# Patient Record
Sex: FEMALE | Race: WHITE | NOT HISPANIC OR LATINO | Employment: UNEMPLOYED | URBAN - METROPOLITAN AREA
[De-identification: names, ages, dates, MRNs, and addresses within clinical notes are randomized per-mention and may not be internally consistent; named-entity substitution may affect disease eponyms.]

---

## 2017-04-12 ENCOUNTER — COMMUNICATION - HEALTHEAST (OUTPATIENT)
Dept: INTERNAL MEDICINE | Facility: CLINIC | Age: 67
End: 2017-04-12

## 2017-04-12 DIAGNOSIS — J44.9 COPD (CHRONIC OBSTRUCTIVE PULMONARY DISEASE) (H): ICD-10-CM

## 2017-07-28 ENCOUNTER — COMMUNICATION - HEALTHEAST (OUTPATIENT)
Dept: INTERNAL MEDICINE | Facility: CLINIC | Age: 67
End: 2017-07-28

## 2017-07-28 DIAGNOSIS — J44.9 COPD (CHRONIC OBSTRUCTIVE PULMONARY DISEASE) (H): ICD-10-CM

## 2017-09-29 ENCOUNTER — HOSPITAL ENCOUNTER (OUTPATIENT)
Dept: MAMMOGRAPHY | Facility: HOSPITAL | Age: 67
Discharge: HOME | End: 2017-09-29
Attending: INTERNAL MEDICINE

## 2017-09-29 DIAGNOSIS — Z12.31 VISIT FOR SCREENING MAMMOGRAM: ICD-10-CM

## 2017-10-02 ENCOUNTER — COMMUNICATION - HEALTHEAST (OUTPATIENT)
Dept: INTERNAL MEDICINE | Facility: CLINIC | Age: 67
End: 2017-10-02

## 2017-10-02 ENCOUNTER — HOSPITAL ENCOUNTER (OUTPATIENT)
Dept: MAMMOGRAPHY | Facility: HOSPITAL | Age: 67
Discharge: HOME | End: 2017-10-02
Attending: INTERNAL MEDICINE

## 2017-10-02 DIAGNOSIS — N64.89 BREAST ASYMMETRY: ICD-10-CM

## 2017-11-09 ENCOUNTER — AMBULATORY - HEALTHEAST (OUTPATIENT)
Dept: INTERNAL MEDICINE | Facility: CLINIC | Age: 67
End: 2017-11-09

## 2017-11-09 ENCOUNTER — OFFICE VISIT - HEALTHEAST (OUTPATIENT)
Dept: INTERNAL MEDICINE | Facility: CLINIC | Age: 67
End: 2017-11-09

## 2017-11-09 DIAGNOSIS — D64.9 ANEMIA: ICD-10-CM

## 2017-11-09 DIAGNOSIS — Z00.00 GENERAL MEDICAL EXAM: ICD-10-CM

## 2017-11-09 DIAGNOSIS — J44.9 COPD (CHRONIC OBSTRUCTIVE PULMONARY DISEASE) (H): ICD-10-CM

## 2017-11-09 DIAGNOSIS — Z00.00 ROUTINE GENERAL MEDICAL EXAMINATION AT A HEALTH CARE FACILITY: ICD-10-CM

## 2017-11-09 DIAGNOSIS — I10 HTN (HYPERTENSION): ICD-10-CM

## 2017-11-09 RX ORDER — ALBUTEROL SULFATE 90 UG/1
2 AEROSOL, METERED RESPIRATORY (INHALATION) EVERY 6 HOURS PRN
Qty: 1 INHALER | Refills: 3 | Status: SHIPPED | OUTPATIENT
Start: 2017-11-09 | End: 2021-12-06

## 2017-11-09 RX ORDER — MECOBALAMIN 5000 MCG
15 TABLET,DISINTEGRATING ORAL DAILY
Status: ON HOLD | COMMUNITY
Start: 2017-11-09 | End: 2023-01-01

## 2017-11-09 RX ORDER — CETIRIZINE HYDROCHLORIDE 10 MG/1
10 TABLET ORAL DAILY PRN
Status: SHIPPED | COMMUNITY
Start: 2017-11-09

## 2017-11-09 ASSESSMENT — MIFFLIN-ST. JEOR: SCORE: 1192.1

## 2017-11-10 ENCOUNTER — AMBULATORY - HEALTHEAST (OUTPATIENT)
Dept: LAB | Facility: CLINIC | Age: 67
End: 2017-11-10

## 2017-11-10 ENCOUNTER — COMMUNICATION - HEALTHEAST (OUTPATIENT)
Dept: INTERNAL MEDICINE | Facility: CLINIC | Age: 67
End: 2017-11-10

## 2017-11-10 ENCOUNTER — AMBULATORY - HEALTHEAST (OUTPATIENT)
Dept: INTERNAL MEDICINE | Facility: CLINIC | Age: 67
End: 2017-11-10

## 2017-11-10 DIAGNOSIS — E11.9 DIABETES (H): ICD-10-CM

## 2017-11-10 DIAGNOSIS — D64.9 ANEMIA: ICD-10-CM

## 2017-11-10 DIAGNOSIS — Z00.00 GENERAL MEDICAL EXAM: ICD-10-CM

## 2017-11-10 DIAGNOSIS — Z00.00 ROUTINE GENERAL MEDICAL EXAMINATION AT A HEALTH CARE FACILITY: ICD-10-CM

## 2017-11-10 LAB
CHOLEST SERPL-MCNC: 169 MG/DL
FASTING STATUS PATIENT QL REPORTED: YES
HBA1C MFR BLD: 13.7 % (ref 3.5–6)
HDLC SERPL-MCNC: 64 MG/DL
LDLC SERPL CALC-MCNC: 76 MG/DL
TRIGL SERPL-MCNC: 146 MG/DL

## 2017-11-13 ENCOUNTER — COMMUNICATION - HEALTHEAST (OUTPATIENT)
Dept: INTERNAL MEDICINE | Facility: CLINIC | Age: 67
End: 2017-11-13

## 2017-11-15 ENCOUNTER — AMBULATORY - HEALTHEAST (OUTPATIENT)
Dept: EDUCATION SERVICES | Facility: CLINIC | Age: 67
End: 2017-11-15

## 2017-11-15 DIAGNOSIS — E11.9 DIABETES (H): ICD-10-CM

## 2017-12-09 ENCOUNTER — COMMUNICATION - HEALTHEAST (OUTPATIENT)
Dept: INTERNAL MEDICINE | Facility: CLINIC | Age: 67
End: 2017-12-09

## 2017-12-09 DIAGNOSIS — E11.9 DIABETES (H): ICD-10-CM

## 2017-12-10 RX ORDER — GLUCOSAMINE HCL/CHONDROITIN SU 500-400 MG
CAPSULE ORAL
Qty: 30 STRIP | Refills: 6 | Status: ON HOLD | OUTPATIENT
Start: 2017-12-10 | End: 2024-01-01

## 2017-12-20 ENCOUNTER — COMMUNICATION - HEALTHEAST (OUTPATIENT)
Dept: EDUCATION SERVICES | Facility: CLINIC | Age: 67
End: 2017-12-20

## 2017-12-20 ENCOUNTER — OFFICE VISIT - HEALTHEAST (OUTPATIENT)
Dept: EDUCATION SERVICES | Facility: CLINIC | Age: 67
End: 2017-12-20

## 2017-12-26 ENCOUNTER — OFFICE VISIT - HEALTHEAST (OUTPATIENT)
Dept: INTERNAL MEDICINE | Facility: CLINIC | Age: 67
End: 2017-12-26

## 2017-12-26 ENCOUNTER — AMBULATORY - HEALTHEAST (OUTPATIENT)
Dept: INTERNAL MEDICINE | Facility: CLINIC | Age: 67
End: 2017-12-26

## 2017-12-26 ENCOUNTER — COMMUNICATION - HEALTHEAST (OUTPATIENT)
Dept: INTERNAL MEDICINE | Facility: CLINIC | Age: 67
End: 2017-12-26

## 2017-12-26 DIAGNOSIS — E11.9 DIABETES MELLITUS, TYPE 2 (H): ICD-10-CM

## 2017-12-26 ASSESSMENT — MIFFLIN-ST. JEOR: SCORE: 1183.02

## 2018-10-02 ENCOUNTER — COMMUNICATION - HEALTHEAST (OUTPATIENT)
Dept: INTERNAL MEDICINE | Facility: CLINIC | Age: 68
End: 2018-10-02

## 2018-10-02 ENCOUNTER — AMBULATORY - HEALTHEAST (OUTPATIENT)
Dept: INTERNAL MEDICINE | Facility: CLINIC | Age: 68
End: 2018-10-02

## 2018-10-02 DIAGNOSIS — E11.9 DIABETES MELLITUS, TYPE 2 (H): ICD-10-CM

## 2018-10-17 ENCOUNTER — AMBULATORY - HEALTHEAST (OUTPATIENT)
Dept: LAB | Facility: CLINIC | Age: 68
End: 2018-10-17

## 2018-10-17 DIAGNOSIS — E11.9 DIABETES MELLITUS, TYPE 2 (H): ICD-10-CM

## 2018-10-17 LAB
ALBUMIN UR-MCNC: NEGATIVE MG/DL
APPEARANCE UR: CLEAR
BACTERIA #/AREA URNS HPF: ABNORMAL HPF
BILIRUB UR QL STRIP: NEGATIVE
CHOLEST SERPL-MCNC: 162 MG/DL
COLOR UR AUTO: YELLOW
CREAT UR-MCNC: 154.5 MG/DL
FASTING STATUS PATIENT QL REPORTED: YES
FASTING STATUS PATIENT QL REPORTED: YES
GLUCOSE BLD-MCNC: 167 MG/DL (ref 70–125)
GLUCOSE UR STRIP-MCNC: NEGATIVE MG/DL
HBA1C MFR BLD: 9.4 % (ref 3.5–6)
HDLC SERPL-MCNC: 62 MG/DL
HGB UR QL STRIP: NEGATIVE
KETONES UR STRIP-MCNC: NEGATIVE MG/DL
LDLC SERPL CALC-MCNC: 70 MG/DL
LEUKOCYTE ESTERASE UR QL STRIP: ABNORMAL
MICROALBUMIN UR-MCNC: 3.65 MG/DL (ref 0–1.99)
MICROALBUMIN/CREAT UR: 23.6 MG/G
NITRATE UR QL: POSITIVE
PH UR STRIP: 5.5 [PH] (ref 5–8)
RBC #/AREA URNS AUTO: ABNORMAL HPF
SP GR UR STRIP: >=1.03 (ref 1–1.03)
SQUAMOUS #/AREA URNS AUTO: ABNORMAL LPF
TRIGL SERPL-MCNC: 148 MG/DL
UROBILINOGEN UR STRIP-ACNC: ABNORMAL
WBC #/AREA URNS AUTO: ABNORMAL HPF

## 2018-10-20 LAB
BACTERIA SPEC CULT: ABNORMAL
BACTERIA SPEC CULT: ABNORMAL

## 2018-10-22 ENCOUNTER — COMMUNICATION - HEALTHEAST (OUTPATIENT)
Dept: INTERNAL MEDICINE | Facility: CLINIC | Age: 68
End: 2018-10-22

## 2018-10-23 ENCOUNTER — COMMUNICATION - HEALTHEAST (OUTPATIENT)
Dept: INTERNAL MEDICINE | Facility: CLINIC | Age: 68
End: 2018-10-23

## 2018-10-23 ENCOUNTER — OFFICE VISIT - HEALTHEAST (OUTPATIENT)
Dept: INTERNAL MEDICINE | Facility: CLINIC | Age: 68
End: 2018-10-23

## 2018-10-23 DIAGNOSIS — E11.9 DIABETES MELLITUS, TYPE 2 (H): ICD-10-CM

## 2018-10-23 ASSESSMENT — MIFFLIN-ST. JEOR: SCORE: 1223.85

## 2018-12-18 ENCOUNTER — COMMUNICATION - HEALTHEAST (OUTPATIENT)
Dept: INTERNAL MEDICINE | Facility: CLINIC | Age: 68
End: 2018-12-18

## 2018-12-18 DIAGNOSIS — J44.9 COPD (CHRONIC OBSTRUCTIVE PULMONARY DISEASE) (H): ICD-10-CM

## 2018-12-18 DIAGNOSIS — I10 HTN (HYPERTENSION): ICD-10-CM

## 2019-09-16 ENCOUNTER — COMMUNICATION - HEALTHEAST (OUTPATIENT)
Dept: LAB | Facility: CLINIC | Age: 69
End: 2019-09-16

## 2019-09-16 ENCOUNTER — AMBULATORY - HEALTHEAST (OUTPATIENT)
Dept: INTERNAL MEDICINE | Facility: CLINIC | Age: 69
End: 2019-09-16

## 2019-09-16 DIAGNOSIS — E11.8 TYPE 2 DIABETES MELLITUS WITH COMPLICATION, WITHOUT LONG-TERM CURRENT USE OF INSULIN (H): ICD-10-CM

## 2019-10-17 ENCOUNTER — COMMUNICATION - HEALTHEAST (OUTPATIENT)
Dept: INTERNAL MEDICINE | Facility: CLINIC | Age: 69
End: 2019-10-17

## 2019-10-17 DIAGNOSIS — Z00.00 ROUTINE GENERAL MEDICAL EXAMINATION AT A HEALTH CARE FACILITY: ICD-10-CM

## 2019-10-21 ENCOUNTER — AMBULATORY - HEALTHEAST (OUTPATIENT)
Dept: LAB | Facility: CLINIC | Age: 69
End: 2019-10-21

## 2019-10-21 DIAGNOSIS — E11.8 TYPE 2 DIABETES MELLITUS WITH COMPLICATION, WITHOUT LONG-TERM CURRENT USE OF INSULIN (H): ICD-10-CM

## 2019-10-21 LAB
CHOLEST SERPL-MCNC: 158 MG/DL
FASTING STATUS PATIENT QL REPORTED: YES
FASTING STATUS PATIENT QL REPORTED: YES
GLUCOSE BLD-MCNC: 141 MG/DL (ref 70–125)
HBA1C MFR BLD: 9.2 % (ref 3.5–6)
HDLC SERPL-MCNC: 56 MG/DL
LDLC SERPL CALC-MCNC: 69 MG/DL
TRIGL SERPL-MCNC: 166 MG/DL

## 2019-10-22 ENCOUNTER — COMMUNICATION - HEALTHEAST (OUTPATIENT)
Dept: INTERNAL MEDICINE | Facility: CLINIC | Age: 69
End: 2019-10-22

## 2019-10-31 ENCOUNTER — OFFICE VISIT - HEALTHEAST (OUTPATIENT)
Dept: INTERNAL MEDICINE | Facility: CLINIC | Age: 69
End: 2019-10-31

## 2019-10-31 ENCOUNTER — COMMUNICATION - HEALTHEAST (OUTPATIENT)
Dept: INTERNAL MEDICINE | Facility: CLINIC | Age: 69
End: 2019-10-31

## 2019-10-31 DIAGNOSIS — E11.69 TYPE 2 DIABETES MELLITUS WITH OTHER SPECIFIED COMPLICATION, WITHOUT LONG-TERM CURRENT USE OF INSULIN (H): ICD-10-CM

## 2019-10-31 DIAGNOSIS — Z00.00 ROUTINE GENERAL MEDICAL EXAMINATION AT A HEALTH CARE FACILITY: ICD-10-CM

## 2019-10-31 DIAGNOSIS — Z12.31 VISIT FOR SCREENING MAMMOGRAM: ICD-10-CM

## 2019-10-31 DIAGNOSIS — I10 HTN (HYPERTENSION): ICD-10-CM

## 2019-10-31 ASSESSMENT — MIFFLIN-ST. JEOR: SCORE: 1251.06

## 2019-12-26 ENCOUNTER — COMMUNICATION - HEALTHEAST (OUTPATIENT)
Dept: INTERNAL MEDICINE | Facility: CLINIC | Age: 69
End: 2019-12-26

## 2019-12-26 DIAGNOSIS — J44.9 COPD (CHRONIC OBSTRUCTIVE PULMONARY DISEASE) (H): ICD-10-CM

## 2020-03-15 ENCOUNTER — COMMUNICATION - HEALTHEAST (OUTPATIENT)
Dept: INTERNAL MEDICINE | Facility: CLINIC | Age: 70
End: 2020-03-15

## 2020-03-15 DIAGNOSIS — J44.9 COPD (CHRONIC OBSTRUCTIVE PULMONARY DISEASE) (H): ICD-10-CM

## 2020-03-30 ENCOUNTER — COMMUNICATION - HEALTHEAST (OUTPATIENT)
Dept: INTERNAL MEDICINE | Facility: CLINIC | Age: 70
End: 2020-03-30

## 2020-03-30 DIAGNOSIS — J44.9 COPD (CHRONIC OBSTRUCTIVE PULMONARY DISEASE) (H): ICD-10-CM

## 2020-04-08 ENCOUNTER — COMMUNICATION - HEALTHEAST (OUTPATIENT)
Dept: INTERNAL MEDICINE | Facility: CLINIC | Age: 70
End: 2020-04-08

## 2020-04-08 DIAGNOSIS — J44.9 COPD (CHRONIC OBSTRUCTIVE PULMONARY DISEASE) (H): ICD-10-CM

## 2020-05-05 ENCOUNTER — COMMUNICATION - HEALTHEAST (OUTPATIENT)
Dept: INTERNAL MEDICINE | Facility: CLINIC | Age: 70
End: 2020-05-05

## 2020-05-05 DIAGNOSIS — J44.9 COPD (CHRONIC OBSTRUCTIVE PULMONARY DISEASE) (H): ICD-10-CM

## 2020-06-15 ENCOUNTER — COMMUNICATION - HEALTHEAST (OUTPATIENT)
Dept: INTERNAL MEDICINE | Facility: CLINIC | Age: 70
End: 2020-06-15

## 2020-06-15 DIAGNOSIS — J44.9 COPD (CHRONIC OBSTRUCTIVE PULMONARY DISEASE) (H): ICD-10-CM

## 2020-06-16 RX ORDER — PREDNISONE 10 MG/1
TABLET ORAL
Qty: 90 TABLET | Refills: 11 | Status: SHIPPED | OUTPATIENT
Start: 2020-06-16 | End: 2021-12-06

## 2020-12-02 ENCOUNTER — COMMUNICATION - HEALTHEAST (OUTPATIENT)
Dept: INTERNAL MEDICINE | Facility: CLINIC | Age: 70
End: 2020-12-02

## 2020-12-02 DIAGNOSIS — Z00.00 ROUTINE GENERAL MEDICAL EXAMINATION AT A HEALTH CARE FACILITY: ICD-10-CM

## 2020-12-02 DIAGNOSIS — I10 HTN (HYPERTENSION): ICD-10-CM

## 2021-03-31 ENCOUNTER — COMMUNICATION - HEALTHEAST (OUTPATIENT)
Dept: INTERNAL MEDICINE | Facility: CLINIC | Age: 71
End: 2021-03-31

## 2021-03-31 DIAGNOSIS — I10 HTN (HYPERTENSION): ICD-10-CM

## 2021-03-31 DIAGNOSIS — Z00.00 ROUTINE GENERAL MEDICAL EXAMINATION AT A HEALTH CARE FACILITY: ICD-10-CM

## 2021-04-01 RX ORDER — GLIPIZIDE 5 MG/1
TABLET ORAL
Qty: 90 TABLET | Refills: 0 | Status: SHIPPED | OUTPATIENT
Start: 2021-04-01 | End: 2021-12-06

## 2021-04-01 RX ORDER — LISINOPRIL 20 MG/1
TABLET ORAL
Qty: 90 TABLET | Refills: 0 | Status: SHIPPED | OUTPATIENT
Start: 2021-04-01 | End: 2021-12-06

## 2021-05-26 ENCOUNTER — RECORDS - HEALTHEAST (OUTPATIENT)
Dept: ADMINISTRATIVE | Facility: CLINIC | Age: 71
End: 2021-05-26

## 2021-05-27 ENCOUNTER — RECORDS - HEALTHEAST (OUTPATIENT)
Dept: ADMINISTRATIVE | Facility: CLINIC | Age: 71
End: 2021-05-27

## 2021-05-28 ENCOUNTER — RECORDS - HEALTHEAST (OUTPATIENT)
Dept: ADMINISTRATIVE | Facility: CLINIC | Age: 71
End: 2021-05-28

## 2021-05-31 VITALS — BODY MASS INDEX: 29.61 KG/M2 | WEIGHT: 158 LBS

## 2021-05-31 VITALS — HEIGHT: 61 IN | WEIGHT: 158 LBS | BODY MASS INDEX: 29.83 KG/M2

## 2021-05-31 VITALS — BODY MASS INDEX: 30.21 KG/M2 | WEIGHT: 160 LBS | HEIGHT: 61 IN

## 2021-06-01 NOTE — TELEPHONE ENCOUNTER
Please place appropriate lab orders for upcoming lab appointment. Thank you.    Arleen Hernandez, CMA

## 2021-06-02 VITALS — WEIGHT: 167 LBS | BODY MASS INDEX: 31.53 KG/M2 | HEIGHT: 61 IN

## 2021-06-02 NOTE — TELEPHONE ENCOUNTER
RN cannot approve Refill Request    RN can NOT refill this medication Protocol failed and NO refill given.     Regla Desai, [facility] Triage/Med Refill 10/18/2019    Requested Prescriptions   Pending Prescriptions Disp Refills     glipiZIDE (GLUCOTROL) 5 MG tablet [Pharmacy Med Name: GlipiZIDE 5MG       TAB] 90 tablet 3     Sig: TAKE 1 TABLET BY MOUTH ONCE DAILY       Oral Hypoglycemics Refill Protocol Failed - 10/17/2019  4:45 PM        Failed - Visit with PCP or prescribing provider visit in last 6 months       Last office visit with prescriber/PCP: Visit date not found OR same dept: 10/23/2018 Lacho Gunter MD OR same specialty: 10/23/2018 Lacho Gunter MD Last physical: Visit date not found Last MTM visit: Visit date not found         Next appt within 3 mo: Visit date not found  Next physical within 3 mo: Visit date not found  Prescriber OR PCP: Lacho Gunter MD  Last diagnosis associated with med order: There are no diagnoses linked to this encounter.   If protocol passes may refill for 12 months if within 3 months of last provider visit (or a total of 15 months).           Failed - A1C in last 6 months     Hemoglobin A1c   Date Value Ref Range Status   10/17/2018 9.4 (H) 3.5 - 6.0 % Final               Failed - Serum creatinine in last year     Creatinine   Date Value Ref Range Status   11/10/2017 0.80 0.60 - 1.10 mg/dL Final             Passed - Microalbumin in last year      Microalbumin, Random Urine   Date Value Ref Range Status   10/17/2018 3.65 (H) 0.00 - 1.99 mg/dL Final                  Passed - Blood pressure in last year     BP Readings from Last 1 Encounters:   10/23/18 158/84

## 2021-06-02 NOTE — PROGRESS NOTES
Office Visit - Follow up    Stella Greene   69 y.o. female    Date of Visit: 10/31/2019    Chief Complaint   Patient presents with     Diabetes     Hypertension       Subjective: Diabetes mellitus type 2 with hypertension and obesity and insulin resistance.    Initial A1c 13 today's A1c 9.7.  Desire it less than 8.0.    The patient has had a history of hypertension as well but no target organ damage related to same.  Initial A1c as noted above was near 13.  She is trying to work on diet and exercise as her blood sugars come under somewhat better control his weight is up 6 more pounds.    Colonoscopy dated August 8, 2011 showed tubular adenomatous polyps mammogram incomplete but negative ultrasound October 2, 2017.    No blood in stool or urine medication list reviewed well-tolerated normal effects reconciled in chart.    No known drug allergies.  She is trying to exercise for she is a non-smoker she does not abuse alcohol.  Her  has been successful in losing weight also patient of this examiner with coronary artery disease.  Now retired 13+ years.    ROS: A comprehensive review of systems was performed and was otherwise negative    Medications:  Prior to Admission medications    Medication Sig Start Date End Date Taking? Authorizing Provider   ADVAIR DISKUS 250-50 mcg/dose DISKUS INHALE ONE PUFF MOUTH TWICE DAILY 12/19/18  Yes Lacho Gunter MD   blood glucose test (CONTOUR NEXT STRIPS) strips Test four times daily.  Dx code DM2. 12/10/17  Yes Lacho Gunter MD   cetirizine (ZYRTEC) 10 MG tablet Take 10 mg by mouth daily.   Yes PROVIDER, HISTORICAL   fluticasone (FLONASE) 50 mcg/actuation nasal spray 1 spray into each nostril daily.   Yes PROVIDER, HISTORICAL   generic lancets Test each day as directed. 11/15/17  Yes Lacho Gunter MD   glipiZIDE (GLUCOTROL) 5 MG tablet TAKE 1 TABLET BY MOUTH ONCE DAILY 10/18/19  Yes Lacho Gunter MD   lansoprazole (PREVACID) 15 MG capsule Take 15 mg  by mouth daily.   Yes PROVIDER, HISTORICAL   lisinopril (PRINIVIL,ZESTRIL) 20 MG tablet TAKE ONE TABLET BY MOUTH ONCE DAILY 12/19/18  Yes Lacho Gunter MD   metFORMIN (GLUCOPHAGE) 1000 MG tablet TAKE ONE TABLET BY MOUTH TWICE DAILY WITH MEALS 12/19/18  Yes Lacho Gunter MD   multivitamin therapeutic tablet Take 1 tablet by mouth daily.   Yes PROVIDER, HISTORICAL   predniSONE (DELTASONE) 10 mg tablet TAKE ONE TABLET BY MOUTH ONCE DAILY 12/19/18  Yes Lacho Gunter MD   albuterol (PROAIR HFA;PROVENTIL HFA;VENTOLIN HFA) 90 mcg/actuation inhaler Inhale 2 puffs every 6 (six) hours as needed for wheezing (prn). 11/9/17   Lacho Gunter MD       Allergies: No Known Allergies    Immunizations:   Immunization History   Administered Date(s) Administered     Influenza high dose,seasonal,PF, 65+ yrs 11/09/2017, 10/31/2019     Influenza, seasonal,quad inj 6-35 mos 11/18/2013     Tdap 11/18/2013       Exam Chest clear to auscultation and percussion.  Heart tones regular rhythm without murmur rub or gallop.  Abdomen soft nontender no organomegaly.  No peritoneal signs.  Extremities free of edema cyanosis or clubbing.  Neck veins nondistended no thyromegaly or scleral icterus noted, carotids full.  Skin warm and dry easily conversant good spirited.  Normal intelligence.  Neurologically intact no gross localizing findings.    Recheck 138/8297 is a pulse O2 sats 97% respiratory rate 18 and unlabored.    Will be spending the winter in [place] upon her return return to clinic.  For fasting labs to include A1c blood sugar lipid panel plus urine for microalbumin.  Lipid panel was excellent blood sugar was 140 A1c 9.7.  Improved.    Assessment and Plan  Diabetes mellitus type 2 with improving A1c but not optimal.  Prefer less than 8.  Consider Januvia addition patient wished to wait on any medicine addition and will try to work on weight with caloric restriction regular exercise.  Insulin resistance related to  obesity BMI 32+.    Hypertension with adequate control.  138/82 no target organ damage related to same.    Tubular adenomatous colon polyp see colonoscopy report August 8, 2011.  Obesity with insulin resistance and diabetes mellitus type 2.    COPD continue same meds and inhalers.  RTC March or April 2020 for fasting office visit with labs to include A1c lipid panel urine for microalbumin and blood sugar.  Encourage weight loss with regular exercise and the need to lose weight was emphasized to improve insulin resistance.        The following high BMI interventions were performed this visit: encouragement to exercise    Lacho Gunter MD    Patient Active Problem List   Diagnosis     Essential Hypertension     Chronic Obstructive Pulmonary Disease

## 2021-06-03 VITALS
OXYGEN SATURATION: 97 % | DIASTOLIC BLOOD PRESSURE: 82 MMHG | SYSTOLIC BLOOD PRESSURE: 138 MMHG | HEIGHT: 61 IN | WEIGHT: 173 LBS | BODY MASS INDEX: 32.66 KG/M2 | HEART RATE: 97 BPM

## 2021-06-04 NOTE — TELEPHONE ENCOUNTER
RN cannot approve Refill Request    RN can NOT refill this medication med is not covered by policy/route to provider     . Last office visit: 10/31/2019 Lacho Gunter MD Last Physical: 11/9/2017 Last MTM visit: Visit date not found Last visit same specialty: 10/31/2019 Lacho Gunter MD.  Next visit within 3 mo: Visit date not found  Next physical within 3 mo: Visit date not found      Regla Desai, [facility] Triage/Med Refill 12/28/2019    Requested Prescriptions   Pending Prescriptions Disp Refills     ADVAIR DISKUS 250-50 mcg/dose DISKUS [Pharmacy Med Name: Advair Diskus 250-50 MCG/DOSE Inhalation Aerosol Powder Breath Activated] 60 each 0     Sig: INHALE 1 PUFF BY MOUTH TWICE DAILY       There is no refill protocol information for this order        predniSONE (DELTASONE) 10 mg tablet [Pharmacy Med Name: predniSONE 10 MG Oral Tablet] 90 tablet 0     Sig: TAKE 1 TABLET BY MOUTH ONCE DAILY       There is no refill protocol information for this order

## 2021-06-06 NOTE — TELEPHONE ENCOUNTER
RN cannot approve Refill Request    RN can NOT refill this medication med is not covered by policy/route to provider     . Last office visit: 10/31/2019 Lacho Gunter MD Last Physical: 11/9/2017 Last MTM visit: Visit date not found Last visit same specialty: 10/31/2019 Lacho Gunter MD.  Next visit within 3 mo: Visit date not found  Next physical within 3 mo: Visit date not found      Regla Desai, [facility] Triage/Med Refill 3/16/2020    Requested Prescriptions   Pending Prescriptions Disp Refills     ADVAIR DISKUS 250-50 mcg/dose DISKUS [Pharmacy Med Name: Advair Diskus 250-50 MCG/DOSE Inhalation Aerosol Powder Breath Activated] 60 each 0     Sig: INHALE 1 DOSE BY MOUTH TWICE DAILY       There is no refill protocol information for this order        predniSONE (DELTASONE) 10 mg tablet [Pharmacy Med Name: predniSONE 10 MG Oral Tablet] 90 tablet 0     Sig: Take 1 tablet by mouth once daily       There is no refill protocol information for this order

## 2021-06-07 NOTE — TELEPHONE ENCOUNTER
RN cannot approve Refill Request    RN can NOT refill this medication med is not covered by policy/route to provider. Last office visit: 10/31/2019 Lacho Gunter MD Last Physical: 11/9/2017 Last MTM visit: Visit date not found Last visit same specialty: 10/31/2019 Lacho Gunter MD.  Next visit within 3 mo: Visit date not found  Next physical within 3 mo: Visit date not found      Uma Aldrich, [facility] Triage/Med Refill 5/5/2020    Requested Prescriptions   Pending Prescriptions Disp Refills     ADVAIR DISKUS 250-50 mcg/dose DISKUS [Pharmacy Med Name: Advair Diskus 250-50 MCG/DOSE Inhalation Aerosol Powder Breath Activated] 60 each 0     Sig: INHALE 1 DOSE BY MOUTH TWICE DAILY       There is no refill protocol information for this order

## 2021-06-08 NOTE — TELEPHONE ENCOUNTER
RN cannot approve Refill Request    RN can NOT refill this medication med is not covered by policy/route to provider   . Last office visit: 10/31/2019 Lacho Gunter MD Last Physical: 11/9/2017 Last MTM visit: Visit date not found Last visit same specialty: 10/31/2019 Lacho Gunter MD.  Next visit within 3 mo: Visit date not found  Next physical within 3 mo: Visit date not found      Regla Desai, [facility] Triage/Med Refill 6/16/2020    Requested Prescriptions   Pending Prescriptions Disp Refills     ADVAIR DISKUS 250-50 mcg/dose DISKUS [Pharmacy Med Name: Advair Diskus 250-50 MCG/DOSE Inhalation Aerosol Powder Breath Activated] 60 each 11     Sig: INHALE 1 DOSE BY MOUTH TWICE DAILY       There is no refill protocol information for this order        predniSONE (DELTASONE) 10 mg tablet [Pharmacy Med Name: predniSONE 10 MG Oral Tablet] 90 tablet 11     Sig: Take 1 tablet by mouth once daily       There is no refill protocol information for this order

## 2021-06-13 NOTE — TELEPHONE ENCOUNTER
RN cannot approve Refill Request    RN can NOT refill this medication Protocol failed and NO refill given. Last office visit: 10/31/2019 Lacho Gunter MD Last Physical: 11/9/2017 Last MTM visit: Visit date not found Last visit same specialty: 10/31/2019 Lacho Gunter MD.  Next visit within 3 mo: Visit date not found  Next physical within 3 mo: Visit date not found      Regla Desai, [facility] Triage/Med Refill 12/4/2020    Requested Prescriptions   Pending Prescriptions Disp Refills     glipiZIDE (GLUCOTROL) 5 MG tablet [Pharmacy Med Name: glipiZIDE 5 MG Oral Tablet] 90 tablet 0     Sig: Take 1 tablet by mouth once daily       Oral Hypoglycemics Refill Protocol Failed - 12/2/2020  6:06 PM        Failed - Visit with PCP or prescribing provider visit in last 6 months       Last office visit with prescriber/PCP: Visit date not found OR same dept: Visit date not found OR same specialty: 10/31/2019 Lacho Gunter MD Last physical: Visit date not found Last MTM visit: Visit date not found         Next appt within 3 mo: Visit date not found  Next physical within 3 mo: Visit date not found  Prescriber OR PCP: Lacho Gunter MD  Last diagnosis associated with med order: 1. Routine general medical examination at a health care facility  - glipiZIDE (GLUCOTROL) 5 MG tablet [Pharmacy Med Name: glipiZIDE 5 MG Oral Tablet]; Take 1 tablet by mouth once daily  Dispense: 90 tablet; Refill: 0  - metFORMIN (GLUCOPHAGE) 1000 MG tablet [Pharmacy Med Name: metFORMIN HCl 1000 MG Oral Tablet]; TAKE 1 TABLET BY MOUTH TWICE DAILY WITH MEALS  Dispense: 180 tablet; Refill: 0    2. HTN (hypertension)  - lisinopriL (PRINIVIL,ZESTRIL) 20 MG tablet [Pharmacy Med Name: Lisinopril 20 MG Oral Tablet]; Take 1 tablet by mouth once daily  Dispense: 90 tablet; Refill: 0  - metFORMIN (GLUCOPHAGE) 1000 MG tablet [Pharmacy Med Name: metFORMIN HCl 1000 MG Oral Tablet]; TAKE 1 TABLET BY MOUTH TWICE DAILY WITH MEALS  Dispense:  180 tablet; Refill: 0     If protocol passes may refill for 12 months if within 3 months of last provider visit (or a total of 15 months).           Failed - A1C in last 6 months     Hemoglobin A1c   Date Value Ref Range Status   10/21/2019 9.2 (H) 3.5 - 6.0 % Final               Failed - Microalbumin in last year      Microalbumin, Random Urine   Date Value Ref Range Status   10/17/2018 3.65 (H) 0.00 - 1.99 mg/dL Final                  Failed - Blood pressure in last year     BP Readings from Last 1 Encounters:   10/31/19 138/82             Failed - Serum creatinine in last year     Creatinine   Date Value Ref Range Status   11/10/2017 0.80 0.60 - 1.10 mg/dL Final                lisinopriL (PRINIVIL,ZESTRIL) 20 MG tablet [Pharmacy Med Name: Lisinopril 20 MG Oral Tablet] 90 tablet 0     Sig: Take 1 tablet by mouth once daily       Ace Inhibitors Refill Protocol Failed - 12/2/2020  6:06 PM        Failed - PCP or prescribing provider visit in past 12 months       Last office visit with prescriber/PCP: 10/31/2019 Lacho Gunter MD OR same dept: Visit date not found OR same specialty: 10/31/2019 Lacho Gunter MD  Last physical: 11/9/2017 Last MTM visit: Visit date not found   Next visit within 3 mo: Visit date not found  Next physical within 3 mo: Visit date not found  Prescriber OR PCP: Lacho Gunter MD  Last diagnosis associated with med order: 1. Routine general medical examination at a health care facility  - glipiZIDE (GLUCOTROL) 5 MG tablet [Pharmacy Med Name: glipiZIDE 5 MG Oral Tablet]; Take 1 tablet by mouth once daily  Dispense: 90 tablet; Refill: 0  - metFORMIN (GLUCOPHAGE) 1000 MG tablet [Pharmacy Med Name: metFORMIN HCl 1000 MG Oral Tablet]; TAKE 1 TABLET BY MOUTH TWICE DAILY WITH MEALS  Dispense: 180 tablet; Refill: 0    2. HTN (hypertension)  - lisinopriL (PRINIVIL,ZESTRIL) 20 MG tablet [Pharmacy Med Name: Lisinopril 20 MG Oral Tablet]; Take 1 tablet by mouth once daily  Dispense: 90  tablet; Refill: 0  - metFORMIN (GLUCOPHAGE) 1000 MG tablet [Pharmacy Med Name: metFORMIN HCl 1000 MG Oral Tablet]; TAKE 1 TABLET BY MOUTH TWICE DAILY WITH MEALS  Dispense: 180 tablet; Refill: 0    If protocol passes may refill for 12 months if within 3 months of last provider visit (or a total of 15 months).             Failed - Serum Potassium in past 12 months     No results found for: LN-POTASSIUM          Failed - Blood pressure filed in past 12 months     BP Readings from Last 1 Encounters:   10/31/19 138/82             Failed - Serum Creatinine in past 12 months     Creatinine   Date Value Ref Range Status   11/10/2017 0.80 0.60 - 1.10 mg/dL Final                metFORMIN (GLUCOPHAGE) 1000 MG tablet [Pharmacy Med Name: metFORMIN HCl 1000 MG Oral Tablet] 180 tablet 0     Sig: TAKE 1 TABLET BY MOUTH TWICE DAILY WITH MEALS       Metformin Refill Protocol Failed - 12/2/2020  6:06 PM        Failed - Blood pressure in last 12 months     BP Readings from Last 1 Encounters:   10/31/19 138/82             Failed - LFT or AST or ALT in last 12 months     Albumin   Date Value Ref Range Status   11/10/2017 3.6 3.5 - 5.0 g/dL Final     Bilirubin, Total   Date Value Ref Range Status   11/10/2017 0.8 0.0 - 1.0 mg/dL Final     Alkaline Phosphatase   Date Value Ref Range Status   11/10/2017 90 45 - 120 U/L Final     AST   Date Value Ref Range Status   11/10/2017 15 0 - 40 U/L Final     ALT   Date Value Ref Range Status   11/10/2017 38 0 - 45 U/L Final     Protein, Total   Date Value Ref Range Status   11/10/2017 6.9 6.0 - 8.0 g/dL Final                Failed - GFR or Serum Creatinine in last 6 months     GFR MDRD Non Af Amer   Date Value Ref Range Status   11/10/2017 >60 >60 mL/min/1.73m2 Final     GFR MDRD Af Amer   Date Value Ref Range Status   11/10/2017 >60 >60 mL/min/1.73m2 Final             Failed - Visit with PCP or prescribing provider visit in last 6 months or next 3 months     Last office visit with prescriber/PCP:  Visit date not found OR same dept: Visit date not found OR same specialty: 10/31/2019 Lacho Gunter MD Last physical: Visit date not found Last MTM visit: Visit date not found         Next appt within 3 mo: Visit date not found  Next physical within 3 mo: Visit date not found  Prescriber OR PCP: Lacho Gunter MD  Last diagnosis associated with med order: 1. Routine general medical examination at a health care facility  - glipiZIDE (GLUCOTROL) 5 MG tablet [Pharmacy Med Name: glipiZIDE 5 MG Oral Tablet]; Take 1 tablet by mouth once daily  Dispense: 90 tablet; Refill: 0  - metFORMIN (GLUCOPHAGE) 1000 MG tablet [Pharmacy Med Name: metFORMIN HCl 1000 MG Oral Tablet]; TAKE 1 TABLET BY MOUTH TWICE DAILY WITH MEALS  Dispense: 180 tablet; Refill: 0    2. HTN (hypertension)  - lisinopriL (PRINIVIL,ZESTRIL) 20 MG tablet [Pharmacy Med Name: Lisinopril 20 MG Oral Tablet]; Take 1 tablet by mouth once daily  Dispense: 90 tablet; Refill: 0  - metFORMIN (GLUCOPHAGE) 1000 MG tablet [Pharmacy Med Name: metFORMIN HCl 1000 MG Oral Tablet]; TAKE 1 TABLET BY MOUTH TWICE DAILY WITH MEALS  Dispense: 180 tablet; Refill: 0     If protocol passes may refill for 12 months if within 3 months of last provider visit (or a total of 15 months).           Failed - A1C in last 6 months     Hemoglobin A1c   Date Value Ref Range Status   10/21/2019 9.2 (H) 3.5 - 6.0 % Final               Failed - Microalbumin in last year      Microalbumin, Random Urine   Date Value Ref Range Status   10/17/2018 3.65 (H) 0.00 - 1.99 mg/dL Final

## 2021-06-14 NOTE — PROGRESS NOTES
Assessment:   Stella arrived today with her BG log.  She is testing FBG most days with 100 % of results above target range as indicated below.  BG have been trending toward goal, however.    She is taking Metformin as prescribed and has occasional loose stools.  Pt may benefit from increased dose of Metformin.  Will consult with PCP.  Pt has been making positive lifestyle changes and has reduced portion sizes and limited CHO intake.  She has lost approximately 2 lbs in the past month.  Congratulated her for her efforts.  She asked about fiber and sources of CHO.  Discussed the different CHO and the benefits of fiber.  Demonstrated use of [employer] website.  She is anticipating her trip to [place] and the cruise.  Discussed ways to enjoy the cruise and make better choices.  She plans to be active on the beach and walking the promenade.        Plan: see goals    Subjective and Objective:      Stella Greene is referred by Dr Gunter for Diabetes Education.     Lab Results   Component Value Date    HGBA1C 13.7 (H) 11/10/2017     Meals;  B-donut-diet pop or water or egg or oatmeal or PB with toast  L-meat or unsweet tea or [employer] chili  D-pizza or chicken sand or pot pie from [employer] or [facility]   HS-popcorn or sunchips    Current diabetes medications:    Metformin 500 mg bid    FBG: most recent  138, 156, 138, 154, 161, 168, 163, 167, 183, 161, 186    Goals       activity            Add activity most days        medications            Take as directed.  DOING        monitoring            Test BG each day.  DOING        nutrition            New goals:  Add foods with fiber      Watch portion size of CHO foods.  DOING  Add protein to each meal.  DOING            Follow up:   MNT (medical nutrition therapy)      Education:     Monitoring   Meter (per above goals): Assessed and Discussed  Monitoring: Assessed and Discussed  BG goals: Assessed and Discussed    Nutrition Management  Nutrition Management: Assessed  "and Discussed  Weight: Assessed and Discussed  Portions/Balance: Assessed and Discussed  Carb ID/Count: Assessed and Discussed  Label Reading: Assessed and Discussed  Heart Healthy Fats: Needs instruction/review at follow-up  Menu Planning: Needs instruction/review at follow-up  Dining Out: Assessed, Discussed and Literature provided  Physical Activity: Needs instruction/review at follow-up  Medications: Assessed and Discussed  Orals: Assessed and Discussed  Injected Medications: Not addressed   Storage/Exp:Not addressed   Site Rotation: Not addressed   Sites Assessed: no    Diabetes Disease Process: Assessed    Acute Complications: Prevent, Detect, Treat:  Hypoglycemia: Assessed and Discussed  Hyperglycemia: Assessed and Discussed  Sick Days: Needs instruction/review at follow-up  Driving: Not addressed    Chronic Complications  Foot Care:Needs instruction/review at follow-up  Skin Care: Needs instruction/review at follow-up  Eye: Needs instruction/review at follow-up  ABC: Needs instruction/review at follow-up  Teeth:Needs instruction/review at follow-up  Goal Setting and Problem Solving: Needs instruction/review at follow-up  Barriers: Needs instruction/review at follow-up \"love of sugar and carbs\"  Psychosocial Adjustments: Needs instruction/review at follow-up      Time spent with the patient: 60 minutes for diabetes education and counseling.   Previous Education: no  Visit Type:MNT  Hours Remaining: DSMT 9 and MNT 2      Melissa Steward RD, LD, CDE  12/20/2017               "

## 2021-06-14 NOTE — PROGRESS NOTES
Assessment:   Stella arrived today newly dx with T2DM.  Educated her on A1c and BG goals.  Demonstrated Contour Next BG meter and pt returned demonstration.    She has started taking Metformin bid as prescribed without concerns.  She has many questions about nutrition and the amount of sugar in foods.  Provided Living well with diabetes and place mat and discussed.  She is including three meals per day.  Educated pt on portion size of typical foods.  She eats out most dinners.  Provided eating out tips and discussed.    She has not been active.  Will discuss at next appt.   Pt has a strong family hx of diabetes.  She is planning on a vacation in [place] for two months with two cruises.  Will discuss traveling at next appt.      Plan: see goals    Subjective and Objective:      Stella Greene is referred by Dr Gunter for Diabetes Education.     Lab Results   Component Value Date    HGBA1C 13.7 (H) 11/10/2017     Meals;  B-donut-diet pop or water   L-meat or unsweet tea  D-pizza or chicken sand or pot pie from [employer] or [employer]   HS-popcorn or sunchips    Current diabetes medications:    Metformin 500 mg bid    Goals     None          Follow up:   MNT (medical nutrition therapy)      Education:     Monitoring   Meter (per above goals): Assessed, Discussed, Literature provided and Patient returned demonstration  Monitoring: Assessed, Discussed, Literature provided and Patient returned demonstration  BG goals: Assessed, Discussed and Literature provided    Nutrition Management  Nutrition Management: Assessed, Discussed and Literature provided  Weight: Assessed and Discussed  Portions/Balance: Assessed and Discussed  Carb ID/Count: Assessed, Discussed and Literature provided  Label Reading: Assessed, Discussed and Literature provided  Heart Healthy Fats: Needs instruction/review at follow-up  Menu Planning: Needs instruction/review at follow-up  Dining Out: Assessed, Discussed and Literature provided  Physical  "Activity: Needs instruction/review at follow-up  Medications: Assessed and Discussed  Orals: Assessed and Discussed  Injected Medications: Not addressed   Storage/Exp:Not addressed   Site Rotation: Not addressed   Sites Assessed: no    Diabetes Disease Process: Assessed    Acute Complications: Prevent, Detect, Treat:  Hypoglycemia: Assessed, Discussed and Literature provided  Hyperglycemia: Assessed, Discussed and Literature provided  Sick Days: Needs instruction/review at follow-up  Driving: Not addressed    Chronic Complications  Foot Care:Needs instruction/review at follow-up  Skin Care: Needs instruction/review at follow-up  Eye: Needs instruction/review at follow-up  ABC: Needs instruction/review at follow-up  Teeth:Needs instruction/review at follow-up  Goal Setting and Problem Solving: Needs instruction/review at follow-up  Barriers: Needs instruction/review at follow-up \"love of sugar and carbs\"  Psychosocial Adjustments: Needs instruction/review at follow-up      Time spent with the patient: 60 minutes for diabetes education and counseling.   Previous Education: no  Visit Type:DSMT  Hours Remaining: DSMT 9 and MNT 3      Melissa Steward RD, LD, CDE  11/15/2017               "

## 2021-06-14 NOTE — PROGRESS NOTES
Assessment and Plan:   Annual wellness visit physical exam.  Labs to be done tomorrow fasting.    67-year-old female.    1. Routine general medical examination at a health care facility  Annual wellness visit and physical exam with labs to be done tomorrow.  - Microalbumin, Random Urine; Future  - Glycosylated Hemoglobin A1c; Future      The patient's current medical problems were reviewed.    I have had an Advance Directives discussion with the patient.  The following health maintenance schedule was reviewed with the patient and provided in printed form in the after visit summary:   Health Maintenance   Topic Date Due     ZOSTER VACCINE  2011     DXA SCAN  2016     PNEUMOCOCCAL POLYSACCHARIDE VACCINE AGE 65 AND OVER  2016     PNEUMOCOCCAL CONJUGATE VACCINE FOR ADULTS (PCV13 OR PREVNAR)  2016     FALL RISK ASSESSMENT  2018     MAMMOGRAM  2019     COLONOSCOPY  2021     ADVANCE DIRECTIVES DISCUSSED WITH PATIENT  2022     TD 18+ HE  2023     INFLUENZA VACCINE RULE BASED  Completed        Subjective:   Chief Complaint: Stella Greene is an 67 y.o. female here for an Annual Wellness visit.   HPI: Annual wellness visit and physical exam with fasting labs to be done tomorrow.  Patient feels she could be diabetic she is 67 years of age she has excessive thirst and urination.  No symptoms of peripheral neuropathy.    Non-smoker.    No alcohol.    No known drug allergies.    Medication list reviewed including Prevacid multivitamin Zyrtec and albuterol.    History of laparoscopic examination of her abdomen plus D&C.  Prior nasal polyp surgery.  GYN checks including Pap pelvic rectal and breast checks with her gynecologist Dr. Fong.    COPD history of asthma.  Controlled with Advair disc.  History of hypertension also on prednisone Deltasone 10 mg daily.    Mother  blood dyscrasias 79.    Father  75 stroke.    3 children well  well also patient of this  examiner.    Last mammogram incomplete finally complete with ultrasound right breast all negative October 2, 2017.    Tubular adenomatous colon polyp seen in colonoscopy dated August 8, 2011.  Dr. Huizar.  Medication list reviewed generally well-tolerated.    Review of Systems:    Please see above.  The rest of the review of systems are negative for all systems.    Patient Care Team:  Lacho Gunter MD as PCP - General  Govind Huizar MD (Gastroenterology)     Patient Active Problem List   Diagnosis     Essential Hypertension     Chronic Obstructive Pulmonary Disease     No past medical history on file.   No past surgical history on file.   No family history on file.   Social History     Social History     Marital status:      Spouse name: N/A     Number of children: N/A     Years of education: N/A     Occupational History     Not on file.     Social History Main Topics     Smoking status: Never Smoker     Smokeless tobacco: Never Used     Alcohol use No     Drug use: Not on file     Sexual activity: Not on file     Other Topics Concern     Not on file     Social History Narrative      Current Outpatient Prescriptions   Medication Sig Dispense Refill     ADVAIR DISKUS 250-50 mcg/dose DISKUS INHALE ONE PUFF BY MOUTH TWICE DAILY 60 each 0     cetirizine (ZYRTEC) 10 MG tablet Take 10 mg by mouth daily.       fluticasone (FLONASE) 50 mcg/actuation nasal spray 1 spray into each nostril daily.       lansoprazole (PREVACID) 15 MG capsule Take 15 mg by mouth daily.       lisinopril (PRINIVIL,ZESTRIL) 20 MG tablet TAKE 1 TABLET DAILY 90 tablet 3     multivitamin therapeutic tablet Take 1 tablet by mouth daily.       predniSONE (DELTASONE) 10 MG tablet Take 1 tablet (10 mg total) by mouth daily. 90 tablet 3     albuterol (PROAIR HFA;PROVENTIL HFA;VENTOLIN HFA) 90 mcg/actuation inhaler Inhale 2 puffs every 6 (six) hours as needed for wheezing (prn).       No current facility-administered medications for this visit.  "      Objective:   Vital Signs:   Visit Vitals     /86     Pulse 78     Ht 5' 1.25\" (1.556 m)     Wt 160 lb (72.6 kg)     BMI 29.99 kg/m2        VisionScreening:  No exam data present     PHYSICAL EXAM  Chest clear to auscultation and percussion.  Heart tones regular rhythm without murmur rub or gallop.  Abdomen soft nontender no organomegaly.  No peritoneal signs.  Extremities free of edema cyanosis or clubbing.  Neck veins nondistended no thyromegaly or scleral icterus noted, carotids full.  Skin warm and dry easily conversant good spirited.  Normal intelligence.  Neurologically intact no gross localizing findings.  Impaired breath sounds throughout decreased.  No rales rhonchi or wheezes skin negative mild dorsal thoracic kyphosis noted mild flushing of the feces good pulses noted in all 4 extremities.  Onychomycosis noted no carotid bruits no thyromegaly skin negative lymph negative neuro negative HEENT negative breast pelvic and rectal examination done per her gynecologist Dr. Fong not repeated.  BMI elevated.  Weight down 25 pounds.  Tubular colon polyps tubular adenomatous in type.  See colonoscopy report August 8, 2011.    Assessment Results 11/9/2017   Activities of Daily Living No help needed   Instrumental Activities of Daily Living No help needed   Get Up and Go Score Less than 12 seconds   Mini Cog Total Score 5   Some recent data might be hidden     A Mini-Cog score of 0-2 suggests the possibility of dementia, score of 3-5 suggests no dementia    Identified Health Risks:     She is at risk for lack of exercise and has been provided with information to increase physical activity for the benefit of her well-being.  The patient was counseled and encouraged to consider modifying their diet and eating habits. She was provided with information on recommended healthy diet options.  Information regarding advance directives (living graff), including where she can download the appropriate form, was " provided to the patient via the AVS.

## 2021-06-15 NOTE — PROGRESS NOTES
Office Visit - Follow up    Stella Greene   67 y.o. female    Date of Visit: 12/26/2017    Chief Complaint   Patient presents with     Hypertension     Diabetes       Subjective: Diabetes mellitus type 2 with hypertension.  Outside blood pressure readings were reviewed outside blood sugar readings were reviewed.  Has met with diabetic educator Kiara twice.  Blood sugar this morning 155 at the initial time of diagnosis in excess of 320 on November 9, 2017.  Patient denies any new complaints no polyuria or polydipsia.  She denies blood in stool or urine and no chest pain or shortness of breath.    Colonoscopy with Dr. Hernández dated May 8, 2011 showed one tubular adenomatous colon polyp.  Mammograms were done in late September early October 2017 with ultimately being allCLEAR including ultrasound right breast.    Medication list reviewed generally well-tolerated    ROS: A comprehensive review of systems was performed and was otherwise negative    Medications:  Prior to Admission medications    Medication Sig Start Date End Date Taking? Authorizing Provider   blood glucose test (CONTOUR NEXT STRIPS) strips Test four times daily.  Dx code DM2. 12/10/17  Yes Lacho Gunter MD   cetirizine (ZYRTEC) 10 MG tablet Take 10 mg by mouth daily.   Yes PROVIDER, HISTORICAL   fluticasone-salmeterol (ADVAIR DISKUS) 250-50 mcg/dose DISKUS Inhale 1 puff 2 (two) times a day. 11/9/17  Yes Lacho Gunter MD   generic lancets Test each day as directed. 11/15/17  Yes Lacho Gunter MD   lansoprazole (PREVACID) 15 MG capsule Take 15 mg by mouth daily.   Yes PROVIDER, HISTORICAL   lisinopril (PRINIVIL,ZESTRIL) 20 MG tablet TAKE 1 TABLET DAILY 11/9/17  Yes Lacho Gunter MD   metFORMIN (GLUCOPHAGE) 500 MG tablet Take 1 tablet (500 mg total) by mouth 2 (two) times a day with meals.  Patient taking differently: Take 1,000 mg by mouth 2 (two) times a day with meals.  11/10/17  Yes Lacho Gunter MD   multivitamin  therapeutic tablet Take 1 tablet by mouth daily.   Yes PROVIDER, HISTORICAL   predniSONE (DELTASONE) 10 mg tablet Take 1 tablet (10 mg total) by mouth daily. 11/9/17  Yes Lacho Gunter MD   albuterol (PROAIR HFA;PROVENTIL HFA;VENTOLIN HFA) 90 mcg/actuation inhaler Inhale 2 puffs every 6 (six) hours as needed for wheezing (prn). 11/9/17   Lacho Gunter MD   fluticasone (FLONASE) 50 mcg/actuation nasal spray 1 spray into each nostril daily.    PROVIDER, HISTORICAL       Allergies: No Known Allergies    Immunizations:   Immunization History   Administered Date(s) Administered     Influenza high dose, seasonal 11/09/2017     Influenza, seasonal,quad inj 6-35 mos 11/18/2013     Tdap 11/18/2013       Exam Chest clear to auscultation and percussion.  Heart tones regular rhythm without murmur rub or gallop.  Abdomen soft nontender no organomegaly.  No peritoneal signs.  Extremities free of edema cyanosis or clubbing.  Neck veins nondistended no thyromegaly or scleral icterus noted, carotids full.  Skin warm and dry easily conversant good spirited.  Normal intelligence.  Neurologically intact no gross localizing findings.      Assessment and Plan  Diabetes mellitus type 2 offered laboratory testing today to include A1c blood sugar urine for microalbumin and lipid panel patient declined.  Needs fasting office visit with labs in March when she returns home from [place].    Obesity BMI 29.61.  With insulin resistance.    Hypertension controlled.    Addendum O2 sats 98% pulse 97 blood pressure initially elevated recheck improved 138/84.    Tubular adenomatous colon polyp see colonoscopy report May 8 or August 8, 2011 with Dr. Hernández.        Time: total time spent with the patient was 25 minutes of which >50% was spent in counseling and coordination of care    The following high BMI interventions were performed this visit: encouragement to exercise    Lacho Gunter MD    Patient Active Problem List   Diagnosis      Essential Hypertension     Chronic Obstructive Pulmonary Disease

## 2021-06-16 NOTE — TELEPHONE ENCOUNTER
RN cannot approve Refill Request    RN can NOT refill this medication PCP messaged that patient is overdue for Office Visit. Last office visit: 10/31/2019 Lacho Gunter MD Last Physical: 11/9/2017 Last MTM visit: Visit date not found Last visit same specialty: 10/31/2019 Lacho Gunter MD.  Next visit within 3 mo: Visit date not found  Next physical within 3 mo: Visit date not found      Viridiana Dunbar, [facility] Triage/Med Refill 3/31/2021    Requested Prescriptions   Pending Prescriptions Disp Refills     glipiZIDE (GLUCOTROL) 5 MG tablet [Pharmacy Med Name: glipiZIDE 5 MG Oral Tablet] 90 tablet 0     Sig: Take 1 tablet by mouth once daily       Oral Hypoglycemics Refill Protocol Failed - 3/31/2021  1:56 PM        Failed - Visit with PCP or prescribing provider visit in last 6 months       Last office visit with prescriber/PCP: Visit date not found OR same dept: Visit date not found OR same specialty: 10/31/2019 Lacho Gunter MD Last physical: Visit date not found Last MTM visit: Visit date not found         Next appt within 3 mo: Visit date not found  Next physical within 3 mo: Visit date not found  Prescriber OR PCP: Lacho Gunter MD  Last diagnosis associated with med order: 1. Routine general medical examination at a health care facility  - glipiZIDE (GLUCOTROL) 5 MG tablet [Pharmacy Med Name: glipiZIDE 5 MG Oral Tablet]; Take 1 tablet by mouth once daily  Dispense: 90 tablet; Refill: 0    2. HTN (hypertension)  - lisinopriL (PRINIVIL,ZESTRIL) 20 MG tablet [Pharmacy Med Name: Lisinopril 20 MG Oral Tablet]; Take 1 tablet by mouth once daily  Dispense: 90 tablet; Refill: 0     If protocol passes may refill for 12 months if within 3 months of last provider visit (or a total of 15 months).           Failed - A1C in last 6 months     Hemoglobin A1c   Date Value Ref Range Status   10/21/2019 9.2 (H) 3.5 - 6.0 % Final               Failed - Microalbumin in last year       Microalbumin, Random Urine   Date Value Ref Range Status   10/17/2018 3.65 (H) 0.00 - 1.99 mg/dL Final                  Failed - Blood pressure in last year     BP Readings from Last 1 Encounters:   10/31/19 138/82             Failed - Serum creatinine in last year     Creatinine   Date Value Ref Range Status   11/10/2017 0.80 0.60 - 1.10 mg/dL Final                lisinopriL (PRINIVIL,ZESTRIL) 20 MG tablet [Pharmacy Med Name: Lisinopril 20 MG Oral Tablet] 90 tablet 0     Sig: Take 1 tablet by mouth once daily       Ace Inhibitors Refill Protocol Failed - 3/31/2021  1:56 PM        Failed - PCP or prescribing provider visit in past 12 months       Last office visit with prescriber/PCP: 10/31/2019 Lacho Gunter MD OR same dept: Visit date not found OR same specialty: 10/31/2019 Lacho Gunter MD  Last physical: 11/9/2017 Last MTM visit: Visit date not found   Next visit within 3 mo: Visit date not found  Next physical within 3 mo: Visit date not found  Prescriber OR PCP: Lacho Gunter MD  Last diagnosis associated with med order: 1. Routine general medical examination at a health care facility  - glipiZIDE (GLUCOTROL) 5 MG tablet [Pharmacy Med Name: glipiZIDE 5 MG Oral Tablet]; Take 1 tablet by mouth once daily  Dispense: 90 tablet; Refill: 0    2. HTN (hypertension)  - lisinopriL (PRINIVIL,ZESTRIL) 20 MG tablet [Pharmacy Med Name: Lisinopril 20 MG Oral Tablet]; Take 1 tablet by mouth once daily  Dispense: 90 tablet; Refill: 0    If protocol passes may refill for 12 months if within 3 months of last provider visit (or a total of 15 months).             Failed - Serum Potassium in past 12 months     No results found for: LN-POTASSIUM          Failed - Blood pressure filed in past 12 months     BP Readings from Last 1 Encounters:   10/31/19 138/82             Failed - Serum Creatinine in past 12 months     Creatinine   Date Value Ref Range Status   11/10/2017 0.80 0.60 - 1.10 mg/dL Final

## 2021-06-19 NOTE — LETTER
Letter by Lacho Gunter MD at      Author: Lacho Gunter MD Service: -- Author Type: --    Filed:  Encounter Date: 10/22/2019 Status: Signed         Stella [place]  [id number] [facility] [place] [address]             October 22, 2019         Dear Ms. Greene,    Below are the results from your recent visit:    Resulted Orders   Glycosylated Hemoglobin A1c   Result Value Ref Range    Hemoglobin A1c 9.2 (H) 3.5 - 6.0 %   Glucose   Result Value Ref Range    Glucose 141 (H) 70 - 125 mg/dL    Patient Fasting > 8hrs? Yes     Narrative    Fasting Glucose reference range is 70-99 mg/dL per  American Diabetes Association (ADA) guidelines.   Lipid Cascade   Result Value Ref Range    Cholesterol 158 <=199 mg/dL    Triglycerides 166 (H) <=149 mg/dL    HDL Cholesterol 56 >=50 mg/dL    LDL Calculated 69 <=129 mg/dL    Patient Fasting > 8hrs? Yes        All very good results    Remains too high and weight before the A1c less than 9.    It is better than 1 year ago when A1c was 13.7 so we are making headway but not optimal yet for the low value as we prefer at less than 8    Please call with questions or contact us using [employer].    Sincerely,        Electronically signed by Lacho Gunter MD

## 2021-06-21 NOTE — PROGRESS NOTES
Office Visit - Follow up    Stella Greene   68 y.o. female    Date of Visit: 10/23/2018    Chief Complaint   Patient presents with     Diabetes     Hypertension       Subjective: Diabetes mellitus type 2 with latest A1c 9.4.  Also hypertension.  Diarrhea from metformin.    Glipizide trial.  5 mg daily.  In the a.m.    Colonoscopy dated August 8, 2011 was unremarkable for cancer.  Mammogram dated September 29, 2017 showed negative after an ultrasound done right breast.    No blood in stool or urine no chest pain or shortness of breath.    Morning blood sugars range between 160 and 180 never over 200 last laboratory tests reviewed from October 17, 2018.  Started yesterday ciprofloxacin 250 mg twice daily for UTI.    ROS: A comprehensive review of systems was performed and was otherwise negative    Medications:  Prior to Admission medications    Medication Sig Start Date End Date Taking? Authorizing Provider   blood glucose test (CONTOUR NEXT STRIPS) strips Test four times daily.  Dx code DM2. 12/10/17  Yes Lacho Gunter MD   cetirizine (ZYRTEC) 10 MG tablet Take 10 mg by mouth daily.   Yes PROVIDER, HISTORICAL   ciprofloxacin HCl (CIPRO) 250 MG tablet Take 1 tablet (250 mg total) by mouth 2 (two) times a day for 7 days. 10/22/18 10/29/18 Yes Lacho Gunter MD   fluticasone (FLONASE) 50 mcg/actuation nasal spray 1 spray into each nostril daily.   Yes PROVIDER, HISTORICAL   fluticasone-salmeterol (ADVAIR DISKUS) 250-50 mcg/dose DISKUS Inhale 1 puff 2 (two) times a day. 11/9/17  Yes Lacho Gunter MD   generic lancets Test each day as directed. 11/15/17  Yes Lacho Gunter MD   lansoprazole (PREVACID) 15 MG capsule Take 15 mg by mouth daily.   Yes PROVIDER, HISTORICAL   lisinopril (PRINIVIL,ZESTRIL) 20 MG tablet TAKE 1 TABLET DAILY 11/9/17  Yes Lacho Gunter MD   metFORMIN (GLUCOPHAGE) 1000 MG tablet Take 1 tablet (1,000 mg total) by mouth 2 (two) times a day with meals. 12/26/17  Yes Lacho  JOSE JUAN Gunter MD   multivitamin therapeutic tablet Take 1 tablet by mouth daily.   Yes PROVIDER, HISTORICAL   predniSONE (DELTASONE) 10 mg tablet Take 1 tablet (10 mg total) by mouth daily. 11/9/17  Yes Lacho Gunter MD   albuterol (PROAIR HFA;PROVENTIL HFA;VENTOLIN HFA) 90 mcg/actuation inhaler Inhale 2 puffs every 6 (six) hours as needed for wheezing (prn). 11/9/17   Lacho Gunter MD   glipiZIDE (GLUCOTROL) 5 MG tablet Take 1 tablet (5 mg total) by mouth daily. 10/23/18   Lacho Gunter MD       Allergies: No Known Allergies    Immunizations:   Immunization History   Administered Date(s) Administered     Influenza high dose, seasonal 11/09/2017     Influenza, seasonal,quad inj 6-35 mos 11/18/2013     Tdap 11/18/2013       Exam Chest clear to auscultation and percussion.  Heart tones regular rhythm without murmur rub or gallop.  Abdomen soft nontender no organomegaly.  No peritoneal signs.  Extremities free of edema cyanosis or clubbing.  Neck veins nondistended no thyromegaly or scleral icterus noted, carotids full.  Skin warm and dry easily conversant good spirited.  Normal intelligence.  Neurologically intact no gross localizing findings.    Assessment and Plan  Diabetes mellitus type 2.  With associated diarrhea from metformin.  Decrease metformin to 1000 mg daily and add glipizide 5 mg in the morning.    Hypertension with mild systolic elevation 158/84 recheck 2 months time.  Reemphasized salt restriction regular exercise and weight loss.    Obesity with BMI 31+ and insulin resistance.        Time: total time spent with the patient was 25 minutes of which >50% was spent in counseling and coordination of care    The following high BMI interventions were performed this visit: encouragement to exercise    Lacho Gunter MD    Patient Active Problem List   Diagnosis     Essential Hypertension     Chronic Obstructive Pulmonary Disease

## 2021-07-03 NOTE — ADDENDUM NOTE
Addendum Note by Per Dunbar RN at 12/10/2017  6:40 AM     Author: Per Dunbar RN Service: -- Author Type: Registered Nurse    Filed: 12/10/2017  6:40 AM Encounter Date: 12/9/2017 Status: Signed    : Per Dunbar RN (Registered Nurse)    Addended by: PER DUNBAR on: 12/10/2017 06:40 AM        Modules accepted: Orders

## 2021-07-03 NOTE — ADDENDUM NOTE
Addendum Note by Kim Stern CMA at 11/9/2017  3:23 PM     Author: Kim Stern CMA Service: -- Author Type: Certified Medical Assistant    Filed: 11/9/2017  3:23 PM Encounter Date: 11/9/2017 Status: Signed    : Kim Stern CMA (Certified Medical Assistant)    Addended by: KIM SETRN on: 11/9/2017 03:23 PM        Modules accepted: Orders

## 2021-07-03 NOTE — ADDENDUM NOTE
Addendum Note by Tico Pena MD at 11/9/2017  3:15 PM     Author: Tico Pena MD Service: -- Author Type: Physician    Filed: 11/9/2017  3:15 PM Encounter Date: 11/9/2017 Status: Signed    : Tico Pena MD (Physician)    Addended by: TICO PENA on: 11/9/2017 03:15 PM        Modules accepted: Orders

## 2021-07-13 ENCOUNTER — RECORDS - HEALTHEAST (OUTPATIENT)
Dept: ADMINISTRATIVE | Facility: CLINIC | Age: 71
End: 2021-07-13

## 2021-07-21 ENCOUNTER — RECORDS - HEALTHEAST (OUTPATIENT)
Dept: ADMINISTRATIVE | Facility: CLINIC | Age: 71
End: 2021-07-21

## 2021-12-06 ENCOUNTER — OFFICE VISIT (OUTPATIENT)
Dept: INTERNAL MEDICINE | Facility: CLINIC | Age: 71
End: 2021-12-06
Payer: MEDICARE

## 2021-12-06 VITALS
BODY MASS INDEX: 28.28 KG/M2 | DIASTOLIC BLOOD PRESSURE: 88 MMHG | HEART RATE: 92 BPM | SYSTOLIC BLOOD PRESSURE: 144 MMHG | HEIGHT: 61 IN | OXYGEN SATURATION: 96 % | WEIGHT: 149.8 LBS

## 2021-12-06 DIAGNOSIS — Z00.00 ROUTINE GENERAL MEDICAL EXAMINATION AT A HEALTH CARE FACILITY: ICD-10-CM

## 2021-12-06 DIAGNOSIS — I73.9 PAD (PERIPHERAL ARTERY DISEASE) (H): ICD-10-CM

## 2021-12-06 DIAGNOSIS — Z11.59 NEED FOR HEPATITIS C SCREENING TEST: ICD-10-CM

## 2021-12-06 DIAGNOSIS — E11.8 TYPE 2 DIABETES MELLITUS WITH COMPLICATION, WITHOUT LONG-TERM CURRENT USE OF INSULIN (H): Primary | ICD-10-CM

## 2021-12-06 DIAGNOSIS — I10 ESSENTIAL HYPERTENSION: ICD-10-CM

## 2021-12-06 DIAGNOSIS — Z12.31 VISIT FOR SCREENING MAMMOGRAM: ICD-10-CM

## 2021-12-06 DIAGNOSIS — Z13.220 SCREENING FOR HYPERLIPIDEMIA: ICD-10-CM

## 2021-12-06 DIAGNOSIS — Z12.11 SCREEN FOR COLON CANCER: ICD-10-CM

## 2021-12-06 DIAGNOSIS — E11.9 DIABETES MELLITUS, TYPE 2 (H): Primary | ICD-10-CM

## 2021-12-06 LAB
ANION GAP SERPL CALCULATED.3IONS-SCNC: 13 MMOL/L (ref 5–18)
BUN SERPL-MCNC: 12 MG/DL (ref 8–28)
CALCIUM SERPL-MCNC: 10.2 MG/DL (ref 8.5–10.5)
CHLORIDE BLD-SCNC: 103 MMOL/L (ref 98–107)
CHOLEST SERPL-MCNC: 174 MG/DL
CO2 SERPL-SCNC: 24 MMOL/L (ref 22–31)
CREAT SERPL-MCNC: 0.81 MG/DL (ref 0.6–1.1)
CREAT UR-MCNC: 161 MG/DL
FASTING STATUS PATIENT QL REPORTED: YES
GFR SERPL CREATININE-BSD FRML MDRD: 74 ML/MIN/1.73M2
GLUCOSE BLD-MCNC: 247 MG/DL (ref 70–125)
HBA1C MFR BLD: 12.7 % (ref 0–5.6)
HDLC SERPL-MCNC: 58 MG/DL
LDLC SERPL CALC-MCNC: 92 MG/DL
MICROALBUMIN UR-MCNC: 3.43 MG/DL (ref 0–1.99)
MICROALBUMIN/CREAT UR: 21.3 MG/G CR
POTASSIUM BLD-SCNC: 4.8 MMOL/L (ref 3.5–5)
SODIUM SERPL-SCNC: 140 MMOL/L (ref 136–145)
TRIGL SERPL-MCNC: 122 MG/DL

## 2021-12-06 PROCEDURE — 80048 BASIC METABOLIC PNL TOTAL CA: CPT | Performed by: INTERNAL MEDICINE

## 2021-12-06 PROCEDURE — 99214 OFFICE O/P EST MOD 30 MIN: CPT | Performed by: INTERNAL MEDICINE

## 2021-12-06 PROCEDURE — 80061 LIPID PANEL: CPT | Performed by: INTERNAL MEDICINE

## 2021-12-06 PROCEDURE — 82043 UR ALBUMIN QUANTITATIVE: CPT | Performed by: INTERNAL MEDICINE

## 2021-12-06 PROCEDURE — 83036 HEMOGLOBIN GLYCOSYLATED A1C: CPT | Performed by: INTERNAL MEDICINE

## 2021-12-06 PROCEDURE — 36415 COLL VENOUS BLD VENIPUNCTURE: CPT | Performed by: INTERNAL MEDICINE

## 2021-12-06 PROCEDURE — 86803 HEPATITIS C AB TEST: CPT | Performed by: INTERNAL MEDICINE

## 2021-12-06 RX ORDER — CEPHALEXIN 500 MG/1
500 CAPSULE ORAL 2 TIMES DAILY
Qty: 30 CAPSULE | Refills: 1 | Status: SHIPPED | OUTPATIENT
Start: 2021-12-06 | End: 2022-04-05

## 2021-12-06 RX ORDER — LISINOPRIL 20 MG/1
TABLET ORAL
Qty: 90 TABLET | Refills: 11 | Status: ON HOLD | OUTPATIENT
Start: 2021-12-06 | End: 2023-01-05

## 2021-12-06 RX ORDER — GLIPIZIDE 5 MG/1
TABLET ORAL
Qty: 90 TABLET | Refills: 11 | Status: SHIPPED | OUTPATIENT
Start: 2021-12-06 | End: 2022-04-26 | Stop reason: ALTCHOICE

## 2021-12-06 RX ORDER — PREDNISONE 10 MG/1
TABLET ORAL
Qty: 90 TABLET | Refills: 11 | Status: SHIPPED | OUTPATIENT
Start: 2021-12-06 | End: 2022-07-15

## 2021-12-06 RX ORDER — ALBUTEROL SULFATE 90 UG/1
2 AEROSOL, METERED RESPIRATORY (INHALATION) EVERY 6 HOURS PRN
Qty: 18 G | Refills: 11 | Status: SHIPPED | OUTPATIENT
Start: 2021-12-06 | End: 2023-01-01

## 2021-12-06 ASSESSMENT — MIFFLIN-ST. JEOR: SCORE: 1140.68

## 2021-12-06 NOTE — LETTER
December 6, 2021      Stella Greene  [address] [facility] [place] [address]        Dear ,    We are writing to inform you of your test results.    Your Hemoglobin A1C is too high and needs to be less than 8. I will send an order for diabetic education as well as an endocrinology consultation. You should be receiving a call to set up these appointments in the next few days.      Resulted Orders   HEMOGLOBIN A1C   Result Value Ref Range    Hemoglobin A1C 12.7 (H) 0.0 - 5.6 %      Comment:      Normal <5.7%   Prediabetes 5.7-6.4%    Diabetes 6.5% or higher     Note: Adopted from ADA consensus guidelines.       If you have any questions or concerns, please call the clinic at the number listed above.       Sincerely,      Lacho Gunter MD

## 2021-12-06 NOTE — LETTER
December 7, 2021      Stella Greene  [address] [facility] [address]        Dear ,    We are writing to inform you of your test results.    Fasting blood sugar bit too high as we prefer less than 140.  Rest of the labs are good but for the A1c discussed previously.       Resulted Orders   BASIC METABOLIC PANEL   Result Value Ref Range    Sodium 140 136 - 145 mmol/L    Potassium 4.8 3.5 - 5.0 mmol/L    Chloride 103 98 - 107 mmol/L    Carbon Dioxide (CO2) 24 22 - 31 mmol/L    Anion Gap 13 5 - 18 mmol/L    Urea Nitrogen 12 8 - 28 mg/dL    Creatinine 0.81 0.60 - 1.10 mg/dL    Calcium 10.2 8.5 - 10.5 mg/dL    Glucose 247 (H) 70 - 125 mg/dL    GFR Estimate 74 >60 mL/min/1.73m2      Comment:      As of July 11, 2021, eGFR is calculated by the CKD-EPI creatinine equation, without race adjustment. eGFR can be influenced by muscle mass, exercise, and diet. The reported eGFR is an estimation only and is only applicable if the renal function is stable.   HEMOGLOBIN A1C   Result Value Ref Range    Hemoglobin A1C 12.7 (H) 0.0 - 5.6 %      Comment:      Normal <5.7%   Prediabetes 5.7-6.4%    Diabetes 6.5% or higher     Note: Adopted from ADA consensus guidelines.   Lipid panel reflex to direct LDL Fasting   Result Value Ref Range    Cholesterol 174 <=199 mg/dL    Triglycerides 122 <=149 mg/dL    Direct Measure HDL 58 >=50 mg/dL      Comment:      HDL Cholesterol Reference Range:     0-2 years:   No reference ranges established for patients under 2 years old  at [facility] for lipid analytes.    2-8 years:  Greater than 45 mg/dL     18 years and older:   Female: Greater than or equal to 50 mg/dL   Male:   Greater than or equal to 40 mg/dL    LDL Cholesterol Calculated 92 <=129 mg/dL    Patient Fasting > 8hrs? Yes    Albumin Random Urine Quantitative with Creat Ratio   Result Value Ref Range    Microalbumin Urine mg/dL 3.43 (H) 0.00 - 1.99 mg/dL    Creatinine Urine mg/dL 161 mg/dL    Microalbumin  Urine mg/g Cr 21.3 (H) <=19.9 mg/g Cr    Narrative    Microalbumin, Random Urine   <2.0 mg/dL . . . . . . . . Normal   3.0-30.0 mg/dL . . . . . . Microalbuminuria   >30.0 mg/dL . . . . . .  . Clinical Proteinuria     Microalbumin/Creatinine Ratio, Random Urine   <20 mg/g . . . . .. . . . Normal    mg/g . . . . . . . Microalbuminuria   >300 mg/g . . . . . . . . Clinical Proteinuria       If you have any questions or concerns, please call the clinic at the number listed above.       Sincerely,      Lacho Gunter MD

## 2021-12-07 LAB — HCV AB SERPL QL IA: NONREACTIVE

## 2021-12-07 ASSESSMENT — ASTHMA QUESTIONNAIRES: ACT_TOTALSCORE: 22

## 2021-12-12 ENCOUNTER — HEALTH MAINTENANCE LETTER (OUTPATIENT)
Age: 71
End: 2021-12-12

## 2021-12-14 ENCOUNTER — ANCILLARY PROCEDURE (OUTPATIENT)
Dept: VASCULAR ULTRASOUND | Facility: CLINIC | Age: 71
End: 2021-12-14
Attending: NURSE PRACTITIONER
Payer: MEDICARE

## 2021-12-14 DIAGNOSIS — I73.9 PAD (PERIPHERAL ARTERY DISEASE) (H): ICD-10-CM

## 2021-12-14 DIAGNOSIS — E11.9 DIABETES MELLITUS, TYPE 2 (H): ICD-10-CM

## 2021-12-14 DIAGNOSIS — I10 ESSENTIAL HYPERTENSION: ICD-10-CM

## 2021-12-14 PROCEDURE — 93922 UPR/L XTREMITY ART 2 LEVELS: CPT

## 2021-12-14 PROCEDURE — 93925 LOWER EXTREMITY STUDY: CPT | Mod: 26 | Performed by: SURGERY

## 2021-12-14 PROCEDURE — 93925 LOWER EXTREMITY STUDY: CPT

## 2021-12-14 PROCEDURE — 93922 UPR/L XTREMITY ART 2 LEVELS: CPT | Mod: 26 | Performed by: SURGERY

## 2021-12-15 ENCOUNTER — TELEPHONE (OUTPATIENT)
Dept: VASCULAR SURGERY | Facility: CLINIC | Age: 71
End: 2021-12-15
Payer: MEDICARE

## 2021-12-15 NOTE — TELEPHONE ENCOUNTER
----- Message from Hannah Blandon NP sent at 12/15/2021  8:31 AM CST -----  Please let the patient know that her arterial ultrasound   Was normal

## 2021-12-17 ENCOUNTER — OFFICE VISIT (OUTPATIENT)
Dept: VASCULAR SURGERY | Facility: CLINIC | Age: 71
End: 2021-12-17
Attending: INTERNAL MEDICINE
Payer: MEDICARE

## 2021-12-17 ENCOUNTER — TELEPHONE (OUTPATIENT)
Dept: VASCULAR SURGERY | Facility: CLINIC | Age: 71
End: 2021-12-17

## 2021-12-17 VITALS
RESPIRATION RATE: 16 BRPM | DIASTOLIC BLOOD PRESSURE: 82 MMHG | TEMPERATURE: 98.3 F | BODY MASS INDEX: 28.91 KG/M2 | SYSTOLIC BLOOD PRESSURE: 154 MMHG | HEART RATE: 76 BPM | WEIGHT: 154.2 LBS

## 2021-12-17 DIAGNOSIS — L97.909 ULCER OF LOWER EXTREMITY, UNSPECIFIED LATERALITY, UNSPECIFIED ULCER STAGE (H): ICD-10-CM

## 2021-12-17 DIAGNOSIS — L97.412 DIABETIC ULCER OF RIGHT MIDFOOT ASSOCIATED WITH TYPE 2 DIABETES MELLITUS, WITH FAT LAYER EXPOSED (H): Primary | ICD-10-CM

## 2021-12-17 DIAGNOSIS — E11.621 DIABETIC ULCER OF RIGHT MIDFOOT ASSOCIATED WITH TYPE 2 DIABETES MELLITUS, WITH FAT LAYER EXPOSED (H): Primary | ICD-10-CM

## 2021-12-17 DIAGNOSIS — E11.8 TYPE 2 DIABETES MELLITUS WITH COMPLICATION, WITHOUT LONG-TERM CURRENT USE OF INSULIN (H): ICD-10-CM

## 2021-12-17 DIAGNOSIS — R23.4 FISSURE IN SKIN OF FOOT: ICD-10-CM

## 2021-12-17 DIAGNOSIS — E11.8 TYPE 2 DIABETES MELLITUS WITH COMPLICATION, WITHOUT LONG-TERM CURRENT USE OF INSULIN (H): Primary | ICD-10-CM

## 2021-12-17 PROCEDURE — G0463 HOSPITAL OUTPT CLINIC VISIT: HCPCS | Performed by: NURSE PRACTITIONER

## 2021-12-17 PROCEDURE — 99204 OFFICE O/P NEW MOD 45 MIN: CPT | Mod: 25 | Performed by: NURSE PRACTITIONER

## 2021-12-17 PROCEDURE — 250N000009 HC RX 250: Performed by: NURSE PRACTITIONER

## 2021-12-17 PROCEDURE — 11042 DBRDMT SUBQ TIS 1ST 20SQCM/<: CPT | Performed by: NURSE PRACTITIONER

## 2021-12-17 RX ORDER — MUPIROCIN 20 MG/G
OINTMENT TOPICAL DAILY
Qty: 30 G | Refills: 3 | Status: SHIPPED | OUTPATIENT
Start: 2021-12-17 | End: 2022-07-15

## 2021-12-17 RX ORDER — MULTIVIT-MIN/IRON/FOLIC ACID/K 18-600-40
1 CAPSULE ORAL DAILY
COMMUNITY

## 2021-12-17 RX ORDER — MICONAZOLE NITRATE 20 MG/G
CREAM TOPICAL DAILY
Qty: 118 G | Refills: 3 | Status: SHIPPED | OUTPATIENT
Start: 2021-12-17 | End: 2022-07-15

## 2021-12-17 RX ORDER — LIDOCAINE HYDROCHLORIDE 20 MG/ML
JELLY TOPICAL DAILY PRN
Status: DISCONTINUED | OUTPATIENT
Start: 2021-12-17 | End: 2024-01-01

## 2021-12-17 RX ADMIN — LIDOCAINE HYDROCHLORIDE: 20 JELLY TOPICAL at 07:54

## 2021-12-17 ASSESSMENT — PAIN SCALES - GENERAL: PAINLEVEL: NO PAIN (0)

## 2021-12-17 NOTE — LETTER
2021    [facility]  Fax: [phone] Wound Dressing Rx and Order Form  Customer Service: [phone] Order Status: New   Verbal: Amber  Patient Info:  Name: Stella Greene  : 1950  Address: [address] [place] [id number]        Insurance Info:  INSURER: Payor: MEDICARE / Plan: MEDICARE / Product Type: Medicare /   Policy ID#:  [id number]  : 1950    Physician Info:   Name: Hannah Blandon   Dept Address/Phones:   [facility], [address]  [facility] [address] [phone]  Fax: [phone]    Impression:   Encounter Diagnoses   Name Primary?     Diabetic ulcer of right midfoot associated with type 2 diabetes mellitus, with fat layer exposed (H) Yes     Type 2 diabetes mellitus with complication, without long-term current use of insulin (H)      Fissure in skin of foot      Wound info:    VASC Wound Rt plantar below 3rd toe (Active)   Pre Size Length 3 21 0700   Pre Size Width 0.2 21 0700   Pre Size Depth 0.1 21 0700   Pre Total Sq cm 0.6 21 0700       VASC Wound Rt plantar (Active)   Pre Size Length 0.8 21 0700   Pre Size Width 0.5 21 0700   Pre Size Depth 0.1 21 0700   Pre Total Sq cm 0.4 21 0700       VASC Wound Rt hallux (Active)   Pre Size Length 0.2 21 0700   Pre Size Width 3.2 21 0700   Pre Size Depth 0.2 21 0700   Pre Total Sq cm 0.64 21 0700       VASC Wound Rt shin (Active)   Pre Size Length 2 21 0700   Pre Size Width 2 21 0700   Pre Size Depth 0.1 21 0700   Pre Total Sq cm 4 21 0700       VASC Wound Rt anterior ankle (Active)   Pre Size Length 1 21 0700   Pre Size Width 0.8 21 0700   Pre Size Depth 0.1 21 0700   Pre Total Sq cm 0.8 21 0700       VASC Wound Lt shin (Active)   Pre Size Length 0.6 21 0700   Pre Size Width 0.6 21 0700   Pre Size Depth 0.1 21 0700   Pre Total Sq cm 0.36  12/17/21 0700         Drainage: Moderate  Thickness:  Full  Duration of Need: 90 days  Days Supply: 90 days  Start Date: 12/17/2021  Starter Kit:ancillary  Qualifying wound/Debridement: yes/yes     Dressing Type Brand Size Number of pieces Frequency of change   Primary Manuka Honey HD supra lite   4''x5'' 12 sheets  3 times a week     Square gauze   4''x4'' 3 loafs 3 times a week    Medipore+pad  2''x2 3/4'' 48 bandages  3 times per week     Latex free tubular compression  Size F  1 box 3 times per week        Note: If total out of pocket is more than $50.00 please contact the patient before processing order.     OK to forward to covered supplier.    Electronically Signed Physician: CHAVA CORRALES                         Date: 12/17/2021

## 2021-12-17 NOTE — PATIENT INSTRUCTIONS
"  Apply to dry, thickened; scaling areas 1-2 times per day    Stop getting wounds wet in the shower; tub; pool; ocean    Avoid getting sand in the wounds; wear ankle cotton sock and tennis shoe when going to the beach    Wound Care Instructions    Daily for the foot and every 3 days for the shins , Cleanse your wound(s) with Dilute hibiclens 30cc in 500cc NS.    Pat Dry with non-sterile gauze    Apply Lotion to the intact skin surrounding your wound and other dry skin locations. Some good lotions include: Remedy Skin Repair Cream, Sarna, Vanicream or Cetaphil    Apply am lactin to the heels and thickened skin areas on the feet (intact skin ONLY) apply twice per day    Primary Dressing: Apply mupirocin and  into/onto the wounds    Secondary dressing: Cover with medihoney alginate; gauze or bordered foam    Secure with non-sterile roll gauze (4\" x 75\" roll) and tape (1\" roll tape) as needed; avoid adhesive directly on the skin    Compression: tubular compression bilaterally    It is not ok to get your wound wet in the bath or shower    SEEK MEDICAL CARE IF:    You have an increase in swelling, pain, or redness around the wound.    You have an increase in the amount of pus coming from the wound.    There is a bad smell coming from the wound.    The wound appears to be worsening/enlarging    You have a fever greater than 101.5 F      It is ok to continue current wound care treatment/products for the next 2-3 days until new wound care supplies are ordered and arrive. If longer than this please contact our office at [phone].      It is recommended that you do not get your ulcer wet when showering.  Listed below are several ways of keeping it dry when you shower.     1. Wrap it with Press and Seal plastic wrap.  It can be found in the stores where the plastic wraps or tin foil is kept.               2.  Some people take a bath and hang their leg/foot out of the tub.                        3  Put your leg in a plastic " bag and tape it on.           4. You can purchase a shower cover for casts at some pharmacies and through the Internet.            5. Take a Bed Bath or wash up at the sink

## 2021-12-17 NOTE — TELEPHONE ENCOUNTER
"Called patient to give her contact information for endocrinology, however patient is not planning on scheduling with them right now.  She is leaving for 2 months to go to [place] and if she is \"still having issues\" she will consider scheduling at that time.  She also states her blood sugar last time she checked was \"only 119\".  Educated patient that blood sugars can fluctuate and one reading does not mean her sugars are overall controlled. PCP and Hannah Blandon NP updated.  "

## 2021-12-17 NOTE — TELEPHONE ENCOUNTER
----- Message from Hannah Blandon NP sent at 12/17/2021  9:58 AM CST -----  Regarding: FW: diabetes management  Can you call the patient and let her know that I spoke with Dr. Gunter and he would like her seen by endocrinology to help manager her diabetes; please give her the contact info to get scheduled with them.    LK  ----- Message -----  From: Lacho Gunter MD  Sent: 12/17/2021   8:43 AM CST  To: Hannah Blandon NP  Subject: RE: diabetes management                          I think she should see a endocrinologist and the wound clinic and thanks for your help! MJB  ----- Message -----  From: Hannah Blandon NP  Sent: 12/17/2021   8:28 AM CST  To: Lacho Gunter MD  Subject: diabetes management                              Hey Dr. Gunter    I had the pleasure of seeing Stella Greene today for her right foot ulcers and bilateral leg wounds. I had a serious discussion with her about her diabetes; delayed wound healing and risk of future amputation. She needs much tighter control to get these wounds healed. Her sugars are running over 200. Can you help?      Hannah Blandon DNP, RN, CNP, CWOCN, CFCN, CLT  [facility]  [phone]

## 2021-12-17 NOTE — PROGRESS NOTES
[facility] Consult Note    Date of Service:   12/17/2021     Requesting Provider: Dr. Lacho Gunter    Chief Complaint: BLE leg ulcers and right foot fissures    History of Present Illness: Stella Greene is being seen at [facility] today regarding BLE leg ulcers and right foot fissures. They arrive to the clinic today alone. The patient reports that she struck her left shin on the  door a few weeks ago; the right shin she is unsure what caused the wound. She was started on keflex 12/6/21; is tolerating this well.  Was currently/previously using bandadis. Reports pain of 0/10; currently using nothing for pain. Has used nothing as compression in the past, is currently using nothing for compression. Denies any fevers, chills, or generalized ill feeling. Denies history of cancer. Sleeps in a bed with legs elevated. Pt still has their uterus and ovaries, they deny any abnormal vaginal bleeding. Denies history of DVT, Joint Replacement and Vein Procedures. Positive history of Cellulitis. I personally reviewed outside imaging, lab work, and progress noted through Care Everywhere and outside records. She has diabetes; checks fs rarely; these were running 150s now above 200s,  last a1c was 12.7% done 12/6/21. She is on prednisone for copd; this is chronic. She had ESTELLA completed 12/14 this demonstrated adequate arterial perfusion for wound healing; results were d/w her today and answered all questions. Lives in a house with spouse; he is good health; he would be willing to help with wound cares.         Review of Systems:   Constitutional:  Negative   EENTM: negative for glasses;  negative [place]  GI:  negative for nausea/vomiting;   negative for constipation   negative diarrhea;   negative for fecal incontinence  negative weight loss  :   negative dysuria,  negative incontinence  MS: patient is ambulatory;  does not use assistive devices  Cardiac: negative   Respiratory:   positive SOB; asthma; copd  Endocrine:  positive  diabetes  Psych: negative  depression/anxiety    Past Medical History:    Past Medical History:   Diagnosis Date     Chronic Obstructive Pulmonary Disease     Created by Conversion      Diabetes mellitus, type 2 (H) 12/6/2021     Essential hypertension     Created by Conversion Replacement Utility updated for latest IMO load        Surgical History:   Past Surgical History:   Procedure Laterality Date     LAPAROSCOPIC TUBAL LIGATION Bilateral      nasal polpys Bilateral         Medications:   Current Outpatient Medications   Medication Sig     ADVAIR DISKUS 250-50 MCG/DOSE inhaler [ADVAIR DISKUS 250-50 MCG/DOSE DISKUS] INHALE 1 DOSE BY MOUTH TWICE DAILY     albuterol (PROAIR HFA/PROVENTIL HFA/VENTOLIN HFA) 108 (90 Base) MCG/ACT inhaler Inhale 2 puffs into the lungs every 6 hours as needed     blood glucose test (CONTOUR NEXT STRIPS) strips [BLOOD GLUCOSE TEST (CONTOUR NEXT STRIPS) STRIPS] Test four times daily.  Dx code DM2.     cephALEXin (KEFLEX) 500 MG capsule Take 1 capsule (500 mg) by mouth 2 times daily     cetirizine (ZYRTEC) 10 MG tablet [CETIRIZINE (ZYRTEC) 10 MG TABLET] Take 10 mg by mouth daily.     fluticasone propionate (FLONASE) 50 mcg/actuation nasal spray [FLUTICASONE PROPIONATE (FLONASE) 50 MCG/ACTUATION NASAL SPRAY] 1 spray into each nostril daily.     generic lancets [GENERIC LANCETS] Test each day as directed.     glipiZIDE (GLUCOTROL) 5 MG tablet [GLIPIZIDE (GLUCOTROL) 5 MG TABLET] Take 1 tablet by mouth once daily     lansoprazole (PREVACID) 15 MG capsule [LANSOPRAZOLE (PREVACID) 15 MG CAPSULE] Take 15 mg by mouth daily.     lisinopril (ZESTRIL) 20 MG tablet [LISINOPRIL (PRINIVIL,ZESTRIL) 20 MG TABLET] Take 1 tablet by mouth once daily     metFORMIN (GLUCOPHAGE) 1000 MG tablet Take 1 tablet (1,000 mg) by mouth daily (with dinner)     miconazole (MICATIN) 2 % external cream Apply topically daily Apply 3 grams to right plantar foot daily      multivitamin therapeutic tablet [MULTIVITAMIN THERAPEUTIC TABLET] Take 1 tablet by mouth daily.     mupirocin (BACTROBAN) 2 % external ointment Apply topically daily     predniSONE (DELTASONE) 10 MG tablet [PREDNISONE (DELTASONE) 10 MG TABLET] Take 1 tablet by mouth once daily     Vitamin D, Cholecalciferol, 25 MCG (1000 UT) TABS      Current Facility-Administered Medications   Medication     lidocaine (XYLOCAINE) 2 % external gel       Allergies: No Known Allergies    Family history:   Family History   Problem Relation Age of Onset     No Known Problems Mother      Heart Disease Father         Social History:   Social History     Socioeconomic History     Marital status:      Spouse name: Not on file     Number of children: Not on file     Years of education: Not on file     Highest education level: Not on file   Occupational History     Not on file   Tobacco Use     Smoking status: Never Smoker     Smokeless tobacco: Never Used   Substance and Sexual Activity     Alcohol use: No     Drug use: No     Sexual activity: Not on file   Other Topics Concern     Parent/sibling w/ CABG, MI or angioplasty before 65F 55M? Not Asked   Social History Narrative     Not on file     Social Determinants of Health     Financial Resource Strain: Not on file   Food Insecurity: Not on file   Transportation Needs: Not on file   Physical Activity: Not on file   Stress: Not on file   Social Connections: Not on file   Intimate Partner Violence: Not on file   Housing Stability: Not on file        Physical Exam  Vitals: Resp 16   Wt 154 lb 3.2 oz (69.9 kg)   BMI 28.91 kg/m    Weight is 154 lbs 3.2 oz          Body mass index is 28.91 kg/m .  General: This is a 71 year old female who appears their reported age, not in acute distress  Head: normocephalic, Atraumatic; not wearing glasses; non-icteric sclera; no exudate; no hearing loss   Respiratory: Clear throughout all lung fields; unlabored breathing; no cough  Cardiac: Regular,  Rate and Rhythm, no murmurs appreciated   Skin: Uniformly warm and dry  Psych: Alert and oriented x4; calm and cooperative throughout exam  Extremities: BLE with trace edema; scattered areas of hyperpigmentation; strength testing revealed 4/4 to BLEs. Significantly thickened skin on the bilateral heels with fissures    Wound #1 Location: right shin  Size: 2L x 2W x 0.1depth.  No sinus tract present, Wound base: red;  No underminingpresent. Wound is full thickness. There is moderate drainage. Periwound: no denudement, erythema, induration, maceration or warmth.      Wound #2 Location: right anterior ankle  Size: 1L x 0.8W x 0.1depth.  No sinus tract present, Wound base: red  No underminingpresent. Wound is full thickness. There is moderate drainage. Periwound: no denudement, erythema, induration, maceration or warmth.      Wound #3 Location: left shin  Size: 0.6L x 0.6W x 0.1depth.  No sinus tract present, Wound base: slough  No underminingpresent. Wound is full thickness. There is moderate drainage. Periwound: no denudement, erythema, induration, maceration or warmth.     Wound #4 Location: right plantar  Size: 0.8L x 0.5W x 0.1depth.  No sinus tract present, Wound base: fissure  No underminingpresent. Wound is full thickness. There is moderate drainage. Periwound: no denudement, erythema, induration, maceration or warmth.         Wound #5 Location: right plantar below 3rd toe Size: 3x0.2 x 0.1depth.  No sinus tract present, Wound base: fissure  No underminingpresent. Wound is full thickness. There is moderate drainage. Periwound: no denudement, erythema, induration, maceration or warmth.     Wound #6 Location: right plantar hallux Size: 0.2x3.2x 0.1depth.  No sinus tract present, Wound base: fissure  No underminingpresent. Wound is full thickness. There is moderate drainage. Periwound: no denudement, erythema, induration, maceration or warmth.    Sensation: Decreased to pinprick and light touch in a stocking  distribution bilaterally Using a monofiliment the patient was able to identify 5/7 sites on the right plantar foot and 5/7 sites on the left plantar foot.    Peripheral Vascular: normal dorsalis pedis, posterior tibial pulses to bilateral feet , using a handheld doppler these were strong; easily found and biphasic in nature.  Good capillary refill. No unusual venous distention. Positive for spider veins, telangiectasias, hemosiderin deposition or hyperpigmentation and fibrosis or scarring         Circumferential volume measures:      No flowsheet data found.    Ulceration(s)/Wound(s):     VASC Wound Rt plantar below 3rd toe (Active)   Pre Size Length 3 12/17/21 0700   Pre Size Width 0.2 12/17/21 0700   Pre Size Depth 0.1 12/17/21 0700   Pre Total Sq cm 0.6 12/17/21 0700   Number of days: 0       VASC Wound Rt plantar (Active)   Pre Size Length 0.8 12/17/21 0700   Pre Size Width 0.5 12/17/21 0700   Pre Size Depth 0.1 12/17/21 0700   Pre Total Sq cm 0.4 12/17/21 0700   Number of days: 0       VASC Wound Rt hallux (Active)   Pre Size Length 0.2 12/17/21 0700   Pre Size Width 3.2 12/17/21 0700   Pre Size Depth 0.2 12/17/21 0700   Pre Total Sq cm 0.64 12/17/21 0700   Number of days: 0       VASC Wound Rt shin (Active)   Pre Size Length 2 12/17/21 0700   Pre Size Width 2 12/17/21 0700   Pre Size Depth 0.1 12/17/21 0700   Pre Total Sq cm 4 12/17/21 0700   Number of days: 0       VASC Wound Rt anterior ankle (Active)   Pre Size Length 1 12/17/21 0700   Pre Size Width 0.8 12/17/21 0700   Pre Size Depth 0.1 12/17/21 0700   Pre Total Sq cm 0.8 12/17/21 0700   Number of days: 0       VASC Wound Lt shin (Active)   Pre Size Length 0.6 12/17/21 0700   Pre Size Width 0.6 12/17/21 0700   Pre Size Depth 0.1 12/17/21 0700   Pre Total Sq cm 0.36 12/17/21 0700   Number of days: 0        Laboratory studies:     I personally reviewed the following lab results today and those on care everywhere    No results found for: CRP   No results  found for: SED   Last Renal Panel:  Sodium   Date Value Ref Range Status   12/06/2021 140 136 - 145 mmol/L Final     Potassium   Date Value Ref Range Status   12/06/2021 4.8 3.5 - 5.0 mmol/L Final     Chloride   Date Value Ref Range Status   12/06/2021 103 98 - 107 mmol/L Final     Carbon Dioxide (CO2)   Date Value Ref Range Status   12/06/2021 24 22 - 31 mmol/L Final     Anion Gap   Date Value Ref Range Status   12/06/2021 13 5 - 18 mmol/L Final     Glucose   Date Value Ref Range Status   12/06/2021 247 (H) 70 - 125 mg/dL Final     Urea Nitrogen   Date Value Ref Range Status   12/06/2021 12 8 - 28 mg/dL Final     Creatinine   Date Value Ref Range Status   12/06/2021 0.81 0.60 - 1.10 mg/dL Final     GFR Estimate   Date Value Ref Range Status   12/06/2021 74 >60 mL/min/1.73m2 Final     Comment:     As of July 11, 2021, eGFR is calculated by the CKD-EPI creatinine equation, without race adjustment. eGFR can be influenced by muscle mass, exercise, and diet. The reported eGFR is an estimation only and is only applicable if the renal function is stable.     Calcium   Date Value Ref Range Status   12/06/2021 10.2 8.5 - 10.5 mg/dL Final      No results found for: WBC  No results found for: RBC  No results found for: HGB  No results found for: HCT  No components found for: MCT  No results found for: MCV  No results found for: MCH  No results found for: MCHC  No results found for: RDW  No results found for: PLT   Lab Results   Component Value Date    A1C 12.7 12/06/2021    A1C 9.2 10/21/2019    A1C 9.4 10/17/2018    A1C 13.7 11/10/2017      No results found for: TSH   No results found for: VITDT             Impression:   Encounter Diagnoses   Name Primary?     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 Debridement: After discussion of risk factors and verbal consent was obtained 2% Lidocaine HCL jelly was applied, under clean conditions, the bilateral legs and foot ulceration(s) were debrided using currette or #15 blade scalpel. Devitalized and nonviable tissue, along with any fibrin and slough, was removed to improve granulation tissue formation, stimulate wound healing, decrease overall bacteria load, disrupt biofilm formation and decrease edge senescence.  Total excisional debridement was 6.8 sq cm from the epidermis/dermis area or into the subcutaneous tissue with a depth of 0.1 cm.   Ulcers were improved afterwards and .  Measures were unchanged after debridement.    2. Treatment: wound treatment will include irrigation and dressings to promote autolytic debridement which will include: the foot ulcers appear to be from xerosis and fissuring with a fungal component; I had a very serious conversation with her about foot health and her diabetes; and risk for limb loss in the future; I recommend am lactin lotion to the heels BID; I recommend miconazole cream and mupiocin ointment; and medihoney and gauze; due to the location will need to be changed daily; for the shins she can use medihoney and bordered foam change every 3 days. She is traveling to [place] for 2 months; I recommend she stop getting the wounds wet in the shower/tub/pool or ocean. She needs to find a way to keep the sand out of the wounds. Her ABIs were normal this was d/w her today and answered all questions    3. Edema. Will apply light tubular compression today; encourage elevation    4. Offloading: will see how she does without offloading; if the wounds fail to improve on the right foot will need to make her NWB at her next visit    5. Nutrition: her diabetes is not controlled; she is not checking fs; she has all supplies needed to do this; she is just not checking; when she is checking running  over 200; this could be due to chronic prednisone use and her ulcers; educated her on the importance diabetes control and wound healing; sent message to PCP to also address. She should continue on MVI daily and vitamin D.    Patient to return to clinic in 8 week(s) for re-evaluation. They were instructed to call the clinic sooner with any further questions or concerns. Answered all questions.              Hannah Blandon DNP, RN, CNP, CWOCN  [facility]  [phone]      This note was electronically signed by Hannah Blandon, NOEL

## 2022-04-04 ASSESSMENT — ENCOUNTER SYMPTOMS
SINUS CONGESTION: 0
FLANK PAIN: 0
HEMATURIA: 0
SMELL DISTURBANCE: 0
SORE THROAT: 0
TROUBLE SWALLOWING: 0
HOARSE VOICE: 0
SINUS PAIN: 0
DYSURIA: 0
DIFFICULTY URINATING: 0
TASTE DISTURBANCE: 0
NECK MASS: 0

## 2022-04-05 ENCOUNTER — OFFICE VISIT (OUTPATIENT)
Dept: ENDOCRINOLOGY | Facility: CLINIC | Age: 72
End: 2022-04-05
Attending: INTERNAL MEDICINE
Payer: MEDICARE

## 2022-04-05 ENCOUNTER — TELEPHONE (OUTPATIENT)
Dept: ENDOCRINOLOGY | Facility: CLINIC | Age: 72
End: 2022-04-05

## 2022-04-05 ENCOUNTER — LAB (OUTPATIENT)
Dept: LAB | Facility: CLINIC | Age: 72
End: 2022-04-05

## 2022-04-05 VITALS
BODY MASS INDEX: 29.04 KG/M2 | HEART RATE: 88 BPM | SYSTOLIC BLOOD PRESSURE: 144 MMHG | WEIGHT: 154.9 LBS | DIASTOLIC BLOOD PRESSURE: 84 MMHG

## 2022-04-05 DIAGNOSIS — E24.2 CUSHINGOID SIDE EFFECT OF STEROIDS (H): ICD-10-CM

## 2022-04-05 DIAGNOSIS — E66.3 OVERWEIGHT (BMI 25.0-29.9): ICD-10-CM

## 2022-04-05 DIAGNOSIS — E11.65 TYPE 2 DIABETES MELLITUS WITH HYPERGLYCEMIA, WITH LONG-TERM CURRENT USE OF INSULIN (H): ICD-10-CM

## 2022-04-05 DIAGNOSIS — E78.5 DYSLIPIDEMIA: ICD-10-CM

## 2022-04-05 DIAGNOSIS — L97.909 ULCER OF LOWER EXTREMITY, UNSPECIFIED LATERALITY, UNSPECIFIED ULCER STAGE (H): ICD-10-CM

## 2022-04-05 DIAGNOSIS — E27.40 ADRENAL INSUFFICIENCY (H): ICD-10-CM

## 2022-04-05 DIAGNOSIS — J45.909 ASTHMA, UNSPECIFIED ASTHMA SEVERITY, UNSPECIFIED WHETHER COMPLICATED, UNSPECIFIED WHETHER PERSISTENT: ICD-10-CM

## 2022-04-05 DIAGNOSIS — Z79.4 TYPE 2 DIABETES MELLITUS WITH HYPERGLYCEMIA, WITH LONG-TERM CURRENT USE OF INSULIN (H): Primary | ICD-10-CM

## 2022-04-05 DIAGNOSIS — E11.65 TYPE 2 DIABETES MELLITUS WITH HYPERGLYCEMIA, WITH LONG-TERM CURRENT USE OF INSULIN (H): Primary | ICD-10-CM

## 2022-04-05 DIAGNOSIS — E11.42 TYPE 2 DIABETES MELLITUS WITH DIABETIC POLYNEUROPATHY, WITH LONG-TERM CURRENT USE OF INSULIN (H): ICD-10-CM

## 2022-04-05 DIAGNOSIS — Z79.4 TYPE 2 DIABETES MELLITUS WITH HYPERGLYCEMIA, WITH LONG-TERM CURRENT USE OF INSULIN (H): ICD-10-CM

## 2022-04-05 DIAGNOSIS — Z79.4 TYPE 2 DIABETES MELLITUS WITH DIABETIC POLYNEUROPATHY, WITH LONG-TERM CURRENT USE OF INSULIN (H): ICD-10-CM

## 2022-04-05 DIAGNOSIS — I10 HYPERTENSION, UNSPECIFIED TYPE: ICD-10-CM

## 2022-04-05 LAB
ANION GAP SERPL CALCULATED.3IONS-SCNC: 13 MMOL/L (ref 5–18)
BUN SERPL-MCNC: 13 MG/DL (ref 8–28)
CALCIUM SERPL-MCNC: 10 MG/DL (ref 8.5–10.5)
CHLORIDE BLD-SCNC: 101 MMOL/L (ref 98–107)
CO2 SERPL-SCNC: 24 MMOL/L (ref 22–31)
CREAT SERPL-MCNC: 0.8 MG/DL (ref 0.6–1.1)
FASTING STATUS PATIENT QL REPORTED: NO
GFR SERPL CREATININE-BSD FRML MDRD: 79 ML/MIN/1.73M2
GLUCOSE BLD-MCNC: 290 MG/DL (ref 70–125)
GLUCOSE BLD-MCNC: 290 MG/DL (ref 70–125)
HBA1C MFR BLD: 10.7 % (ref 0–5.6)
POTASSIUM BLD-SCNC: 4.1 MMOL/L (ref 3.5–5)
SODIUM SERPL-SCNC: 138 MMOL/L (ref 136–145)

## 2022-04-05 PROCEDURE — 99205 OFFICE O/P NEW HI 60 MIN: CPT | Performed by: INTERNAL MEDICINE

## 2022-04-05 PROCEDURE — 80048 BASIC METABOLIC PNL TOTAL CA: CPT

## 2022-04-05 PROCEDURE — 36415 COLL VENOUS BLD VENIPUNCTURE: CPT

## 2022-04-05 PROCEDURE — 83036 HEMOGLOBIN GLYCOSYLATED A1C: CPT

## 2022-04-05 RX ORDER — INSULIN ASPART 100 [IU]/ML
INJECTION, SUSPENSION SUBCUTANEOUS
Qty: 15 ML | Refills: 3 | Status: SHIPPED | OUTPATIENT
Start: 2022-04-05 | End: 2022-04-26

## 2022-04-05 RX ORDER — GLUCAGON INJECTION, SOLUTION 1 MG/.2ML
1 INJECTION, SOLUTION SUBCUTANEOUS PRN
Qty: 0.2 ML | Refills: 1 | Status: ON HOLD | OUTPATIENT
Start: 2022-04-05 | End: 2024-01-01

## 2022-04-05 NOTE — TELEPHONE ENCOUNTER
Date: 4/7/2022 Status: Scheduled   Time: 3:00 PM Length: 60   Visit Type: DIABETIC ED [690] Copay: $0.00   Provider: Patricia Pittman RN

## 2022-04-05 NOTE — TELEPHONE ENCOUNTER
Dr. Romo would like this patient seen ASAP to get her started on insulin.    Please let him know if you cannot see her in the next 1-3 days (if not he will contact the [facility] to schedule).    Patient would like a call on her home phone number:  [phone]    Thank you!

## 2022-04-05 NOTE — PROGRESS NOTES
Subjective:    New patient, no prior Endocrine notes including Care Everywhere    Stella Greene is a 71 year old female who presents to review DM.    Diagnosed with DM: she recalls diagnosis of DM in 2017 (BMI was ~30 kg/m2 at the time of diagnosis) when she had polydipsia, polyuria and her HbA1c was 13.7% (prior to this she hadn't had glycemic testing in a long time). She has never been on insulin therapy.     History of DKA/HHS: no    FH of DM: multiple maternal aunts/uncles with DM-2 (diagnosed around age 70), son with DM-2 (diagnosed with DM around age 45)    Glycemic control over the years:          Current DM therapy:  -Glipizide 5 mg daily (not IR)   -Metformin 1000 mg daily, not ER (taken intermittently due to GI upset)   -has never been on insulin therapy     Prior DM therapy:  -No GLP-1 R agonist or SGLT2 inhibitor     Current glycemic control:  -doesn't use her glucometer     Diet: 3 meals/day    Physical activity: she gardens, no formal exercise program    Tobacco/alcohol use: no    Complications noted in the A/P section.     Objective:    /84, BMI 29 kg/m2     She does appears mildly Cushingoid. Thoracic kyphosis, spine non tender to palpation.     DM foot exam: pedal pulses palpable, markedly reduced sensation to 10 gram monofilament testing bilaterally, multiple healing/healed lower extremity/foot ulcerations, none are actively infected     Assessment/Plan:    # DM-2  # No recent DM eye exam, unknown if DM retinopathy    # Hypertension, on lisinopril    -12/2021: GFR 74, urine microalbumin normal   # Peripheral neuropathy, multiple lower extremity ulcerations  -most recently seen in the [facility] wound care clinic 12/2021  # No prior ASCVD event  # Dyslipidemia, has never been on a cholesterol lowering medication    -12/2021: , HDL 58, LDL 92,     We reviewed management of diabetes mellitus in detail.  We reviewed that at this time I strongly recommend starting therapy with insulin.   To make this easier for Stella we will start NovoLog 70/30 10 units before breakfast and 10 units before evening meal.  She will do her best to check a capillary glucose twice daily before breakfast and before her evening meal and occasionally before lunch and before her bedtime.  In the future we will start a CGM but she will like to hold off on this for now.    Labs ordered hemoglobin A1c, glucose, BMP.  She will see a diabetes nurse educator as soon as possible to start insulin and bring her insulin with her to the appointment.  Given her significant GI upset she will stop Metformin.  When she starts insulin she will stop glipizide.    In the future we will likely start a GLP-1 receptor agonist and reintroduce Metformin as extended release.  Will need to be cautious with an SGLT2 inhibitor given her lower extremity ulcerations.    She has severe peripheral neuropathy and lower extremity ulcerations and I recommended a podiatry visit but she prefers to hold off on this for now.    She will set up a local diabetes eye exam.    Will address a statin at a future visit.     # Secondary adrenal insufficiency  -she has been on prednisone 10 mg daily for about 25 years for asthma.    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  I did refer her to a pulmonologist because the indication for prednisone for her has been asthma.  I am hoping that she can be on either a lower dose of prednisone or ideally no systemic oral prednisone.    # Bone health  -She takes a MVI daily and vitamin D 1,000 international unit(s) daily   -no recent DEXA, no prior fracture     We will review bone health in detail at a subsequent visit.    67 minutes spent on the date of the encounter doing chart review, history and exam, documentation and further activities as noted above.

## 2022-04-05 NOTE — LETTER
4/5/2022         RE: Stella Greene  [address]  [facility] [address]        Dear Colleague,    Thank you for referring your patient, Stella Greene, to the [facility]. Please see a copy of my visit note below.    Subjective:    New patient, no prior Endocrine notes including Care Everywhere    Stella Greene is a 71 year old female who presents to review DM.    Diagnosed with DM: she recalls diagnosis of DM in 2017 (BMI was ~30 kg/m2 at the time of diagnosis) when she had polydipsia, polyuria and her HbA1c was 13.7% (prior to this she hadn't had glycemic testing in a long time). She has never been on insulin therapy.     History of DKA/HHS: no    FH of DM: multiple maternal aunts/uncles with DM-2 (diagnosed around age 70), son with DM-2 (diagnosed with DM around age 45)    Glycemic control over the years:          Current DM therapy:  -Glipizide 5 mg daily (not IR)   -Metformin 1000 mg daily, not ER (taken intermittently due to GI upset)   -has never been on insulin therapy     Prior DM therapy:  -No GLP-1 R agonist or SGLT2 inhibitor     Current glycemic control:  -doesn't use her glucometer     Diet: 3 meals/day    Physical activity: she gardens, no formal exercise program    Tobacco/alcohol use: no    Complications noted in the A/P section.     Objective:    /84, BMI 29 kg/m2     She does appears mildly Cushingoid. Thoracic kyphosis, spine non tender to palpation.     DM foot exam: pedal pulses palpable, markedly reduced sensation to 10 gram monofilament testing bilaterally, multiple healing/healed lower extremity/foot ulcerations, none are actively infected     Assessment/Plan:    # DM-2  # No recent DM eye exam, unknown if DM retinopathy    # Hypertension, on lisinopril    -12/2021: GFR 74, urine microalbumin normal   # Peripheral neuropathy, multiple lower extremity ulcerations  -most recently seen in the [facility] wound care clinic 12/2021  # No prior ASCVD event  #  Dyslipidemia, has never been on a cholesterol lowering medication    -12/2021: , HDL 58, LDL 92,     We reviewed management of diabetes mellitus in detail.  We reviewed that at this time I strongly recommend starting therapy with insulin.  To make this easier for Stella we will start NovoLog 70/30 10 units before breakfast and 10 units before evening meal.  She will do her best to check a capillary glucose twice daily before breakfast and before her evening meal and occasionally before lunch and before her bedtime.  In the future we will start a CGM but she will like to hold off on this for now.    Labs ordered hemoglobin A1c, glucose, BMP.  She will see a diabetes nurse educator as soon as possible to start insulin and bring her insulin with her to the appointment.  Given her significant GI upset she will stop Metformin.  When she starts insulin she will stop glipizide.    In the future we will likely start a GLP-1 receptor agonist and reintroduce Metformin as extended release.  Will need to be cautious with an SGLT2 inhibitor given her lower extremity ulcerations.    She has severe peripheral neuropathy and lower extremity ulcerations and I recommended a podiatry visit but she prefers to hold off on this for now.    She will set up a local diabetes eye exam.    Will address a statin at a future visit.     # Secondary adrenal insufficiency  -she has been on prednisone 10 mg daily for about 25 years for asthma.    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  I did refer her to a pulmonologist because the indication for prednisone for her has been asthma.  I am hoping that she can be on either a lower dose of prednisone or ideally no systemic oral prednisone.    # Bone health  -She takes a MVI daily and vitamin D 1,000 international unit(s) daily   -no recent DEXA, no prior fracture     We will review bone health in detail at a subsequent visit.    67 minutes spent on the date of the  encounter doing chart review, history and exam, documentation and further activities as noted above.       Again, thank you for allowing me to participate in the care of your patient.        Sincerely,        Chris Romo MD

## 2022-04-07 NOTE — TELEPHONE ENCOUNTER
Patient had to cancel appt because her  was admitted to the hospital. Wondering when else we can get patient in?    Please call her back @ [phone]

## 2022-04-07 NOTE — TELEPHONE ENCOUNTER
"4/7 - lvm x 1 - per karsten, \"Ok to use my 12:30 on Monday or any urgent hold on any CDE schedule (can use holds for urgent GDM). I know Mary Ann and Nadira have urgent GDM holds next week as well. \"  "

## 2022-04-08 NOTE — TELEPHONE ENCOUNTER
4/8 - called x 2 - pt states she wants to wait until her  is out of the hospital to schedule an appt. she will call to schedule

## 2022-04-26 ENCOUNTER — OFFICE VISIT (OUTPATIENT)
Dept: ENDOCRINOLOGY | Facility: CLINIC | Age: 72
End: 2022-04-26
Payer: MEDICARE

## 2022-04-26 VITALS
HEART RATE: 88 BPM | SYSTOLIC BLOOD PRESSURE: 136 MMHG | WEIGHT: 153.9 LBS | BODY MASS INDEX: 28.85 KG/M2 | DIASTOLIC BLOOD PRESSURE: 88 MMHG

## 2022-04-26 DIAGNOSIS — E11.65 TYPE 2 DIABETES MELLITUS WITH HYPERGLYCEMIA, WITH LONG-TERM CURRENT USE OF INSULIN (H): Primary | ICD-10-CM

## 2022-04-26 DIAGNOSIS — J45.909 ASTHMA, UNSPECIFIED ASTHMA SEVERITY, UNSPECIFIED WHETHER COMPLICATED, UNSPECIFIED WHETHER PERSISTENT: ICD-10-CM

## 2022-04-26 DIAGNOSIS — E27.40 ADRENAL INSUFFICIENCY (H): ICD-10-CM

## 2022-04-26 DIAGNOSIS — E11.42 TYPE 2 DIABETES MELLITUS WITH DIABETIC POLYNEUROPATHY, WITH LONG-TERM CURRENT USE OF INSULIN (H): ICD-10-CM

## 2022-04-26 DIAGNOSIS — E78.5 DYSLIPIDEMIA: ICD-10-CM

## 2022-04-26 DIAGNOSIS — L97.909 ULCER OF LOWER EXTREMITY, UNSPECIFIED LATERALITY, UNSPECIFIED ULCER STAGE (H): ICD-10-CM

## 2022-04-26 DIAGNOSIS — I10 HYPERTENSION, UNSPECIFIED TYPE: ICD-10-CM

## 2022-04-26 DIAGNOSIS — E66.3 OVERWEIGHT (BMI 25.0-29.9): ICD-10-CM

## 2022-04-26 DIAGNOSIS — Z79.4 TYPE 2 DIABETES MELLITUS WITH DIABETIC POLYNEUROPATHY, WITH LONG-TERM CURRENT USE OF INSULIN (H): ICD-10-CM

## 2022-04-26 DIAGNOSIS — Z79.4 TYPE 2 DIABETES MELLITUS WITH HYPERGLYCEMIA, WITH LONG-TERM CURRENT USE OF INSULIN (H): Primary | ICD-10-CM

## 2022-04-26 DIAGNOSIS — E24.2 CUSHINGOID SIDE EFFECT OF STEROIDS (H): ICD-10-CM

## 2022-04-26 PROCEDURE — 99214 OFFICE O/P EST MOD 30 MIN: CPT | Performed by: INTERNAL MEDICINE

## 2022-04-26 RX ORDER — INSULIN ASPART 100 [IU]/ML
INJECTION, SUSPENSION SUBCUTANEOUS
Qty: 15 ML | Refills: 3 | COMMUNITY
Start: 2022-04-26 | End: 2022-05-27

## 2022-04-26 RX ORDER — ROSUVASTATIN CALCIUM 10 MG/1
10 TABLET, COATED ORAL DAILY
Qty: 90 TABLET | Refills: 4 | Status: SHIPPED | OUTPATIENT
Start: 2022-04-26 | End: 2022-06-24

## 2022-04-26 NOTE — PROGRESS NOTES
Subjective:    Established patient    Stella Greene is a 71 year old female who presents for DM.     Current DM therapy:  -NovoLog 70/30 10 units before breakfast and 10 units before evening meal  -She stopped glipizide and stopped metformin (and GI distress has resolved)     She has checked a capillary glucose twice daily, before breakfast and before her evening meal.  Typical values are around 160-250 mg/deciliter.  No hypoglycemia.    Objective:    /88, BMI 28.9 kg/meters squared, appears well.    4/2022: pedal pulses palpable, markedly reduced sensation to 10 gram monofilament testing bilaterally, multiple healing/healed lower extremity/foot ulcerations, none are actively infected     Assessment/Plan:    # DM-2, HbA1c 10.7% 4/2022 with glucose 290 mg/dL   # No recent DM eye exam, unknown if DM retinopathy  -She will set this up locally     # Hypertension, on lisinopril    -12/2021: urine microalbumin normal   -4/2022: GFR 79  # Peripheral neuropathy, multiple lower extremity ulcerations  -most recently seen in the [facility] wound care clinic 12/2021  # No prior ASCVD event, not on ASA   # Dyslipidemia, has never been on a cholesterol lowering medication    -12/2021: , HDL 58, LDL 92,      Increase NovoLog 70/30 to 20 units before breakfast and 10 units before evening meal.  Continue to check a capillary glucose before breakfast and before evening meal.  She will check in with me weekly via [employer] for insulin dose adjustment.    She will also start rosuvastatin 10 mg daily.    In the future we will likely start a GLP-1 receptor agonist and reintroduce Metformin as extended release.  Will need to be cautious with an SGLT2 inhibitor given her lower extremity ulcerations.  She would like to start these medicines slowly over time and we will likely start at the next visit.    In the future we will start a CGM but she will like to hold off on this for now.     Return in 1-2 months.      # Secondary  adrenal insufficiency  -she has been on prednisone 10 mg daily for about 25 years for asthma.     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  I did refer her to a pulmonologist because the indication for prednisone for her has been asthma.  I am hoping that she can be on either a lower dose of prednisone or ideally no systemic oral prednisone.    She will schedule this appointment.     # Bone health  -She takes a MVI daily and vitamin D 1,000 international unit(s) daily   -no recent DEXA, no prior fracture   -In 4/2022 she had teeth extracted      We will review bone health in detail at a subsequent visit.    32 minutes spent on the date of the encounter doing chart review, history and exam, documentation and further activities as noted above.

## 2022-04-26 NOTE — NURSING NOTE
Started insulin 4/20/22   Novolog 10 units BID    Blood sugar log:  (checking before breakfast while fasting and before her evening meal)    4/20/22:  237  4/21/22:  199, 260  4/22/22:  175, 211  4/23/22:  198, 278 (only had eggs)  4/24/22:  184, 289 (no lunch)  4/25/22:  157, 250  4/26/22:  177

## 2022-04-26 NOTE — LETTER
4/26/2022         RE: Stella Greene  [address]  [facility] [address]        Dear Colleague,    Thank you for referring your patient, Stella Greene, to the [facility]. Please see a copy of my visit note below.    Subjective:    Established patient    Stella Greene is a 71 year old female who presents for DM.     Current DM therapy:  -NovoLog 70/30 10 units before breakfast and 10 units before evening meal  -She stopped glipizide and stopped metformin (and GI distress has resolved)     She has checked a capillary glucose twice daily, before breakfast and before her evening meal.  Typical values are around 160-250 mg/deciliter.  No hypoglycemia.    Objective:    /88, BMI 28.9 kg/meters squared, appears well.    4/2022: pedal pulses palpable, markedly reduced sensation to 10 gram monofilament testing bilaterally, multiple healing/healed lower extremity/foot ulcerations, none are actively infected     Assessment/Plan:    # DM-2, HbA1c 10.7% 4/2022 with glucose 290 mg/dL   # No recent DM eye exam, unknown if DM retinopathy  -She will set this up locally     # Hypertension, on lisinopril    -12/2021: urine microalbumin normal   -4/2022: GFR 79  # Peripheral neuropathy, multiple lower extremity ulcerations  -most recently seen in the [facility] wound care clinic 12/2021  # No prior ASCVD event, not on ASA   # Dyslipidemia, has never been on a cholesterol lowering medication    -12/2021: , HDL 58, LDL 92,      Increase NovoLog 70/30 to 20 units before breakfast and 10 units before evening meal.  Continue to check a capillary glucose before breakfast and before evening meal.  She will check in with me weekly via [employer] for insulin dose adjustment.    She will also start rosuvastatin 10 mg daily.    In the future we will likely start a GLP-1 receptor agonist and reintroduce Metformin as extended release.  Will need to be cautious with an SGLT2 inhibitor given her lower  extremity ulcerations.  She would like to start these medicines slowly over time and we will likely start at the next visit.    In the future we will start a CGM but she will like to hold off on this for now.     Return in 1-2 months.      # Secondary adrenal insufficiency  -she has been on prednisone 10 mg daily for about 25 years for asthma.     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  I did refer her to a pulmonologist because the indication for prednisone for her has been asthma.  I am hoping that she can be on either a lower dose of prednisone or ideally no systemic oral prednisone.    She will schedule this appointment.     # Bone health  -She takes a MVI daily and vitamin D 1,000 international unit(s) daily   -no recent DEXA, no prior fracture   -In 4/2022 she had teeth extracted      We will review bone health in detail at a subsequent visit.    32 minutes spent on the date of the encounter doing chart review, history and exam, documentation and further activities as noted above.       Again, thank you for allowing me to participate in the care of your patient.        Sincerely,        Chris Romo MD

## 2022-05-02 DIAGNOSIS — J45.909 ASTHMA: Primary | ICD-10-CM

## 2022-05-26 DIAGNOSIS — Z79.4 TYPE 2 DIABETES MELLITUS WITH HYPERGLYCEMIA, WITH LONG-TERM CURRENT USE OF INSULIN (H): ICD-10-CM

## 2022-05-26 DIAGNOSIS — E11.65 TYPE 2 DIABETES MELLITUS WITH HYPERGLYCEMIA, WITH LONG-TERM CURRENT USE OF INSULIN (H): ICD-10-CM

## 2022-05-26 RX ORDER — INSULIN ASPART 100 [IU]/ML
INJECTION, SUSPENSION SUBCUTANEOUS
Qty: 15 ML | Refills: 3 | OUTPATIENT
Start: 2022-05-26

## 2022-05-26 NOTE — TELEPHONE ENCOUNTER
Pending Prescriptions:                       Disp   Refills    insulin aspart prot & aspart (NOVOLOG MIX*15 mL  3            Si units before breakfast, 10 units before           evening meal    Last filled: 22  Last seen: 22

## 2022-05-27 ENCOUNTER — TELEPHONE (OUTPATIENT)
Dept: ENDOCRINOLOGY | Facility: CLINIC | Age: 72
End: 2022-05-27
Payer: MEDICARE

## 2022-05-27 DIAGNOSIS — E11.65 TYPE 2 DIABETES MELLITUS WITH HYPERGLYCEMIA, WITH LONG-TERM CURRENT USE OF INSULIN (H): ICD-10-CM

## 2022-05-27 DIAGNOSIS — Z79.4 TYPE 2 DIABETES MELLITUS WITH HYPERGLYCEMIA, WITH LONG-TERM CURRENT USE OF INSULIN (H): ICD-10-CM

## 2022-05-27 RX ORDER — INSULIN ASPART 100 [IU]/ML
INJECTION, SUSPENSION SUBCUTANEOUS
Qty: 15 ML | Refills: 3 | Status: SHIPPED | OUTPATIENT
Start: 2022-05-27 | End: 2022-06-24

## 2022-05-27 NOTE — TELEPHONE ENCOUNTER
Rx was sent on 4/26/22 with 3 refills.    PT informed. Pt states was receiving old rx.     Informed rx was sent on 4/26/22 with 3 refills

## 2022-05-27 NOTE — TELEPHONE ENCOUNTER
[facility] Call Center    Phone Message    May a detailed message be left on voicemail: yes     Reason for Call: Medication Refill Request    Has the patient contacted the pharmacy for the refill? Yes   Name of medication being requested: Novolog Pens  Provider who prescribed the medication: Dr. Romo  Pharmacy: [facility]  Date medication is needed: TODAY  Medication was increased at last visit and the new script needs to reflect.        Action Taken: Other: Endo    Travel Screening: Not Applicable

## 2022-06-24 ENCOUNTER — OFFICE VISIT (OUTPATIENT)
Dept: ENDOCRINOLOGY | Facility: CLINIC | Age: 72
End: 2022-06-24
Payer: MEDICARE

## 2022-06-24 VITALS
DIASTOLIC BLOOD PRESSURE: 88 MMHG | HEART RATE: 76 BPM | BODY MASS INDEX: 28.68 KG/M2 | WEIGHT: 153 LBS | SYSTOLIC BLOOD PRESSURE: 142 MMHG

## 2022-06-24 DIAGNOSIS — E27.40 ADRENAL INSUFFICIENCY (H): ICD-10-CM

## 2022-06-24 DIAGNOSIS — E24.2 CUSHINGOID SIDE EFFECT OF STEROIDS (H): ICD-10-CM

## 2022-06-24 DIAGNOSIS — Z13.820 SCREENING FOR OSTEOPOROSIS: ICD-10-CM

## 2022-06-24 DIAGNOSIS — E11.42 TYPE 2 DIABETES MELLITUS WITH DIABETIC POLYNEUROPATHY, WITH LONG-TERM CURRENT USE OF INSULIN (H): ICD-10-CM

## 2022-06-24 DIAGNOSIS — Z79.4 TYPE 2 DIABETES MELLITUS WITH HYPERGLYCEMIA, WITH LONG-TERM CURRENT USE OF INSULIN (H): Primary | ICD-10-CM

## 2022-06-24 DIAGNOSIS — I10 HYPERTENSION, UNSPECIFIED TYPE: ICD-10-CM

## 2022-06-24 DIAGNOSIS — E78.5 DYSLIPIDEMIA: ICD-10-CM

## 2022-06-24 DIAGNOSIS — E11.65 TYPE 2 DIABETES MELLITUS WITH HYPERGLYCEMIA, WITH LONG-TERM CURRENT USE OF INSULIN (H): Primary | ICD-10-CM

## 2022-06-24 DIAGNOSIS — L97.909 ULCER OF LOWER EXTREMITY, UNSPECIFIED LATERALITY, UNSPECIFIED ULCER STAGE (H): ICD-10-CM

## 2022-06-24 DIAGNOSIS — Z79.4 TYPE 2 DIABETES MELLITUS WITH DIABETIC POLYNEUROPATHY, WITH LONG-TERM CURRENT USE OF INSULIN (H): ICD-10-CM

## 2022-06-24 DIAGNOSIS — J45.909 ASTHMA, UNSPECIFIED ASTHMA SEVERITY, UNSPECIFIED WHETHER COMPLICATED, UNSPECIFIED WHETHER PERSISTENT: ICD-10-CM

## 2022-06-24 DIAGNOSIS — E66.3 OVERWEIGHT (BMI 25.0-29.9): ICD-10-CM

## 2022-06-24 PROCEDURE — 99214 OFFICE O/P EST MOD 30 MIN: CPT | Performed by: INTERNAL MEDICINE

## 2022-06-24 RX ORDER — INSULIN ASPART 100 [IU]/ML
INJECTION, SUSPENSION SUBCUTANEOUS
Qty: 40 ML | Refills: 3 | Status: SHIPPED | OUTPATIENT
Start: 2022-06-24 | End: 2023-01-01

## 2022-06-24 RX ORDER — SEMAGLUTIDE 1.34 MG/ML
1 INJECTION, SOLUTION SUBCUTANEOUS WEEKLY
Qty: 9 ML | Refills: 4 | Status: SHIPPED | OUTPATIENT
Start: 2022-06-24 | End: 2022-10-03

## 2022-06-24 RX ORDER — ROSUVASTATIN CALCIUM 20 MG/1
20 TABLET, COATED ORAL DAILY
Qty: 90 TABLET | Refills: 4 | Status: SHIPPED | OUTPATIENT
Start: 2022-06-24 | End: 2022-12-12

## 2022-06-24 NOTE — LETTER
6/24/2022         RE: Stella Greene  [address]  [place] [facility] [address]        Dear Colleague,    Thank you for referring your patient, Stella Greene, to the [facility]. Please see a copy of my visit note below.    Subjective:    Established patient    Stella Greene is a 71 year old female who presents for the following endocrinopathies. The following is a comprehensive summary of her Endocrine care to date.     Current DM therapy:  -NovoLog 70/30 20 units before breakfast and 10 units before evening meal  -She stopped glipizide and stopped metformin (and GI distress has resolved) 4/2022     She has checked a capillary glucose twice daily, before breakfast (~150 - 180 mg/dL) and before her evening meal (~200 mg/dL). No hypoglycemia.     No personal or FH: pancreatitis, pancreatic cancer, thyroid cancer, MEN     Objective:    /88, BMI 26.68 kg/m2    4/2022: pedal pulses palpable, markedly reduced sensation to 10 gram monofilament testing bilaterally, multiple healing/healed lower extremity/foot ulcerations, none are actively infected     Assessment/Plan:    # DM-2, HbA1c 10.7% 4/2022 with glucose 290 mg/dL   # No recent DM eye exam, unknown if DM retinopathy  -She has an upcoming eye exam next week   # Hypertension, on lisinopril    -12/2021: urine microalbumin normal   -4/2022: GFR 79  # Peripheral neuropathy, multiple lower extremity ulcerations  -most recently seen in the [facility] wound care clinic 12/2021  # No prior ASCVD event, not on ASA   # Dyslipidemia, rosuvastatin 10 mg daily started 4/2022    -12/2021: , HDL 58, LDL 92,   -She has a glucagon kit at home     Glucose is still elevated but improving. She will increase to NovoLog 70/30 25 units before breakfast and 15 units before evening meal. Start Ozempic and increase to 1.0 mg weekly.     In the future we will consider reintroducing Metformin as extended release.  Will need to be cautious with  an SGLT2 inhibitor given her lower extremity ulcerations.       In the future we will start a CGM but she will like to hold off on this for now.    Increase rosuvastatin to 20 mg daily.    Labs ordered: HbA1c, glucose, lipid panel    Return in a few months.       # Secondary adrenal insufficiency  -she has been on prednisone 10 mg daily for about 25 years for asthma.     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  I did refer her to a pulmonologist because the indication for prednisone for her has been asthma.  I am hoping that she can be on either a lower dose of prednisone or ideally no systemic oral prednisone.     She is scheduled to see pulmonology 7/2022.     We reviewed basics of adrenal insufficiency with prednisone taper.      # Bone health  -She takes a MVI daily and vitamin D (dose unclear)   -no recent DEXA, no prior fracture   -In 4/2022 she had teeth extracted   -Need to complete a chart review for her bone health      We will review bone health in detail at a subsequent visit. I ordered a DEXA and vitamin D level.    34 minutes spent on the date of the encounter doing chart review, history and exam, documentation and further activities as noted above.       Again, thank you for allowing me to participate in the care of your patient.        Sincerely,        Chris Romo MD

## 2022-06-24 NOTE — NURSING NOTE
BG Log    First number is fasting and 2nd number is before dinner    6/24 143  6/21 161, 184  6/20 180  6/19 180, 129  6/18 129, 109  6/17 151  6/16 155, 172  6/15 170, 210  6/14 148  6/13 160  6/12 124, 109

## 2022-06-24 NOTE — PROGRESS NOTES
Subjective:    Established patient    Stella Greene is a 71 year old female who presents for the following endocrinopathies. The following is a comprehensive summary of her Endocrine care to date.     Current DM therapy:  -NovoLog 70/30 20 units before breakfast and 10 units before evening meal  -She stopped glipizide and stopped metformin (and GI distress has resolved) 4/2022     She has checked a capillary glucose twice daily, before breakfast (~150 - 180 mg/dL) and before her evening meal (~200 mg/dL). No hypoglycemia.     No personal or FH: pancreatitis, pancreatic cancer, thyroid cancer, MEN     Objective:    /88, BMI 26.68 kg/m2    4/2022: pedal pulses palpable, markedly reduced sensation to 10 gram monofilament testing bilaterally, multiple healing/healed lower extremity/foot ulcerations, none are actively infected     Assessment/Plan:    # DM-2, HbA1c 10.7% 4/2022 with glucose 290 mg/dL   # No recent DM eye exam, unknown if DM retinopathy  -She has an upcoming eye exam next week   # Hypertension, on lisinopril    -12/2021: urine microalbumin normal   -4/2022: GFR 79  # Peripheral neuropathy, multiple lower extremity ulcerations  -most recently seen in the Vascular wound care clinic 12/2021  # No prior ASCVD event, not on ASA   # Dyslipidemia, rosuvastatin 10 mg daily started 4/2022    -12/2021: , HDL 58, LDL 92,   -She has a glucagon kit at home     Glucose is still elevated but improving. She will increase to NovoLog 70/30 25 units before breakfast and 15 units before evening meal. Start Ozempic and increase to 1.0 mg weekly.     In the future we will consider reintroducing Metformin as extended release.  Will need to be cautious with an SGLT2 inhibitor given her lower extremity ulcerations.       In the future we will start a CGM but she will like to hold off on this for now.    Increase rosuvastatin to 20 mg daily.    Labs ordered: HbA1c, glucose, lipid panel    Return in a few months.        # Secondary adrenal insufficiency  -she has been on prednisone 10 mg daily for about 25 years for asthma.     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  I did refer her to a pulmonologist because the indication for prednisone for her has been asthma.  I am hoping that she can be on either a lower dose of prednisone or ideally no systemic oral prednisone.     She is scheduled to see pulmonology 7/2022.     We reviewed basics of adrenal insufficiency with prednisone taper.      # Bone health  -She takes a MVI daily and vitamin D (dose unclear)   -no recent DEXA, no prior fracture   -In 4/2022 she had teeth extracted   -Need to complete a chart review for her bone health      We will review bone health in detail at a subsequent visit. I ordered a DEXA and vitamin D level.    34 minutes spent on the date of the encounter doing chart review, history and exam, documentation and further activities as noted above.

## 2022-06-27 ENCOUNTER — TRANSFERRED RECORDS (OUTPATIENT)
Dept: HEALTH INFORMATION MANAGEMENT | Facility: CLINIC | Age: 72
End: 2022-06-27

## 2022-06-27 LAB
RETINOPATHY: NEGATIVE
RETINOPATHY: NEGATIVE

## 2022-07-15 ENCOUNTER — ALLIED HEALTH/NURSE VISIT (OUTPATIENT)
Dept: PULMONOLOGY | Facility: OTHER | Age: 72
End: 2022-07-15
Payer: MEDICARE

## 2022-07-15 ENCOUNTER — OFFICE VISIT (OUTPATIENT)
Dept: PULMONOLOGY | Facility: OTHER | Age: 72
End: 2022-07-15

## 2022-07-15 VITALS
OXYGEN SATURATION: 97 % | DIASTOLIC BLOOD PRESSURE: 87 MMHG | HEART RATE: 93 BPM | BODY MASS INDEX: 28.93 KG/M2 | WEIGHT: 154.3 LBS | SYSTOLIC BLOOD PRESSURE: 129 MMHG

## 2022-07-15 DIAGNOSIS — J45.909 ASTHMA: ICD-10-CM

## 2022-07-15 DIAGNOSIS — J45.40 MODERATE PERSISTENT ASTHMA WITHOUT COMPLICATION: ICD-10-CM

## 2022-07-15 LAB — HGB BLD-MCNC: 13.4 G/DL

## 2022-07-15 PROCEDURE — 85018 HEMOGLOBIN: CPT

## 2022-07-15 PROCEDURE — 94726 PLETHYSMOGRAPHY LUNG VOLUMES: CPT

## 2022-07-15 PROCEDURE — 94729 DIFFUSING CAPACITY: CPT

## 2022-07-15 PROCEDURE — 99204 OFFICE O/P NEW MOD 45 MIN: CPT | Performed by: INTERNAL MEDICINE

## 2022-07-15 PROCEDURE — 94060 EVALUATION OF WHEEZING: CPT

## 2022-07-15 RX ORDER — PREDNISONE 5 MG/1
5 TABLET ORAL DAILY
Qty: 30 TABLET | Refills: 3 | Status: SHIPPED | OUTPATIENT
Start: 2022-07-15 | End: 2022-09-12

## 2022-07-15 RX ORDER — PREDNISONE 1 MG/1
1 TABLET ORAL DAILY
Qty: 120 TABLET | Refills: 1 | Status: SHIPPED | OUTPATIENT
Start: 2022-07-15 | End: 2022-08-09

## 2022-07-15 RX ORDER — MONTELUKAST SODIUM 10 MG/1
10 TABLET ORAL AT BEDTIME
Qty: 30 TABLET | Refills: 11 | Status: ON HOLD | OUTPATIENT
Start: 2022-07-15 | End: 2024-01-01

## 2022-07-15 ASSESSMENT — ASTHMA QUESTIONNAIRES
QUESTION_3 LAST FOUR WEEKS HOW OFTEN DID YOUR ASTHMA SYMPTOMS (WHEEZING, COUGHING, SHORTNESS OF BREATH, CHEST TIGHTNESS OR PAIN) WAKE YOU UP AT NIGHT OR EARLIER THAN USUAL IN THE MORNING: NOT AT ALL
QUESTION_4 LAST FOUR WEEKS HOW OFTEN HAVE YOU USED YOUR RESCUE INHALER OR NEBULIZER MEDICATION (SUCH AS ALBUTEROL): NOT AT ALL
QUESTION_1 LAST FOUR WEEKS HOW MUCH OF THE TIME DID YOUR ASTHMA KEEP YOU FROM GETTING AS MUCH DONE AT WORK, SCHOOL OR AT HOME: NONE OF THE TIME
QUESTION_5 LAST FOUR WEEKS HOW WOULD YOU RATE YOUR ASTHMA CONTROL: COMPLETELY CONTROLLED
QUESTION_2 LAST FOUR WEEKS HOW OFTEN HAVE YOU HAD SHORTNESS OF BREATH: MORE THAN ONCE A DAY
ACT_TOTALSCORE: 21
ACT_TOTALSCORE: 21

## 2022-07-15 NOTE — PROGRESS NOTES
Pulmonary Clinic Consult     Cc: Asthma     HPI:   The patient is a 71 year old female who presents for an evaluation of her asthma with chronic prednisone use. She has used prednisone with as needed albuterol since late 80's/early 90's for asthma control, at some point Advair was added in. She remembers having her first asthma exacerbation when a fresh [place] tree was in her house in the late 80's.  She reports good control currently, very rare cough, minimal dyspnea with exertion. No dyspnea with exertion when walking. Can do one flight of stairs easily. Occasional wheeze when humid. Occasional snoring. No headaches or daily sleepiness.   Occasional environmental allergy sx, uses zyrtec and flonase. She did spirometry to diagnose her asthma at that time but had never had formal PFTs or imaging.     Hx of GERD, takes prevacid. No reflux. Tries not to eat for a few hours before bed.        Lives in house built 2006, no mold  Children- 3   No pets      ROS: 12-point review performed and notable for weight gain, shortness breath. The remainder reviewed and negative.     Current Outpatient Medications   Medication     ADVAIR DISKUS 250-50 MCG/DOSE inhaler     albuterol (PROAIR HFA/PROVENTIL HFA/VENTOLIN HFA) 108 (90 Base) MCG/ACT inhaler     blood glucose test (CONTOUR NEXT STRIPS) strips     cetirizine (ZYRTEC) 10 MG tablet     fluticasone propionate (FLONASE) 50 mcg/actuation nasal spray     generic lancets     Glucagon (GVOKE HYPOPEN 1-PACK) 1 MG/0.2ML SOAJ     Glucagon 0.5 MG/0.1ML SOAJ     insulin aspart prot & aspart (NOVOLOG MIX 70/30 FLEXPEN) (70-30) 100 UNIT/ML pen     insulin pen needle (31G X 6 MM) 31G X 6 MM miscellaneous     lansoprazole (PREVACID) 15 MG capsule     lisinopril (ZESTRIL) 20 MG tablet     montelukast (SINGULAIR) 10 MG tablet     multivitamin therapeutic tablet     predniSONE (DELTASONE) 1 MG tablet     predniSONE (DELTASONE) 5 MG tablet     rosuvastatin (CRESTOR) 20 MG tablet      semaglutide (OZEMPIC) 2 MG/1.5ML SOPN pen     Semaglutide, 1 MG/DOSE, (OZEMPIC, 1 MG/DOSE,) 4 MG/3ML SOPN     Vitamin D, Cholecalciferol, 25 MCG (1000 UT) TABS     Current Facility-Administered Medications   Medication     lidocaine (XYLOCAINE) 2 % external gel     Social History     Tobacco Use     Smoking status: Never Smoker     Smokeless tobacco: Never Used   Substance Use Topics     Alcohol use: No     Drug use: No     Past Medical History:   Diagnosis Date     Diabetes mellitus, type 2 (H) 12/06/2021     Essential hypertension     Created by Conversion Replacement Utility updated for latest IMO load      Mild persistent asthma without complication      Past Surgical History:   Procedure Laterality Date     LAPAROSCOPIC TUBAL LIGATION Bilateral      nasal polpys Bilateral        ASSESSMENT/PLAN:  /87 (BP Location: Left arm, Patient Position: Chair)   Pulse 93   Wt 70 kg (154 lb 4.8 oz)   SpO2 97%   BMI 28.93 kg/m       Asthma with chronic prednisone use:    She will continue her Advair for now as she has two inhalers left and the cost is high to make changes for her now.     Monitor spirometry closely     Start a very slow taper of prednisone, decrease by 1mg per month with close follow up.    Start Singulair     Continue prn albuterol     Consider adding azithromycin 3 times weekly in future if her symptoms worsen    CBC and IgE panel    Follow up in 4 weeks    Physical Exam  Constitutional:       General: She is not in acute distress.     Appearance: She is not ill-appearing or diaphoretic.   HENT:      Nose: Nose normal.   Cardiovascular:      Rate and Rhythm: Normal rate and regular rhythm.      Heart sounds: Normal heart sounds.   Pulmonary:      Effort: Pulmonary effort is normal. No respiratory distress.      Breath sounds: Normal breath sounds. No wheezing or rhonchi.   Musculoskeletal:      Right lower leg: No edema.      Left lower leg: No edema.   Skin:     General: Skin is warm and dry.    Neurological:      Mental Status: She is alert.   Psychiatric:         Behavior: Behavior normal.       PFTs  Asthma without reversibility and obstruction. Airtrapping. No hyperinflation. Normal diffusion capacity.          Margarita Barbosa MD

## 2022-07-16 LAB
DLCOCOR-%PRED-PRE: 117 %
DLCOCOR-PRE: 20.67 ML/MIN/MMHG
DLCOUNC-%PRED-PRE: 117 %
DLCOUNC-PRE: 20.67 ML/MIN/MMHG
DLCOUNC-PRED: 17.66 ML/MIN/MMHG
ERV-%PRED-PRE: 93 %
ERV-PRE: 0.44 L
ERV-PRED: 0.47 L
EXPTIME-PRE: 7.34 SEC
FEF2575-%PRED-POST: 41 %
FEF2575-%PRED-PRE: 47 %
FEF2575-POST: 0.73 L/SEC
FEF2575-PRE: 0.84 L/SEC
FEF2575-PRED: 1.76 L/SEC
FEFMAX-%PRED-PRE: 66 %
FEFMAX-PRE: 3.49 L/SEC
FEFMAX-PRED: 5.23 L/SEC
FEV1-%PRED-PRE: 75 %
FEV1-PRE: 1.51 L
FEV1FEV6-PRE: 61 %
FEV1FEV6-PRED: 79 %
FEV1FVC-PRE: 60 %
FEV1FVC-PRED: 79 %
FEV1SVC-PRE: 53 %
FEV1SVC-PRED: 74 %
FIFMAX-PRE: 4.44 L/SEC
FRCPLETH-%PRED-PRE: 132 %
FRCPLETH-PRE: 3.38 L
FRCPLETH-PRED: 2.55 L
FVC-%PRED-PRE: 96 %
FVC-PRE: 2.49 L
FVC-PRED: 2.58 L
IC-%PRED-PRE: 99 %
IC-PRE: 2.22 L
IC-PRED: 2.23 L
RVPLETH-%PRED-PRE: 143 %
RVPLETH-PRE: 2.77 L
RVPLETH-PRED: 1.93 L
TLCPLETH-%PRED-PRE: 125 %
TLCPLETH-PRE: 5.61 L
TLCPLETH-PRED: 4.47 L
VA-%PRED-PRE: 107 %
VA-PRE: 4.5 L
VC-%PRED-PRE: 105 %
VC-PRE: 2.84 L
VC-PRED: 2.71 L

## 2022-08-09 DIAGNOSIS — J45.40 MODERATE PERSISTENT ASTHMA WITHOUT COMPLICATION: ICD-10-CM

## 2022-08-09 RX ORDER — PREDNISONE 1 MG/1
1 TABLET ORAL DAILY
Qty: 120 TABLET | Refills: 1 | Status: SHIPPED | OUTPATIENT
Start: 2022-08-09 | End: 2022-08-15

## 2022-08-15 ENCOUNTER — OFFICE VISIT (OUTPATIENT)
Dept: PULMONOLOGY | Facility: OTHER | Age: 72
End: 2022-08-15
Payer: MEDICARE

## 2022-08-15 VITALS
OXYGEN SATURATION: 97 % | SYSTOLIC BLOOD PRESSURE: 135 MMHG | WEIGHT: 154.8 LBS | BODY MASS INDEX: 29.02 KG/M2 | DIASTOLIC BLOOD PRESSURE: 86 MMHG | HEART RATE: 90 BPM

## 2022-08-15 DIAGNOSIS — J45.40 MODERATE PERSISTENT ASTHMA WITHOUT COMPLICATION: ICD-10-CM

## 2022-08-15 PROCEDURE — 99213 OFFICE O/P EST LOW 20 MIN: CPT | Performed by: INTERNAL MEDICINE

## 2022-08-15 RX ORDER — PREDNISONE 1 MG/1
TABLET ORAL
Qty: 120 TABLET | Refills: 1 | COMMUNITY
Start: 2022-08-15 | End: 2022-08-15

## 2022-08-15 RX ORDER — PREDNISONE 1 MG/1
3 TABLET ORAL DAILY
Qty: 90 TABLET | Refills: 0 | Status: SHIPPED | OUTPATIENT
Start: 2022-08-15 | End: 2022-09-12

## 2022-08-15 ASSESSMENT — ASTHMA QUESTIONNAIRES
ACT_TOTALSCORE: 22
QUESTION_3 LAST FOUR WEEKS HOW OFTEN DID YOUR ASTHMA SYMPTOMS (WHEEZING, COUGHING, SHORTNESS OF BREATH, CHEST TIGHTNESS OR PAIN) WAKE YOU UP AT NIGHT OR EARLIER THAN USUAL IN THE MORNING: NOT AT ALL
QUESTION_1 LAST FOUR WEEKS HOW MUCH OF THE TIME DID YOUR ASTHMA KEEP YOU FROM GETTING AS MUCH DONE AT WORK, SCHOOL OR AT HOME: A LITTLE OF THE TIME
ACT_TOTALSCORE: 22
QUESTION_2 LAST FOUR WEEKS HOW OFTEN HAVE YOU HAD SHORTNESS OF BREATH: ONCE OR TWICE A WEEK
QUESTION_5 LAST FOUR WEEKS HOW WOULD YOU RATE YOUR ASTHMA CONTROL: WELL CONTROLLED
QUESTION_4 LAST FOUR WEEKS HOW OFTEN HAVE YOU USED YOUR RESCUE INHALER OR NEBULIZER MEDICATION (SUCH AS ALBUTEROL): NOT AT ALL

## 2022-08-15 NOTE — PATIENT INSTRUCTIONS
If you have worsening symptoms, questions, or need to speak with the nurse please call [phone].    Brandy Bazzi, CNP  Pulmonary Medicine  [facility]   [phone]

## 2022-08-15 NOTE — PROGRESS NOTES
Pulmonary Clinic Consult:     Cc: Asthma     HPI:   The patient is a 72 year old female who presents for an evaluation of her asthma with chronic prednisone use. She has used prednisone with as needed albuterol since late 80's/early 90's for asthma control, at some point Advair was added in. She remembers having her first asthma exacerbation when a fresh [place] tree was in her house in the late 80's.  She reports good control currently, very rare cough, minimal dyspnea with exertion. No dyspnea with exertion when walking. Can do one flight of stairs easily. No cough, wheeze, chest tightness, chest pain. Occasional snoring. No headaches or daily sleepiness.   We started a very slow prednisone taper at last visit. She has been feeling fine and is comfortable continuing this taper. She took 9mg every day for the last month.     Occasional environmental allergy sx, uses zyrtec and flonase. Was started on Singulair at her last visit and has not found it helpful, no change in symptoms. Has not needed her albuterol inhaler.      Hx of GERD, takes prevacid. No reflux. Tries not to eat for a few hours before bed.     Blood work ordered at last visit was not completed. Reminder to have this done before next visit.        Lives in house built 2006, no mold  Children- 3   No pets      ROS: 12-point review performed and notable for weight gain, shortness breath. The remainder reviewed and negative.     Current Outpatient Medications   Medication     ADVAIR DISKUS 250-50 MCG/DOSE inhaler     albuterol (PROAIR HFA/PROVENTIL HFA/VENTOLIN HFA) 108 (90 Base) MCG/ACT inhaler     blood glucose test (CONTOUR NEXT STRIPS) strips     cetirizine (ZYRTEC) 10 MG tablet     fluticasone propionate (FLONASE) 50 mcg/actuation nasal spray     Glucagon (GVOKE HYPOPEN 1-PACK) 1 MG/0.2ML SOAJ     Glucagon 0.5 MG/0.1ML SOAJ     insulin aspart prot & aspart (NOVOLOG MIX 70/30 FLEXPEN) (70-30) 100 UNIT/ML pen     insulin pen needle (31G X 6  MM) 31G X 6 MM miscellaneous     lansoprazole (PREVACID) 15 MG capsule     lisinopril (ZESTRIL) 20 MG tablet     montelukast (SINGULAIR) 10 MG tablet     multivitamin therapeutic tablet     predniSONE (DELTASONE) 1 MG tablet     predniSONE (DELTASONE) 5 MG tablet     rosuvastatin (CRESTOR) 20 MG tablet     semaglutide (OZEMPIC) 2 MG/1.5ML SOPN pen     Semaglutide, 1 MG/DOSE, (OZEMPIC, 1 MG/DOSE,) 4 MG/3ML SOPN     Vitamin D, Cholecalciferol, 25 MCG (1000 UT) TABS     generic lancets     Current Facility-Administered Medications   Medication     lidocaine (XYLOCAINE) 2 % external gel     Social History     Tobacco Use     Smoking status: Never Smoker     Smokeless tobacco: Never Used   Substance Use Topics     Alcohol use: No     Drug use: No     Past Medical History:   Diagnosis Date     Diabetes mellitus, type 2 (H) 12/06/2021     Essential hypertension     Created by Conversion Replacement Utility updated for latest IMO load      Mild persistent asthma without complication      Past Surgical History:   Procedure Laterality Date     LAPAROSCOPIC TUBAL LIGATION Bilateral      nasal polpys Bilateral        ASSESSMENT/PLAN:  /86 (BP Location: Left arm)   Pulse 90   Wt 70.2 kg (154 lb 12.8 oz)   SpO2 97%   BMI 29.02 kg/m       Asthma with chronic prednisone use:    She will continue her Advair for now as she has two inhalers left and the cost is high to make changes for her now.     Monitor spirometry closely     Start a very slow taper of prednisone, decrease by 1mg per month with close follow up. 8mg every day for 4 weeks.     Discontinue Singulair for now. The patient did not notice a difference and would prefer to stop this and monitor her symptoms.     Continue prn albuterol     Consider adding azithromycin 3 times weekly in future if her symptoms worsen    CBC and IgE panel    Follow up in 4 weeks    Physical Exam  Constitutional:       General: She is not in acute distress.     Appearance: She is not  ill-appearing or diaphoretic.   HENT:      Nose: Nose normal.   Cardiovascular:      Rate and Rhythm: Normal rate and regular rhythm.      Heart sounds: Normal heart sounds.   Pulmonary:      Effort: Pulmonary effort is normal. No respiratory distress.      Breath sounds: Normal breath sounds. No wheezing or rhonchi.   Musculoskeletal:      Right lower leg: No edema.      Left lower leg: No edema.   Skin:     General: Skin is warm and dry.   Neurological:      Mental Status: She is alert.   Psychiatric:         Behavior: Behavior normal.       PFTs (7/15/22):  Asthma without reversibility and obstruction. Airtrapping. No hyperinflation. Normal diffusion capacity.          Brandy Bazzi, CNP  Pulmonary Medicine  [facility]   [phone]

## 2022-09-09 ENCOUNTER — LAB (OUTPATIENT)
Dept: LAB | Facility: CLINIC | Age: 72
End: 2022-09-09
Payer: MEDICARE

## 2022-09-09 DIAGNOSIS — E11.65 TYPE 2 DIABETES MELLITUS WITH HYPERGLYCEMIA, WITH LONG-TERM CURRENT USE OF INSULIN (H): ICD-10-CM

## 2022-09-09 DIAGNOSIS — J45.40 MODERATE PERSISTENT ASTHMA WITHOUT COMPLICATION: ICD-10-CM

## 2022-09-09 DIAGNOSIS — Z79.4 TYPE 2 DIABETES MELLITUS WITH HYPERGLYCEMIA, WITH LONG-TERM CURRENT USE OF INSULIN (H): ICD-10-CM

## 2022-09-09 LAB
CHOLEST SERPL-MCNC: 158 MG/DL
FASTING STATUS PATIENT QL REPORTED: YES
GLUCOSE SERPL-MCNC: 113 MG/DL (ref 70–99)
HBA1C MFR BLD: 7.1 % (ref 0–5.6)
HDLC SERPL-MCNC: 60 MG/DL
LDLC SERPL CALC-MCNC: 78 MG/DL
NONHDLC SERPL-MCNC: 98 MG/DL
TRIGL SERPL-MCNC: 99 MG/DL

## 2022-09-09 PROCEDURE — 80061 LIPID PANEL: CPT

## 2022-09-09 PROCEDURE — 82306 VITAMIN D 25 HYDROXY: CPT

## 2022-09-09 PROCEDURE — 83036 HEMOGLOBIN GLYCOSYLATED A1C: CPT

## 2022-09-09 PROCEDURE — 82785 ASSAY OF IGE: CPT

## 2022-09-09 PROCEDURE — 82947 ASSAY GLUCOSE BLOOD QUANT: CPT

## 2022-09-09 PROCEDURE — 36415 COLL VENOUS BLD VENIPUNCTURE: CPT

## 2022-09-12 ENCOUNTER — OFFICE VISIT (OUTPATIENT)
Dept: PULMONOLOGY | Facility: OTHER | Age: 72
End: 2022-09-12
Payer: MEDICARE

## 2022-09-12 VITALS
HEART RATE: 88 BPM | SYSTOLIC BLOOD PRESSURE: 139 MMHG | WEIGHT: 154.2 LBS | BODY MASS INDEX: 28.91 KG/M2 | OXYGEN SATURATION: 98 % | DIASTOLIC BLOOD PRESSURE: 81 MMHG

## 2022-09-12 DIAGNOSIS — J45.40 MODERATE PERSISTENT ASTHMA WITHOUT COMPLICATION: Primary | ICD-10-CM

## 2022-09-12 PROCEDURE — 99214 OFFICE O/P EST MOD 30 MIN: CPT | Performed by: NURSE PRACTITIONER

## 2022-09-12 RX ORDER — PREDNISONE 5 MG/1
5 TABLET ORAL DAILY
Qty: 30 TABLET | Refills: 3 | Status: ON HOLD | OUTPATIENT
Start: 2022-09-12 | End: 2023-01-05

## 2022-09-12 RX ORDER — PREDNISONE 1 MG/1
2 TABLET ORAL DAILY
Qty: 60 TABLET | Refills: 0 | Status: SHIPPED | OUTPATIENT
Start: 2022-09-12 | End: 2022-10-31

## 2022-09-12 ASSESSMENT — ASTHMA QUESTIONNAIRES
QUESTION_1 LAST FOUR WEEKS HOW MUCH OF THE TIME DID YOUR ASTHMA KEEP YOU FROM GETTING AS MUCH DONE AT WORK, SCHOOL OR AT HOME: NONE OF THE TIME
QUESTION_3 LAST FOUR WEEKS HOW OFTEN DID YOUR ASTHMA SYMPTOMS (WHEEZING, COUGHING, SHORTNESS OF BREATH, CHEST TIGHTNESS OR PAIN) WAKE YOU UP AT NIGHT OR EARLIER THAN USUAL IN THE MORNING: NOT AT ALL
QUESTION_2 LAST FOUR WEEKS HOW OFTEN HAVE YOU HAD SHORTNESS OF BREATH: NOT AT ALL
QUESTION_5 LAST FOUR WEEKS HOW WOULD YOU RATE YOUR ASTHMA CONTROL: COMPLETELY CONTROLLED
ACT_TOTALSCORE: 25
ACT_TOTALSCORE: 25
QUESTION_4 LAST FOUR WEEKS HOW OFTEN HAVE YOU USED YOUR RESCUE INHALER OR NEBULIZER MEDICATION (SUCH AS ALBUTEROL): NOT AT ALL

## 2022-09-12 NOTE — PATIENT INSTRUCTIONS
- we will decrease your prednisone to 7mg every day since you are feeling well.   - I will check with Dr. Barbosa and see if she is comfortable continuing this taper via telephone visit for the next few months to eliminate the need for another appointment in month.     If you have worsening symptoms, questions, or need to speak with the nurse please call [phone].

## 2022-09-12 NOTE — Clinical Note
Are you ok with having her follow up in person next in Nov or Dec? She was hoping to not come in as often. She will call in 1 month with update so we can continue prednisone taper.  I did make a not to repeat spirometry next time she was in.

## 2022-09-12 NOTE — PROGRESS NOTES
Pulmonary Clinic Follow up:  September 12, 2022      Cc: Asthma follow up     HPI:   The patient is a 72 year old female who presents for an evaluation of her asthma with chronic prednisone use. She has used prednisone with as needed albuterol since late 80's/early 90's for asthma control, at some point Advair was added in. She remembers having her first asthma exacerbation when a fresh [place] tree was in her house in the late 80's.  She reports good control currently, very rare cough, minimal dyspnea with exertion. No dyspnea with exertion when walking. Can do one flight of stairs easily. No cough, wheeze, chest tightness, chest pain. Occasional snoring. No headaches or daily sleepiness.   We started a very slow prednisone taper a few months ago. She has been feeling fine and is comfortable continuing this taper. She took 8mg every day for the last month.     Occasional environmental allergy sx, uses zyrtec and flonase. Was started on Singulair at a previous visit and did not find it helpful, no change in symptoms. Has not needed her albuterol inhaler.      Hx of GERD, takes prevacid. No reflux. Tries not to eat for a few hours before bed.     Blood work ordered at last visit was not completed. Reminder to have this done before next visit, labs re-ordered for current order as she did just have other labs drawn and they were not done.        Lives in house built 2006, no mold  Children- 3   No pets      ROS: 12-point review performed and notable for weight gain, shortness breath. The remainder reviewed and negative.     Current Outpatient Medications   Medication     ADVAIR DISKUS 250-50 MCG/DOSE inhaler     albuterol (PROAIR HFA/PROVENTIL HFA/VENTOLIN HFA) 108 (90 Base) MCG/ACT inhaler     blood glucose test (CONTOUR NEXT STRIPS) strips     cetirizine (ZYRTEC) 10 MG tablet     fluticasone propionate (FLONASE) 50 mcg/actuation nasal spray     Glucagon (GVOKE HYPOPEN 1-PACK) 1 MG/0.2ML SOAJ     Glucagon 0.5  MG/0.1ML SOAJ     insulin aspart prot & aspart (NOVOLOG MIX 70/30 FLEXPEN) (70-30) 100 UNIT/ML pen     insulin pen needle (31G X 6 MM) 31G X 6 MM miscellaneous     lansoprazole (PREVACID) 15 MG capsule     lisinopril (ZESTRIL) 20 MG tablet     multivitamin therapeutic tablet     predniSONE (DELTASONE) 1 MG tablet     predniSONE (DELTASONE) 5 MG tablet     rosuvastatin (CRESTOR) 20 MG tablet     Semaglutide, 1 MG/DOSE, (OZEMPIC, 1 MG/DOSE,) 4 MG/3ML SOPN     Vitamin D, Cholecalciferol, 25 MCG (1000 UT) TABS     montelukast (SINGULAIR) 10 MG tablet     semaglutide (OZEMPIC) 2 MG/1.5ML SOPN pen     Current Facility-Administered Medications   Medication     lidocaine (XYLOCAINE) 2 % external gel     Social History     Tobacco Use     Smoking status: Never Smoker     Smokeless tobacco: Never Used   Substance Use Topics     Alcohol use: No     Drug use: No     Past Medical History:   Diagnosis Date     Diabetes mellitus, type 2 (H) 12/06/2021     Essential hypertension     Created by Conversion Replacement Utility updated for latest IMO load      Mild persistent asthma without complication      Past Surgical History:   Procedure Laterality Date     LAPAROSCOPIC TUBAL LIGATION Bilateral      nasal polpys Bilateral        ASSESSMENT/PLAN:  /81 (BP Location: Right arm)   Pulse 88   Wt 69.9 kg (154 lb 3.2 oz)   SpO2 98%   BMI 28.91 kg/m       Asthma with chronic prednisone use:    She will continue her Advair for now as she has two inhalers left and the cost is high to make changes for her now. She would like to remain on this medication until she is back from [place] next spring.      Monitor spirometry closely. Recheck at next visit.      Start a very slow taper of prednisone, decrease by 1mg per month with close follow up. 7mg every day for 4 weeks.     Discontinue Singulair for now. The patient did not notice a difference and would prefer to stop this and monitor her symptoms.      Continue prn albuterol      Consider adding azithromycin 3 times weekly in future if her symptoms worsen    CBC and IgE panel reordered     The patient is hoping to extend the time period in between her visits to eliminate the need for so many appointments. She is agreeable to keep up posted and call when she is done with the next month of prednisone.     Follow up in 4-8 weeks    Brandy Bazzi CNP  Pulmonary Medicine  [facility]   [phone]      Physical Exam  Constitutional:       General: She is not in acute distress.     Appearance: She is not ill-appearing or diaphoretic.   HENT:      Nose: Nose normal.   Cardiovascular:      Rate and Rhythm: Normal rate and regular rhythm.      Heart sounds: Normal heart sounds.   Pulmonary:      Effort: Pulmonary effort is normal. No respiratory distress.      Breath sounds: Normal breath sounds. No wheezing or rhonchi.   Musculoskeletal:      Right lower leg: No edema.      Left lower leg: No edema.   Skin:     General: Skin is warm and dry.   Neurological:      Mental Status: She is alert.   Psychiatric:         Behavior: Behavior normal.       PFTs (7/15/22):  Asthma without reversibility and obstruction. Airtrapping. No hyperinflation. Normal diffusion capacity.

## 2022-09-13 LAB — IGE SERPL-ACNC: 273 KU/L (ref 0–114)

## 2022-09-16 ENCOUNTER — OFFICE VISIT (OUTPATIENT)
Dept: ENDOCRINOLOGY | Facility: CLINIC | Age: 72
End: 2022-09-16
Payer: MEDICARE

## 2022-09-16 VITALS
SYSTOLIC BLOOD PRESSURE: 120 MMHG | WEIGHT: 153 LBS | DIASTOLIC BLOOD PRESSURE: 70 MMHG | HEART RATE: 87 BPM | BODY MASS INDEX: 28.68 KG/M2

## 2022-09-16 DIAGNOSIS — Z79.4 TYPE 2 DIABETES MELLITUS WITH DIABETIC POLYNEUROPATHY, WITH LONG-TERM CURRENT USE OF INSULIN (H): ICD-10-CM

## 2022-09-16 DIAGNOSIS — E66.3 OVERWEIGHT (BMI 25.0-29.9): ICD-10-CM

## 2022-09-16 DIAGNOSIS — I10 HYPERTENSION, UNSPECIFIED TYPE: ICD-10-CM

## 2022-09-16 DIAGNOSIS — L97.909 ULCER OF LOWER EXTREMITY, UNSPECIFIED LATERALITY, UNSPECIFIED ULCER STAGE (H): ICD-10-CM

## 2022-09-16 DIAGNOSIS — E11.42 TYPE 2 DIABETES MELLITUS WITH DIABETIC POLYNEUROPATHY, WITH LONG-TERM CURRENT USE OF INSULIN (H): ICD-10-CM

## 2022-09-16 DIAGNOSIS — E78.5 DYSLIPIDEMIA: ICD-10-CM

## 2022-09-16 DIAGNOSIS — E24.2 CUSHINGOID SIDE EFFECT OF STEROIDS (H): ICD-10-CM

## 2022-09-16 DIAGNOSIS — E27.40 ADRENAL INSUFFICIENCY (H): ICD-10-CM

## 2022-09-16 DIAGNOSIS — J45.909 ASTHMA, UNSPECIFIED ASTHMA SEVERITY, UNSPECIFIED WHETHER COMPLICATED, UNSPECIFIED WHETHER PERSISTENT: ICD-10-CM

## 2022-09-16 LAB
DEPRECATED CALCIDIOL+CALCIFEROL SERPL-MC: <43 UG/L (ref 20–75)
VITAMIN D2 SERPL-MCNC: <5 UG/L
VITAMIN D3 SERPL-MCNC: 38 UG/L

## 2022-09-16 PROCEDURE — 99215 OFFICE O/P EST HI 40 MIN: CPT | Performed by: INTERNAL MEDICINE

## 2022-09-16 RX ORDER — METFORMIN HCL 500 MG
2000 TABLET, EXTENDED RELEASE 24 HR ORAL
Qty: 90 TABLET | Refills: 4 | Status: ON HOLD | OUTPATIENT
Start: 2022-09-16 | End: 2023-01-05

## 2022-09-16 RX ORDER — DEXAMETHASONE SODIUM PHOSPHATE 4 MG/ML
INJECTION, SOLUTION INTRA-ARTICULAR; INTRALESIONAL; INTRAMUSCULAR; INTRAVENOUS; SOFT TISSUE
Qty: 2 ML | Refills: 3 | Status: SHIPPED | OUTPATIENT
Start: 2022-09-16 | End: 2023-03-23

## 2022-09-16 RX ORDER — ROSUVASTATIN CALCIUM 10 MG/1
10 TABLET, COATED ORAL DAILY
Qty: 90 TABLET | Refills: 4 | Status: SHIPPED | OUTPATIENT
Start: 2022-09-16 | End: 2023-03-23

## 2022-09-16 NOTE — LETTER
9/16/2022         RE: Stella Greene  [address]  [facility] [address]        Dear Colleague,    Thank you for referring your patient, Stella Greene, to the [facility]. Please see a copy of my visit note below.    Subjective:    Established patient    Stella Greene is a 71 year old female who presents for the following endocrinopathies. The following is a comprehensive summary of her Endocrine care to date.      Current DM therapy:  -NovoLog 70/30 25 units before breakfast and 15 units before evening meal  -Ozempic 1.0 mg weekly   -She stopped glipizide and stopped metformin (and GI distress has resolved) 4/2022     She has been checking a glucose about 3 times per week, and she checks fasting in the AM and it's 100 - 115 mg/dL. No symptoms to suggest hypoglycemia.      No personal or FH: pancreatitis, pancreatic cancer, thyroid cancer, MEN     Objective:    Appears well today, BMI 28.7 kg/m2, /70    4/2022: pedal pulses palpable, markedly reduced sensation to 10 gram monofilament testing bilaterally, multiple healing/healed lower extremity/foot ulcerations, none are actively infected     Assessment/Plan:    # DM-2, control significantly improved  -HbA1c 10.7% 4/2022 with glucose 290 mg/dL   -9/2022: HbA1c 7.1%  # No DM retinopathy on eye exam 6/2022  # Hypertension, on lisinopril    -12/2021: urine microalbumin normal   -4/2022: GFR 79  # Peripheral neuropathy, multiple lower extremity ulcerations  -most recently seen in the Vascular wound care clinic 12/2021  # No prior ASCVD event, not on ASA   # Dyslipidemia, rosuvastatin 20 mg daily (stopped a few weeks ago due to arthralgias that subsequently improved)   -9/2022: , HDL 60, LDL 78, TG 99  -She has a glucagon kit at home     Stella has markedly improved her glycemic control. We would like to increase the dose of Ozempic but it's already $250 per month. I reached out to our pharmacy liaison team to look into  "options.    We will start metformin ER with goal dose 2000 mg daily. No change in insulin dosing for now.     She will be more diligent with checking a capillary glucose twice daily and check in with me in a few weeks to let me know her glucose data and if she's tolerating metformin.     Will need to be cautious with an SGLT2 inhibitor given her lower extremity ulcerations, although I'm hopeful we can start this down the road.       She would like to defer on a CGM.     She will restart rosuvastatin 10 mg daily.     Return in 6 months, when we check in in a few weeks I'll place orders to be done before the visit in 6 months.      # Secondary adrenal insufficiency  -she had been on prednisone 10 mg daily for about 25 years for asthma.     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  I did refer her to a pulmonologist because the indication for prednisone for her has been asthma.  I am hoping that she can be on either a lower dose of prednisone or ideally no systemic oral prednisone.     She is working with pulmonology currently to taper prednisone and the current dose is 8 mg.      Adrenal insufficiency sick day rules:  -When ill, take prednisone 10 mg daily for 3 days or until feeling back to baseline  -If Stella is too ill to take prednisone by mouth (ex. Significant nausea, vomiting), she should inject dexamethasone 4 mg intramuscular and call 911 or proceed to the ED, I prescribed dexamethasone  -In the event of hospitalization, surgery/procedure, car accident, etc. she will need IV steroids, a typical initial dose is hydrocortisone 100 mg IV, I can be contacted for dosing specifics as needed   -She will obtain a medical alert [employer] that states \"secondary adrenal insufficiency\"     # Bone health  -She takes a MVI daily and vitamin D 1000 international unit(s) daily   -no recent DEXA, no prior fracture   -In 4/2022 she had teeth extracted   -Need to complete a chart review for her bone " health      I ordered a DEXA (has not been scheduled yet) and vitamin D level (pending).    She plans to do the DEXA in the spring when she returns from [place] and we'll review her bone health at that time.     41 minutes spent on the date of the encounter doing chart review, history and exam, documentation and further activities as noted above.        Again, thank you for allowing me to participate in the care of your patient.        Sincerely,        Chris Romo MD

## 2022-09-16 NOTE — PROGRESS NOTES
Subjective:    Established patient    Stella Greene is a 71 year old female who presents for the following endocrinopathies. The following is a comprehensive summary of her Endocrine care to date.      Current DM therapy:  -NovoLog 70/30 25 units before breakfast and 15 units before evening meal  -Ozempic 1.0 mg weekly   -She stopped glipizide and stopped metformin (and GI distress has resolved) 4/2022     She has been checking a glucose about 3 times per week, and she checks fasting in the AM and it's 100 - 115 mg/dL. No symptoms to suggest hypoglycemia.      No personal or FH: pancreatitis, pancreatic cancer, thyroid cancer, MEN     Objective:    Appears well today, BMI 28.7 kg/m2, /70    4/2022: pedal pulses palpable, markedly reduced sensation to 10 gram monofilament testing bilaterally, multiple healing/healed lower extremity/foot ulcerations, none are actively infected     Assessment/Plan:    # DM-2, control significantly improved  -HbA1c 10.7% 4/2022 with glucose 290 mg/dL   -9/2022: HbA1c 7.1%  # No DM retinopathy on eye exam 6/2022  # Hypertension, on lisinopril    -12/2021: urine microalbumin normal   -4/2022: GFR 79  # Peripheral neuropathy, multiple lower extremity ulcerations  -most recently seen in the Vascular wound care clinic 12/2021  # No prior ASCVD event, not on ASA   # Dyslipidemia, rosuvastatin 20 mg daily (stopped a few weeks ago due to arthralgias that subsequently improved)   -9/2022: , HDL 60, LDL 78, TG 99  -She has a glucagon kit at home     Stella has markedly improved her glycemic control. We would like to increase the dose of Ozempic but it's already $250 per month. I reached out to our pharmacy liaison team to look into options.    We will start metformin ER with goal dose 2000 mg daily. No change in insulin dosing for now.     She will be more diligent with checking a capillary glucose twice daily and check in with me in a few weeks to let me know her glucose data and if  "she's tolerating metformin.     Will need to be cautious with an SGLT2 inhibitor given her lower extremity ulcerations, although I'm hopeful we can start this down the road.       She would like to defer on a CGM.     She will restart rosuvastatin 10 mg daily.     Return in 6 months, when we check in in a few weeks I'll place orders to be done before the visit in 6 months.      # Secondary adrenal insufficiency  -she had been on prednisone 10 mg daily for about 25 years for asthma.     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  I did refer her to a pulmonologist because the indication for prednisone for her has been asthma.  I am hoping that she can be on either a lower dose of prednisone or ideally no systemic oral prednisone.     She is working with pulmonology currently to taper prednisone and the current dose is 8 mg.      Adrenal insufficiency sick day rules:  -When ill, take prednisone 10 mg daily for 3 days or until feeling back to baseline  -If Stella is too ill to take prednisone by mouth (ex. Significant nausea, vomiting), she should inject dexamethasone 4 mg intramuscular and call 911 or proceed to the ED, I prescribed dexamethasone  -In the event of hospitalization, surgery/procedure, car accident, etc. she will need IV steroids, a typical initial dose is hydrocortisone 100 mg IV, I can be contacted for dosing specifics as needed   -She will obtain a medical alert bracelet that states \"secondary adrenal insufficiency\"     # Bone health  -She takes a MVI daily and vitamin D 1000 international unit(s) daily   -no recent DEXA, no prior fracture   -In 4/2022 she had teeth extracted   -Need to complete a chart review for her bone health      I ordered a DEXA (has not been scheduled yet) and vitamin D level (pending).    She plans to do the DEXA in the spring when she returns from [place] and we'll review her bone health at that time.     41 minutes spent on the date of the encounter doing " chart review, history and exam, documentation and further activities as noted above.

## 2022-09-20 ENCOUNTER — LAB (OUTPATIENT)
Dept: LAB | Facility: CLINIC | Age: 72
End: 2022-09-20
Payer: MEDICARE

## 2022-09-20 DIAGNOSIS — J45.40 MODERATE PERSISTENT ASTHMA WITHOUT COMPLICATION: ICD-10-CM

## 2022-09-20 LAB
BASOPHILS # BLD AUTO: 0.1 10E3/UL (ref 0–0.2)
BASOPHILS NFR BLD AUTO: 0 %
EOSINOPHIL # BLD AUTO: 0.4 10E3/UL (ref 0–0.7)
EOSINOPHIL NFR BLD AUTO: 3 %
ERYTHROCYTE [DISTWIDTH] IN BLOOD BY AUTOMATED COUNT: 13.1 % (ref 10–15)
HCT VFR BLD AUTO: 41.6 % (ref 35–47)
HGB BLD-MCNC: 13.6 G/DL (ref 11.7–15.7)
IMM GRANULOCYTES # BLD: 0 10E3/UL
IMM GRANULOCYTES NFR BLD: 0 %
LYMPHOCYTES # BLD AUTO: 3.9 10E3/UL (ref 0.8–5.3)
LYMPHOCYTES NFR BLD AUTO: 28 %
MCH RBC QN AUTO: 28 PG (ref 26.5–33)
MCHC RBC AUTO-ENTMCNC: 32.7 G/DL (ref 31.5–36.5)
MCV RBC AUTO: 86 FL (ref 78–100)
MONOCYTES # BLD AUTO: 1 10E3/UL (ref 0–1.3)
MONOCYTES NFR BLD AUTO: 7 %
NEUTROPHILS # BLD AUTO: 8.8 10E3/UL (ref 1.6–8.3)
NEUTROPHILS NFR BLD AUTO: 62 %
PLATELET # BLD AUTO: 365 10E3/UL (ref 150–450)
RBC # BLD AUTO: 4.86 10E6/UL (ref 3.8–5.2)
WBC # BLD AUTO: 14.1 10E3/UL (ref 4–11)

## 2022-09-20 PROCEDURE — 85025 COMPLETE CBC W/AUTO DIFF WBC: CPT

## 2022-09-20 PROCEDURE — 36415 COLL VENOUS BLD VENIPUNCTURE: CPT

## 2022-10-01 ENCOUNTER — HEALTH MAINTENANCE LETTER (OUTPATIENT)
Age: 72
End: 2022-10-01

## 2022-10-03 DIAGNOSIS — Z79.4 TYPE 2 DIABETES MELLITUS WITH HYPERGLYCEMIA, WITH LONG-TERM CURRENT USE OF INSULIN (H): ICD-10-CM

## 2022-10-03 DIAGNOSIS — E11.65 TYPE 2 DIABETES MELLITUS WITH HYPERGLYCEMIA, WITH LONG-TERM CURRENT USE OF INSULIN (H): ICD-10-CM

## 2022-10-03 RX ORDER — SEMAGLUTIDE 1.34 MG/ML
2 INJECTION, SOLUTION SUBCUTANEOUS WEEKLY
Qty: 18 ML | Refills: 4 | Status: SHIPPED | OUTPATIENT
Start: 2022-10-03 | End: 2022-10-05

## 2022-10-04 ENCOUNTER — TELEPHONE (OUTPATIENT)
Dept: ENDOCRINOLOGY | Facility: CLINIC | Age: 72
End: 2022-10-04

## 2022-10-04 DIAGNOSIS — Z79.4 TYPE 2 DIABETES MELLITUS WITH HYPERGLYCEMIA, WITH LONG-TERM CURRENT USE OF INSULIN (H): ICD-10-CM

## 2022-10-04 DIAGNOSIS — E11.65 TYPE 2 DIABETES MELLITUS WITH HYPERGLYCEMIA, WITH LONG-TERM CURRENT USE OF INSULIN (H): ICD-10-CM

## 2022-10-05 RX ORDER — SEMAGLUTIDE 1.34 MG/ML
2 INJECTION, SOLUTION SUBCUTANEOUS WEEKLY
Qty: 18 ML | Refills: 4 | Status: ON HOLD | OUTPATIENT
Start: 2022-10-05 | End: 2023-01-05

## 2022-10-24 DIAGNOSIS — J45.40 MODERATE PERSISTENT ASTHMA WITHOUT COMPLICATION: ICD-10-CM

## 2022-10-24 DIAGNOSIS — J45.909 ASTHMA: ICD-10-CM

## 2022-10-24 DIAGNOSIS — J45.40 MODERATE PERSISTENT ASTHMA WITHOUT COMPLICATION: Primary | ICD-10-CM

## 2022-10-24 RX ORDER — PREDNISONE 1 MG/1
1 TABLET ORAL DAILY
Qty: 30 TABLET | Refills: 0 | Status: SHIPPED | OUTPATIENT
Start: 2022-10-24 | End: 2022-12-12

## 2022-10-31 RX ORDER — PREDNISONE 1 MG/1
TABLET ORAL
Qty: 60 TABLET | Refills: 0 | Status: SHIPPED | OUTPATIENT
Start: 2022-10-31 | End: 2022-12-12

## 2022-11-26 DIAGNOSIS — Z79.4 TYPE 2 DIABETES MELLITUS WITH HYPERGLYCEMIA, WITH LONG-TERM CURRENT USE OF INSULIN (H): ICD-10-CM

## 2022-11-26 DIAGNOSIS — E11.65 TYPE 2 DIABETES MELLITUS WITH HYPERGLYCEMIA, WITH LONG-TERM CURRENT USE OF INSULIN (H): ICD-10-CM

## 2022-11-28 RX ORDER — INSULIN ASPART 100 [IU]/ML
INJECTION, SUSPENSION SUBCUTANEOUS
Qty: 15 ML | Refills: 0 | OUTPATIENT
Start: 2022-11-28

## 2022-12-12 ENCOUNTER — OFFICE VISIT (OUTPATIENT)
Dept: PULMONOLOGY | Facility: CLINIC | Age: 72
End: 2022-12-12
Payer: MEDICARE

## 2022-12-12 VITALS
OXYGEN SATURATION: 99 % | DIASTOLIC BLOOD PRESSURE: 72 MMHG | BODY MASS INDEX: 28.31 KG/M2 | SYSTOLIC BLOOD PRESSURE: 126 MMHG | WEIGHT: 151 LBS | HEART RATE: 68 BPM

## 2022-12-12 DIAGNOSIS — J45.40 MODERATE PERSISTENT ASTHMA WITHOUT COMPLICATION: ICD-10-CM

## 2022-12-12 PROCEDURE — 99213 OFFICE O/P EST LOW 20 MIN: CPT | Performed by: NURSE PRACTITIONER

## 2022-12-12 RX ORDER — PREDNISONE 1 MG/1
2 TABLET ORAL DAILY
Qty: 90 TABLET | Refills: 1 | Status: ON HOLD | OUTPATIENT
Start: 2022-12-12 | End: 2023-01-05

## 2022-12-12 ASSESSMENT — ASTHMA QUESTIONNAIRES
QUESTION_3 LAST FOUR WEEKS HOW OFTEN DID YOUR ASTHMA SYMPTOMS (WHEEZING, COUGHING, SHORTNESS OF BREATH, CHEST TIGHTNESS OR PAIN) WAKE YOU UP AT NIGHT OR EARLIER THAN USUAL IN THE MORNING: NOT AT ALL
QUESTION_5 LAST FOUR WEEKS HOW WOULD YOU RATE YOUR ASTHMA CONTROL: WELL CONTROLLED
ACT_TOTALSCORE: 23
ACT_TOTALSCORE: 23
QUESTION_4 LAST FOUR WEEKS HOW OFTEN HAVE YOU USED YOUR RESCUE INHALER OR NEBULIZER MEDICATION (SUCH AS ALBUTEROL): NOT AT ALL
QUESTION_2 LAST FOUR WEEKS HOW OFTEN HAVE YOU HAD SHORTNESS OF BREATH: ONCE OR TWICE A WEEK
QUESTION_1 LAST FOUR WEEKS HOW MUCH OF THE TIME DID YOUR ASTHMA KEEP YOU FROM GETTING AS MUCH DONE AT WORK, SCHOOL OR AT HOME: NONE OF THE TIME

## 2022-12-12 NOTE — PROGRESS NOTES
Pulmonary Clinic Follow up:  September 12, 2022    ASSESSMENT/PLAN:  /72 (BP Location: Right arm, Patient Position: Chair, Cuff Size: Adult Regular)   Pulse 68   Wt 68.5 kg (151 lb)   SpO2 99%   BMI 28.31 kg/m       Asthma with chronic prednisone use:    She will continue her Advair for now as she has two inhalers left and the cost is high to make changes for her now. She would like to remain on this medication until she is back from [place] next spring.      Monitor spirometry closely. Recheck at next visit.      Start a very slow taper of prednisone, decrease by 1mg per month with close follow up. Currently on 5mg every day for 4 weeks.     Continue Singulair for now. The patient did not notice a difference previously but her recent IgE was elevated so we agreed she would remain on this until off prednisone to see how she does.       Continue prn albuterol     Consider adding azithromycin 3 times weekly in future if her symptoms worsen    The patient is hoping to extend the time period in between her visits to eliminate the need for so many appointments. She is agreeable to keep up posted and call when she is done with the next month of prednisone.     Follow up after she returns for [place]    Brandy Bazzi CNP  Pulmonary Medicine  [facility]   [phone]    Cc:   Chief Complaint   Patient presents with     Follow Up     Asthma         HPI:   The patient is a 72 year old female who presents for an evaluation of her asthma with chronic prednisone use. She has used prednisone with as needed albuterol since late 80's/early 90's for asthma control, at some point Advair was added in. She remembers having her first asthma exacerbation when a fresh [place] tree was in her house in the late 80's.  She reports good control currently, very rare cough, minimal dyspnea with exertion. No dyspnea with exertion when walking. Can do one flight of stairs easily. No cough, wheeze, chest tightness, chest  pain. Occasional snoring. No headaches or daily sleepiness.   We started a very slow prednisone taper a few months ago. She has been feeling fine and is comfortable continuing this taper. She took 5mg every day for the last few weeks and will decrease this to 4mg while out of state for a few months.     Occasional environmental allergy sx, uses zyrtec and flonase. Was started on Singulair at a previous visit and did not find it helpful, no change in symptoms. Has not needed her albuterol inhaler.      Hx of GERD, takes prevacid. No reflux. Tries not to eat for a few hours before bed.     ACT Total Scores 8/15/2022 9/12/2022 12/12/2022   ACT TOTAL SCORE (Goal Greater than or Equal to 20) 22 25 23   In the past 12 months, how many times did you visit the emergency room for your asthma without being admitted to the hospital? 0 0 0   In the past 12 months, how many times were you hospitalized overnight because of your asthma? 0 0 0          Lives in house built 2006, no mold  Children- 3   No pets      ROS: 12-point review performed and notable for weight gain, shortness breath. The remainder reviewed and negative.     Current Outpatient Medications   Medication     ADVAIR DISKUS 250-50 MCG/DOSE inhaler     albuterol (PROAIR HFA/PROVENTIL HFA/VENTOLIN HFA) 108 (90 Base) MCG/ACT inhaler     blood glucose test (CONTOUR NEXT STRIPS) strips     cetirizine (ZYRTEC) 10 MG tablet     fluticasone propionate (FLONASE) 50 mcg/actuation nasal spray     insulin aspart prot & aspart (NOVOLOG MIX 70/30 FLEXPEN) (70-30) 100 UNIT/ML pen     insulin pen needle (31G X 6 MM) 31G X 6 MM miscellaneous     lansoprazole (PREVACID) 15 MG capsule     lisinopril (ZESTRIL) 20 MG tablet     metFORMIN (GLUCOPHAGE XR) 500 MG 24 hr tablet     multivitamin therapeutic tablet     predniSONE (DELTASONE) 5 MG tablet     rosuvastatin (CRESTOR) 10 MG tablet     Semaglutide, 1 MG/DOSE, (OZEMPIC, 1 MG/DOSE,) 4 MG/3ML SOPN     Vitamin D,  Cholecalciferol, 25 MCG (1000 UT) TABS     dexamethasone (DECADRON) 4 MG/ML injection     Glucagon (GVOKE HYPOPEN 1-PACK) 1 MG/0.2ML SOAJ     Glucagon 0.5 MG/0.1ML SOAJ     montelukast (SINGULAIR) 10 MG tablet     predniSONE (DELTASONE) 1 MG tablet     predniSONE (DELTASONE) 1 MG tablet     rosuvastatin (CRESTOR) 20 MG tablet     Current Facility-Administered Medications   Medication     lidocaine (XYLOCAINE) 2 % external gel     Social History     Tobacco Use     Smoking status: Never     Smokeless tobacco: Never   Vaping Use     Vaping Use: Never used   Substance Use Topics     Alcohol use: No     Drug use: No     Past Medical History:   Diagnosis Date     Diabetes mellitus, type 2 (H) 12/06/2021     Essential hypertension     Created by Conversion Replacement Utility updated for latest IMO load      Mild persistent asthma without complication      Past Surgical History:   Procedure Laterality Date     LAPAROSCOPIC TUBAL LIGATION Bilateral      nasal polpys Bilateral      /72 (BP Location: Right arm, Patient Position: Chair, Cuff Size: Adult Regular)   Pulse 68   Wt 68.5 kg (151 lb)   SpO2 99%   BMI 28.31 kg/m      Physical Exam  Constitutional:       General: She is not in acute distress.     Appearance: She is not ill-appearing or diaphoretic.   HENT:      Nose: Nose normal.   Cardiovascular:      Rate and Rhythm: Normal rate and regular rhythm.      Heart sounds: Normal heart sounds.   Pulmonary:      Effort: Pulmonary effort is normal. No respiratory distress.      Breath sounds: Normal breath sounds. No wheezing or rhonchi.   Musculoskeletal:      Right lower leg: No edema.      Left lower leg: No edema.   Skin:     General: Skin is warm and dry.   Neurological:      Mental Status: She is alert.   Psychiatric:         Behavior: Behavior normal.       Labs:     Latest Reference Range & Units 09/09/22 10:00   IGE 0 - 114 kU/L 273 (H)      Latest Reference Range & Units 09/20/22 11:32   Absolute  Eosinophils 0.0 - 0.7 10e3/uL 0.4       PFTs (7/15/22):  Asthma without reversibility and obstruction. Airtrapping. No hyperinflation. Normal diffusion capacity.

## 2022-12-12 NOTE — PATIENT INSTRUCTIONS
- let's have you follow up in clinic when you get back from [place]. We will repeat the spirometry at that time.   - continue the taper and update us if there are changes in your symptoms.     If you have worsening symptoms, questions, or need to speak with the nurse please call [phone].

## 2022-12-28 ENCOUNTER — APPOINTMENT (OUTPATIENT)
Dept: CT IMAGING | Facility: HOSPITAL | Age: 72
End: 2022-12-28
Attending: EMERGENCY MEDICINE
Payer: MEDICARE

## 2022-12-28 ENCOUNTER — HOSPITAL ENCOUNTER (EMERGENCY)
Facility: HOSPITAL | Age: 72
Discharge: HOME | End: 2022-12-29
Attending: EMERGENCY MEDICINE | Admitting: EMERGENCY MEDICINE
Payer: MEDICARE

## 2022-12-28 DIAGNOSIS — E16.2 HYPOGLYCEMIA: ICD-10-CM

## 2022-12-28 DIAGNOSIS — S01.81XA LACERATION OF FOREHEAD, INITIAL ENCOUNTER: ICD-10-CM

## 2022-12-28 LAB
ERYTHROCYTE [DISTWIDTH] IN BLOOD BY AUTOMATED COUNT: 13.1 % (ref 10–15)
GLUCOSE BLDC GLUCOMTR-MCNC: 100 MG/DL (ref 70–99)
HCT VFR BLD AUTO: 37 % (ref 35–47)
HGB BLD-MCNC: 11.4 G/DL (ref 11.7–15.7)
MCH RBC QN AUTO: 26 PG (ref 26.5–33)
MCHC RBC AUTO-ENTMCNC: 30.8 G/DL (ref 31.5–36.5)
MCV RBC AUTO: 85 FL (ref 78–100)
PLATELET # BLD AUTO: 384 10E3/UL (ref 150–450)
RBC # BLD AUTO: 4.38 10E6/UL (ref 3.8–5.2)
WBC # BLD AUTO: 20.4 10E3/UL (ref 4–11)

## 2022-12-28 PROCEDURE — 96361 HYDRATE IV INFUSION ADD-ON: CPT | Mod: 59

## 2022-12-28 PROCEDURE — G1010 CDSM STANSON: HCPCS

## 2022-12-28 PROCEDURE — 12011 RPR F/E/E/N/L/M 2.5 CM/<: CPT

## 2022-12-28 PROCEDURE — 96360 HYDRATION IV INFUSION INIT: CPT

## 2022-12-28 PROCEDURE — 80048 BASIC METABOLIC PNL TOTAL CA: CPT | Performed by: EMERGENCY MEDICINE

## 2022-12-28 PROCEDURE — 36415 COLL VENOUS BLD VENIPUNCTURE: CPT | Performed by: EMERGENCY MEDICINE

## 2022-12-28 PROCEDURE — 93005 ELECTROCARDIOGRAM TRACING: CPT | Mod: 59 | Performed by: EMERGENCY MEDICINE

## 2022-12-28 PROCEDURE — 258N000003 HC RX IP 258 OP 636: Performed by: EMERGENCY MEDICINE

## 2022-12-28 PROCEDURE — 85027 COMPLETE CBC AUTOMATED: CPT | Performed by: EMERGENCY MEDICINE

## 2022-12-28 PROCEDURE — 99285 EMERGENCY DEPT VISIT HI MDM: CPT | Mod: 25

## 2022-12-28 RX ADMIN — SODIUM CHLORIDE 1000 ML: 9 INJECTION, SOLUTION INTRAVENOUS at 23:28

## 2022-12-28 ASSESSMENT — ACTIVITIES OF DAILY LIVING (ADL): ADLS_ACUITY_SCORE: 33

## 2022-12-29 VITALS
HEART RATE: 92 BPM | SYSTOLIC BLOOD PRESSURE: 92 MMHG | OXYGEN SATURATION: 96 % | TEMPERATURE: 98.7 F | DIASTOLIC BLOOD PRESSURE: 55 MMHG | WEIGHT: 141 LBS | RESPIRATION RATE: 16 BRPM | BODY MASS INDEX: 26.62 KG/M2 | HEIGHT: 61 IN

## 2022-12-29 LAB
ANION GAP SERPL CALCULATED.3IONS-SCNC: 15 MMOL/L (ref 7–15)
BUN SERPL-MCNC: 35.8 MG/DL (ref 8–23)
CALCIUM SERPL-MCNC: 10.2 MG/DL (ref 8.8–10.2)
CHLORIDE SERPL-SCNC: 99 MMOL/L (ref 98–107)
CREAT SERPL-MCNC: 1.63 MG/DL (ref 0.51–0.95)
DEPRECATED HCO3 PLAS-SCNC: 22 MMOL/L (ref 22–29)
GFR SERPL CREATININE-BSD FRML MDRD: 33 ML/MIN/1.73M2
GLUCOSE BLDC GLUCOMTR-MCNC: 95 MG/DL (ref 70–99)
GLUCOSE SERPL-MCNC: 101 MG/DL (ref 70–99)
POTASSIUM SERPL-SCNC: 3.7 MMOL/L (ref 3.4–5.3)
SODIUM SERPL-SCNC: 136 MMOL/L (ref 136–145)

## 2022-12-29 ASSESSMENT — ACTIVITIES OF DAILY LIVING (ADL): ADLS_ACUITY_SCORE: 35

## 2022-12-29 NOTE — ED TRIAGE NOTES
Syncope episode tonight. 2.5 cm laceration to forehead. Spouse states that she hit the door jam when she fell. Patient feels like her blood glucose was low this evening. After fall patient had sugar water. She had a gastroenteritis type event over rosamaria and states that she has not been eating enough. Patient has been continuing to take her regular insulin.      Triage Assessment     Row Name 12/28/22 5522       Triage Assessment (Adult)    Airway WDL WDL       Respiratory WDL    Respiratory WDL WDL       Skin Circulation/Temperature WDL    Skin Circulation/Temperature WDL X  Laceration       Cardiac WDL    Cardiac WDL WDL       Peripheral/Neurovascular WDL    Peripheral Neurovascular WDL WDL       Cognitive/Neuro/Behavioral WDL    Cognitive/Neuro/Behavioral WDL WDL

## 2022-12-29 NOTE — DISCHARGE INSTRUCTIONS
Your symptoms today are most likely related to low blood sugar.  You were also found to have a forehead laceration.  This was repaired with sutures.  These will need to be removed in approximately 5 days.  Follow-up with your primary care doctor or urgent care for suture removal.  Return to the ER for any worsening symptoms or other concerns.

## 2022-12-29 NOTE — ED PROVIDER NOTES
EMERGENCY DEPARTMENT ENCOUnter      NAME: Stella Greene  AGE: 72 year old female  YOB: 1950  MRN: [id number]  EVALUATION DATE & TIME: 2022 10:59 PM    PCP: Lacho Gunter    ED PROVIDER: Adam Cabrera DO      Chief Complaint   Patient presents with     Hypoglycemia       FINAL IMPRESSION:  1. Hypoglycemia    2. Laceration of forehead, initial encounter        ED COURSE & MEDICAL DECISION MAKIN:03 PM I met with the patient, obtained history, performed an initial exam, and discussed options and plan for diagnostics and treatment here in the ED. Room 31.  12:15 AM Rechecked and updated the patient. Performed laceration repair as charted below.  1:02 AM Rechecked and updated the patient. Discussed plan for discharge - patient agreeable.      The patient presented to the emergency department today after having a brief syncopal episode at home.  She states that she took her usual insulin but then did not eat dinner and became hypoglycemic.  She suffered a small forehead laceration.  Here, her blood sugar has remained stable and she is feeling well.  Her laceration was repaired with sutures.  Given her well appearance here I feel that she can be safely discharged home.  She is comfortable with this plan.        Medical Decision Making    History:    Supplemental history from: Documented in HPI, if applicable    External Record(s) reviewed: Documented in HPI, if applicable.    Work Up:    Chart documentation includes differential considered and any EKGs or imaging independently interpreted by provider.    In additional to work up documented, I considered the following work up: See chart documentation, if applicable.    External consultation:    Discussion of management with another provider: See chart documentation, if applicable    Complicating factors:    Care impacted by chronic illness: Diabetes    Care affected by social determinants of health: N/A    Disposition considerations:  Discharge. No recommendations on prescription strength medication(s). I considered admission, but discharged the patient after share decision making conversation.      At the conclusion of the encounter I discussed the results of all of the tests and the disposition. The questions were answered. The patient or family acknowledged understanding and was agreeable with the care plan.     MEDICATIONS GIVEN IN THE EMERGENCY:  Medications   0.9% sodium chloride BOLUS (0 mLs Intravenous Stopped 12/29/22 0133)     =================================================================    HPI    Stella Greene is a 72 year old female with a pertinent history of type II DM, HTN, who presents to this ED by private car for evaluation after a syncopal episode. Patient with history of type II DM on insulin, reports that she took her insulin around dinner time this evening, but she did not actually get around to eating. Patient got up to use the bathroom this evening and had a syncopal episode and fell.  states that she hit her head on the door jam when she fell. 2 cm vertical laceration noted to the left forehead. She denies any associated pain or headaches. She is not anticoagulated. States she is feeling better now and denies any other injuries. Patient mentions that she has not been feeling great overall and not eating much since Christmas Eve, but has continued to take her regular insulin. She has been drinking Gatorade to try staying hydrated. She otherwise denies any complaints or concerns.      REVIEW OF SYSTEMS   Constitutional:  Denies fever or chills  HENT:  Denies sore throat. Positive for forehead laceration.  Respiratory:  Denies cough or shortness of breath   Cardiovascular:  Denies chest pain or palpitations  GI:  Denies abdominal pain, nausea, or vomiting  Musculoskeletal:  Denies any new extremity pain   Skin:  Denies rash   Neurologic:  Denies headache, focal weakness or sensory changes. Positive for syncope.    All other systems reviewed and are negative      PAST MEDICAL HISTORY:  Past Medical History:   Diagnosis Date     Diabetes mellitus, type 2 (H) 12/06/2021     Essential hypertension     Created by Conversion Replacement Utility updated for latest IMO load      Mild persistent asthma without complication        PAST SURGICAL HISTORY:  Past Surgical History:   Procedure Laterality Date     LAPAROSCOPIC TUBAL LIGATION Bilateral      nasal polpys Bilateral        CURRENT MEDICATIONS:    ADVAIR DISKUS 250-50 MCG/DOSE inhaler  albuterol (PROAIR HFA/PROVENTIL HFA/VENTOLIN HFA) 108 (90 Base) MCG/ACT inhaler  blood glucose test (CONTOUR NEXT STRIPS) strips  cetirizine (ZYRTEC) 10 MG tablet  dexamethasone (DECADRON) 4 MG/ML injection  fluticasone propionate (FLONASE) 50 mcg/actuation nasal spray  Glucagon (GVOKE HYPOPEN 1-PACK) 1 MG/0.2ML SOAJ  Glucagon 0.5 MG/0.1ML SOAJ  insulin aspart prot & aspart (NOVOLOG MIX 70/30 FLEXPEN) (70-30) 100 UNIT/ML pen  insulin pen needle (31G X 6 MM) 31G X 6 MM miscellaneous  lansoprazole (PREVACID) 15 MG capsule  lisinopril (ZESTRIL) 20 MG tablet  metFORMIN (GLUCOPHAGE XR) 500 MG 24 hr tablet  montelukast (SINGULAIR) 10 MG tablet  multivitamin therapeutic tablet  predniSONE (DELTASONE) 1 MG tablet  predniSONE (DELTASONE) 5 MG tablet  rosuvastatin (CRESTOR) 10 MG tablet  Semaglutide, 1 MG/DOSE, (OZEMPIC, 1 MG/DOSE,) 4 MG/3ML SOPN  Vitamin D, Cholecalciferol, 25 MCG (1000 UT) TABS        ALLERGIES:  No Known Allergies    FAMILY HISTORY:  Family History   Problem Relation Age of Onset     No Known Problems Mother      Heart Disease Father        SOCIAL HISTORY:   Social History     Socioeconomic History     Marital status:      Spouse name: None     Number of children: None     Years of education: None     Highest education level: None   Tobacco Use     Smoking status: Never     Smokeless tobacco: Never   Vaping Use     Vaping Use: Never used   Substance and Sexual Activity      "Alcohol use: No     Drug use: No       VITALS:  Patient Vitals for the past 24 hrs:   BP Temp Temp src Pulse Resp SpO2 Height Weight   12/29/22 0130 92/55 -- -- 92 -- 96 % -- --   12/29/22 0015 100/58 -- -- 102 -- 96 % -- --   12/28/22 2330 97/58 -- -- 107 -- 95 % -- --   12/28/22 2252 98/62 98.7  F (37.1  C) Temporal 114 16 97 % 1.549 m (5' 1\") 64 kg (141 lb)       PHYSICAL EXAM   Constitutional:  Well developed, Well nourished,  HENT:  Normocephalic, Bilateral external ears normal, Oropharynx moist, Nose normal. 2 cm vertical laceration to the left forehead. No active bleeding. Superficial horizontal laceration just above the eyelid.  Neck:  Normal range of motion, No meningismus, No stridor.   Eyes:  EOMI, Conjunctiva normal, No discharge.   Respiratory:  Normal breath sounds, No respiratory distress, No wheezing, No chest tenderness.   Cardiovascular:  Normal heart rate, Normal rhythm, No murmurs  GI:  Soft, No tenderness, No guarding, No CVA tenderness.   Musculoskeletal:  No tenderness to palpation or major deformities noted.   Integument:  Warm, Dry, No erythema, No rash.   Neurologic:  Alert & oriented x 3, Normal motor function, Normal sensory function, No focal deficits noted.   Psychiatric:  Affect normal, Judgment normal, Mood normal.      LAB:  All pertinent labs reviewed and interpreted.  Results for orders placed or performed during the hospital encounter of 12/28/22              Glucose by meter   Result Value Ref Range    GLUCOSE BY METER POCT 100 (H) 70 - 99 mg/dL   CBC with platelets   Result Value Ref Range    WBC Count 20.4 (H) 4.0 - 11.0 10e3/uL    RBC Count 4.38 3.80 - 5.20 10e6/uL    Hemoglobin 11.4 (L) 11.7 - 15.7 g/dL    Hematocrit 37.0 35.0 - 47.0 %    MCV 85 78 - 100 fL    MCH 26.0 (L) 26.5 - 33.0 pg    MCHC 30.8 (L) 31.5 - 36.5 g/dL    RDW 13.1 10.0 - 15.0 %    Platelet Count 384 150 - 450 10e3/uL   Basic metabolic panel   Result Value Ref Range    Sodium 136 136 - 145 mmol/L    " Potassium 3.7 3.4 - 5.3 mmol/L    Chloride 99 98 - 107 mmol/L    Carbon Dioxide (CO2) 22 22 - 29 mmol/L    Anion Gap 15 7 - 15 mmol/L    Urea Nitrogen 35.8 (H) 8.0 - 23.0 mg/dL    Creatinine 1.63 (H) 0.51 - 0.95 mg/dL    Calcium 10.2 8.8 - 10.2 mg/dL    Glucose 101 (H) 70 - 99 mg/dL    GFR Estimate 33 (L) >60 mL/min/1.73m2   Glucose by meter   Result Value Ref Range    GLUCOSE BY METER POCT 95 70 - 99 mg/dL       RADIOLOGY:  I have independently reviewed and interpreted the above imaging, pending the final radiology read.  CT Head w/o Contrast   Final Result   IMPRESSION:   1.  Left frontal scalp laceration without acute intracranial abnormality.   2.  Incidental note of a subcentimeter calcified left frontal convexity meningioma.          EKG:  Sinus tachycardia at 108 bpm.  Right bundle branch block.  No signs of acute ischemia.   ms,  ms.    I have independently reviewed and interpreted this EKG      PROCEDURES:  PROCEDURE: Laceration Repair   INDICATIONS: Laceration   PROCEDURE PROVIDER: Dr Adam Cabrera   SITE: Forehead.   TYPE/SIZE: simple, clean and no foreign body visualized  2 cm (total length)   FUNCTIONAL ASSESSMENT: Distal sensation, circulation and motor intact   MEDICATION: 3 mLs of 1% Lidocaine with epinephrine   PREPARATION: scrubbing with Betadine   DEBRIDEMENT: no debridement   CLOSURE:  Superficial layer closed with 3 stitches of 6-0 Prolene simple interrupted    Total number of sutures/staples placed: 3       I, Surendra Gamez, am serving as a scribe to document services personally performed by Dr. Cabrera based on my observation and the provider's statements to me. I, Adam Cabrera, DO attest that Surendra Gamez is acting in a scribe capacity, has observed my performance of the services and has documented them in accordance with my direction.    Adam Cabrera, DO  Emergency Medicine  [facility] EMERGENCY DEPARTMENT  [address]  [facility] [address]  [phone]  Dept: [phone]     Adam Cabrera MD  12/29/22 0222

## 2022-12-31 ENCOUNTER — HOSPITAL ENCOUNTER (INPATIENT)
Facility: HOSPITAL | Age: 72
LOS: 5 days | Discharge: HOME | DRG: 871 | End: 2023-01-05
Attending: EMERGENCY MEDICINE | Admitting: INTERNAL MEDICINE
Payer: MEDICARE

## 2022-12-31 ENCOUNTER — APPOINTMENT (OUTPATIENT)
Dept: INTERVENTIONAL RADIOLOGY/VASCULAR | Facility: HOSPITAL | Age: 72
DRG: 871 | End: 2022-12-31
Attending: RADIOLOGY
Payer: MEDICARE

## 2022-12-31 ENCOUNTER — APPOINTMENT (OUTPATIENT)
Dept: CT IMAGING | Facility: HOSPITAL | Age: 72
DRG: 871 | End: 2022-12-31
Attending: EMERGENCY MEDICINE
Payer: MEDICARE

## 2022-12-31 DIAGNOSIS — D72.829 LEUKOCYTOSIS, UNSPECIFIED TYPE: ICD-10-CM

## 2022-12-31 DIAGNOSIS — E11.8 TYPE 2 DIABETES MELLITUS WITH COMPLICATION, WITHOUT LONG-TERM CURRENT USE OF INSULIN (H): ICD-10-CM

## 2022-12-31 DIAGNOSIS — R19.00 PELVIC MASS: ICD-10-CM

## 2022-12-31 DIAGNOSIS — R65.21 SEPTIC SHOCK (H): ICD-10-CM

## 2022-12-31 DIAGNOSIS — J45.40 MODERATE PERSISTENT ASTHMA WITHOUT COMPLICATION: ICD-10-CM

## 2022-12-31 DIAGNOSIS — A41.9 SEPTIC SHOCK (H): ICD-10-CM

## 2022-12-31 DIAGNOSIS — R79.89 ELEVATED TROPONIN: ICD-10-CM

## 2022-12-31 DIAGNOSIS — R74.8 ELEVATED LIVER ENZYMES: ICD-10-CM

## 2022-12-31 DIAGNOSIS — N28.9 ACUTE RENAL INSUFFICIENCY: ICD-10-CM

## 2022-12-31 DIAGNOSIS — N39.0 URINARY TRACT INFECTION WITHOUT HEMATURIA, SITE UNSPECIFIED: ICD-10-CM

## 2022-12-31 DIAGNOSIS — N17.0 ACUTE KIDNEY FAILURE WITH TUBULAR NECROSIS (H): Primary | ICD-10-CM

## 2022-12-31 LAB
ALBUMIN SERPL BCG-MCNC: 3 G/DL (ref 3.5–5.2)
ALBUMIN UR-MCNC: 100 MG/DL
ALBUMIN UR-MCNC: 100 MG/DL
ALP SERPL-CCNC: 487 U/L (ref 35–104)
ALT SERPL W P-5'-P-CCNC: 92 U/L (ref 10–35)
ANION GAP SERPL CALCULATED.3IONS-SCNC: 14 MMOL/L (ref 7–15)
ANION GAP SERPL CALCULATED.3IONS-SCNC: 19 MMOL/L (ref 7–15)
ANION GAP SERPL CALCULATED.3IONS-SCNC: 21 MMOL/L (ref 7–15)
APPEARANCE UR: ABNORMAL
APPEARANCE UR: ABNORMAL
APTT PPP: 31 SECONDS (ref 22–38)
AST SERPL W P-5'-P-CCNC: 98 U/L (ref 10–35)
B-OH-BUTYR SERPL-MCNC: 2.2 MMOL/L
BACTERIA #/AREA URNS HPF: ABNORMAL /HPF
BASOPHILS # BLD MANUAL: 0 10E3/UL (ref 0–0.2)
BASOPHILS NFR BLD MANUAL: 0 %
BILIRUB DIRECT SERPL-MCNC: 0.58 MG/DL (ref 0–0.3)
BILIRUB SERPL-MCNC: 0.9 MG/DL
BILIRUB UR QL STRIP: NEGATIVE
BILIRUB UR QL STRIP: NEGATIVE
BUN SERPL-MCNC: 33.5 MG/DL (ref 8–23)
BUN SERPL-MCNC: 34.1 MG/DL (ref 8–23)
BUN SERPL-MCNC: 38 MG/DL (ref 8–23)
CALCIUM SERPL-MCNC: 7.5 MG/DL (ref 8.8–10.2)
CALCIUM SERPL-MCNC: 7.6 MG/DL (ref 8.8–10.2)
CALCIUM SERPL-MCNC: 9 MG/DL (ref 8.8–10.2)
CHLORIDE SERPL-SCNC: 102 MMOL/L (ref 98–107)
CHLORIDE SERPL-SCNC: 105 MMOL/L (ref 98–107)
CHLORIDE SERPL-SCNC: 106 MMOL/L (ref 98–107)
COLOR UR AUTO: ABNORMAL
COLOR UR AUTO: ABNORMAL
CREAT SERPL-MCNC: 2.57 MG/DL (ref 0.51–0.95)
CREAT SERPL-MCNC: 2.79 MG/DL (ref 0.51–0.95)
CREAT SERPL-MCNC: 2.99 MG/DL (ref 0.51–0.95)
DEPRECATED HCO3 PLAS-SCNC: 16 MMOL/L (ref 22–29)
DEPRECATED HCO3 PLAS-SCNC: 18 MMOL/L (ref 22–29)
DEPRECATED HCO3 PLAS-SCNC: 21 MMOL/L (ref 22–29)
EOSINOPHIL # BLD MANUAL: 0 10E3/UL (ref 0–0.7)
EOSINOPHIL NFR BLD MANUAL: 0 %
ERYTHROCYTE [DISTWIDTH] IN BLOOD BY AUTOMATED COUNT: 13.5 % (ref 10–15)
ETHANOL SERPL-MCNC: <0.01 G/DL
FLUAV RNA SPEC QL NAA+PROBE: NEGATIVE
FLUBV RNA RESP QL NAA+PROBE: NEGATIVE
GFR SERPL CREATININE-BSD FRML MDRD: 16 ML/MIN/1.73M2
GFR SERPL CREATININE-BSD FRML MDRD: 17 ML/MIN/1.73M2
GFR SERPL CREATININE-BSD FRML MDRD: 19 ML/MIN/1.73M2
GLUCOSE BLDC GLUCOMTR-MCNC: 139 MG/DL (ref 70–99)
GLUCOSE BLDC GLUCOMTR-MCNC: 153 MG/DL (ref 70–99)
GLUCOSE BLDC GLUCOMTR-MCNC: 242 MG/DL (ref 70–99)
GLUCOSE BLDC GLUCOMTR-MCNC: 260 MG/DL (ref 70–99)
GLUCOSE SERPL-MCNC: 155 MG/DL (ref 70–99)
GLUCOSE SERPL-MCNC: 215 MG/DL (ref 70–99)
GLUCOSE SERPL-MCNC: 260 MG/DL (ref 70–99)
GLUCOSE UR STRIP-MCNC: 70 MG/DL
GLUCOSE UR STRIP-MCNC: NEGATIVE MG/DL
HBA1C MFR BLD: 5.8 %
HCT VFR BLD AUTO: 31.5 % (ref 35–47)
HGB BLD-MCNC: 10.1 G/DL (ref 11.7–15.7)
HGB UR QL STRIP: ABNORMAL
HGB UR QL STRIP: ABNORMAL
INR PPP: 1.3 (ref 0.85–1.15)
KETONES UR STRIP-MCNC: NEGATIVE MG/DL
KETONES UR STRIP-MCNC: NEGATIVE MG/DL
LACTATE SERPL-SCNC: 1.9 MMOL/L (ref 0.7–2)
LACTATE SERPL-SCNC: 3.5 MMOL/L (ref 0.7–2)
LEUKOCYTE ESTERASE UR QL STRIP: ABNORMAL
LEUKOCYTE ESTERASE UR QL STRIP: ABNORMAL
LIPASE SERPL-CCNC: 15 U/L (ref 13–60)
LYMPHOCYTES # BLD MANUAL: 2.3 10E3/UL (ref 0.8–5.3)
LYMPHOCYTES NFR BLD MANUAL: 4 %
MCH RBC QN AUTO: 26 PG (ref 26.5–33)
MCHC RBC AUTO-ENTMCNC: 32.1 G/DL (ref 31.5–36.5)
MCV RBC AUTO: 81 FL (ref 78–100)
METAMYELOCYTES # BLD MANUAL: 0.6 10E3/UL
METAMYELOCYTES NFR BLD MANUAL: 1 %
MONOCYTES # BLD MANUAL: 4.1 10E3/UL (ref 0–1.3)
MONOCYTES NFR BLD MANUAL: 7 %
MUCOUS THREADS #/AREA URNS LPF: PRESENT /LPF
NEUTROPHILS # BLD MANUAL: 51.4 10E3/UL (ref 1.6–8.3)
NEUTROPHILS NFR BLD MANUAL: 88 %
NITRATE UR QL: NEGATIVE
NITRATE UR QL: NEGATIVE
NT-PROBNP SERPL-MCNC: ABNORMAL PG/ML (ref 0–900)
PATH REV: ABNORMAL
PH UR STRIP: 5.5 [PH] (ref 5–7)
PH UR STRIP: 5.5 [PH] (ref 5–7)
PLAT MORPH BLD: ABNORMAL
PLATELET # BLD AUTO: 396 10E3/UL (ref 150–450)
POTASSIUM SERPL-SCNC: 3.5 MMOL/L (ref 3.4–5.3)
POTASSIUM SERPL-SCNC: 3.6 MMOL/L (ref 3.4–5.3)
POTASSIUM SERPL-SCNC: 3.8 MMOL/L (ref 3.4–5.3)
PROCALCITONIN SERPL IA-MCNC: >100 NG/ML
PROT SERPL-MCNC: 6.8 G/DL (ref 6.4–8.3)
RBC # BLD AUTO: 3.88 10E6/UL (ref 3.8–5.2)
RBC MORPH BLD: ABNORMAL
RBC URINE: 0 /HPF
RBC URINE: >182 /HPF
RSV RNA SPEC NAA+PROBE: NEGATIVE
SARS-COV-2 RNA RESP QL NAA+PROBE: NEGATIVE
SODIUM SERPL-SCNC: 140 MMOL/L (ref 136–145)
SODIUM SERPL-SCNC: 141 MMOL/L (ref 136–145)
SODIUM SERPL-SCNC: 141 MMOL/L (ref 136–145)
SP GR UR STRIP: 1.02 (ref 1–1.03)
SP GR UR STRIP: 1.04 (ref 1–1.03)
TROPONIN T SERPL HS-MCNC: 311 NG/L
TROPONIN T SERPL HS-MCNC: 453 NG/L
UROBILINOGEN UR STRIP-MCNC: <2 MG/DL
UROBILINOGEN UR STRIP-MCNC: <2 MG/DL
WBC # BLD AUTO: 58.4 10E3/UL (ref 4–11)
WBC # BLD AUTO: 58.4 10E3/UL (ref 4–11)
WBC CLUMPS #/AREA URNS HPF: PRESENT /HPF
WBC CLUMPS #/AREA URNS HPF: PRESENT /HPF
WBC URINE: >182 /HPF
WBC URINE: >182 /HPF

## 2022-12-31 PROCEDURE — 255N000002 HC RX 255 OP 636: Performed by: RADIOLOGY

## 2022-12-31 PROCEDURE — 84484 ASSAY OF TROPONIN QUANT: CPT | Performed by: EMERGENCY MEDICINE

## 2022-12-31 PROCEDURE — C1769 GUIDE WIRE: HCPCS

## 2022-12-31 PROCEDURE — 80048 BASIC METABOLIC PNL TOTAL CA: CPT | Performed by: INTERNAL MEDICINE

## 2022-12-31 PROCEDURE — 96366 THER/PROPH/DIAG IV INF ADDON: CPT

## 2022-12-31 PROCEDURE — C1729 CATH, DRAINAGE: HCPCS

## 2022-12-31 PROCEDURE — 83690 ASSAY OF LIPASE: CPT | Performed by: EMERGENCY MEDICINE

## 2022-12-31 PROCEDURE — G1010 CDSM STANSON: HCPCS

## 2022-12-31 PROCEDURE — 83036 HEMOGLOBIN GLYCOSYLATED A1C: CPT | Performed by: INTERNAL MEDICINE

## 2022-12-31 PROCEDURE — 36569 INSJ PICC 5 YR+ W/O IMAGING: CPT

## 2022-12-31 PROCEDURE — 82010 KETONE BODYS QUAN: CPT | Performed by: INTERNAL MEDICINE

## 2022-12-31 PROCEDURE — 82077 ASSAY SPEC XCP UR&BREATH IA: CPT | Performed by: EMERGENCY MEDICINE

## 2022-12-31 PROCEDURE — 3E043XZ INTRODUCTION OF VASOPRESSOR INTO CENTRAL VEIN, PERCUTANEOUS APPROACH: ICD-10-PCS | Performed by: EMERGENCY MEDICINE

## 2022-12-31 PROCEDURE — 83605 ASSAY OF LACTIC ACID: CPT | Performed by: EMERGENCY MEDICINE

## 2022-12-31 PROCEDURE — 96375 TX/PRO/DX INJ NEW DRUG ADDON: CPT

## 2022-12-31 PROCEDURE — 82533 TOTAL CORTISOL: CPT | Performed by: EMERGENCY MEDICINE

## 2022-12-31 PROCEDURE — 250N000011 HC RX IP 250 OP 636: Performed by: RADIOLOGY

## 2022-12-31 PROCEDURE — 85014 HEMATOCRIT: CPT | Performed by: EMERGENCY MEDICINE

## 2022-12-31 PROCEDURE — 36415 COLL VENOUS BLD VENIPUNCTURE: CPT | Performed by: EMERGENCY MEDICINE

## 2022-12-31 PROCEDURE — 85007 BL SMEAR W/DIFF WBC COUNT: CPT | Performed by: EMERGENCY MEDICINE

## 2022-12-31 PROCEDURE — 258N000003 HC RX IP 258 OP 636: Performed by: EMERGENCY MEDICINE

## 2022-12-31 PROCEDURE — 80053 COMPREHEN METABOLIC PANEL: CPT | Performed by: EMERGENCY MEDICINE

## 2022-12-31 PROCEDURE — 85730 THROMBOPLASTIN TIME PARTIAL: CPT | Performed by: EMERGENCY MEDICINE

## 2022-12-31 PROCEDURE — 96374 THER/PROPH/DIAG INJ IV PUSH: CPT | Mod: 59

## 2022-12-31 PROCEDURE — 250N000009 HC RX 250: Performed by: EMERGENCY MEDICINE

## 2022-12-31 PROCEDURE — 99291 CRITICAL CARE FIRST HOUR: CPT | Performed by: INTERNAL MEDICINE

## 2022-12-31 PROCEDURE — 87637 SARSCOV2&INF A&B&RSV AMP PRB: CPT | Performed by: EMERGENCY MEDICINE

## 2022-12-31 PROCEDURE — 250N000011 HC RX IP 250 OP 636: Performed by: EMERGENCY MEDICINE

## 2022-12-31 PROCEDURE — 81001 URINALYSIS AUTO W/SCOPE: CPT | Performed by: RADIOLOGY

## 2022-12-31 PROCEDURE — 200N000001 HC R&B ICU

## 2022-12-31 PROCEDURE — 87040 BLOOD CULTURE FOR BACTERIA: CPT | Performed by: EMERGENCY MEDICINE

## 2022-12-31 PROCEDURE — 87186 SC STD MICRODIL/AGAR DIL: CPT | Performed by: EMERGENCY MEDICINE

## 2022-12-31 PROCEDURE — 51702 INSERT TEMP BLADDER CATH: CPT

## 2022-12-31 PROCEDURE — 250N000009 HC RX 250: Performed by: INTERNAL MEDICINE

## 2022-12-31 PROCEDURE — 99285 EMERGENCY DEPT VISIT HI MDM: CPT | Mod: 25

## 2022-12-31 PROCEDURE — C9113 INJ PANTOPRAZOLE SODIUM, VIA: HCPCS | Performed by: INTERNAL MEDICINE

## 2022-12-31 PROCEDURE — 87086 URINE CULTURE/COLONY COUNT: CPT | Performed by: RADIOLOGY

## 2022-12-31 PROCEDURE — 250N000012 HC RX MED GY IP 250 OP 636 PS 637: Performed by: INTERNAL MEDICINE

## 2022-12-31 PROCEDURE — 85610 PROTHROMBIN TIME: CPT | Performed by: EMERGENCY MEDICINE

## 2022-12-31 PROCEDURE — 99152 MOD SED SAME PHYS/QHP 5/>YRS: CPT

## 2022-12-31 PROCEDURE — 82248 BILIRUBIN DIRECT: CPT | Performed by: EMERGENCY MEDICINE

## 2022-12-31 PROCEDURE — 83880 ASSAY OF NATRIURETIC PEPTIDE: CPT | Performed by: EMERGENCY MEDICINE

## 2022-12-31 PROCEDURE — 84145 PROCALCITONIN (PCT): CPT | Performed by: EMERGENCY MEDICINE

## 2022-12-31 PROCEDURE — 272N000452 HC KIT SHRLOCK 5FR POWER PICC TRIPLE LUMEN

## 2022-12-31 PROCEDURE — 93005 ELECTROCARDIOGRAM TRACING: CPT | Performed by: EMERGENCY MEDICINE

## 2022-12-31 PROCEDURE — 71250 CT THORAX DX C-: CPT | Mod: MG

## 2022-12-31 PROCEDURE — 96361 HYDRATE IV INFUSION ADD-ON: CPT

## 2022-12-31 PROCEDURE — 0T25X0Z CHANGE DRAINAGE DEVICE IN KIDNEY, EXTERNAL APPROACH: ICD-10-PCS | Performed by: RADIOLOGY

## 2022-12-31 PROCEDURE — 250N000011 HC RX IP 250 OP 636: Performed by: INTERNAL MEDICINE

## 2022-12-31 PROCEDURE — 272N000492 HC NEEDLE CR1

## 2022-12-31 PROCEDURE — 81001 URINALYSIS AUTO W/SCOPE: CPT | Performed by: EMERGENCY MEDICINE

## 2022-12-31 PROCEDURE — 96365 THER/PROPH/DIAG IV INF INIT: CPT | Mod: 59

## 2022-12-31 RX ORDER — LIDOCAINE 40 MG/G
CREAM TOPICAL
Status: ACTIVE | OUTPATIENT
Start: 2022-12-31 | End: 2023-01-03

## 2022-12-31 RX ORDER — FENTANYL CITRATE 50 UG/ML
25-50 INJECTION, SOLUTION INTRAMUSCULAR; INTRAVENOUS EVERY 5 MIN PRN
Status: DISCONTINUED | OUTPATIENT
Start: 2022-12-31 | End: 2023-01-02

## 2022-12-31 RX ORDER — NALOXONE HYDROCHLORIDE 0.4 MG/ML
0.4 INJECTION, SOLUTION INTRAMUSCULAR; INTRAVENOUS; SUBCUTANEOUS
Status: DISCONTINUED | OUTPATIENT
Start: 2022-12-31 | End: 2023-01-02

## 2022-12-31 RX ORDER — CEFAZOLIN SODIUM 1 G/50ML
1250 SOLUTION INTRAVENOUS ONCE
Status: COMPLETED | OUTPATIENT
Start: 2022-12-31 | End: 2022-12-31

## 2022-12-31 RX ORDER — NOREPINEPHRINE BITARTRATE 0.02 MG/ML
.01-.6 INJECTION, SOLUTION INTRAVENOUS CONTINUOUS
Status: DISCONTINUED | OUTPATIENT
Start: 2022-12-31 | End: 2022-12-31

## 2022-12-31 RX ORDER — NOREPINEPHRINE BITARTRATE 0.02 MG/ML
.01-.6 INJECTION, SOLUTION INTRAVENOUS CONTINUOUS
Status: DISCONTINUED | OUTPATIENT
Start: 2022-12-31 | End: 2023-01-02

## 2022-12-31 RX ORDER — DEXTROSE MONOHYDRATE 25 G/50ML
25-50 INJECTION, SOLUTION INTRAVENOUS
Status: DISCONTINUED | OUTPATIENT
Start: 2022-12-31 | End: 2023-01-05

## 2022-12-31 RX ORDER — NICOTINE POLACRILEX 4 MG
15-30 LOZENGE BUCCAL
Status: DISCONTINUED | OUTPATIENT
Start: 2022-12-31 | End: 2023-01-05

## 2022-12-31 RX ORDER — ONDANSETRON 4 MG/1
4 TABLET, ORALLY DISINTEGRATING ORAL ONCE
Status: COMPLETED | OUTPATIENT
Start: 2022-12-31 | End: 2022-12-31

## 2022-12-31 RX ORDER — PIPERACILLIN SODIUM, TAZOBACTAM SODIUM 3; .375 G/15ML; G/15ML
3.38 INJECTION, POWDER, LYOPHILIZED, FOR SOLUTION INTRAVENOUS EVERY 12 HOURS
Status: DISCONTINUED | OUTPATIENT
Start: 2022-12-31 | End: 2023-01-02

## 2022-12-31 RX ORDER — NALOXONE HYDROCHLORIDE 0.4 MG/ML
0.2 INJECTION, SOLUTION INTRAMUSCULAR; INTRAVENOUS; SUBCUTANEOUS
Status: DISCONTINUED | OUTPATIENT
Start: 2022-12-31 | End: 2023-01-02

## 2022-12-31 RX ORDER — PIPERACILLIN SODIUM, TAZOBACTAM SODIUM 3; .375 G/15ML; G/15ML
3.38 INJECTION, POWDER, LYOPHILIZED, FOR SOLUTION INTRAVENOUS ONCE
Status: COMPLETED | OUTPATIENT
Start: 2022-12-31 | End: 2022-12-31

## 2022-12-31 RX ORDER — FLUMAZENIL 0.1 MG/ML
0.2 INJECTION, SOLUTION INTRAVENOUS
Status: DISCONTINUED | OUTPATIENT
Start: 2022-12-31 | End: 2023-01-02

## 2022-12-31 RX ORDER — IPRATROPIUM BROMIDE AND ALBUTEROL SULFATE 2.5; .5 MG/3ML; MG/3ML
3 SOLUTION RESPIRATORY (INHALATION) EVERY 6 HOURS PRN
Status: DISCONTINUED | OUTPATIENT
Start: 2022-12-31 | End: 2023-01-05 | Stop reason: HOSPADM

## 2022-12-31 RX ADMIN — VANCOMYCIN HYDROCHLORIDE 1250 MG: 5 INJECTION, POWDER, LYOPHILIZED, FOR SOLUTION INTRAVENOUS at 15:05

## 2022-12-31 RX ADMIN — MIDAZOLAM HYDROCHLORIDE 1 MG: 1 INJECTION, SOLUTION INTRAMUSCULAR; INTRAVENOUS at 19:41

## 2022-12-31 RX ADMIN — HYDROCORTISONE SODIUM SUCCINATE 100 MG: 100 INJECTION, POWDER, FOR SOLUTION INTRAMUSCULAR; INTRAVENOUS at 21:15

## 2022-12-31 RX ADMIN — FENTANYL CITRATE 50 MCG: 50 INJECTION, SOLUTION INTRAMUSCULAR; INTRAVENOUS at 19:44

## 2022-12-31 RX ADMIN — SODIUM CHLORIDE 500 ML: 9 INJECTION, SOLUTION INTRAVENOUS at 14:45

## 2022-12-31 RX ADMIN — INSULIN ASPART 3 UNITS: 100 INJECTION, SOLUTION INTRAVENOUS; SUBCUTANEOUS at 21:09

## 2022-12-31 RX ADMIN — PIPERACILLIN AND TAZOBACTAM 3.38 G: 3; .375 INJECTION, POWDER, LYOPHILIZED, FOR SOLUTION INTRAVENOUS at 21:15

## 2022-12-31 RX ADMIN — LIDOCAINE HYDROCHLORIDE 2 ML: 10 INJECTION, SOLUTION EPIDURAL; INFILTRATION; INTRACAUDAL; PERINEURAL at 14:54

## 2022-12-31 RX ADMIN — IOHEXOL 10 ML: 350 INJECTION, SOLUTION INTRAVENOUS at 20:00

## 2022-12-31 RX ADMIN — MIDAZOLAM HYDROCHLORIDE 0.5 MG: 1 INJECTION, SOLUTION INTRAMUSCULAR; INTRAVENOUS at 19:49

## 2022-12-31 RX ADMIN — PIPERACILLIN AND TAZOBACTAM 3.38 G: 3; .375 INJECTION, POWDER, LYOPHILIZED, FOR SOLUTION INTRAVENOUS at 14:02

## 2022-12-31 RX ADMIN — SODIUM CHLORIDE 500 ML: 9 INJECTION, SOLUTION INTRAVENOUS at 13:15

## 2022-12-31 RX ADMIN — HYDROCORTISONE SODIUM SUCCINATE 100 MG: 100 INJECTION, POWDER, FOR SOLUTION INTRAMUSCULAR; INTRAVENOUS at 14:06

## 2022-12-31 RX ADMIN — PANTOPRAZOLE SODIUM 40 MG: 40 INJECTION, POWDER, FOR SOLUTION INTRAVENOUS at 20:32

## 2022-12-31 RX ADMIN — SODIUM CHLORIDE 500 ML: 9 INJECTION, SOLUTION INTRAVENOUS at 14:00

## 2022-12-31 RX ADMIN — Medication 0.03 MCG/KG/MIN: at 15:26

## 2022-12-31 RX ADMIN — SODIUM CHLORIDE 1000 ML: 9 INJECTION, SOLUTION INTRAVENOUS at 13:31

## 2022-12-31 RX ADMIN — SODIUM BICARBONATE: 84 INJECTION, SOLUTION INTRAVENOUS at 18:55

## 2022-12-31 RX ADMIN — FENTANYL CITRATE 50 MCG: 50 INJECTION, SOLUTION INTRAMUSCULAR; INTRAVENOUS at 19:51

## 2022-12-31 ASSESSMENT — ACTIVITIES OF DAILY LIVING (ADL)
ADLS_ACUITY_SCORE: 33
ADLS_ACUITY_SCORE: 44
ADLS_ACUITY_SCORE: 44
ADLS_ACUITY_SCORE: 37
ADLS_ACUITY_SCORE: 37
ADLS_ACUITY_SCORE: 35

## 2022-12-31 NOTE — ED NOTES
Patient to room 6 via wheelchair with . Assisted to bed, very weak.  states she was seen here 3 days ago after fall, CT head done, negative, laceration to forehead with sutures present. Patient is diabetic,  states she did not eat supper last night. This am, she was weak, did not get out of bed. He checked her sugar at 115.    Blood pressure at 70/. Diaphoretic. Accu check at 137. IV placed, lab, IV fluids infusing. Pateint has now had 2000cc NS with BP at 68-70/. Lungs clear. Abdomin soft, bowels sounds minimal. Denies any pain or nausea.MD in room. Pure wick placed, no void, order for hall.    PICC nurse here placing line. Will need CT after line placed. Repeat accu check, hall and urine sent.

## 2022-12-31 NOTE — PROCEDURES
"PICC Line Insertion Procedure Note  Pt. Name: Stella Greene  MRN:        [id number]    Procedure: Insertion of a  triple Lumen  5 fr  Bard SOLO (valved) Power PICC, Lot number [id number]    Indications: Vasopressor    Contraindications : none    Procedure Details     Patient identified with 2 identifiers and \"Time Out\" conducted.  .     Central line insertion bundle followed: hand hygiene performed prior to procedure, site cleansed with cholraprep, hat, mask, sterile gloves, sterile gown worn, patient draped with maximum barrier head to toe drape, sterile field maintained.    The vein was assessed and found to be compressible and of adequate size.     Lidocaine 1% 2 ml administered sq to the insertion site. A 5 Fr PICC was inserted into the basilic vein of the right arm with ultrasound guidance. 1 attempt(s) required to access vein.   Catheter threaded without difficulty. Good blood return noted.    Modified Seldinger Technique used for insertion.    The 8 sharps that are included in the PICC insertion kit were accounted for and disposed of in the sharps container prior to breakdown of the sterile field.    Catheter secured with Statlock, biopatch and Tegaderm dressing applied.    Findings:    Total catheter length  37 cm, with 0 cm exposed. Mid upper arm circumference is 29 cm. Catheter was flushed with 30 cc NS. Patient  tolerated procedure well.    Tip placement verified by 3CG technology. Tip placement in the SVC.    CLABSI prevention brochure left at bedside.    Patient's primary RN notified PICC is ready for use.      Comments:        Stephen GUEVARAN,RN,VA-BC  Vascular Access - [facility]        "

## 2022-12-31 NOTE — PHARMACY-VANCOMYCIN DOSING SERVICE
"Pharmacy Vancomycin Initial Note  Date of Service 2022  Patient's  1950  72 year old, female    Indication: Sepsis    Current estimated CrCl = Estimated Creatinine Clearance: 15.1 mL/min (A) (based on SCr of 2.99 mg/dL (H)).    Creatinine for last 3 days  2022: 11:28 PM Creatinine 1.63 mg/dL  2022:  1:14 PM Creatinine 2.99 mg/dL    Recent Vancomycin Level(s) for last 3 days  No results found for requested labs within last 72 hours.      Vancomycin IV Administrations (past 72 hours)      No vancomycin orders with administrations in past 72 hours.                Nephrotoxins and other renal medications (From now, onward)    Start     Dose/Rate Route Frequency Ordered Stop    22 1400  piperacillin-tazobactam (ZOSYN) 3.375 g vial to attach to  mL bag        Note to Pharmacy: For [facility], [facility] and [facility]: For Zosyn-naive patients, use the \"Zosyn initial dose + extended infusion\" order panel.    3.375 g  over 30 Minutes Intravenous ONCE 22 1338            Contrast Orders - past 72 hours (72h ago, onward)    None            Plan:  1. Start vancomycin  1250 mg IV once. Please re-consult pharmacy for future doses  CARLITOS SALCEDO [facility]  "

## 2022-12-31 NOTE — ED PROVIDER NOTES
"  Emergency Department Encounter     Evaluation Date & Time:   2022 12:56 PM    CHIEF COMPLAINT:  Dizziness and Generalized Weakness      Triage Note:       Impression and Plan       FINAL IMPRESSION:    ICD-10-CM    1. Septic shock (H)  A41.9     R65.21       2. Leukocytosis, unspecified type  D72.829       3. Urinary tract infection without hematuria, site unspecified  N39.0       4. Elevated troponin  R77.8       5. Acute renal insufficiency  N28.9       6. Elevated liver enzymes  R74.8       7. Pelvic mass  R19.00             ED COURSE & MEDICAL DECISION MAKIN:58 PM Introduced myself to the patient, obtained history of present illness, and performed initial physical exam at this time. PPE: Provider wore gloves, gown, and paper mask.   1:30 PM Checked on the patient and updated her and her  on the current treatment plan. BP after 500cc bolus low (70s systolic), however is mentating well. Will finish bolus of additional liter NS and reassess.   1:41 PM Spoke with patient and her  regarding disposition.   2:00 PM Discussed the patient with Dr. Soto, Cardiology  3:15 PM Updated the patient and her  on the current treatment plan.  4:44 PM Spoke with Radiology regarding imaging results.   4:51 PM Discussed the case with Dr. Uriostegui, ICU.   5:05 PM Spoke with Nori Vivar PA-C, Urology, regarding the patient.   5:10 PM Updated the patient and her  on the information from ICU and Urology.   5:20 PM Spoke with Nori Vivar PA-C, Urology, again with information about the patient's wishes on how to move forward with treatment.  5:34 PM Spoke with Urology again.   5:35 PM Updated the patient.     73 yo F, history of DM2, HTN and COPD / asthma, who presents for evaluation of generalized weakness and confusion.  reports that she usually gets out of bed around 9am, but he had to wake her this morning at 11am. At that time, she was \"spacey\" and had some difficulty walking " without assistance. She vomited once during the night and twice since she awoke today.     She denies headache, chest pain, abdominal pain. She reports mild shortness of breath due to her asthma.    Patient just seen here 3 days ago after a syncopal episode with head injury attributed to hypoglycemia.    On exam, she has tachycardic rate with regular rhythm with clear lungs. Abdomen benign. Patient A&O x3 with normal neuro exam.    Patient placed on cardiac monitor, IV access established and blood sent for labs.    Blood sugar checked and unremarkable (139).     Initial BP low (70s systolic) for which IV fluid bolus was initiated.     EKG performed and demonstrated sinus tachycardia with PACs, RBBB and no ST-T wave changes consistent with ACS or pericarditis.    Blood pressure not responsive to IV fluids.  Her spouse did report that they had been weaning her off chronic steroids; a cortisol level was checked and patient was given 100 Mg IV Solu-Cortef, also without improvement in her blood pressures.    Patient continued to be hypotensive despite 2+L IV fluids and steroids for which a PICC line was placed and Levophed was initiated with MAP > 65.     Her initial lactate returned elevated at 3.5 and her WBC also returned elevated at 58.4 with left shift (ANC 51.4).  Blood cultures were obtained and patient was given broad-spectrum antibiotics (IV Zosyn and IV vancomycin).  Procalcitonin checked and returned elevated at >100.    Troponin returned elevated (453). I spoke with Dr. Soto with Cardiology who agreed with plan to hold on heparin at this time given concerns for sepsis. Consider demand ischemia. He advises to check a repeat troponin.    COVID, influenza and RSV were negative.    Despite 30+ml/kg IV fluids, the patient has had very little urine output for which a Waddell catheter was placed.  UA suggestive of infection with 500 LE, many bacteria, >182 WBCs and WBC clumps.  Urine culture is pending and  antibiotics already administered should adequately cover urinary source of infection.    Patient with acute renal insufficiency (creatinine 2.99 with GFR 16); creatinine 3 days ago was 1.63 (GFR 33) and 3 months ago was 0.8 with GFR of 79). She also has abnormal liver panel with elevated alk phos (487) and elevated enzymes (AST 98, ALT 92).     Repeat troponin improved (311).     Given septic shock with acute renal insufficiency and decreased urine output, CT chest / abdomen / pelvis (without IV contrast given renal function) was performed and demonstrated:  1.  Right hydronephrosis and hydroureter extending to the distal ureter, where the ureter crosses vessels and a soft tissue mass.  2.  Non-specific lower pelvic stranding could be from cystitis.  3.  Retroperitoneal masses are concerning for malignancy. The uterus is heterogeneous, lobulated, and enlarged, and a uterine or right ovarian mass is possible. Recommend further evaluation of the retroperitoneal soft tissue. PET/CT may be helpful.  4.  Hepatic steatosis.  5.  Fluid throughout the esophagus with a moderate to large hiatal hernia. This raises the risk of aspiration.      Urology was consulted and recommended percutaneous nephrostomy tube be placed by interventional radiology; they will discuss the case with the interventional radiologist.  Patient kept NPO.     Head CT also performed given syncope with head injury 3 days ago and was negative for acute intracranial abnormality.    A repeat lactate was checked and improved to 1.9.    Patient admitted to the ICU.  Patient in improved condition at time of disposition.      At the conclusion of the encounter I discussed the results of all the tests and the disposition. The questions were answered. The patient and family acknowledged understanding and were agreeable with the care plan.    60 minutes of critical care time    MEDICATIONS GIVEN IN THE EMERGENCY DEPARTMENT:  Medications   lidocaine 1 % 0.1-5 mL (2  mLs Other Given 12/31/22 1454)   lidocaine (LMX4) cream (has no administration in time range)   sodium chloride (PF) 0.9% PF flush 10-40 mL (has no administration in time range)   norepinephrine (LEVOPHED) 4 mg in  mL infusion PREMIX (0.05 mcg/kg/min × 63.5 kg Intravenous Rate/Dose Change 12/31/22 2030)   sodium chloride (PF) 0.9% PF flush 10-20 mL (has no administration in time range)   sodium chloride (PF) 0.9% PF flush 10-40 mL (10 mLs Intracatheter Not Given 12/31/22 1529)   sodium chloride (PF) 0.9% PF flush 10-40 mL (has no administration in time range)   glucose gel 15-30 g (has no administration in time range)     Or   dextrose 50 % injection 25-50 mL (has no administration in time range)     Or   glucagon injection 1 mg (has no administration in time range)   glucose gel 15-30 g (has no administration in time range)     Or   dextrose 50 % injection 25-50 mL (has no administration in time range)     Or   glucagon injection 1 mg (has no administration in time range)   insulin aspart (NovoLOG) injection (RAPID ACTING) (has no administration in time range)   piperacillin-tazobactam (ZOSYN) 3.375 g vial to attach to  mL bag (has no administration in time range)   sodium bicarbonate 150 mEq in sterile water (preservative free) 1,000 mL infusion ( Intravenous Rate/Dose Verify 12/31/22 1915)   ipratropium - albuterol 0.5 mg/2.5 mg/3 mL (DUONEB) neb solution 3 mL (has no administration in time range)   hydrocortisone sodium succinate PF (solu-CORTEF) injection 100 mg (has no administration in time range)   pantoprazole (PROTONIX) IV push injection 40 mg (40 mg Intravenous Given 12/31/22 2032)   vancomycin place castro - receiving intermittent dosing (has no administration in time range)   lidocaine 1 % 1-30 mL (has no administration in time range)   midazolam (VERSED) injection 0.5-2 mg (0.5 mg Intravenous Given 12/31/22 1949)   flumazenil (ROMAZICON) injection 0.2 mg (has no administration in time  range)   fentaNYL (PF) (SUBLIMAZE) injection 25-50 mcg (50 mcg Intravenous Given 12/1951)   naloxone (NARCAN) injection 0.2 mg (has no administration in time range)     Or   naloxone (NARCAN) injection 0.4 mg (has no administration in time range)     Or   naloxone (NARCAN) injection 0.2 mg (has no administration in time range)     Or   naloxone (NARCAN) injection 0.4 mg (has no administration in time range)   0.9% sodium chloride BOLUS (0 mLs Intravenous Stopped 12/31/22 1329)   0.9% sodium chloride BOLUS (0 mLs Intravenous Stopped 12/31/22 1410)   piperacillin-tazobactam (ZOSYN) 3.375 g vial to attach to  mL bag (3.375 g Intravenous Given 12/31/22 1402)   ondansetron (ZOFRAN ODT) ODT tab 4 mg (4 mg Oral Not Given 12/31/22 1403)   hydrocortisone sodium succinate PF (solu-CORTEF) injection 100 mg (100 mg Intravenous Given 12/31/22 1406)   vancomycin (VANCOCIN) 1,250 mg in sodium chloride 0.9 % 250 mL intermittent infusion (0 mg Intravenous Stopped 12/31/22 1645)   0.9% sodium chloride BOLUS (0 mLs Intravenous Stopped 12/31/22 1416)   0.9% sodium chloride BOLUS (0 mLs Intravenous Stopped 12/31/22 1627)   iohexol (OMNIPAQUE) 350 MG/ML injectable solution 100 mL (10 mLs Intravenous Given 12/31/22 2000)       NEW PRESCRIPTIONS STARTED AT TODAY'S ED VISIT:  Current Discharge Medication List          HPI     HPI     Stella Greene is a 72 year old female, history of DM2, HTN and asthma / COPD, who presents to this ED for evaluation of dizziness and generalized weakness.    Per chart review, the patient was seen on 12/28/22 for evaluation of hypoglycemia and a forehead laceration at [facility] ED. During this visit, the patient had a CT head wo contrast with results as follows: 1.  Left frontal scalp laceration without acute intracranial abnormality.  2.  Incidental note of a subcentimeter calcified left frontal convexity meningioma. Patient's blood sugar remained stable while in the ED, and was discharged home  "following laceration repair.     Per the patient's , she normally gets out of bed around 9am, but he had to wake her at 11am this morning as she was still sleeping. The patient's  noticed that she seemed \"spacy\" and generally weak, so he checked her blood sugar, which was 115. He states that it was very difficult to get her downstairs and into the car as she was weak and unsteady. The patient endorses dizziness, which she describes as feeling lightheaded and off balance, but denies room-spinning sensation. She also reports generalized weakness without focal weakness.  reports that she vomited during the night and was not aware of this and has vomited twice more since waking this morning.     She denies headache, chest pain, abdominal pain. She reports mild shortness of breath due to her asthma.    No blood thinners. Blood glucose in the room was 139.     Of note, the patient was seen on Wednesday as documented in chart review above. Her  states that her blood pressure was 95/60 at that time. It is 78/47 on initial exam today.     She has otherwise been in her usual state of health and denies diarrhea, cough, fever or other concerns.    REVIEW OF SYSTEMS:  All other systems reviewed and are negative.      Medical History     Past Medical History:   Diagnosis Date     Diabetes mellitus, type 2 (H) 12/06/2021     Essential hypertension      Mild persistent asthma without complication        Past Surgical History:   Procedure Laterality Date     IR NEPHROSTOMY TUBE PLACEMENT RIGHT  12/31/2022     LAPAROSCOPIC TUBAL LIGATION Bilateral      nasal polpys Bilateral      PICC TRIPLE LUMEN PLACEMENT  12/31/2022            Family History   Problem Relation Age of Onset     No Known Problems Mother      Heart Disease Father        Social History     Tobacco Use     Smoking status: Never     Smokeless tobacco: Never   Vaping Use     Vaping Use: Never used   Substance Use Topics     Alcohol use: No     " "Drug use: No       No current outpatient medications on file.      Physical Exam     First Vitals:  Patient Vitals for the past 24 hrs:   BP Temp Temp src Pulse Resp SpO2 Height Weight   12/31/22 2030 122/68 -- -- 105 25 97 % -- --   12/31/22 2015 119/70 -- -- 92 30 95 % -- --   12/31/22 2000 113/64 -- -- 95 17 95 % -- --   12/31/22 1955 110/63 -- -- 93 16 98 % -- --   12/31/22 1950 115/66 -- -- 95 18 (!) 89 % -- --   12/31/22 1945 112/68 -- -- 94 22 95 % -- --   12/31/22 1940 109/61 -- -- 98 20 -- -- --   12/31/22 1920 110/66 98.1  F (36.7  C) Oral 93 25 98 % -- --   12/31/22 1915 106/64 98.1  F (36.7  C) Oral 93 21 99 % -- --   12/31/22 1900 105/62 -- -- 93 25 98 % -- --   12/31/22 1845 104/60 -- -- 93 17 99 % -- --   12/31/22 1830 97/58 -- -- 93 25 100 % -- --   12/31/22 1815 99/57 -- -- 96 28 100 % -- --   12/31/22 1800 101/58 98  F (36.7  C) Oral 95 24 99 % 1.549 m (5' 1\") 67.8 kg (149 lb 7.6 oz)   12/31/22 1730 92/54 -- -- 95 26 100 % -- --   12/31/22 1700 100/57 -- -- 96 23 100 % -- --   12/31/22 1656 99/57 -- -- 96 18 99 % -- --   12/31/22 1649 94/54 -- -- 95 18 100 % -- --   12/31/22 1645 90/51 -- -- 95 20 100 % -- --   12/31/22 1638 (!) 88/51 -- -- 95 18 100 % -- --   12/31/22 1636 92/52 -- -- 94 18 99 % -- --   12/31/22 1634 (!) 88/51 -- -- 94 21 99 % -- --   12/31/22 1630 (!) 88/53 -- -- 94 23 99 % -- --   12/31/22 1627 90/52 -- -- 94 22 100 % -- --   12/31/22 1615 (!) 86/50 -- -- 93 20 99 % -- --   12/31/22 1535 (!) 80/51 -- -- 93 22 100 % -- --   12/31/22 1530 (!) 78/52 -- -- 92 24 100 % -- --   12/31/22 1515 (!) 80/52 -- -- 95 20 99 % -- --   12/31/22 1435 (!) 74/48 -- -- 94 21 100 % -- --   12/31/22 1430 (!) 75/50 -- -- 95 20 100 % -- --   12/31/22 1425 (!) 72/48 -- -- 93 27 99 % -- --   12/31/22 1422 (!) 69/46 -- -- 93 26 99 % -- --   12/31/22 1412 (!) 73/48 -- -- 90 23 95 % -- --   12/31/22 1402 (!) 72/46 -- -- 90 22 94 % -- --   12/31/22 1352 (!) 70/46 -- -- 92 23 97 % -- --   12/31/22 1342 (!) " "67/43 -- -- 91 20 92 % -- --   12/31/22 1332 (!) 70/49 -- -- 96 23 94 % -- --   12/31/22 1330 (!) 69/43 -- -- 99 23 96 % -- --   12/31/22 1320 (!) 69/46 -- -- 102 22 96 % -- --   12/31/22 1310 (!) 89/44 -- -- 104 24 90 % -- --   12/31/22 1303 (!) 78/47 98.8  F (37.1  C) -- 106 16 91 % 1.575 m (5' 2\") 63.5 kg (140 lb)   12/31/22 1300 (!) 78/47 -- -- -- -- -- -- --       PHYSICAL EXAM:   Physical Exam    GENERAL: Awake, alert.  In no acute distress.   HEENT: Normocephalic. Ecchymosis around left eye. Pupils equal, round and reactive. Conjunctiva normal. EOMI without nystagmus. TMs normal without erythema or hemotympanum.   NECK: No stridor.  PULMONARY: Symmetrical breath sounds without distress.  Lungs clear to auscultation bilaterally without wheezes, rhonchi or rales.  CARDIO: Tachycardic rate with regular rhythm.  No significant murmur, rub or gallop.    ABDOMINAL: Abdomen soft, non-distended and non-tender to palpation.    EXTREMITIES: No lower extremity swelling or edema.      NEURO: Alert and oriented to person, place and time.  Cranial nerves III-XII intact.  Strength 5/5 BL upper and lower extremities with sensation to light touch grossly intact.  PSYCH: Normal mood and affect.  SKIN: No rashes.     Results     LAB:  All pertinent labs reviewed and interpreted  Labs Ordered and Resulted from Time of ED Arrival to Time of ED Departure   CBC WITH PLATELETS - Abnormal       Result Value    WBC Count 58.4 (*)     RBC Count 3.88      Hemoglobin 10.1 (*)     Hematocrit 31.5 (*)     MCV 81      MCH 26.0 (*)     MCHC 32.1      RDW 13.5      Platelet Count 396     BASIC METABOLIC PANEL - Abnormal    Sodium 141      Potassium 3.6      Chloride 102      Carbon Dioxide (CO2) 18 (*)     Anion Gap 21 (*)     Urea Nitrogen 33.5 (*)     Creatinine 2.99 (*)     Calcium 9.0      Glucose 155 (*)     GFR Estimate 16 (*)    TROPONIN T, HIGH SENSITIVITY - Abnormal    Troponin T, High Sensitivity 453 (*)    INR - Abnormal    INR " 1.30 (*)    ROUTINE UA WITH MICROSCOPIC REFLEX TO CULTURE - Abnormal    Color Urine Luzmaria (*)     Appearance Urine Cloudy (*)     Glucose Urine Negative      Bilirubin Urine Negative      Ketones Urine Negative      Specific Gravity Urine 1.017      Blood Urine 0.2 mg/dL (*)     pH Urine 5.5      Protein Albumin Urine 100 (*)     Urobilinogen Urine <2.0      Nitrite Urine Negative      Leukocyte Esterase Urine 500 Danial/uL (*)     Bacteria Urine Many (*)     WBC Clumps Urine Present (*)     Mucus Urine Present (*)     RBC Urine 0      WBC Urine >182 (*)    LACTIC ACID WHOLE BLOOD - Abnormal    Lactic Acid 3.5 (*)    HEPATIC FUNCTION PANEL - Abnormal    Protein Total 6.8      Albumin 3.0 (*)     Bilirubin Total 0.9      Alkaline Phosphatase 487 (*)     AST 98 (*)     ALT 92 (*)     Bilirubin Direct 0.58 (*)    GLUCOSE BY METER - Abnormal    GLUCOSE BY METER POCT 139 (*)    WBC AND DIFFERENTIAL - Abnormal    WBC Count 58.4 (*)    NT PROBNP INPATIENT - Abnormal    N terminal Pro BNP Inpatient >35,000 (*)    BASIC METABOLIC PANEL - Abnormal    Sodium 141      Potassium 3.8      Chloride 106      Carbon Dioxide (CO2) 16 (*)     Anion Gap 19 (*)     Urea Nitrogen 34.1 (*)     Creatinine 2.79 (*)     Calcium 7.6 (*)     Glucose 215 (*)     GFR Estimate 17 (*)    GLUCOSE BY METER - Abnormal    GLUCOSE BY METER POCT 153 (*)    TROPONIN T, HIGH SENSITIVITY - Abnormal    Troponin T, High Sensitivity 311 (*)    MANUAL DIFFERENTIAL - Abnormal    % Neutrophils 88      % Lymphocytes 4      % Monocytes 7      % Eosinophils 0      % Basophils 0      % Metamyelocytes 1      Absolute Neutrophils 51.4 (*)     Absolute Lymphocytes 2.3      Absolute Monocytes 4.1 (*)     Absolute Eosinophils 0.0      Absolute Basophils 0.0      Absolute Metamyelocytes 0.6 (*)     RBC Morphology Confirmed RBC Indices      Platelet Assessment        Value: Automated Count Confirmed. Platelet morphology is normal.    Pathologist Review Comments       PARTIAL  THROMBOPLASTIN TIME - Normal    aPTT 31     INFLUENZA A/B & SARS-COV2 PCR MULTIPLEX - Normal    Influenza A PCR Negative      Influenza B PCR Negative      RSV PCR Negative      SARS CoV2 PCR Negative     ETHYL ALCOHOL LEVEL - Normal    Alcohol ethyl <0.01     LIPASE - Normal    Lipase 15     LACTIC ACID WHOLE BLOOD - Normal    Lactic Acid 1.9     GLUCOSE MONITOR NURSING POCT   CORTISOL   BLOOD CULTURE   BLOOD CULTURE   URINE CULTURE   WBC AND DIFFERENTIAL       RADIOLOGY:  IR Nephrostomy Tube Placement Right   Final Result   IMPRESSION:     Successful right percutaneous nephrostomy placement, as detailed above.      Head CT w/o contrast   Final Result   IMPRESSION:       1. Senescent changes and sequelae of chronic microangiopathy without acute intracranial abnormality.      CT Chest Abdomen Pelvis w/o Contrast   Final Result   IMPRESSION:   1.  Right hydronephrosis and hydroureter extending to the distal ureter, where the ureter crosses vessels and a soft tissue mass.   2.  Nonspecific lower pelvic stranding could be from cystitis.   3.  Retroperitoneal masses are concerning for malignancy. The uterus is heterogeneous, lobulated, and enlarged, and a uterine or right ovarian mass is possible. Recommend further evaluation of the retroperitoneal soft tissue. PET/CT may be helpful.   4.  Hepatic steatosis.   5.  Fluid throughout the esophagus with a moderate to large hiatal hernia. This raises the risk of aspiration.        Discussed with Dr. Fernandez by me over telephone at 4:45 PM.          EC2022, 13:13; sinus tachycardia with rate of 107 bpm; PACs; RBBB; no ST-T wave changes consistent with ACS or pericarditis; compared to previous EKG dated 2022, complete RBBB has replaced incomplete RBBB    EKG independently reviewed and interpreted by Irene Fernandez MD      PROCEDURES:  Procedures:    Critical Care     Performed by: Dr Irene Fernandez  Authorized by: Dr Irene Fernandez     Total critical care time:  60 minutes    Critical care was necessary to treat or prevent imminent or life-threatening deterioration of the following conditions: septic shock    Critical care was time spent personally by me on the following activities: development of treatment plan with patient or surrogate, discussions with consultants, examination of patient, evaluation of patient's response to treatment, obtaining history from patient or surrogate, ordering and performing treatments and interventions, ordering and review of laboratory studies, ordering and review of radiographic studies, re-evaluation of patient's condition and monitoring for potential decompensation.  Critical care time was exclusive of separately billable procedures and treating other patients.      [facility] System Documentation     CMS Diagnoses:   The patient has signs of Septic Shock  The patient has signs of septic shock as evidenced by:  1. Presence of Sepsis, AND  2. Persistent hypotension defined by the last 2 BP readings within the ONE HOUR following completion of the 30mL/kg bolus being low (SBP <90 or MAP <65)    Time septic shock diagnosis confirmed = 1500  12/31/22   as this was the time when Persistent Hypotension present (2 consecutive SBP <90 or MAP <65 within ONE hour after 30cc/kg IVF bolus completed)    3 Hour Septic Shock Bundle Completion:  1. Initial Lactic Acid Result:   Recent Labs   Lab Test 12/31/22  1628 12/31/22  1314   LACT 1.9 3.5*     2. Blood Cultures before Antibiotics: Yes  3. Broad Spectrum Antibiotics Administered:  yes       Anti-infectives (From admission through now)    Start     Dose/Rate Route Frequency Ordered Stop    12/31/22 1430  vancomycin (VANCOCIN) 1,250 mg in sodium chloride 0.9 % 250 mL intermittent infusion         1,250 mg  over 90 Minutes Intravenous ONCE 12/31/22 1356 12/31/22 1645    12/31/22 1400  piperacillin-tazobactam (ZOSYN) 3.375 g vial to attach to  mL bag        Note to Pharmacy: For [facility], [facility] and [facility]:  "For Zosyn-naive patients, use the \"Zosyn initial dose + extended infusion\" order panel.    3.375 g  over 30 Minutes Intravenous ONCE 12/31/22 1338 12/31/22 1432          4. IF 30 mL/kg bolus criteria met based on:  -Lactate > 4  OR  -Initial Hypotension:  Definition:  2 low BP readings (SBP <90, MAP <65, or decrease > 40 from baseline due to infection) within 3 hrs of each other during the time period of 6 hrs before and 3 hrs  after time zero  THEN: Fluid volume administered in ED:  Full 30 mL/kg bolus given (see amount below).    BMI Readings from Last 1 Encounters:   12/31/22 28.24 kg/m      30 mL/kg fluids based on weight: 2,030 mL  30 mL/kg fluids based on IBW (must be >= 60 inches tall): 1,430 mL    Septic Shock reassessment:  1. Repeat Lactic Acid Level: 1.9  2. Vasopressors started for Persistent Hypotension defined by the last 2 BP readings within the ONE HOUR following completion of the 30mL/kg bolus being low (SBP <90 or MAP <65).    I attest to having performed a repeat sepsis exam and assessment of perfusion at 1600 and the results demonstrate improved perfusion.      I, Suyapa Paz, am serving as a scribe to document services personally performed by Irene Fernandez MD based on my observation and the provider's statements to me. I, Irene Fernandez MD attest that Suyapa Paz is acting in a scribe capacity, has observed my performance of the services and has documented them in accordance with my direction.    Irene Fernandez MD  Emergency Medicine  [facility] EMERGENCY DEPARTMENT           Irene Fernandez MD  12/31/22 2052    "

## 2022-12-31 NOTE — PHARMACY-ADMISSION MEDICATION HISTORY
Pharmacy Note - Admission Medication History    Pertinent Provider Information: Patient not taking statin due to myalgia, stopped taking Ozempic due to GI disturbances, current prednisone dose is 5 mg per patient. Flagged metformin for removal as well.      ______________________________________________________________________    Prior To Admission (PTA) med list completed and updated in EMR.       Current Facility-Administered Medications for the 12/31/22 encounter (Hospital Encounter)   Medication     lidocaine (XYLOCAINE) 2 % external gel     PTA Med List   Medication Sig Last Dose     ADVAIR DISKUS 250-50 MCG/DOSE inhaler [ADVAIR DISKUS 250-50 MCG/DOSE DISKUS] INHALE 1 DOSE BY MOUTH TWICE DAILY 12/31/2022 at am     albuterol (PROAIR HFA/PROVENTIL HFA/VENTOLIN HFA) 108 (90 Base) MCG/ACT inhaler Inhale 2 puffs into the lungs every 6 hours as needed Unknown     blood glucose test (CONTOUR NEXT STRIPS) strips [BLOOD GLUCOSE TEST (CONTOUR NEXT STRIPS) STRIPS] Test four times daily.  Dx code DM2. 12/30/2022 at am     cetirizine (ZYRTEC) 10 MG tablet [CETIRIZINE (ZYRTEC) 10 MG TABLET] Take 10 mg by mouth daily. 12/30/2022 at am     dexamethasone (DECADRON) 4 MG/ML injection Inject 4 mg for adrenal crisis      fluticasone propionate (FLONASE) 50 mcg/actuation nasal spray [FLUTICASONE PROPIONATE (FLONASE) 50 MCG/ACTUATION NASAL SPRAY] 1 spray into each nostril daily. 12/31/2022     Glucagon (GVOKE HYPOPEN 1-PACK) 1 MG/0.2ML SOAJ Inject 1 mg Subcutaneous as needed (low BG levels)      Glucagon 0.5 MG/0.1ML SOAJ Inject 1 mg Subcutaneous daily as needed (severe hypoglycemia)      insulin aspart prot & aspart (NOVOLOG MIX 70/30 FLEXPEN) (70-30) 100 UNIT/ML pen 25 units before breakfast, 15 units before evening meal Past Week     insulin pen needle (31G X 6 MM) 31G X 6 MM miscellaneous Use 2 pen needles daily or as directed.      lansoprazole (PREVACID) 15 MG capsule [LANSOPRAZOLE (PREVACID) 15 MG CAPSULE] Take 15 mg by mouth  daily. Past Week at am     lisinopril (ZESTRIL) 20 MG tablet [LISINOPRIL (PRINIVIL,ZESTRIL) 20 MG TABLET] Take 1 tablet by mouth once daily 12/31/2022 at am     montelukast (SINGULAIR) 10 MG tablet Take 1 tablet (10 mg) by mouth At Bedtime 12/30/2022 at pm     multivitamin therapeutic tablet [MULTIVITAMIN THERAPEUTIC TABLET] Take 1 tablet by mouth daily. 12/30/2022 at am     predniSONE (DELTASONE) 5 MG tablet Take 1 tablet (5 mg) by mouth daily 12/30/2022 at am     Vitamin D, Cholecalciferol, 25 MCG (1000 UT) TABS  12/30/2022 at am       Information source(s): Patient and CareEverywhere/SureScripts  Method of interview communication: in-person    Summary of Changes to PTA Med List  New: none  Discontinued: none  Changed: none    Patient was asked about OTC/herbal products specifically.  PTA med list reflects this.    In the past week, patient estimated taking medication this percent of the time:  greater than 90%.    Allergies were reviewed, assessed, and updated with the patient.      Patient did not bring any medications to the hospital and can't retrieve from home. No multi-dose medications are available for use during hospital stay.     The information provided in this note is only as accurate as the sources available at the time of the update(s).    Thank you for the opportunity to participate in the care of this patient.    CARLITOS SALCEDO [facility]  12/31/2022 2:18 PM

## 2023-01-01 ENCOUNTER — ANESTHESIA EVENT (OUTPATIENT)
Dept: SURGERY | Facility: HOSPITAL | Age: 73
End: 2023-01-01
Payer: MEDICARE

## 2023-01-01 ENCOUNTER — OFFICE VISIT (OUTPATIENT)
Dept: ENDOCRINOLOGY | Facility: CLINIC | Age: 73
End: 2023-01-01
Payer: MEDICARE

## 2023-01-01 ENCOUNTER — HOSPITAL ENCOUNTER (INPATIENT)
Facility: HOSPITAL | Age: 73
LOS: 3 days | Discharge: HOME | DRG: 392 | End: 2023-05-11
Attending: EMERGENCY MEDICINE | Admitting: INTERNAL MEDICINE
Payer: MEDICARE

## 2023-01-01 ENCOUNTER — ANESTHESIA (OUTPATIENT)
Dept: SURGERY | Facility: HOSPITAL | Age: 73
End: 2023-01-01
Payer: MEDICARE

## 2023-01-01 ENCOUNTER — HEALTH MAINTENANCE LETTER (OUTPATIENT)
Age: 73
End: 2023-01-01

## 2023-01-01 ENCOUNTER — LAB (OUTPATIENT)
Dept: LAB | Facility: CLINIC | Age: 73
End: 2023-01-01
Payer: MEDICARE

## 2023-01-01 ENCOUNTER — APPOINTMENT (OUTPATIENT)
Dept: RADIOLOGY | Facility: HOSPITAL | Age: 73
End: 2023-01-01
Attending: STUDENT IN AN ORGANIZED HEALTH CARE EDUCATION/TRAINING PROGRAM
Payer: MEDICARE

## 2023-01-01 ENCOUNTER — MYC MEDICAL ADVICE (OUTPATIENT)
Dept: INTERNAL MEDICINE | Facility: CLINIC | Age: 73
End: 2023-01-01
Payer: MEDICARE

## 2023-01-01 ENCOUNTER — HOSPITAL ENCOUNTER (OUTPATIENT)
Dept: MRI IMAGING | Facility: HOSPITAL | Age: 73
Discharge: HOME | End: 2023-10-20
Attending: OBSTETRICS & GYNECOLOGY | Admitting: OBSTETRICS & GYNECOLOGY
Payer: MEDICARE

## 2023-01-01 ENCOUNTER — HOSPITAL ENCOUNTER (OUTPATIENT)
Facility: HOSPITAL | Age: 73
Discharge: HOME | End: 2023-11-10
Attending: STUDENT IN AN ORGANIZED HEALTH CARE EDUCATION/TRAINING PROGRAM | Admitting: STUDENT IN AN ORGANIZED HEALTH CARE EDUCATION/TRAINING PROGRAM
Payer: MEDICARE

## 2023-01-01 ENCOUNTER — TRANSFERRED RECORDS (OUTPATIENT)
Dept: HEALTH INFORMATION MANAGEMENT | Facility: CLINIC | Age: 73
End: 2023-01-01
Payer: MEDICARE

## 2023-01-01 ENCOUNTER — OFFICE VISIT (OUTPATIENT)
Dept: INTERNAL MEDICINE | Facility: CLINIC | Age: 73
End: 2023-01-01
Payer: MEDICARE

## 2023-01-01 VITALS
WEIGHT: 127.5 LBS | RESPIRATION RATE: 17 BRPM | TEMPERATURE: 98.2 F | HEIGHT: 61 IN | SYSTOLIC BLOOD PRESSURE: 100 MMHG | BODY MASS INDEX: 24.07 KG/M2 | HEART RATE: 86 BPM | DIASTOLIC BLOOD PRESSURE: 58 MMHG

## 2023-01-01 VITALS
WEIGHT: 137 LBS | HEIGHT: 61 IN | TEMPERATURE: 97.6 F | BODY MASS INDEX: 25.86 KG/M2 | HEART RATE: 94 BPM | RESPIRATION RATE: 18 BRPM | DIASTOLIC BLOOD PRESSURE: 70 MMHG | SYSTOLIC BLOOD PRESSURE: 135 MMHG | OXYGEN SATURATION: 98 %

## 2023-01-01 VITALS
SYSTOLIC BLOOD PRESSURE: 124 MMHG | HEART RATE: 80 BPM | BODY MASS INDEX: 23.41 KG/M2 | WEIGHT: 128 LBS | DIASTOLIC BLOOD PRESSURE: 68 MMHG

## 2023-01-01 VITALS
BODY MASS INDEX: 23.63 KG/M2 | DIASTOLIC BLOOD PRESSURE: 76 MMHG | TEMPERATURE: 98.2 F | WEIGHT: 128.4 LBS | SYSTOLIC BLOOD PRESSURE: 130 MMHG | RESPIRATION RATE: 16 BRPM | HEIGHT: 62 IN | HEART RATE: 78 BPM | OXYGEN SATURATION: 100 %

## 2023-01-01 DIAGNOSIS — I10 ESSENTIAL HYPERTENSION: Primary | ICD-10-CM

## 2023-01-01 DIAGNOSIS — E78.5 DYSLIPIDEMIA: ICD-10-CM

## 2023-01-01 DIAGNOSIS — E11.9 TYPE 2 DIABETES MELLITUS WITHOUT COMPLICATION, WITHOUT LONG-TERM CURRENT USE OF INSULIN (H): ICD-10-CM

## 2023-01-01 DIAGNOSIS — N13.39 OTHER HYDRONEPHROSIS: ICD-10-CM

## 2023-01-01 DIAGNOSIS — J45.909 ASTHMA, UNSPECIFIED ASTHMA SEVERITY, UNSPECIFIED WHETHER COMPLICATED, UNSPECIFIED WHETHER PERSISTENT: ICD-10-CM

## 2023-01-01 DIAGNOSIS — E11.42 TYPE 2 DIABETES MELLITUS WITH DIABETIC POLYNEUROPATHY, WITH LONG-TERM CURRENT USE OF INSULIN (H): ICD-10-CM

## 2023-01-01 DIAGNOSIS — R11.2 NAUSEA AND VOMITING, UNSPECIFIED VOMITING TYPE: ICD-10-CM

## 2023-01-01 DIAGNOSIS — E27.40 ADRENAL INSUFFICIENCY (H): ICD-10-CM

## 2023-01-01 DIAGNOSIS — C54.1 ENDOMETRIAL CANCER (H): ICD-10-CM

## 2023-01-01 DIAGNOSIS — I10 HYPERTENSION, UNSPECIFIED TYPE: ICD-10-CM

## 2023-01-01 DIAGNOSIS — Z79.4 TYPE 2 DIABETES MELLITUS WITH DIABETIC POLYNEUROPATHY, WITH LONG-TERM CURRENT USE OF INSULIN (H): Primary | ICD-10-CM

## 2023-01-01 DIAGNOSIS — Z92.21 STATUS POST CHEMOTHERAPY: ICD-10-CM

## 2023-01-01 DIAGNOSIS — C55 MALIGNANT NEOPLASM OF UTERUS, UNSPECIFIED SITE (H): ICD-10-CM

## 2023-01-01 DIAGNOSIS — E11.8 TYPE 2 DIABETES MELLITUS WITH COMPLICATION, WITHOUT LONG-TERM CURRENT USE OF INSULIN (H): ICD-10-CM

## 2023-01-01 DIAGNOSIS — Z79.4 TYPE 2 DIABETES MELLITUS WITH DIABETIC POLYNEUROPATHY, WITH LONG-TERM CURRENT USE OF INSULIN (H): ICD-10-CM

## 2023-01-01 DIAGNOSIS — I10 ESSENTIAL HYPERTENSION: ICD-10-CM

## 2023-01-01 DIAGNOSIS — R74.8 ELEVATED LIVER ENZYMES: ICD-10-CM

## 2023-01-01 DIAGNOSIS — E11.42 TYPE 2 DIABETES MELLITUS WITH DIABETIC POLYNEUROPATHY, WITH LONG-TERM CURRENT USE OF INSULIN (H): Primary | ICD-10-CM

## 2023-01-01 DIAGNOSIS — J44.89 OTHER SPECIFIED CHRONIC OBSTRUCTIVE PULMONARY DISEASE (H): ICD-10-CM

## 2023-01-01 DIAGNOSIS — C54.1 PRIMARY MALIGNANT NEOPLASM OF ENDOMETRIUM (H): ICD-10-CM

## 2023-01-01 DIAGNOSIS — Z01.818 PREOPERATIVE EXAMINATION: Primary | ICD-10-CM

## 2023-01-01 LAB
ACTH PLAS-MCNC: 21 PG/ML
ALBUMIN SERPL BCG-MCNC: 3.5 G/DL (ref 3.5–5.2)
ALBUMIN SERPL BCG-MCNC: 3.9 G/DL (ref 3.5–5.2)
ALP SERPL-CCNC: 100 U/L (ref 35–104)
ALP SERPL-CCNC: 87 U/L (ref 35–104)
ALT SERPL W P-5'-P-CCNC: 16 U/L (ref 10–35)
ALT SERPL W P-5'-P-CCNC: 6 U/L (ref 0–50)
ANION GAP SERPL CALCULATED.3IONS-SCNC: 11 MMOL/L (ref 7–15)
ANION GAP SERPL CALCULATED.3IONS-SCNC: 13 MMOL/L (ref 7–15)
ANION GAP SERPL CALCULATED.3IONS-SCNC: 16 MMOL/L (ref 7–15)
AST SERPL W P-5'-P-CCNC: 10 U/L (ref 10–35)
AST SERPL W P-5'-P-CCNC: 16 U/L (ref 0–45)
ATRIAL RATE - MUSE: 76 BPM
BACTERIA UR CULT: ABNORMAL
BASOPHILS # BLD AUTO: 0 10E3/UL (ref 0–0.2)
BASOPHILS NFR BLD AUTO: 0 %
BILIRUB DIRECT SERPL-MCNC: <0.2 MG/DL (ref 0–0.3)
BILIRUB SERPL-MCNC: 0.3 MG/DL
BILIRUB SERPL-MCNC: 0.5 MG/DL
BUN SERPL-MCNC: 10.7 MG/DL (ref 8–23)
BUN SERPL-MCNC: 23.3 MG/DL (ref 8–23)
BUN SERPL-MCNC: 32.7 MG/DL (ref 8–23)
BUN SERPL-MCNC: 38.6 MG/DL (ref 8–23)
BUN SERPL-MCNC: 42.5 MG/DL (ref 8–23)
CALCIUM SERPL-MCNC: 7.5 MG/DL (ref 8.8–10.2)
CALCIUM SERPL-MCNC: 7.5 MG/DL (ref 8.8–10.2)
CALCIUM SERPL-MCNC: 9.1 MG/DL (ref 8.8–10.2)
CALCIUM SERPL-MCNC: 9.1 MG/DL (ref 8.8–10.2)
CALCIUM SERPL-MCNC: 9.5 MG/DL (ref 8.8–10.2)
CANCER AG125 SERPL-ACNC: 85 U/ML
CHLORIDE SERPL-SCNC: 101 MMOL/L (ref 98–107)
CHLORIDE SERPL-SCNC: 101 MMOL/L (ref 98–107)
CHLORIDE SERPL-SCNC: 104 MMOL/L (ref 98–107)
CHLORIDE SERPL-SCNC: 104 MMOL/L (ref 98–107)
CHLORIDE SERPL-SCNC: 105 MMOL/L (ref 98–107)
CHOLEST SERPL-MCNC: 158 MG/DL
CORTIS SERPL-MCNC: 13.8 UG/DL
CORTIS SERPL-MCNC: 57.7 UG/DL
CREAT SERPL-MCNC: 0.71 MG/DL (ref 0.51–0.95)
CREAT SERPL-MCNC: 0.78 MG/DL (ref 0.51–0.95)
CREAT SERPL-MCNC: 1.09 MG/DL (ref 0.51–0.95)
CREAT SERPL-MCNC: 1.86 MG/DL (ref 0.51–0.95)
CREAT SERPL-MCNC: 2.3 MG/DL (ref 0.51–0.95)
DEPRECATED HCO3 PLAS-SCNC: 19 MMOL/L (ref 22–29)
DEPRECATED HCO3 PLAS-SCNC: 20 MMOL/L (ref 22–29)
DEPRECATED HCO3 PLAS-SCNC: 22 MMOL/L (ref 22–29)
DEPRECATED HCO3 PLAS-SCNC: 23 MMOL/L (ref 22–29)
DEPRECATED HCO3 PLAS-SCNC: 26 MMOL/L (ref 22–29)
DIASTOLIC BLOOD PRESSURE - MUSE: NORMAL MMHG
EGFRCR SERPLBLD CKD-EPI 2021: 90 ML/MIN/1.73M2
EOSINOPHIL # BLD AUTO: 0 10E3/UL (ref 0–0.7)
EOSINOPHIL NFR BLD AUTO: 1 %
ERYTHROCYTE [DISTWIDTH] IN BLOOD BY AUTOMATED COUNT: 13.6 % (ref 10–15)
ERYTHROCYTE [DISTWIDTH] IN BLOOD BY AUTOMATED COUNT: 14.6 % (ref 10–15)
ERYTHROCYTE [DISTWIDTH] IN BLOOD BY AUTOMATED COUNT: 15.1 % (ref 10–15)
ERYTHROCYTE [DISTWIDTH] IN BLOOD BY AUTOMATED COUNT: 15.1 % (ref 10–15)
FASTING STATUS PATIENT QL REPORTED: ABNORMAL
GFR SERPL CREATININE-BSD FRML MDRD: 22 ML/MIN/1.73M2
GFR SERPL CREATININE-BSD FRML MDRD: 28 ML/MIN/1.73M2
GFR SERPL CREATININE-BSD FRML MDRD: 54 ML/MIN/1.73M2
GFR SERPL CREATININE-BSD FRML MDRD: 81 ML/MIN/1.73M2
GLUCOSE BLDC GLUCOMTR-MCNC: 115 MG/DL (ref 70–99)
GLUCOSE BLDC GLUCOMTR-MCNC: 132 MG/DL (ref 70–99)
GLUCOSE BLDC GLUCOMTR-MCNC: 144 MG/DL (ref 70–99)
GLUCOSE BLDC GLUCOMTR-MCNC: 144 MG/DL (ref 70–99)
GLUCOSE BLDC GLUCOMTR-MCNC: 154 MG/DL (ref 70–99)
GLUCOSE BLDC GLUCOMTR-MCNC: 166 MG/DL (ref 70–99)
GLUCOSE BLDC GLUCOMTR-MCNC: 169 MG/DL (ref 70–99)
GLUCOSE BLDC GLUCOMTR-MCNC: 169 MG/DL (ref 70–99)
GLUCOSE BLDC GLUCOMTR-MCNC: 170 MG/DL (ref 70–99)
GLUCOSE BLDC GLUCOMTR-MCNC: 173 MG/DL (ref 70–99)
GLUCOSE BLDC GLUCOMTR-MCNC: 173 MG/DL (ref 70–99)
GLUCOSE BLDC GLUCOMTR-MCNC: 177 MG/DL (ref 70–99)
GLUCOSE BLDC GLUCOMTR-MCNC: 180 MG/DL (ref 70–99)
GLUCOSE BLDC GLUCOMTR-MCNC: 183 MG/DL (ref 70–99)
GLUCOSE BLDC GLUCOMTR-MCNC: 184 MG/DL (ref 70–99)
GLUCOSE BLDC GLUCOMTR-MCNC: 185 MG/DL (ref 70–99)
GLUCOSE BLDC GLUCOMTR-MCNC: 188 MG/DL (ref 70–99)
GLUCOSE BLDC GLUCOMTR-MCNC: 189 MG/DL (ref 70–99)
GLUCOSE BLDC GLUCOMTR-MCNC: 190 MG/DL (ref 70–99)
GLUCOSE BLDC GLUCOMTR-MCNC: 190 MG/DL (ref 70–99)
GLUCOSE BLDC GLUCOMTR-MCNC: 191 MG/DL (ref 70–99)
GLUCOSE BLDC GLUCOMTR-MCNC: 191 MG/DL (ref 70–99)
GLUCOSE BLDC GLUCOMTR-MCNC: 192 MG/DL (ref 70–99)
GLUCOSE BLDC GLUCOMTR-MCNC: 196 MG/DL (ref 70–99)
GLUCOSE BLDC GLUCOMTR-MCNC: 201 MG/DL (ref 70–99)
GLUCOSE BLDC GLUCOMTR-MCNC: 209 MG/DL (ref 70–99)
GLUCOSE BLDC GLUCOMTR-MCNC: 214 MG/DL (ref 70–99)
GLUCOSE BLDC GLUCOMTR-MCNC: 220 MG/DL (ref 70–99)
GLUCOSE BLDC GLUCOMTR-MCNC: 225 MG/DL (ref 70–99)
GLUCOSE BLDC GLUCOMTR-MCNC: 228 MG/DL (ref 70–99)
GLUCOSE BLDC GLUCOMTR-MCNC: 236 MG/DL (ref 70–99)
GLUCOSE BLDC GLUCOMTR-MCNC: 242 MG/DL (ref 70–99)
GLUCOSE BLDC GLUCOMTR-MCNC: 94 MG/DL (ref 70–99)
GLUCOSE SERPL-MCNC: 109 MG/DL (ref 70–99)
GLUCOSE SERPL-MCNC: 239 MG/DL (ref 70–99)
GLUCOSE SERPL-MCNC: 243 MG/DL (ref 70–99)
GLUCOSE SERPL-MCNC: 246 MG/DL (ref 70–99)
GLUCOSE SERPL-MCNC: 249 MG/DL (ref 70–99)
GLUCOSE SERPL-MCNC: 97 MG/DL (ref 70–99)
HBA1C MFR BLD: 5 % (ref 0–5.6)
HCT VFR BLD AUTO: 25.5 % (ref 35–47)
HCT VFR BLD AUTO: 27.2 % (ref 35–47)
HCT VFR BLD AUTO: 27.3 % (ref 35–47)
HCT VFR BLD AUTO: 28.8 % (ref 35–47)
HDLC SERPL-MCNC: 50 MG/DL
HGB BLD-MCNC: 8.2 G/DL (ref 11.7–15.7)
HGB BLD-MCNC: 8.6 G/DL (ref 11.7–15.7)
HGB BLD-MCNC: 8.8 G/DL (ref 11.7–15.7)
HGB BLD-MCNC: 9.2 G/DL (ref 11.7–15.7)
IMM GRANULOCYTES # BLD: 0.1 10E3/UL
IMM GRANULOCYTES NFR BLD: 1 %
INTERPRETATION ECG - MUSE: NORMAL
LDLC SERPL CALC-MCNC: 81 MG/DL
LYMPHOCYTES # BLD AUTO: 0.9 10E3/UL (ref 0.8–5.3)
LYMPHOCYTES NFR BLD AUTO: 13 %
MCH RBC QN AUTO: 25.5 PG (ref 26.5–33)
MCH RBC QN AUTO: 28.9 PG (ref 26.5–33)
MCH RBC QN AUTO: 29.3 PG (ref 26.5–33)
MCH RBC QN AUTO: 29.7 PG (ref 26.5–33)
MCHC RBC AUTO-ENTMCNC: 31.6 G/DL (ref 31.5–36.5)
MCHC RBC AUTO-ENTMCNC: 31.9 G/DL (ref 31.5–36.5)
MCHC RBC AUTO-ENTMCNC: 32.2 G/DL (ref 31.5–36.5)
MCHC RBC AUTO-ENTMCNC: 32.2 G/DL (ref 31.5–36.5)
MCV RBC AUTO: 81 FL (ref 78–100)
MCV RBC AUTO: 91 FL (ref 78–100)
MCV RBC AUTO: 91 FL (ref 78–100)
MCV RBC AUTO: 92 FL (ref 78–100)
MONOCYTES # BLD AUTO: 0.2 10E3/UL (ref 0–1.3)
MONOCYTES NFR BLD AUTO: 3 %
NEUTROPHILS # BLD AUTO: 5.3 10E3/UL (ref 1.6–8.3)
NEUTROPHILS NFR BLD AUTO: 82 %
NONHDLC SERPL-MCNC: 108 MG/DL
NRBC # BLD AUTO: 0 10E3/UL
NRBC BLD AUTO-RTO: 0 /100
P AXIS - MUSE: 62 DEGREES
PLATELET # BLD AUTO: 275 10E3/UL (ref 150–450)
PLATELET # BLD AUTO: 302 10E3/UL (ref 150–450)
PLATELET # BLD AUTO: 320 10E3/UL (ref 150–450)
PLATELET # BLD AUTO: 330 10E3/UL (ref 150–450)
POTASSIUM SERPL-SCNC: 2.9 MMOL/L (ref 3.4–5.3)
POTASSIUM SERPL-SCNC: 3.5 MMOL/L (ref 3.4–5.3)
POTASSIUM SERPL-SCNC: 3.5 MMOL/L (ref 3.4–5.3)
POTASSIUM SERPL-SCNC: 3.9 MMOL/L (ref 3.4–5.3)
POTASSIUM SERPL-SCNC: 4.1 MMOL/L (ref 3.4–5.3)
PR INTERVAL - MUSE: 164 MS
PROT SERPL-MCNC: 7.2 G/DL (ref 6.4–8.3)
PROT SERPL-MCNC: 7.4 G/DL (ref 6.4–8.3)
QRS DURATION - MUSE: 126 MS
QT - MUSE: 428 MS
QTC - MUSE: 481 MS
R AXIS - MUSE: -13 DEGREES
RBC # BLD AUTO: 2.76 10E6/UL (ref 3.8–5.2)
RBC # BLD AUTO: 3 10E6/UL (ref 3.8–5.2)
RBC # BLD AUTO: 3.18 10E6/UL (ref 3.8–5.2)
RBC # BLD AUTO: 3.37 10E6/UL (ref 3.8–5.2)
SODIUM SERPL-SCNC: 136 MMOL/L (ref 136–145)
SODIUM SERPL-SCNC: 137 MMOL/L (ref 136–145)
SODIUM SERPL-SCNC: 138 MMOL/L (ref 135–145)
SODIUM SERPL-SCNC: 143 MMOL/L (ref 136–145)
SODIUM SERPL-SCNC: 143 MMOL/L (ref 136–145)
SYSTOLIC BLOOD PRESSURE - MUSE: NORMAL MMHG
T AXIS - MUSE: 35 DEGREES
TRIGL SERPL-MCNC: 136 MG/DL
VENTRICULAR RATE- MUSE: 76 BPM
WBC # BLD AUTO: 4.6 10E3/UL (ref 4–11)
WBC # BLD AUTO: 5.1 10E3/UL (ref 4–11)
WBC # BLD AUTO: 54 10E3/UL (ref 4–11)
WBC # BLD AUTO: 6.4 10E3/UL (ref 4–11)

## 2023-01-01 PROCEDURE — 250N000011 HC RX IP 250 OP 636: Performed by: INTERNAL MEDICINE

## 2023-01-01 PROCEDURE — 99285 EMERGENCY DEPT VISIT HI MDM: CPT | Mod: 25

## 2023-01-01 PROCEDURE — 99223 1ST HOSP IP/OBS HIGH 75: CPT | Mod: AI | Performed by: INTERNAL MEDICINE

## 2023-01-01 PROCEDURE — 82947 ASSAY GLUCOSE BLOOD QUANT: CPT

## 2023-01-01 PROCEDURE — 85027 COMPLETE CBC AUTOMATED: CPT | Performed by: INTERNAL MEDICINE

## 2023-01-01 PROCEDURE — 258N000003 HC RX IP 258 OP 636: Performed by: NURSE ANESTHETIST, CERTIFIED REGISTERED

## 2023-01-01 PROCEDURE — 96375 TX/PRO/DX INJ NEW DRUG ADDON: CPT

## 2023-01-01 PROCEDURE — 99239 HOSP IP/OBS DSCHRG MGMT >30: CPT | Mod: GC | Performed by: INTERNAL MEDICINE

## 2023-01-01 PROCEDURE — 250N000013 HC RX MED GY IP 250 OP 250 PS 637: Performed by: PHYSICIAN ASSISTANT

## 2023-01-01 PROCEDURE — 370N000017 HC ANESTHESIA TECHNICAL FEE, PER MIN: Performed by: STUDENT IN AN ORGANIZED HEALTH CARE EDUCATION/TRAINING PROGRAM

## 2023-01-01 PROCEDURE — 82962 GLUCOSE BLOOD TEST: CPT

## 2023-01-01 PROCEDURE — 96361 HYDRATE IV INFUSION ADD-ON: CPT

## 2023-01-01 PROCEDURE — 250N000011 HC RX IP 250 OP 636: Performed by: EMERGENCY MEDICINE

## 2023-01-01 PROCEDURE — C1769 GUIDE WIRE: HCPCS | Performed by: STUDENT IN AN ORGANIZED HEALTH CARE EDUCATION/TRAINING PROGRAM

## 2023-01-01 PROCEDURE — 80053 COMPREHEN METABOLIC PANEL: CPT | Performed by: INTERNAL MEDICINE

## 2023-01-01 PROCEDURE — 258N000003 HC RX IP 258 OP 636: Performed by: EMERGENCY MEDICINE

## 2023-01-01 PROCEDURE — 80048 BASIC METABOLIC PNL TOTAL CA: CPT | Performed by: EMERGENCY MEDICINE

## 2023-01-01 PROCEDURE — 36415 COLL VENOUS BLD VENIPUNCTURE: CPT | Performed by: EMERGENCY MEDICINE

## 2023-01-01 PROCEDURE — 200N000001 HC R&B ICU

## 2023-01-01 PROCEDURE — 80053 COMPREHEN METABOLIC PANEL: CPT | Performed by: NURSE PRACTITIONER

## 2023-01-01 PROCEDURE — 710N000009 HC RECOVERY PHASE 1, LEVEL 1, PER MIN: Performed by: STUDENT IN AN ORGANIZED HEALTH CARE EDUCATION/TRAINING PROGRAM

## 2023-01-01 PROCEDURE — 258N000003 HC RX IP 258 OP 636: Performed by: STUDENT IN AN ORGANIZED HEALTH CARE EDUCATION/TRAINING PROGRAM

## 2023-01-01 PROCEDURE — 99207 PR CDG-CUT & PASTE-POTENTIAL IMPACT ON LEVEL: CPT | Performed by: INTERNAL MEDICINE

## 2023-01-01 PROCEDURE — 250N000013 HC RX MED GY IP 250 OP 250 PS 637: Performed by: OBSTETRICS & GYNECOLOGY

## 2023-01-01 PROCEDURE — 120N000001 HC R&B MED SURG/OB

## 2023-01-01 PROCEDURE — 82024 ASSAY OF ACTH: CPT

## 2023-01-01 PROCEDURE — 710N000012 HC RECOVERY PHASE 2, PER MINUTE: Performed by: STUDENT IN AN ORGANIZED HEALTH CARE EDUCATION/TRAINING PROGRAM

## 2023-01-01 PROCEDURE — 80061 LIPID PANEL: CPT | Performed by: NURSE PRACTITIONER

## 2023-01-01 PROCEDURE — 999N000180 XR SURGERY CARM FLUORO LESS THAN 5 MIN

## 2023-01-01 PROCEDURE — 93010 ELECTROCARDIOGRAM REPORT: CPT | Mod: OFF | Performed by: INTERNAL MEDICINE

## 2023-01-01 PROCEDURE — 258N000001 HC RX 258: Performed by: INTERNAL MEDICINE

## 2023-01-01 PROCEDURE — 250N000013 HC RX MED GY IP 250 OP 250 PS 637: Performed by: INTERNAL MEDICINE

## 2023-01-01 PROCEDURE — 250N000009 HC RX 250: Performed by: INTERNAL MEDICINE

## 2023-01-01 PROCEDURE — C2617 STENT, NON-COR, TEM W/O DEL: HCPCS | Performed by: STUDENT IN AN ORGANIZED HEALTH CARE EDUCATION/TRAINING PROGRAM

## 2023-01-01 PROCEDURE — 83036 HEMOGLOBIN GLYCOSYLATED A1C: CPT | Performed by: NURSE PRACTITIONER

## 2023-01-01 PROCEDURE — 255N000002 HC RX 255 OP 636: Mod: JZ | Performed by: OBSTETRICS & GYNECOLOGY

## 2023-01-01 PROCEDURE — 72197 MRI PELVIS W/O & W/DYE: CPT | Mod: MA

## 2023-01-01 PROCEDURE — 999N000141 HC STATISTIC PRE-PROCEDURE NURSING ASSESSMENT: Performed by: STUDENT IN AN ORGANIZED HEALTH CARE EDUCATION/TRAINING PROGRAM

## 2023-01-01 PROCEDURE — 250N000009 HC RX 250: Performed by: NURSE ANESTHETIST, CERTIFIED REGISTERED

## 2023-01-01 PROCEDURE — 99233 SBSQ HOSP IP/OBS HIGH 50: CPT | Performed by: INTERNAL MEDICINE

## 2023-01-01 PROCEDURE — A9585 GADOBUTROL INJECTION: HCPCS | Mod: JZ | Performed by: OBSTETRICS & GYNECOLOGY

## 2023-01-01 PROCEDURE — 80048 BASIC METABOLIC PNL TOTAL CA: CPT | Performed by: INTERNAL MEDICINE

## 2023-01-01 PROCEDURE — 250N000025 HC SEVOFLURANE, PER MIN: Performed by: STUDENT IN AN ORGANIZED HEALTH CARE EDUCATION/TRAINING PROGRAM

## 2023-01-01 PROCEDURE — 999N000287 HC ICU ADULT ROUNDING, EACH 10 MINS

## 2023-01-01 PROCEDURE — 82248 BILIRUBIN DIRECT: CPT | Performed by: NURSE PRACTITIONER

## 2023-01-01 PROCEDURE — 272N000001 HC OR GENERAL SUPPLY STERILE: Performed by: STUDENT IN AN ORGANIZED HEALTH CARE EDUCATION/TRAINING PROGRAM

## 2023-01-01 PROCEDURE — 360N000082 HC SURGERY LEVEL 2 W/ FLUORO, PER MIN: Performed by: STUDENT IN AN ORGANIZED HEALTH CARE EDUCATION/TRAINING PROGRAM

## 2023-01-01 PROCEDURE — 250N000011 HC RX IP 250 OP 636: Performed by: NURSE ANESTHETIST, CERTIFIED REGISTERED

## 2023-01-01 PROCEDURE — 85027 COMPLETE CBC AUTOMATED: CPT | Performed by: NURSE PRACTITIONER

## 2023-01-01 PROCEDURE — 250N000011 HC RX IP 250 OP 636: Performed by: NURSE PRACTITIONER

## 2023-01-01 PROCEDURE — 85025 COMPLETE CBC W/AUTO DIFF WBC: CPT | Performed by: EMERGENCY MEDICINE

## 2023-01-01 PROCEDURE — 86304 IMMUNOASSAY TUMOR CA 125: CPT | Performed by: OBSTETRICS & GYNECOLOGY

## 2023-01-01 PROCEDURE — 250N000012 HC RX MED GY IP 250 OP 636 PS 637: Performed by: INTERNAL MEDICINE

## 2023-01-01 PROCEDURE — 258N000003 HC RX IP 258 OP 636: Performed by: INTERNAL MEDICINE

## 2023-01-01 PROCEDURE — 96374 THER/PROPH/DIAG INJ IV PUSH: CPT

## 2023-01-01 PROCEDURE — 36415 COLL VENOUS BLD VENIPUNCTURE: CPT

## 2023-01-01 PROCEDURE — 96376 TX/PRO/DX INJ SAME DRUG ADON: CPT

## 2023-01-01 PROCEDURE — 36415 COLL VENOUS BLD VENIPUNCTURE: CPT | Performed by: INTERNAL MEDICINE

## 2023-01-01 PROCEDURE — 82533 TOTAL CORTISOL: CPT

## 2023-01-01 PROCEDURE — 255N000002 HC RX 255 OP 636: Performed by: STUDENT IN AN ORGANIZED HEALTH CARE EDUCATION/TRAINING PROGRAM

## 2023-01-01 PROCEDURE — 36415 COLL VENOUS BLD VENIPUNCTURE: CPT | Performed by: NURSE PRACTITIONER

## 2023-01-01 PROCEDURE — 99214 OFFICE O/P EST MOD 30 MIN: CPT | Performed by: INTERNAL MEDICINE

## 2023-01-01 PROCEDURE — 99214 OFFICE O/P EST MOD 30 MIN: CPT | Mod: 25 | Performed by: NURSE PRACTITIONER

## 2023-01-01 PROCEDURE — 93005 ELECTROCARDIOGRAM TRACING: CPT | Performed by: NURSE PRACTITIONER

## 2023-01-01 PROCEDURE — C9113 INJ PANTOPRAZOLE SODIUM, VIA: HCPCS | Performed by: INTERNAL MEDICINE

## 2023-01-01 DEVICE — URETERAL STENT
Type: IMPLANTABLE DEVICE | Site: URETER | Status: FUNCTIONAL
Brand: PERCUFLEX™ PLUS

## 2023-01-01 RX ORDER — LIDOCAINE HYDROCHLORIDE 10 MG/ML
INJECTION, SOLUTION INFILTRATION; PERINEURAL PRN
Status: DISCONTINUED | OUTPATIENT
Start: 2023-01-01 | End: 2023-01-01

## 2023-01-01 RX ORDER — FLUTICASONE PROPIONATE AND SALMETEROL 50; 250 UG/1; UG/1
POWDER RESPIRATORY (INHALATION)
Qty: 60 EACH | Refills: 2 | Status: SHIPPED | OUTPATIENT
Start: 2023-01-01 | End: 2023-01-01

## 2023-01-01 RX ORDER — LORAZEPAM 0.5 MG/1
0.5 TABLET ORAL EVERY 6 HOURS PRN
Qty: 20 TABLET | Refills: 0 | Status: SHIPPED | OUTPATIENT
Start: 2023-01-01 | End: 2024-01-01

## 2023-01-01 RX ORDER — OLANZAPINE 2.5 MG/1
2.5 TABLET, FILM COATED ORAL AT BEDTIME
Qty: 30 TABLET | Refills: 0 | Status: SHIPPED | OUTPATIENT
Start: 2023-01-01 | End: 2023-01-01

## 2023-01-01 RX ORDER — ONDANSETRON 2 MG/ML
4 INJECTION INTRAMUSCULAR; INTRAVENOUS EVERY 30 MIN PRN
Status: DISCONTINUED | OUTPATIENT
Start: 2023-01-01 | End: 2023-01-01 | Stop reason: HOSPADM

## 2023-01-01 RX ORDER — GLYCOPYRROLATE 0.2 MG/ML
INJECTION, SOLUTION INTRAMUSCULAR; INTRAVENOUS PRN
Status: DISCONTINUED | OUTPATIENT
Start: 2023-01-01 | End: 2023-01-01

## 2023-01-01 RX ORDER — ENOXAPARIN SODIUM 100 MG/ML
40 INJECTION SUBCUTANEOUS EVERY 24 HOURS
Status: DISCONTINUED | OUTPATIENT
Start: 2023-01-01 | End: 2023-01-01 | Stop reason: HOSPADM

## 2023-01-01 RX ORDER — LIDOCAINE 40 MG/G
CREAM TOPICAL
Status: DISCONTINUED | OUTPATIENT
Start: 2023-01-01 | End: 2023-01-01 | Stop reason: HOSPADM

## 2023-01-01 RX ORDER — NICOTINE POLACRILEX 4 MG
15-30 LOZENGE BUCCAL
Status: DISCONTINUED | OUTPATIENT
Start: 2023-01-01 | End: 2023-01-01 | Stop reason: HOSPADM

## 2023-01-01 RX ORDER — LISINOPRIL 5 MG/1
5 TABLET ORAL DAILY
Qty: 90 TABLET | Refills: 0 | Status: ON HOLD | OUTPATIENT
Start: 2023-01-01 | End: 2024-01-01

## 2023-01-01 RX ORDER — OXYCODONE HYDROCHLORIDE 5 MG/1
5 TABLET ORAL
Status: DISCONTINUED | OUTPATIENT
Start: 2023-01-01 | End: 2023-01-01 | Stop reason: HOSPADM

## 2023-01-01 RX ORDER — FENTANYL CITRATE 50 UG/ML
25 INJECTION, SOLUTION INTRAMUSCULAR; INTRAVENOUS EVERY 5 MIN PRN
Status: DISCONTINUED | OUTPATIENT
Start: 2023-01-01 | End: 2023-01-01 | Stop reason: HOSPADM

## 2023-01-01 RX ORDER — DEXAMETHASONE SODIUM PHOSPHATE 10 MG/ML
INJECTION, SOLUTION INTRAMUSCULAR; INTRAVENOUS PRN
Status: DISCONTINUED | OUTPATIENT
Start: 2023-01-01 | End: 2023-01-01

## 2023-01-01 RX ORDER — PROCHLORPERAZINE 25 MG
12.5 SUPPOSITORY, RECTAL RECTAL EVERY 12 HOURS PRN
Status: DISCONTINUED | OUTPATIENT
Start: 2023-01-01 | End: 2023-01-01 | Stop reason: HOSPADM

## 2023-01-01 RX ORDER — OLANZAPINE 2.5 MG/1
2.5 TABLET, FILM COATED ORAL AT BEDTIME
Qty: 30 TABLET | Refills: 0 | Status: SHIPPED | OUTPATIENT
Start: 2023-01-01 | End: 2024-01-01

## 2023-01-01 RX ORDER — LORAZEPAM 0.5 MG/1
0.5 TABLET ORAL EVERY 4 HOURS PRN
Status: DISCONTINUED | OUTPATIENT
Start: 2023-01-01 | End: 2023-01-01 | Stop reason: HOSPADM

## 2023-01-01 RX ORDER — FENTANYL CITRATE 50 UG/ML
INJECTION, SOLUTION INTRAMUSCULAR; INTRAVENOUS PRN
Status: DISCONTINUED | OUTPATIENT
Start: 2023-01-01 | End: 2023-01-01

## 2023-01-01 RX ORDER — SODIUM CHLORIDE, SODIUM LACTATE, POTASSIUM CHLORIDE, CALCIUM CHLORIDE 600; 310; 30; 20 MG/100ML; MG/100ML; MG/100ML; MG/100ML
INJECTION, SOLUTION INTRAVENOUS CONTINUOUS
Status: DISCONTINUED | OUTPATIENT
Start: 2023-01-01 | End: 2023-01-01 | Stop reason: HOSPADM

## 2023-01-01 RX ORDER — PANTOPRAZOLE SODIUM 20 MG/1
TABLET, DELAYED RELEASE ORAL
Qty: 60 TABLET | Refills: 1 | Status: SHIPPED | OUTPATIENT
Start: 2023-01-01 | End: 2023-01-01

## 2023-01-01 RX ORDER — HEPARIN SODIUM (PORCINE) LOCK FLUSH IV SOLN 100 UNIT/ML 100 UNIT/ML
5-10 SOLUTION INTRAVENOUS
Status: DISCONTINUED | OUTPATIENT
Start: 2023-01-01 | End: 2023-01-01 | Stop reason: HOSPADM

## 2023-01-01 RX ORDER — PANTOPRAZOLE SODIUM 20 MG/1
20 TABLET, DELAYED RELEASE ORAL
Status: DISCONTINUED | OUTPATIENT
Start: 2023-01-01 | End: 2023-01-01

## 2023-01-01 RX ORDER — PREDNISONE 5 MG/1
TABLET ORAL
Qty: 150 TABLET | Refills: 3 | Status: SHIPPED | OUTPATIENT
Start: 2023-01-01 | End: 2023-01-01

## 2023-01-01 RX ORDER — GABAPENTIN 300 MG/1
300 CAPSULE ORAL AT BEDTIME
Qty: 60 CAPSULE | Refills: 0 | Status: ON HOLD | OUTPATIENT
Start: 2023-01-01 | End: 2024-01-01

## 2023-01-01 RX ORDER — ACETAMINOPHEN 650 MG/1
650 SUPPOSITORY RECTAL EVERY 6 HOURS PRN
Status: DISCONTINUED | OUTPATIENT
Start: 2023-01-01 | End: 2023-01-01 | Stop reason: HOSPADM

## 2023-01-01 RX ORDER — DEXTROSE MONOHYDRATE, SODIUM CHLORIDE, AND POTASSIUM CHLORIDE 50; 1.49; 9 G/1000ML; G/1000ML; G/1000ML
INJECTION, SOLUTION INTRAVENOUS CONTINUOUS
Status: DISCONTINUED | OUTPATIENT
Start: 2023-01-01 | End: 2023-01-01

## 2023-01-01 RX ORDER — OLANZAPINE 2.5 MG/1
2.5 TABLET, FILM COATED ORAL AT BEDTIME
Status: DISCONTINUED | OUTPATIENT
Start: 2023-01-01 | End: 2023-01-01 | Stop reason: HOSPADM

## 2023-01-01 RX ORDER — ONDANSETRON 4 MG/1
4 TABLET, ORALLY DISINTEGRATING ORAL EVERY 30 MIN PRN
Status: DISCONTINUED | OUTPATIENT
Start: 2023-01-01 | End: 2023-01-01 | Stop reason: HOSPADM

## 2023-01-01 RX ORDER — POTASSIUM CHLORIDE 1500 MG/1
40 TABLET, EXTENDED RELEASE ORAL ONCE
Status: COMPLETED | OUTPATIENT
Start: 2023-01-01 | End: 2023-01-01

## 2023-01-01 RX ORDER — ONDANSETRON 2 MG/ML
INJECTION INTRAMUSCULAR; INTRAVENOUS PRN
Status: DISCONTINUED | OUTPATIENT
Start: 2023-01-01 | End: 2023-01-01

## 2023-01-01 RX ORDER — NICOTINE POLACRILEX 4 MG
15-30 LOZENGE BUCCAL
Status: DISCONTINUED | OUTPATIENT
Start: 2023-01-01 | End: 2023-01-05 | Stop reason: HOSPADM

## 2023-01-01 RX ORDER — HEPARIN SODIUM,PORCINE 10 UNIT/ML
5-10 VIAL (ML) INTRAVENOUS
Status: DISCONTINUED | OUTPATIENT
Start: 2023-01-01 | End: 2023-01-01 | Stop reason: HOSPADM

## 2023-01-01 RX ORDER — OXYCODONE HYDROCHLORIDE 5 MG/1
10 TABLET ORAL
Status: DISCONTINUED | OUTPATIENT
Start: 2023-01-01 | End: 2023-01-01 | Stop reason: HOSPADM

## 2023-01-01 RX ORDER — SODIUM CHLORIDE, SODIUM LACTATE, POTASSIUM CHLORIDE, CALCIUM CHLORIDE 600; 310; 30; 20 MG/100ML; MG/100ML; MG/100ML; MG/100ML
INJECTION, SOLUTION INTRAVENOUS CONTINUOUS
Status: DISCONTINUED | OUTPATIENT
Start: 2023-01-01 | End: 2023-01-02

## 2023-01-01 RX ORDER — FENTANYL CITRATE 50 UG/ML
50 INJECTION, SOLUTION INTRAMUSCULAR; INTRAVENOUS EVERY 5 MIN PRN
Status: DISCONTINUED | OUTPATIENT
Start: 2023-01-01 | End: 2023-01-01 | Stop reason: HOSPADM

## 2023-01-01 RX ORDER — DEXTROSE MONOHYDRATE 25 G/50ML
25-50 INJECTION, SOLUTION INTRAVENOUS
Status: DISCONTINUED | OUTPATIENT
Start: 2023-01-01 | End: 2023-01-01 | Stop reason: HOSPADM

## 2023-01-01 RX ORDER — GADOBUTROL 604.72 MG/ML
6 INJECTION INTRAVENOUS ONCE
Status: COMPLETED | OUTPATIENT
Start: 2023-01-01 | End: 2023-01-01

## 2023-01-01 RX ORDER — HEPARIN SODIUM,PORCINE 10 UNIT/ML
5-10 VIAL (ML) INTRAVENOUS EVERY 24 HOURS
Status: DISCONTINUED | OUTPATIENT
Start: 2023-01-01 | End: 2023-01-01 | Stop reason: HOSPADM

## 2023-01-01 RX ORDER — PROPOFOL 10 MG/ML
INJECTION, EMULSION INTRAVENOUS PRN
Status: DISCONTINUED | OUTPATIENT
Start: 2023-01-01 | End: 2023-01-01

## 2023-01-01 RX ORDER — CEFAZOLIN SODIUM/WATER 2 G/20 ML
2 SYRINGE (ML) INTRAVENOUS
Status: COMPLETED | OUTPATIENT
Start: 2023-01-01 | End: 2023-01-01

## 2023-01-01 RX ORDER — CEFAZOLIN SODIUM/WATER 2 G/20 ML
2 SYRINGE (ML) INTRAVENOUS SEE ADMIN INSTRUCTIONS
Status: DISCONTINUED | OUTPATIENT
Start: 2023-01-01 | End: 2023-01-01 | Stop reason: HOSPADM

## 2023-01-01 RX ORDER — METOCLOPRAMIDE 5 MG/1
TABLET ORAL
Qty: 30 TABLET | Refills: 1 | Status: SHIPPED | OUTPATIENT
Start: 2023-01-01 | End: 2024-01-01

## 2023-01-01 RX ORDER — FLUTICASONE PROPIONATE AND SALMETEROL 50; 250 UG/1; UG/1
1 POWDER RESPIRATORY (INHALATION) 2 TIMES DAILY
Qty: 14 EACH | Refills: 0 | Status: SHIPPED | OUTPATIENT
Start: 2023-01-01

## 2023-01-01 RX ORDER — PANTOPRAZOLE SODIUM 20 MG/1
20 TABLET, DELAYED RELEASE ORAL
Status: DISCONTINUED | OUTPATIENT
Start: 2023-01-01 | End: 2023-01-01 | Stop reason: HOSPADM

## 2023-01-01 RX ORDER — ALBUTEROL SULFATE 90 UG/1
2 AEROSOL, METERED RESPIRATORY (INHALATION) EVERY 6 HOURS PRN
Qty: 18 G | Refills: 1 | Status: SHIPPED | OUTPATIENT
Start: 2023-01-01

## 2023-01-01 RX ORDER — DEXTROSE MONOHYDRATE 25 G/50ML
25-50 INJECTION, SOLUTION INTRAVENOUS
Status: DISCONTINUED | OUTPATIENT
Start: 2023-01-01 | End: 2023-01-05 | Stop reason: HOSPADM

## 2023-01-01 RX ORDER — ACETAMINOPHEN 325 MG/1
650 TABLET ORAL EVERY 6 HOURS PRN
Status: DISCONTINUED | OUTPATIENT
Start: 2023-01-01 | End: 2023-01-01 | Stop reason: HOSPADM

## 2023-01-01 RX ORDER — METOCLOPRAMIDE 5 MG/1
5 TABLET ORAL
Status: DISCONTINUED | OUTPATIENT
Start: 2023-01-01 | End: 2023-01-01 | Stop reason: HOSPADM

## 2023-01-01 RX ORDER — HYDRALAZINE HYDROCHLORIDE 20 MG/ML
10 INJECTION INTRAMUSCULAR; INTRAVENOUS EVERY 6 HOURS PRN
Status: DISCONTINUED | OUTPATIENT
Start: 2023-01-01 | End: 2023-01-01 | Stop reason: HOSPADM

## 2023-01-01 RX ORDER — ONDANSETRON 2 MG/ML
4 INJECTION INTRAMUSCULAR; INTRAVENOUS EVERY 6 HOURS PRN
Status: DISCONTINUED | OUTPATIENT
Start: 2023-01-01 | End: 2023-01-01 | Stop reason: HOSPADM

## 2023-01-01 RX ORDER — LISINOPRIL 20 MG/1
TABLET ORAL
Qty: 90 TABLET | Refills: 3 | Status: SHIPPED | OUTPATIENT
Start: 2023-01-01 | End: 2023-01-01 | Stop reason: DRUGHIGH

## 2023-01-01 RX ORDER — PANTOPRAZOLE SODIUM 20 MG/1
20 TABLET, DELAYED RELEASE ORAL DAILY
Status: ON HOLD | COMMUNITY
Start: 2023-01-01 | End: 2024-01-01

## 2023-01-01 RX ORDER — HEPARIN SODIUM 5000 [USP'U]/.5ML
5000 INJECTION, SOLUTION INTRAVENOUS; SUBCUTANEOUS EVERY 8 HOURS
Status: DISCONTINUED | OUTPATIENT
Start: 2023-01-01 | End: 2023-01-05 | Stop reason: HOSPADM

## 2023-01-01 RX ORDER — FLUTICASONE PROPIONATE AND SALMETEROL 250; 50 UG/1; UG/1
1 POWDER RESPIRATORY (INHALATION) DAILY
COMMUNITY
End: 2023-01-01

## 2023-01-01 RX ORDER — SODIUM CHLORIDE, SODIUM LACTATE, POTASSIUM CHLORIDE, CALCIUM CHLORIDE 600; 310; 30; 20 MG/100ML; MG/100ML; MG/100ML; MG/100ML
INJECTION, SOLUTION INTRAVENOUS CONTINUOUS
Status: DISCONTINUED | OUTPATIENT
Start: 2023-01-01 | End: 2023-01-01

## 2023-01-01 RX ADMIN — ROCURONIUM BROMIDE 30 MG: 50 INJECTION, SOLUTION INTRAVENOUS at 12:31

## 2023-01-01 RX ADMIN — INSULIN ASPART 1 UNITS: 100 INJECTION, SOLUTION INTRAVENOUS; SUBCUTANEOUS at 13:22

## 2023-01-01 RX ADMIN — POTASSIUM CHLORIDE, DEXTROSE MONOHYDRATE AND SODIUM CHLORIDE: 150; 5; 900 INJECTION, SOLUTION INTRAVENOUS at 20:48

## 2023-01-01 RX ADMIN — INSULIN ASPART 2 UNITS: 100 INJECTION, SOLUTION INTRAVENOUS; SUBCUTANEOUS at 20:55

## 2023-01-01 RX ADMIN — SODIUM CHLORIDE, POTASSIUM CHLORIDE, SODIUM LACTATE AND CALCIUM CHLORIDE 1000 ML: 600; 310; 30; 20 INJECTION, SOLUTION INTRAVENOUS at 01:26

## 2023-01-01 RX ADMIN — INSULIN ASPART 1 UNITS: 100 INJECTION, SOLUTION INTRAVENOUS; SUBCUTANEOUS at 17:17

## 2023-01-01 RX ADMIN — ONDANSETRON 4 MG: 2 INJECTION INTRAMUSCULAR; INTRAVENOUS at 12:36

## 2023-01-01 RX ADMIN — HYDROCORTISONE SODIUM SUCCINATE 100 MG: 100 INJECTION, POWDER, FOR SOLUTION INTRAMUSCULAR; INTRAVENOUS at 05:38

## 2023-01-01 RX ADMIN — POTASSIUM CHLORIDE, DEXTROSE MONOHYDRATE AND SODIUM CHLORIDE: 150; 5; 900 INJECTION, SOLUTION INTRAVENOUS at 03:31

## 2023-01-01 RX ADMIN — INSULIN ASPART 2 UNITS: 100 INJECTION, SOLUTION INTRAVENOUS; SUBCUTANEOUS at 04:06

## 2023-01-01 RX ADMIN — WATER 50 MG: 1 INJECTION INTRAMUSCULAR; INTRAVENOUS; SUBCUTANEOUS at 19:16

## 2023-01-01 RX ADMIN — WATER 50 MG: 1 INJECTION INTRAMUSCULAR; INTRAVENOUS; SUBCUTANEOUS at 02:41

## 2023-01-01 RX ADMIN — INSULIN ASPART 2 UNITS: 100 INJECTION, SOLUTION INTRAVENOUS; SUBCUTANEOUS at 03:37

## 2023-01-01 RX ADMIN — PANTOPRAZOLE SODIUM 20 MG: 20 TABLET, DELAYED RELEASE ORAL at 18:55

## 2023-01-01 RX ADMIN — PROCHLORPERAZINE EDISYLATE 5 MG: 5 INJECTION, SOLUTION INTRAMUSCULAR; INTRAVENOUS at 03:30

## 2023-01-01 RX ADMIN — LORAZEPAM 0.5 MG: 0.5 TABLET ORAL at 22:12

## 2023-01-01 RX ADMIN — WATER 50 MG: 1 INJECTION INTRAMUSCULAR; INTRAVENOUS; SUBCUTANEOUS at 17:22

## 2023-01-01 RX ADMIN — WATER 50 MG: 1 INJECTION INTRAMUSCULAR; INTRAVENOUS; SUBCUTANEOUS at 08:33

## 2023-01-01 RX ADMIN — OLANZAPINE 2.5 MG: 2.5 TABLET, FILM COATED ORAL at 21:23

## 2023-01-01 RX ADMIN — HEPARIN SODIUM 5000 UNITS: 10000 INJECTION, SOLUTION INTRAVENOUS; SUBCUTANEOUS at 15:25

## 2023-01-01 RX ADMIN — WATER 50 MG: 1 INJECTION INTRAMUSCULAR; INTRAVENOUS; SUBCUTANEOUS at 10:54

## 2023-01-01 RX ADMIN — FENTANYL CITRATE 50 MCG: 50 INJECTION INTRAMUSCULAR; INTRAVENOUS at 12:31

## 2023-01-01 RX ADMIN — INSULIN ASPART 1 UNITS: 100 INJECTION, SOLUTION INTRAVENOUS; SUBCUTANEOUS at 12:00

## 2023-01-01 RX ADMIN — INSULIN ASPART 2 UNITS: 100 INJECTION, SOLUTION INTRAVENOUS; SUBCUTANEOUS at 07:48

## 2023-01-01 RX ADMIN — INSULIN ASPART 2 UNITS: 100 INJECTION, SOLUTION INTRAVENOUS; SUBCUTANEOUS at 00:49

## 2023-01-01 RX ADMIN — INSULIN ASPART 1 UNITS: 100 INJECTION, SOLUTION INTRAVENOUS; SUBCUTANEOUS at 06:15

## 2023-01-01 RX ADMIN — POTASSIUM CHLORIDE, DEXTROSE MONOHYDRATE AND SODIUM CHLORIDE: 150; 5; 900 INJECTION, SOLUTION INTRAVENOUS at 06:14

## 2023-01-01 RX ADMIN — HYDRALAZINE HYDROCHLORIDE 10 MG: 20 INJECTION INTRAMUSCULAR; INTRAVENOUS at 13:02

## 2023-01-01 RX ADMIN — INSULIN ASPART 2 UNITS: 100 INJECTION, SOLUTION INTRAVENOUS; SUBCUTANEOUS at 17:20

## 2023-01-01 RX ADMIN — INSULIN ASPART 2 UNITS: 100 INJECTION, SOLUTION INTRAVENOUS; SUBCUTANEOUS at 02:25

## 2023-01-01 RX ADMIN — ONDANSETRON 4 MG: 2 INJECTION INTRAMUSCULAR; INTRAVENOUS at 07:44

## 2023-01-01 RX ADMIN — PHENYLEPHRINE HYDROCHLORIDE 100 MCG: 10 INJECTION INTRAVENOUS at 12:39

## 2023-01-01 RX ADMIN — SUGAMMADEX 200 MG: 100 INJECTION, SOLUTION INTRAVENOUS at 12:55

## 2023-01-01 RX ADMIN — INSULIN ASPART 1 UNITS: 100 INJECTION, SOLUTION INTRAVENOUS; SUBCUTANEOUS at 08:41

## 2023-01-01 RX ADMIN — PROCHLORPERAZINE EDISYLATE 5 MG: 5 INJECTION, SOLUTION INTRAMUSCULAR; INTRAVENOUS at 04:18

## 2023-01-01 RX ADMIN — PIPERACILLIN AND TAZOBACTAM 3.38 G: 3; .375 INJECTION, POWDER, LYOPHILIZED, FOR SOLUTION INTRAVENOUS at 23:08

## 2023-01-01 RX ADMIN — INSULIN ASPART 3 UNITS: 100 INJECTION, SOLUTION INTRAVENOUS; SUBCUTANEOUS at 00:07

## 2023-01-01 RX ADMIN — PANTOPRAZOLE SODIUM 40 MG: 40 INJECTION, POWDER, FOR SOLUTION INTRAVENOUS at 07:51

## 2023-01-01 RX ADMIN — HYDROCORTISONE SODIUM SUCCINATE 50 MG: 100 INJECTION, POWDER, FOR SOLUTION INTRAMUSCULAR; INTRAVENOUS at 07:41

## 2023-01-01 RX ADMIN — INSULIN ASPART 1 UNITS: 100 INJECTION, SOLUTION INTRAVENOUS; SUBCUTANEOUS at 11:43

## 2023-01-01 RX ADMIN — ENOXAPARIN SODIUM 40 MG: 40 INJECTION SUBCUTANEOUS at 16:14

## 2023-01-01 RX ADMIN — FAMOTIDINE 20 MG: 10 INJECTION, SOLUTION INTRAVENOUS at 02:45

## 2023-01-01 RX ADMIN — LORAZEPAM 0.5 MG: 0.5 TABLET ORAL at 16:20

## 2023-01-01 RX ADMIN — FAMOTIDINE 20 MG: 10 INJECTION, SOLUTION INTRAVENOUS at 03:34

## 2023-01-01 RX ADMIN — INSULIN GLARGINE 15 UNITS: 100 INJECTION, SOLUTION SUBCUTANEOUS at 10:49

## 2023-01-01 RX ADMIN — PROCHLORPERAZINE EDISYLATE 5 MG: 5 INJECTION, SOLUTION INTRAMUSCULAR; INTRAVENOUS at 14:40

## 2023-01-01 RX ADMIN — LORAZEPAM 0.5 MG: 0.5 TABLET ORAL at 08:31

## 2023-01-01 RX ADMIN — POTASSIUM CHLORIDE, DEXTROSE MONOHYDRATE AND SODIUM CHLORIDE: 150; 5; 900 INJECTION, SOLUTION INTRAVENOUS at 17:55

## 2023-01-01 RX ADMIN — HYDRALAZINE HYDROCHLORIDE 10 MG: 20 INJECTION INTRAMUSCULAR; INTRAVENOUS at 16:09

## 2023-01-01 RX ADMIN — INSULIN ASPART 1 UNITS: 100 INJECTION, SOLUTION INTRAVENOUS; SUBCUTANEOUS at 17:54

## 2023-01-01 RX ADMIN — ONDANSETRON 4 MG: 2 INJECTION INTRAMUSCULAR; INTRAVENOUS at 02:00

## 2023-01-01 RX ADMIN — ONDANSETRON 4 MG: 2 INJECTION INTRAMUSCULAR; INTRAVENOUS at 23:52

## 2023-01-01 RX ADMIN — Medication 2 G: at 12:25

## 2023-01-01 RX ADMIN — METOCLOPRAMIDE 5 MG: 5 TABLET ORAL at 16:18

## 2023-01-01 RX ADMIN — INSULIN ASPART 2 UNITS: 100 INJECTION, SOLUTION INTRAVENOUS; SUBCUTANEOUS at 05:01

## 2023-01-01 RX ADMIN — METOCLOPRAMIDE 5 MG: 5 TABLET ORAL at 11:27

## 2023-01-01 RX ADMIN — METOCLOPRAMIDE 5 MG: 5 TABLET ORAL at 10:52

## 2023-01-01 RX ADMIN — WATER 50 MG: 1 INJECTION INTRAMUSCULAR; INTRAVENOUS; SUBCUTANEOUS at 10:52

## 2023-01-01 RX ADMIN — POTASSIUM CHLORIDE 40 MEQ: 1500 TABLET, EXTENDED RELEASE ORAL at 20:32

## 2023-01-01 RX ADMIN — INSULIN ASPART 2 UNITS: 100 INJECTION, SOLUTION INTRAVENOUS; SUBCUTANEOUS at 12:02

## 2023-01-01 RX ADMIN — HEPARIN SODIUM 5000 UNITS: 10000 INJECTION, SOLUTION INTRAVENOUS; SUBCUTANEOUS at 20:44

## 2023-01-01 RX ADMIN — METOCLOPRAMIDE 5 MG: 5 TABLET ORAL at 06:30

## 2023-01-01 RX ADMIN — INSULIN ASPART 1 UNITS: 100 INJECTION, SOLUTION INTRAVENOUS; SUBCUTANEOUS at 01:08

## 2023-01-01 RX ADMIN — SODIUM CHLORIDE, POTASSIUM CHLORIDE, SODIUM LACTATE AND CALCIUM CHLORIDE: 600; 310; 30; 20 INJECTION, SOLUTION INTRAVENOUS at 10:12

## 2023-01-01 RX ADMIN — ENOXAPARIN SODIUM 40 MG: 40 INJECTION SUBCUTANEOUS at 16:20

## 2023-01-01 RX ADMIN — PANTOPRAZOLE SODIUM 20 MG: 20 TABLET, DELAYED RELEASE ORAL at 06:51

## 2023-01-01 RX ADMIN — DEXAMETHASONE SODIUM PHOSPHATE 4 MG: 10 INJECTION, SOLUTION INTRAMUSCULAR; INTRAVENOUS at 12:36

## 2023-01-01 RX ADMIN — SODIUM CHLORIDE, POTASSIUM CHLORIDE, SODIUM LACTATE AND CALCIUM CHLORIDE: 600; 310; 30; 20 INJECTION, SOLUTION INTRAVENOUS at 09:53

## 2023-01-01 RX ADMIN — PROCHLORPERAZINE EDISYLATE 5 MG: 5 INJECTION, SOLUTION INTRAMUSCULAR; INTRAVENOUS at 13:24

## 2023-01-01 RX ADMIN — WATER 50 MG: 1 INJECTION INTRAMUSCULAR; INTRAVENOUS; SUBCUTANEOUS at 02:45

## 2023-01-01 RX ADMIN — METOCLOPRAMIDE 5 MG: 5 TABLET ORAL at 06:51

## 2023-01-01 RX ADMIN — HYDROCORTISONE SODIUM SUCCINATE 100 MG: 100 INJECTION, POWDER, FOR SOLUTION INTRAMUSCULAR; INTRAVENOUS at 14:16

## 2023-01-01 RX ADMIN — HYDROCORTISONE SODIUM SUCCINATE 100 MG: 100 INJECTION, POWDER, FOR SOLUTION INTRAMUSCULAR; INTRAVENOUS at 23:07

## 2023-01-01 RX ADMIN — PIPERACILLIN AND TAZOBACTAM 3.38 G: 3; .375 INJECTION, POWDER, LYOPHILIZED, FOR SOLUTION INTRAVENOUS at 10:05

## 2023-01-01 RX ADMIN — LORAZEPAM 0.5 MG: 0.5 TABLET ORAL at 16:18

## 2023-01-01 RX ADMIN — INSULIN ASPART 2 UNITS: 100 INJECTION, SOLUTION INTRAVENOUS; SUBCUTANEOUS at 08:00

## 2023-01-01 RX ADMIN — LIDOCAINE HYDROCHLORIDE 50 MG: 10 INJECTION, SOLUTION INFILTRATION; PERINEURAL at 12:31

## 2023-01-01 RX ADMIN — METOCLOPRAMIDE 5 MG: 5 TABLET ORAL at 17:18

## 2023-01-01 RX ADMIN — WATER 50 MG: 1 INJECTION INTRAMUSCULAR; INTRAVENOUS; SUBCUTANEOUS at 01:08

## 2023-01-01 RX ADMIN — Medication 5 ML: at 12:54

## 2023-01-01 RX ADMIN — FAMOTIDINE 20 MG: 10 INJECTION, SOLUTION INTRAVENOUS at 14:37

## 2023-01-01 RX ADMIN — INSULIN ASPART 2 UNITS: 100 INJECTION, SOLUTION INTRAVENOUS; SUBCUTANEOUS at 12:54

## 2023-01-01 RX ADMIN — GLYCOPYRROLATE 0.2 MG: 0.2 INJECTION INTRAMUSCULAR; INTRAVENOUS at 12:39

## 2023-01-01 RX ADMIN — INSULIN ASPART 2 UNITS: 100 INJECTION, SOLUTION INTRAVENOUS; SUBCUTANEOUS at 21:21

## 2023-01-01 RX ADMIN — PANTOPRAZOLE SODIUM 20 MG: 20 TABLET, DELAYED RELEASE ORAL at 06:30

## 2023-01-01 RX ADMIN — POTASSIUM CHLORIDE, DEXTROSE MONOHYDRATE AND SODIUM CHLORIDE: 150; 5; 900 INJECTION, SOLUTION INTRAVENOUS at 19:00

## 2023-01-01 RX ADMIN — HYDROCORTISONE SODIUM SUCCINATE 50 MG: 100 INJECTION, POWDER, FOR SOLUTION INTRAMUSCULAR; INTRAVENOUS at 23:54

## 2023-01-01 RX ADMIN — WATER 50 MG: 1 INJECTION INTRAMUSCULAR; INTRAVENOUS; SUBCUTANEOUS at 18:10

## 2023-01-01 RX ADMIN — HYDRALAZINE HYDROCHLORIDE 10 MG: 20 INJECTION INTRAMUSCULAR; INTRAVENOUS at 23:52

## 2023-01-01 RX ADMIN — OLANZAPINE 2.5 MG: 2.5 TABLET, FILM COATED ORAL at 20:54

## 2023-01-01 RX ADMIN — PROPOFOL 140 MG: 10 INJECTION, EMULSION INTRAVENOUS at 12:31

## 2023-01-01 RX ADMIN — SODIUM CHLORIDE, POTASSIUM CHLORIDE, SODIUM LACTATE AND CALCIUM CHLORIDE 1000 ML: 600; 310; 30; 20 INJECTION, SOLUTION INTRAVENOUS at 23:58

## 2023-01-01 RX ADMIN — INSULIN ASPART 1 UNITS: 100 INJECTION, SOLUTION INTRAVENOUS; SUBCUTANEOUS at 21:09

## 2023-01-01 RX ADMIN — INSULIN ASPART 1 UNITS: 100 INJECTION, SOLUTION INTRAVENOUS; SUBCUTANEOUS at 08:12

## 2023-01-01 RX ADMIN — SODIUM BICARBONATE: 84 INJECTION, SOLUTION INTRAVENOUS at 06:09

## 2023-01-01 RX ADMIN — PROCHLORPERAZINE EDISYLATE 5 MG: 5 INJECTION, SOLUTION INTRAMUSCULAR; INTRAVENOUS at 01:22

## 2023-01-01 RX ADMIN — INSULIN ASPART 1 UNITS: 100 INJECTION, SOLUTION INTRAVENOUS; SUBCUTANEOUS at 08:55

## 2023-01-01 RX ADMIN — GADOBUTROL 6 ML: 604.72 INJECTION INTRAVENOUS at 16:06

## 2023-01-01 ASSESSMENT — ACTIVITIES OF DAILY LIVING (ADL)
ADLS_ACUITY_SCORE: 31
VISION_MANAGEMENT: READING GLASSES
ADLS_ACUITY_SCORE: 31
CHANGE_IN_FUNCTIONAL_STATUS_SINCE_ONSET_OF_CURRENT_ILLNESS/INJURY: YES
ADLS_ACUITY_SCORE: 20
ADLS_ACUITY_SCORE: 35
ADLS_ACUITY_SCORE: 31
TOILETING_ISSUES: NO
ADLS_ACUITY_SCORE: 31
NUMBER_OF_TIMES_PATIENT_HAS_FALLEN_WITHIN_LAST_SIX_MONTHS: 1
ADLS_ACUITY_SCORE: 35
WALKING_OR_CLIMBING_STAIRS_DIFFICULTY: NO
ADLS_ACUITY_SCORE: 35
ADLS_ACUITY_SCORE: 35
ADLS_ACUITY_SCORE: 31
DRESSING/BATHING_DIFFICULTY: NO
ADLS_ACUITY_SCORE: 31
ADLS_ACUITY_SCORE: 27
CONCENTRATING,_REMEMBERING_OR_MAKING_DECISIONS_DIFFICULTY: NO
ADLS_ACUITY_SCORE: 20
ADLS_ACUITY_SCORE: 31
ADLS_ACUITY_SCORE: 27
ADLS_ACUITY_SCORE: 27
ADLS_ACUITY_SCORE: 20
ADLS_ACUITY_SCORE: 27
WALKING_OR_CLIMBING_STAIRS_DIFFICULTY: NO
ADLS_ACUITY_SCORE: 35
ADLS_ACUITY_SCORE: 35
DRESSING/BATHING_DIFFICULTY: NO
ADLS_ACUITY_SCORE: 35
ADLS_ACUITY_SCORE: 31
DOING_ERRANDS_INDEPENDENTLY_DIFFICULTY: NO
DEPENDENT_IADLS:: INDEPENDENT
WEAR_GLASSES_OR_BLIND: YES
ADLS_ACUITY_SCORE: 20
ADLS_ACUITY_SCORE: 35
FALL_HISTORY_WITHIN_LAST_SIX_MONTHS: YES
ADLS_ACUITY_SCORE: 35
ADLS_ACUITY_SCORE: 27
ADLS_ACUITY_SCORE: 20
FALL_HISTORY_WITHIN_LAST_SIX_MONTHS: YES
ADLS_ACUITY_SCORE: 20
ADLS_ACUITY_SCORE: 27
CONCENTRATING,_REMEMBERING_OR_MAKING_DECISIONS_DIFFICULTY: NO
NUMBER_OF_TIMES_PATIENT_HAS_FALLEN_WITHIN_LAST_SIX_MONTHS: 1
ADLS_ACUITY_SCORE: 20
ADLS_ACUITY_SCORE: 20
ADLS_ACUITY_SCORE: 27
ADLS_ACUITY_SCORE: 44
ADLS_ACUITY_SCORE: 27
WEAR_GLASSES_OR_BLIND: NO
DOING_ERRANDS_INDEPENDENTLY_DIFFICULTY: NO
ADLS_ACUITY_SCORE: 35
ADLS_ACUITY_SCORE: 35
ADLS_ACUITY_SCORE: 20
EQUIPMENT_CURRENTLY_USED_AT_HOME: GLUCOMETER
ADLS_ACUITY_SCORE: 35
ADLS_ACUITY_SCORE: 27
ADLS_ACUITY_SCORE: 35
ADLS_ACUITY_SCORE: 35
ADLS_ACUITY_SCORE: 27
DIFFICULTY_EATING/SWALLOWING: NO
ADLS_ACUITY_SCORE: 20
ADLS_ACUITY_SCORE: 31
DEPENDENT_IADLS:: INDEPENDENT;TRANSPORTATION
ADLS_ACUITY_SCORE: 44
ADLS_ACUITY_SCORE: 35
DIFFICULTY_EATING/SWALLOWING: NO
ADLS_ACUITY_SCORE: 35
CHANGE_IN_FUNCTIONAL_STATUS_SINCE_ONSET_OF_CURRENT_ILLNESS/INJURY: NO
ADLS_ACUITY_SCORE: 35
ADLS_ACUITY_SCORE: 31
ADLS_ACUITY_SCORE: 31
ADLS_ACUITY_SCORE: 37
ADLS_ACUITY_SCORE: 35
ADLS_ACUITY_SCORE: 31
ADLS_ACUITY_SCORE: 37
ADLS_ACUITY_SCORE: 35
TOILETING_ISSUES: NO
ADLS_ACUITY_SCORE: 35

## 2023-01-01 ASSESSMENT — COPD QUESTIONNAIRES: COPD: 1

## 2023-01-01 ASSESSMENT — ASTHMA QUESTIONNAIRES
QUESTION_4 LAST FOUR WEEKS HOW OFTEN HAVE YOU USED YOUR RESCUE INHALER OR NEBULIZER MEDICATION (SUCH AS ALBUTEROL): NOT AT ALL
QUESTION_3 LAST FOUR WEEKS HOW OFTEN DID YOUR ASTHMA SYMPTOMS (WHEEZING, COUGHING, SHORTNESS OF BREATH, CHEST TIGHTNESS OR PAIN) WAKE YOU UP AT NIGHT OR EARLIER THAN USUAL IN THE MORNING: NOT AT ALL
QUESTION_5 LAST FOUR WEEKS HOW WOULD YOU RATE YOUR ASTHMA CONTROL: WELL CONTROLLED
QUESTION_2 LAST FOUR WEEKS HOW OFTEN HAVE YOU HAD SHORTNESS OF BREATH: ONCE OR TWICE A WEEK
ACT_TOTALSCORE: 23
QUESTION_1 LAST FOUR WEEKS HOW MUCH OF THE TIME DID YOUR ASTHMA KEEP YOU FROM GETTING AS MUCH DONE AT WORK, SCHOOL OR AT HOME: NONE OF THE TIME
ACT_TOTALSCORE: 23

## 2023-01-01 ASSESSMENT — ENCOUNTER SYMPTOMS
NAUSEA: 1
VOMITING: 1
DIARRHEA: 0
CONSTIPATION: 0
BLOOD IN STOOL: 0
FEVER: 0
ABDOMINAL PAIN: 1

## 2023-01-01 ASSESSMENT — PAIN SCALES - GENERAL: PAINLEVEL: NO PAIN (0)

## 2023-01-01 NOTE — PHARMACY-VANCOMYCIN DOSING SERVICE
"Pharmacy Vancomycin Initial Note  Date of Service 2022  Patient's  1950  72 year old, female    Indication: Pyelonephritis    Current estimated CrCl = Estimated Creatinine Clearance: 16.3 mL/min (A) (based on SCr of 2.79 mg/dL (H)).    Creatinine for last 3 days  2022: 11:28 PM Creatinine 1.63 mg/dL  2022:  1:14 PM Creatinine 2.99 mg/dL;  4:28 PM Creatinine 2.79 mg/dL    Recent Vancomycin Level(s) for last 3 days  No results found for requested labs within last 72 hours.      Vancomycin IV Administrations (past 72 hours)                   vancomycin (VANCOCIN) 1,250 mg in sodium chloride 0.9 % 250 mL intermittent infusion (mg) 1,250 mg New Bag 22 1505                Nephrotoxins and other renal medications (From now, onward)    Start     Dose/Rate Route Frequency Ordered Stop    22 2200  piperacillin-tazobactam (ZOSYN) 3.375 g vial to attach to  mL bag        Note to Pharmacy: For [facility] and [facility]: For Zosyn-naive patients, use the \"Zosyn initial dose + extended infusion\" order panel.    3.375 g  over 240 Minutes Intravenous EVERY 12 HOURS 22 185  vancomycin place castro - receiving intermittent dosing         1 each Intravenous SEE ADMIN INSTRUCTIONS 22 1859      22 1430  norepinephrine (LEVOPHED) 4 mg in  mL infusion PREMIX         0.01-0.6 mcg/kg/min × 63.5 kg  2.4-142.9 mL/hr  Intravenous CONTINUOUS 22 1420            Contrast Orders - past 72 hours (72h ago, onward)    None                Plan:  1. Start vancomycin  Intermittent dosing due to INGRID.  The patient received a dose of 1250 mg vancomycin in the ED today.  Future dosing to be determined as renal function improves.  2. Vancomycin monitoring method: Trough (Method 2 = manual dose calculation)  3. Vancomycin therapeutic monitoring goal: 10-15 mg/L  4. Pharmacy will check vancomycin levels as appropriate in 1-3 Days.    5. Serum creatinine levels will be " ordered daily for the first week of therapy and at least twice weekly for subsequent weeks.      Barb Mcghee, [facility]

## 2023-01-01 NOTE — IR NOTE
Bedisde report given to Tonie, no questions at the end of report. Pt alert and interacting in plan of care.

## 2023-01-01 NOTE — H&P
"Critical Care Admission Note      12/31/2022    Name: Stella Greene MRN#: [id number]   Age: 72 year old YOB: 1950                    Problem List:   Principal Problem:    Elevated liver enzymes  Active Problems:    Acute renal insufficiency    Elevated troponin    Septic shock (H)    Urinary tract infection without hematuria, site unspecified    Leukocytosis, unspecified type    Acute kidney failure with tubular necrosis (H)    Clinically Significant Risk Factors Present on Admission             # Anion Gap Metabolic Acidosis: Highest Anion Gap = 21 mmol/L in last 2 days, will monitor and treat as appropriate  # Hypoalbuminemia: Lowest albumin = 3 g/dL at 12/31/2022  1:14 PM, will monitor as appropriate  # Coagulation Defect: INR = 1.30 (Ref range: 0.85 - 1.15) and/or PTT = 31 Seconds (Ref range: 22 - 38 Seconds), will monitor for bleeding   # Acute Kidney Injury, unspecified: based on a >150% or 0.3 mg/dL increase in last creatinine compared to past 90 day average, will monitor renal function  # Hypertension: home medication list includes antihypertensive(s)   # Circulatory Shock: currently requiring pressors for blood pressure support     # Overweight: Estimated body mass index is 28.24 kg/m  as calculated from the following:    Height as of this encounter: 1.549 m (5' 1\").    Weight as of this encounter: 67.8 kg (149 lb 7.6 oz).        # Anemia: based on hgb <11               HPI:     72-year-old female with history of insulin-dependent diabetes mellitus, asthma, hypertension.  She has been feeling unwell since Wendy eve.  Sustained a fall a few days ago and hit her head.  Presented to the emergency room and had it sutured.  Today she was appearing weak to her .  He checked her sugar which was 115.  She had difficulty ambulating.  Upon arrival to the ED she was found to be hypotensive.  Not responsive to fluid so she was started on vasopressors.  CT of the abdomen is showing right-sided " hydronephrosis secondary to an obstructing mass probably coming from the uterus.      Assessment and plan :       I have personally reviewed the labs, imaging studies, cultures and discussed the case with referring physician and consulting physicians.     My assessment and plan by system for this patient is as follows:    Neurology/Psychiatry:   Awake and answering questions      Cardiovascular:   Septic shock secondary to UTI  Received IV fluids.  Now started on vasopressors.  Lactic acid has improved.  Continue stress dose steroids since she is chronically on prednisone  Start bicarbonate drip    Elevated troponins upon presentation but coming down.  Discussed between ED and cardiology.  No plan for heparin drip, most likely demand ischemia    Pulmonary/Ventilator Management:   Mild persistent asthma  Follows at the pulmonary clinic.  Steroids, Singulair, bronchodilators.  Currently n.p.o. some going to put her on as needed DuoNebs.  Once she can take orals postprocedure she can go back on her Singulair.      GI and Nutrition :   NPO for now      Renal/Fluids/Electrolytes:   Acute renal failure secondary to probably ATN due to urosepsis  Right-sided hydronephrosis secondary to obstructing mass compressing the ureter  Plan for a nephrostomy tube    Bicarbonate drip    - monitor function and electrolytes as needed with replacement per ICU protocols. - generally avoid nephrotoxic agents such as NSAID, IV contrast unless specifically required  - adjust medications as needed for renal clearance  - follow I/O's as appropriate.    Infectious Disease:   Urosepsis.  Continue Zosyn      Endocrine:   Insulin-dependent diabetes mellitus  Start sliding scale insulin  Check ketones in the blood but urine ketones were negative    Plan  - ICU insulin protocol, goal sugar <180      Hematology/Oncology:   Leukocytosis secondary to sepsis  Plan               Medical History:     Past Medical History:   Diagnosis Date     Diabetes  mellitus, type 2 (H) 12/06/2021     Essential hypertension     Created by Conversion Replacement Utility updated for latest IMO load      Mild persistent asthma without complication      Past Surgical History:   Procedure Laterality Date     LAPAROSCOPIC TUBAL LIGATION Bilateral      nasal polpys Bilateral      PICC TRIPLE LUMEN PLACEMENT  12/31/2022          Social History     Socioeconomic History     Marital status:      Spouse name: Not on file     Number of children: Not on file     Years of education: Not on file     Highest education level: Not on file   Occupational History     Not on file   Tobacco Use     Smoking status: Never     Smokeless tobacco: Never   Vaping Use     Vaping Use: Never used   Substance and Sexual Activity     Alcohol use: No     Drug use: No     Sexual activity: Not on file   Other Topics Concern     Parent/sibling w/ CABG, MI or angioplasty before 65F 55M? Not Asked   Social History Narrative     Not on file     Social Determinants of Health     Financial Resource Strain: Not on file   Food Insecurity: Not on file   Transportation Needs: Not on file   Physical Activity: Not on file   Stress: Not on file   Social Connections: Not on file   Intimate Partner Violence: Not on file   Housing Stability: Not on file      No Known Allergies           Key Medications:       hydrocortisone sodium succinate PF  100 mg Intravenous Q8H     insulin aspart  1-6 Units Subcutaneous Q4H     [START ON 1/1/2023] pantoprazole  40 mg Intravenous Daily with breakfast     piperacillin-tazobactam  3.375 g Intravenous Q12H     sodium chloride (PF)  10-40 mL Intracatheter Q7 Days       norepinephrine 0.06 mcg/kg/min (12/31/22 1810)     sodium bicabonate in water for infusion          Home Meds  No current facility-administered medications on file prior to encounter.  ADVAIR DISKUS 250-50 MCG/DOSE inhaler, [ADVAIR DISKUS 250-50 MCG/DOSE DISKUS] INHALE 1 DOSE BY MOUTH TWICE DAILY  albuterol (PROAIR  HFA/PROVENTIL HFA/VENTOLIN HFA) 108 (90 Base) MCG/ACT inhaler, Inhale 2 puffs into the lungs every 6 hours as needed  blood glucose test (CONTOUR NEXT STRIPS) strips, [BLOOD GLUCOSE TEST (CONTOUR NEXT STRIPS) STRIPS] Test four times daily.  Dx code DM2.  cetirizine (ZYRTEC) 10 MG tablet, [CETIRIZINE (ZYRTEC) 10 MG TABLET] Take 10 mg by mouth daily.  dexamethasone (DECADRON) 4 MG/ML injection, Inject 4 mg for adrenal crisis  fluticasone propionate (FLONASE) 50 mcg/actuation nasal spray, [FLUTICASONE PROPIONATE (FLONASE) 50 MCG/ACTUATION NASAL SPRAY] 1 spray into each nostril daily.  Glucagon (GVOKE HYPOPEN 1-PACK) 1 MG/0.2ML SOAJ, Inject 1 mg Subcutaneous as needed (low BG levels)  Glucagon 0.5 MG/0.1ML SOAJ, Inject 1 mg Subcutaneous daily as needed (severe hypoglycemia)  insulin aspart prot & aspart (NOVOLOG MIX 70/30 FLEXPEN) (70-30) 100 UNIT/ML pen, 25 units before breakfast, 15 units before evening meal  insulin pen needle (31G X 6 MM) 31G X 6 MM miscellaneous, Use 2 pen needles daily or as directed.  lansoprazole (PREVACID) 15 MG capsule, [LANSOPRAZOLE (PREVACID) 15 MG CAPSULE] Take 15 mg by mouth daily.  lisinopril (ZESTRIL) 20 MG tablet, [LISINOPRIL (PRINIVIL,ZESTRIL) 20 MG TABLET] Take 1 tablet by mouth once daily  montelukast (SINGULAIR) 10 MG tablet, Take 1 tablet (10 mg) by mouth At Bedtime  multivitamin therapeutic tablet, [MULTIVITAMIN THERAPEUTIC TABLET] Take 1 tablet by mouth daily.  predniSONE (DELTASONE) 5 MG tablet, Take 1 tablet (5 mg) by mouth daily  Vitamin D, Cholecalciferol, 25 MCG (1000 UT) TABS,   metFORMIN (GLUCOPHAGE XR) 500 MG 24 hr tablet, Take 4 tablets (2,000 mg) by mouth daily (with dinner)  predniSONE (DELTASONE) 1 MG tablet, Take 2 tablets (2 mg) by mouth daily (Patient not taking: Reported on 12/31/2022)  rosuvastatin (CRESTOR) 10 MG tablet, Take 1 tablet (10 mg) by mouth daily (Patient not taking: Reported on 12/31/2022)  Semaglutide, 1 MG/DOSE, (OZEMPIC, 1 MG/DOSE,) 4 MG/3ML SOPN,  Inject 2 mg Subcutaneous once a week (Patient not taking: Reported on 12/31/2022)               Physical Examination:   Temp:  [98  F (36.7  C)-98.8  F (37.1  C)] 98  F (36.7  C)  Pulse:  [] 96  Resp:  [16-28] 28  BP: ()/(43-58) 99/57  SpO2:  [90 %-100 %] 100 %    Intake/Output Summary (Last 24 hours) at 12/31/2022 1830  Last data filed at 12/31/2022 1800  Gross per 24 hour   Intake 3250 ml   Output 125 ml   Net 3125 ml     Wt Readings from Last 4 Encounters:   12/31/22 67.8 kg (149 lb 7.6 oz)   12/28/22 64 kg (141 lb)   12/12/22 68.5 kg (151 lb)   09/16/22 69.4 kg (153 lb)     BP - Mean:  [49-74] 72  Resp: 28    No lab results found in last 7 days.    GEN: no acute distress   HEENT: head ncat, sclera anicteric, OP patent, trachea midline   PULM:  clear anteriorly    CV/COR: RRR S1S2 no gallop,  No rub, no murmur  ABD: soft nontender, hypoactive bowel sounds, no mass  EXT: No edema  NEURO: grossly intact  SKIN: no obvious rash  LINES: clean, dry intact         Data:   All data and imaging reviewed     ROUTINE ICU LABS (Last four results)  CMP  Recent Labs   Lab 12/31/22  1628 12/31/22  1456 12/31/22  1314 12/31/22  1304 12/29/22  0042 12/28/22  2328     --  141  --   --  136   POTASSIUM 3.8  --  3.6  --   --  3.7   CHLORIDE 106  --  102  --   --  99   CO2 16*  --  18*  --   --  22   ANIONGAP 19*  --  21*  --   --  15   * 153* 155* 139*   < > 101*   BUN 34.1*  --  33.5*  --   --  35.8*   CR 2.79*  --  2.99*  --   --  1.63*   GFRESTIMATED 17*  --  16*  --   --  33*   DAVID 7.6*  --  9.0  --   --  10.2   PROTTOTAL  --   --  6.8  --   --   --    ALBUMIN  --   --  3.0*  --   --   --    BILITOTAL  --   --  0.9  --   --   --    ALKPHOS  --   --  487*  --   --   --    AST  --   --  98*  --   --   --    ALT  --   --  92*  --   --   --     < > = values in this interval not displayed.     CBC  Recent Labs   Lab 12/31/22  1314 12/28/22  5360   WBC 58.4*  58.4* 20.4*   RBC 3.88 4.38   HGB 10.1* 11.4*    HCT 31.5* 37.0   MCV 81 85   MCH 26.0* 26.0*   MCHC 32.1 30.8*   RDW 13.5 13.1    384     INR  Recent Labs   Lab 12/31/22  1314   INR 1.30*     Arterial Blood GasNo lab results found in last 7 days.    All cultures:  No results for input(s): CULT in the last 168 hours.  Recent Results (from the past 24 hour(s))   CT Chest Abdomen Pelvis w/o Contrast    Narrative    EXAM: CT CHEST ABDOMEN PELVIS W/O CONTRAST  LOCATION: [facility]  DATE/TIME: 12/31/2022 3:55 PM    INDICATION: sepsis, hypotension, WBC 58K   eval source of infection  COMPARISON: None.  TECHNIQUE: CT scan of the chest, abdomen, and pelvis was performed without IV contrast. Multiplanar reformats were obtained. Dose reduction techniques were used.   CONTRAST: None.    FINDINGS:   LUNGS AND PLEURA: Normal.    MEDIASTINUM/AXILLAE:  Moderate to large hiatal hernia with fluid throughout the esophagus. Low-attenuation the cardiac ventricles suggests anemia. Right PICC.     CORONARY ARTERY CALCIFICATION: None.    HEPATOBILIARY: Hepatic steatosis with mild hepatomegaly.     PANCREAS: Normal.    SPLEEN: Normal.    ADRENAL GLANDS: Normal.    KIDNEYS/BLADDER: 1 to 2 mm nonobstructing calculus at the upper pole of the right kidney. Right hydronephrosis and hydroureter extending to the lower ureter, where it crosses over pelvic soft tissue and vessels (series 3 image 228, for example). The   urinary bladder is decompressed with a Waddell catheter.     BOWEL: Colonic diverticulosis. Normal appendix.     LYMPH NODES: 14 mm lymph node between the aorta and IVC (series 3 image 175). 41 mm x 17 mm soft tissue in the region of the lower aorta and IVC (image 192). 40 mm  by 34 mm soft tissue mass adjacent to the right external iliac vessels (image 234). 20 mm   x 40 mm soft tissue adjacent to the left external iliac vessels (image 237).     VASCULATURE: Cannot assess patency on this noncontrast study.     PELVIC ORGANS:  Heterogeneous enlarged  uterus with lobulation. One lobulated mass extends towards the right adnexa. There is minimal nonspecific stranding in the lower pelvis.    MUSCULOSKELETAL: No fractures.       Impression    IMPRESSION:  1.  Right hydronephrosis and hydroureter extending to the distal ureter, where the ureter crosses vessels and a soft tissue mass.  2.  Nonspecific lower pelvic stranding could be from cystitis.  3.  Retroperitoneal masses are concerning for malignancy. The uterus is heterogeneous, lobulated, and enlarged, and a uterine or right ovarian mass is possible. Recommend further evaluation of the retroperitoneal soft tissue. PET/CT may be helpful.  4.  Hepatic steatosis.  5.  Fluid throughout the esophagus with a moderate to large hiatal hernia. This raises the risk of aspiration.      Discussed with Dr. Fernandez by me over telephone at 4:45 PM.   Head CT w/o contrast    Narrative    EXAM: CT HEAD W/O CONTRAST  LOCATION: [facility]  DATE/TIME: 12/31/2022 3:59 PM    INDICATION: Confusion  COMPARISON: CT head dated 12/28/2022  TECHNIQUE: Routine CT Head without IV contrast. Multiplanar reformats. Dose reduction techniques were used.    FINDINGS:   INTRACRANIAL CONTENTS: No acute intracranial hemorrhage. No CT evidence of acute infarct. Sequelae of mild chronic microangiopathy. Mild cerebral volume loss without hydrocephalus. No extra-axial fluid collections.  Patent basal cisterns.     VISUALIZED ORBITS/SINUSES/MASTOIDS: The orbits are unremarkable. Paranasal sinus is postsurgical change with mild to moderate nonaggressive mucosal thickening. The temporal bone structures are well-aerated.     BONES/SOFT TISSUES: The calvarium and skull base are unremarkable.       Impression    IMPRESSION:     1. Senescent changes and sequelae of chronic microangiopathy without acute intracranial abnormality.         Billing: This patient is critically ill: Yes. Total critical care time today 32 min exclusive of  procedures or teaching.

## 2023-01-01 NOTE — PHARMACY-VANCOMYCIN DOSING SERVICE
"Pharmacy Vancomycin Note  Date of Service 2023  Patient's  1950   72 year old, female    Indication: Pyelonephritis  Day of Therapy: 2  Current vancomycin regimen: Intermittent  Current vancomycin monitoring method: Trough (Method 2 = manual dose calculation)  Current vancomycin therapeutic monitoring goal: 10-15 mg/L      Current estimated CrCl = Estimated Creatinine Clearance: 19.6 mL/min (A) (based on SCr of 2.3 mg/dL (H)).    Creatinine for last 3 days  2022:  1:14 PM Creatinine 2.99 mg/dL;  4:28 PM Creatinine 2.79 mg/dL; 11:21 PM Creatinine 2.57 mg/dL  2023:  4:12 AM Creatinine 2.30 mg/dL    Recent Vancomycin Levels (past 3 days)  No results found for requested labs within last 72 hours.    Vancomycin IV Administrations (past 72 hours)                   vancomycin (VANCOCIN) 1,250 mg in sodium chloride 0.9 % 250 mL intermittent infusion (mg) 1,250 mg New Bag 22 1505                Nephrotoxins and other renal medications (From now, onward)    Start     Dose/Rate Route Frequency Ordered Stop    23 2100  vancomycin (VANCOCIN) 750 mg in 0.9% NaCl 250 mL intermittent infusion         750 mg  over 60 Minutes Intravenous ONCE 23 0948      22 2200  piperacillin-tazobactam (ZOSYN) 3.375 g vial to attach to  mL bag        Note to Pharmacy: For [facility], [facility] and [facility]: For Zosyn-naive patients, use the \"Zosyn initial dose + extended infusion\" order panel.    3.375 g  over 240 Minutes Intravenous EVERY 12 HOURS 22 18222 185  vancomycin place castro - receiving intermittent dosing         1 each Intravenous SEE ADMIN INSTRUCTIONS 22 1859      22 1430  norepinephrine (LEVOPHED) 4 mg in  mL infusion PREMIX         0.01-0.6 mcg/kg/min × 63.5 kg  2.4-142.9 mL/hr  Intravenous CONTINUOUS 22 1420               Contrast Orders - past 72 hours (72h ago, onward)    Start     Dose/Rate Route Frequency Stop    22  iohexol " (OMNIPAQUE) 350 MG/ML injectable solution 100 mL         100 mL Intravenous ONCE 12/31/22 2000          Interpretation of levels and current regimen:    Has serum creatinine changed greater than 50% in last 72 hours: No    Urine output:  diminished urine output    Renal Function: slightly improved    [employer]ight[employer] Prediction of Planned New Vancomycin Regimen  Regimen: 750 mg IV every 24 hours.  Start time: 21:00 on 01/01/2023  Exposure target: AUC24 (range)400-600 mg/L.hr   AUC24,ss: 572 mg/L.hr  Probability of AUC24 > 400: 86 %  Ctrough,ss: 19.8 mg/L  Probability of Ctrough,ss > 20: 49 %  Probability of nephrotoxicity (Lodise JASON 2009): 17 %      Plan:  1. Change to vancomycin 750 mg IV q24h using AUC dosing with close monitoring  2. Vancomycin monitoring method: AUC  3. Vancomycin therapeutic monitoring goal: 400-600 mg*h/L  4. Pharmacy will check vancomycin levels as appropriate in prior to 3rd dose for close monitoring.  5. Serum creatinine levels will be ordered daily for the first week of therapy and at least twice weekly for subsequent weeks.    Thelma Adam, [facility]

## 2023-01-01 NOTE — CONSULTS
[facility] CONSULT     Type of consult: inpatient  Place of service: [facility]   Reason for consult: Hydronephrosis   Requested by: Dr. Fernandze    History of Present Illness:   Stella Greene is a 72 year old female that was admitted for Elevated liver enzymes.  A urology consult was placed for further evaluation and recommendations regarding hydronephrosis noted on the ct scan  obtained on 12/31/22. History obtained through patient, and chart review.    Patient denies associated pain with hydronephrosis. Patient denies urinary urge, frequency, hematuria, retention. Baseline is with some incontinence. Patient reports that she has not been worked up for hydronephrosis previously. Patient denies a history of kidney stones. Patient denies recurrent urinary tract infections. Denies post menopausal vaginal bleeding. Patient denies difficulty with emptying their bladder. Patient's risk factors include newly diagnosed malignancy, infection and retroperitoneal lymphadenopathy. She has not seen a urologist before.     Afebrile. Hypotension. UA concerning for infection. Patient with leukocytosis, elevated creatinine. CT obtained in the ER 12/31/22, showed 1 to 2 mm nonobstructing calculus at the upper pole of the right kidney, right hydronephrosis and hydroureter extending to the distal ureter, nonspecific lower pelvic stranding, retroperitoneal masses are concerning for malignancy.    Past Medical History  Past Medical History:   Diagnosis Date     Diabetes mellitus, type 2 (H) 12/06/2021     Essential hypertension     Created by Conversion Replacement Utility updated for latest IMO load      Mild persistent asthma without complication        Past Surgical History  Past Surgical History:   Procedure Laterality Date     LAPAROSCOPIC TUBAL LIGATION Bilateral      nasal polpys Bilateral      PICC TRIPLE LUMEN PLACEMENT  12/31/2022            Social History  Social History     Socioeconomic History     Marital  "status:      Spouse name: Not on file     Number of children: Not on file     Years of education: Not on file     Highest education level: Not on file   Occupational History     Not on file   Tobacco Use     Smoking status: Never     Smokeless tobacco: Never   Vaping Use     Vaping Use: Never used   Substance and Sexual Activity     Alcohol use: No     Drug use: No     Sexual activity: Not on file   Other Topics Concern     Parent/sibling w/ CABG, MI or angioplasty before 65F 55M? Not Asked   Social History Narrative     Not on file     Social Determinants of Health     Financial Resource Strain: Not on file   Food Insecurity: Not on file   Transportation Needs: Not on file   Physical Activity: Not on file   Stress: Not on file   Social Connections: Not on file   Intimate Partner Violence: Not on file   Housing Stability: Not on file       Medications  Current Facility-Administered Medications   Medication     lidocaine (LMX4) cream     lidocaine 1 % 0.1-5 mL     norepinephrine (LEVOPHED) 4 mg in  mL infusion PREMIX     sodium chloride (PF) 0.9% PF flush 10-20 mL     sodium chloride (PF) 0.9% PF flush 10-40 mL     sodium chloride (PF) 0.9% PF flush 10-40 mL     sodium chloride (PF) 0.9% PF flush 10-40 mL       Allergies  No Known Allergies    ROS:   A full 12 point review of systems was taken and is negative aside from what is noted above in the HPI.    Physical exam  BP 99/57   Pulse 96   Temp 98.8  F (37.1  C)   Resp 18   Ht 1.575 m (5' 2\")   Wt 63.5 kg (140 lb)   SpO2 99%   BMI 25.61 kg/m    General: NAD, alert, cooperative  Head: normocephalic, without abnormality / atraumatic  Abdomen: soft, non tender, non distended. no suprapubic fullness/tenderness. no CVA tenderness noted   Geniturinary: hall in place, clear yellow urine  Extremities: no calf edema or tenderness  Skin: no rashes or lesions  Musculoskeletal: moves all four extremities equally  Psychological: alert and oriented, answers " questions appropriately    Labs  Lab Results   Component Value Date    WBC 58.4 (HH) 12/31/2022    WBC 58.4 (HH) 12/31/2022    HGB 10.1 (L) 12/31/2022    HCT 31.5 (L) 12/31/2022     12/31/2022    CHOL 158 09/09/2022    TRIG 99 09/09/2022    HDL 60 09/09/2022    ALT 92 (H) 12/31/2022    AST 98 (H) 12/31/2022     12/31/2022    BUN 34.1 (H) 12/31/2022    CO2 16 (L) 12/31/2022    INR 1.30 (H) 12/31/2022    MICROALBUR 3.43 (H) 12/06/2021       Lab Results   Component Value Date    UROBILINOGEN 0.2 E.U./dL 10/17/2018    NITRITE Negative 12/31/2022    BACTERIA Many (A) 12/31/2022        Cultures:  Urine Culture: pending    Lab Results: personally reviewed     Imaging:  EXAM: CT CHEST ABDOMEN PELVIS W/O CONTRAST  LOCATION: [facility]  DATE/TIME: 12/31/2022 3:55 PM     INDICATION: sepsis, hypotension, WBC 58K   eval source of infection  COMPARISON: None.  TECHNIQUE: CT scan of the chest, abdomen, and pelvis was performed without IV contrast. Multiplanar reformats were obtained. Dose reduction techniques were used.   CONTRAST: None.     FINDINGS:   LUNGS AND PLEURA: Normal.     MEDIASTINUM/AXILLAE:  Moderate to large hiatal hernia with fluid throughout the esophagus. Low-attenuation the cardiac ventricles suggests anemia. Right PICC.      CORONARY ARTERY CALCIFICATION: None.     HEPATOBILIARY: Hepatic steatosis with mild hepatomegaly.      PANCREAS: Normal.     SPLEEN: Normal.     ADRENAL GLANDS: Normal.     KIDNEYS/BLADDER: 1 to 2 mm nonobstructing calculus at the upper pole of the right kidney. Right hydronephrosis and hydroureter extending to the lower ureter, where it crosses over pelvic soft tissue and vessels (series 3 image 228, for example). The   urinary bladder is decompressed with a Waddell catheter.      BOWEL: Colonic diverticulosis. Normal appendix.      LYMPH NODES: 14 mm lymph node between the aorta and IVC (series 3 image 175). 41 mm x 17 mm soft tissue in the region of the  lower aorta and IVC (image 192). 40 mm  by 34 mm soft tissue mass adjacent to the right external iliac vessels (image 234). 20 mm   x 40 mm soft tissue adjacent to the left external iliac vessels (image 237).      VASCULATURE: Cannot assess patency on this noncontrast study.      PELVIC ORGANS:  Heterogeneous enlarged uterus with lobulation. One lobulated mass extends towards the right adnexa. There is minimal nonspecific stranding in the lower pelvis.     MUSCULOSKELETAL: No fractures.                                                                       IMPRESSION:  1.  Right hydronephrosis and hydroureter extending to the distal ureter, where the ureter crosses vessels and a soft tissue mass.  2.  Nonspecific lower pelvic stranding could be from cystitis.  3.  Retroperitoneal masses are concerning for malignancy. The uterus is heterogeneous, lobulated, and enlarged, and a uterine or right ovarian mass is possible. Recommend further evaluation of the retroperitoneal soft tissue. PET/CT may be helpful.  4.  Hepatic steatosis.  5.  Fluid throughout the esophagus with a moderate to large hiatal hernia. This raises the risk of aspiration.     I have personally reviewed the imaging reports above.       Assessment/plan  Stella ALEJO Gaboирина is being seen by [facility] for hydronephrosis     - UA is suggestive of infection. UC pending. Receiving Vanco and Zosyn   - Patient is not symptomatic with pain.  - Creatinine is 2.79 which is an elevation from 12/28 which was 1.63.  - Patient will need decompression with either right ureteral stent or right PNT per IR. Given pelvic mass, high likelihood ureteral stent would fail. Discussed with patient and we will plan to proceed with R PNT placement tonight per IR.  - Discussed with patient, chronic PNT with management/exchanges per IR until obstruction removed.   - NPO for procedure.   - Appreciate coordinated consults for oncology and gynecology.     This case was discussed  with: Dr Darin Scott      Thank you for consulting [facility] regarding this patient's care. Please contact us with questions or concerns.     Nori Vivar PA-C  [facility]  Office Phone: #[phone]

## 2023-01-01 NOTE — PROGRESS NOTES
"Critical Care progress note      01/01/2023    Name: Stella Greene MRN#: [id number]   Age: 72 year old YOB: 1950                    Problem List:   Principal Problem:    Elevated liver enzymes  Active Problems:    Acute renal insufficiency    Elevated troponin    Septic shock (H)    Urinary tract infection without hematuria, site unspecified    Leukocytosis, unspecified type    Acute kidney failure with tubular necrosis (H)    Clinically Significant Risk Factors Present on Admission             # Anion Gap Metabolic Acidosis: Highest Anion Gap = 21 mmol/L in last 2 days, will monitor and treat as appropriate  # Hypoalbuminemia: Lowest albumin = 3 g/dL at 12/31/2022  1:14 PM, will monitor as appropriate  # Coagulation Defect: INR = 1.30 (Ref range: 0.85 - 1.15) and/or PTT = 31 Seconds (Ref range: 22 - 38 Seconds), will monitor for bleeding    # Hypertension: home medication list includes antihypertensive(s)   # Circulatory Shock: currently requiring pressors for blood pressure support     # Overweight: Estimated body mass index is 27.83 kg/m  as calculated from the following:    Height as of this encounter: 1.549 m (5' 1\").    Weight as of this encounter: 66.8 kg (147 lb 4.3 oz).        # Anemia: based on hgb <11               HPI:     72-year-old female with history of insulin-dependent diabetes mellitus, asthma, hypertension.  She has been feeling unwell since Christmas eve.  Sustained a fall a few days ago and hit her head.  Presented to the emergency room and had it sutured.  Today she was appearing weak to her .  He checked her sugar which was 115.  She had difficulty ambulating.  Upon arrival to the ED she was found to be hypotensive.  Not responsive to fluid so she was started on vasopressors.  CT of the abdomen is showing right-sided hydronephrosis secondary to an obstructing mass probably coming from the uterus.      Assessment and plan :       I have personally reviewed the labs, " imaging studies, cultures and discussed the case with referring physician and consulting physicians.     My assessment and plan by system for this patient is as follows:    Neurology/Psychiatry:   Awake and answering questions      Cardiovascular:   Septic shock secondary to UTI  Received IV fluids.  Was on norepinephrine until today.  Currently off  Continue stress dose steroids since she is chronically on prednisone  Stop bicarb drip    Elevated troponins upon presentation but coming down.  Discussed between ED and cardiology.  No plan for heparin drip, most likely demand ischemia    Chronic steroid use for asthma.  She is currently being slowly tapered, 5 mg daily of prednisone.  Continue stress dose steroids    Pulmonary/Ventilator Management:   Mild persistent asthma  Follows at the pulmonary clinic.  Steroids, Singulair, bronchodilators.      GI and Nutrition :   Started p.o. diet      Renal/Fluids/Electrolytes:   Acute renal failure secondary to probably ATN due to urosepsis  Right-sided hydronephrosis secondary to obstructing mass compressing the ureter  Status post nephrostomy tube    DC bicarb drip.  LR for now.    - monitor function and electrolytes as needed with replacement per ICU protocols. - generally avoid nephrotoxic agents such as NSAID, IV contrast unless specifically required  - adjust medications as needed for renal clearance  - follow I/O's as appropriate.    Infectious Disease:   Urosepsis with E. coli in the urine  DC vancomycin and depending on sensitivities will adjust antibiotics      Endocrine:   Insulin-dependent diabetes mellitus  Started Lantus 15 units subcu daily plus sliding scale insulin    Plan  - ICU insulin protocol, goal sugar <180      Hematology/Oncology:   Leukocytosis secondary to sepsis    Uterine mass compressing the right ureter  GYN onc consulted.  Attempted biopsy but after discussion with radiologist this is not a good target for needle biopsy.  It would be better to  obtain a CT with contrast but I would wait couple days until her renal function improves.  Another option would be to have GI attempt endoscopically to biopsy one of the lymph nodes               Medical History:     Past Medical History:   Diagnosis Date     Adrenal insufficiency (H)     secondary, due to chronic prednisone use for asthma, tapering off with Pulmonology     Diabetes mellitus, type 2 (H) 12/06/2021    HbA1c 7% 9/2022 - Last visit with Endo 9/2022     Essential hypertension     Created by Conversion Replacement Utility updated for latest IMO load      Mild persistent asthma without complication      Past Surgical History:   Procedure Laterality Date     IR NEPHROSTOMY TUBE PLACEMENT RIGHT  12/31/2022     LAPAROSCOPIC TUBAL LIGATION Bilateral      nasal polpys Bilateral      PICC TRIPLE LUMEN PLACEMENT  12/31/2022          Social History     Socioeconomic History     Marital status:      Spouse name: Not on file     Number of children: Not on file     Years of education: Not on file     Highest education level: Not on file   Occupational History     Not on file   Tobacco Use     Smoking status: Never     Smokeless tobacco: Never   Vaping Use     Vaping Use: Never used   Substance and Sexual Activity     Alcohol use: No     Drug use: No     Sexual activity: Not on file   Other Topics Concern     Parent/sibling w/ CABG, MI or angioplasty before 65F 55M? Not Asked   Social History Narrative     Not on file     Social Determinants of Health     Financial Resource Strain: Not on file   Food Insecurity: Not on file   Transportation Needs: Not on file   Physical Activity: Not on file   Stress: Not on file   Social Connections: Not on file   Intimate Partner Violence: Not on file   Housing Stability: Not on file      No Known Allergies           Key Medications:       heparin ANTICOAGULANT  5,000 Units Subcutaneous Q8H     hydrocortisone sodium succinate PF  100 mg Intravenous Q8H     influenza vac  high-dose quad  0.7 mL Intramuscular Prior to discharge     insulin aspart  1-6 Units Subcutaneous Q4H     insulin glargine  15 Units Subcutaneous QAM AC     pantoprazole  40 mg Intravenous Daily with breakfast     piperacillin-tazobactam  3.375 g Intravenous Q12H     sodium chloride (PF)  10-40 mL Intracatheter Q7 Days       lactated ringers 75 mL/hr at 01/01/23 1200     norepinephrine Stopped (01/01/23 1000)        Home Meds  No current facility-administered medications on file prior to encounter.  ADVAIR DISKUS 250-50 MCG/DOSE inhaler, [ADVAIR DISKUS 250-50 MCG/DOSE DISKUS] INHALE 1 DOSE BY MOUTH TWICE DAILY  albuterol (PROAIR HFA/PROVENTIL HFA/VENTOLIN HFA) 108 (90 Base) MCG/ACT inhaler, Inhale 2 puffs into the lungs every 6 hours as needed  blood glucose test (CONTOUR NEXT STRIPS) strips, [BLOOD GLUCOSE TEST (CONTOUR NEXT STRIPS) STRIPS] Test four times daily.  Dx code DM2.  cetirizine (ZYRTEC) 10 MG tablet, [CETIRIZINE (ZYRTEC) 10 MG TABLET] Take 10 mg by mouth daily.  dexamethasone (DECADRON) 4 MG/ML injection, Inject 4 mg for adrenal crisis  fluticasone propionate (FLONASE) 50 mcg/actuation nasal spray, [FLUTICASONE PROPIONATE (FLONASE) 50 MCG/ACTUATION NASAL SPRAY] 1 spray into each nostril daily.  Glucagon (GVOKE HYPOPEN 1-PACK) 1 MG/0.2ML SOAJ, Inject 1 mg Subcutaneous as needed (low BG levels)  Glucagon 0.5 MG/0.1ML SOAJ, Inject 1 mg Subcutaneous daily as needed (severe hypoglycemia)  insulin aspart prot & aspart (NOVOLOG MIX 70/30 FLEXPEN) (70-30) 100 UNIT/ML pen, 25 units before breakfast, 15 units before evening meal  insulin pen needle (31G X 6 MM) 31G X 6 MM miscellaneous, Use 2 pen needles daily or as directed.  lansoprazole (PREVACID) 15 MG capsule, [LANSOPRAZOLE (PREVACID) 15 MG CAPSULE] Take 15 mg by mouth daily.  lisinopril (ZESTRIL) 20 MG tablet, [LISINOPRIL (PRINIVIL,ZESTRIL) 20 MG TABLET] Take 1 tablet by mouth once daily  montelukast (SINGULAIR) 10 MG tablet, Take 1 tablet (10 mg) by mouth  At Bedtime  multivitamin therapeutic tablet, [MULTIVITAMIN THERAPEUTIC TABLET] Take 1 tablet by mouth daily.  predniSONE (DELTASONE) 5 MG tablet, Take 1 tablet (5 mg) by mouth daily  Vitamin D, Cholecalciferol, 25 MCG (1000 UT) TABS,   metFORMIN (GLUCOPHAGE XR) 500 MG 24 hr tablet, Take 4 tablets (2,000 mg) by mouth daily (with dinner)  predniSONE (DELTASONE) 1 MG tablet, Take 2 tablets (2 mg) by mouth daily (Patient not taking: Reported on 12/31/2022)  rosuvastatin (CRESTOR) 10 MG tablet, Take 1 tablet (10 mg) by mouth daily (Patient not taking: Reported on 12/31/2022)  Semaglutide, 1 MG/DOSE, (OZEMPIC, 1 MG/DOSE,) 4 MG/3ML SOPN, Inject 2 mg Subcutaneous once a week (Patient not taking: Reported on 12/31/2022)               Physical Examination:   Temp:  [97.7  F (36.5  C)-98.1  F (36.7  C)] 97.7  F (36.5  C)  Pulse:  [] 77  Resp:  [15-37] 23  BP: ()/(47-87) 94/59  SpO2:  [89 %-100 %] 96 %    Intake/Output Summary (Last 24 hours) at 12/31/2022 1830  Last data filed at 12/31/2022 1800  Gross per 24 hour   Intake 3250 ml   Output 125 ml   Net 3125 ml     Wt Readings from Last 4 Encounters:   01/01/23 66.8 kg (147 lb 4.3 oz)   12/28/22 64 kg (141 lb)   12/12/22 68.5 kg (151 lb)   09/16/22 69.4 kg (153 lb)     BP - Mean:  [] 71  Resp: 23    No lab results found in last 7 days.    GEN: no acute distress   HEENT: head ncat, sclera anicteric, OP patent, trachea midline   PULM:  clear anteriorly    CV/COR: RRR S1S2 no gallop,  No rub, no murmur  ABD: soft nontender, hypoactive bowel sounds, no mass  EXT: No edema  NEURO: grossly intact  SKIN: no obvious rash  LINES: clean, dry intact         Data:   All data and imaging reviewed     ROUTINE ICU LABS (Last four results)  CMP  Recent Labs   Lab 01/01/23  1149 01/01/23  0800 01/01/23  0412 01/01/23  0358 01/01/23  0001 12/31/22  2321 12/31/22  1922 12/31/22  1628 12/31/22  1456 12/31/22  1314   NA  --   --  143  --   --  140  --  141  --  141   POTASSIUM  --    --  3.5  --   --  3.5  --  3.8  --  3.6   CHLORIDE  --   --  104  --   --  105  --  106  --  102   CO2  --   --  23  --   --  21*  --  16*  --  18*   ANIONGAP  --   --  16*  --   --  14  --  19*  --  21*   * 190* 246* 214*   < > 260*   < > 215*   < > 155*   BUN  --   --  38.6*  --   --  38.0*  --  34.1*  --  33.5*   CR  --   --  2.30*  --   --  2.57*  --  2.79*  --  2.99*   GFRESTIMATED  --   --  22*  --   --  19*  --  17*  --  16*   DAVID  --   --  7.5*  --   --  7.5*  --  7.6*  --  9.0   PROTTOTAL  --   --   --   --   --   --   --   --   --  6.8   ALBUMIN  --   --   --   --   --   --   --   --   --  3.0*   BILITOTAL  --   --   --   --   --   --   --   --   --  0.9   ALKPHOS  --   --   --   --   --   --   --   --   --  487*   AST  --   --   --   --   --   --   --   --   --  98*   ALT  --   --   --   --   --   --   --   --   --  92*    < > = values in this interval not displayed.     CBC  Recent Labs   Lab 01/01/23  0412 12/31/22  1314 12/28/22  6683   WBC 54.0* 58.4*  58.4* 20.4*   RBC 3.37* 3.88 4.38   HGB 8.6* 10.1* 11.4*   HCT 27.2* 31.5* 37.0   MCV 81 81 85   MCH 25.5* 26.0* 26.0*   MCHC 31.6 32.1 30.8*   RDW 13.6 13.5 13.1    396 384     INR  Recent Labs   Lab 12/31/22  1314   INR 1.30*     Arterial Blood GasNo lab results found in last 7 days.    All cultures:  No results for input(s): CULT in the last 168 hours.  Recent Results (from the past 24 hour(s))   CT Chest Abdomen Pelvis w/o Contrast    Narrative    EXAM: CT CHEST ABDOMEN PELVIS W/O CONTRAST  LOCATION: [facility]  DATE/TIME: 12/31/2022 3:55 PM    INDICATION: sepsis, hypotension, WBC 58K   eval source of infection  COMPARISON: None.  TECHNIQUE: CT scan of the chest, abdomen, and pelvis was performed without IV contrast. Multiplanar reformats were obtained. Dose reduction techniques were used.   CONTRAST: None.    FINDINGS:   LUNGS AND PLEURA: Normal.    MEDIASTINUM/AXILLAE:  Moderate to large hiatal hernia with  fluid throughout the esophagus. Low-attenuation the cardiac ventricles suggests anemia. Right PICC.     CORONARY ARTERY CALCIFICATION: None.    HEPATOBILIARY: Hepatic steatosis with mild hepatomegaly.     PANCREAS: Normal.    SPLEEN: Normal.    ADRENAL GLANDS: Normal.    KIDNEYS/BLADDER: 1 to 2 mm nonobstructing calculus at the upper pole of the right kidney. Right hydronephrosis and hydroureter extending to the lower ureter, where it crosses over pelvic soft tissue and vessels (series 3 image 228, for example). The   urinary bladder is decompressed with a Waddell catheter.     BOWEL: Colonic diverticulosis. Normal appendix.     LYMPH NODES: 14 mm lymph node between the aorta and IVC (series 3 image 175). 41 mm x 17 mm soft tissue in the region of the lower aorta and IVC (image 192). 40 mm  by 34 mm soft tissue mass adjacent to the right external iliac vessels (image 234). 20 mm   x 40 mm soft tissue adjacent to the left external iliac vessels (image 237).     VASCULATURE: Cannot assess patency on this noncontrast study.     PELVIC ORGANS:  Heterogeneous enlarged uterus with lobulation. One lobulated mass extends towards the right adnexa. There is minimal nonspecific stranding in the lower pelvis.    MUSCULOSKELETAL: No fractures.       Impression    IMPRESSION:  1.  Right hydronephrosis and hydroureter extending to the distal ureter, where the ureter crosses vessels and a soft tissue mass.  2.  Nonspecific lower pelvic stranding could be from cystitis.  3.  Retroperitoneal masses are concerning for malignancy. The uterus is heterogeneous, lobulated, and enlarged, and a uterine or right ovarian mass is possible. Recommend further evaluation of the retroperitoneal soft tissue. PET/CT may be helpful.  4.  Hepatic steatosis.  5.  Fluid throughout the esophagus with a moderate to large hiatal hernia. This raises the risk of aspiration.      Discussed with Dr. Fernandez by me over telephone at 4:45 PM.   Head CT w/o  contrast    Narrative    EXAM: CT HEAD W/O CONTRAST  LOCATION: [facility]  DATE/TIME: 12/31/2022 3:59 PM    INDICATION: Confusion  COMPARISON: CT head dated 12/28/2022  TECHNIQUE: Routine CT Head without IV contrast. Multiplanar reformats. Dose reduction techniques were used.    FINDINGS:   INTRACRANIAL CONTENTS: No acute intracranial hemorrhage. No CT evidence of acute infarct. Sequelae of mild chronic microangiopathy. Mild cerebral volume loss without hydrocephalus. No extra-axial fluid collections.  Patent basal cisterns.     VISUALIZED ORBITS/SINUSES/MASTOIDS: The orbits are unremarkable. Paranasal sinus is postsurgical change with mild to moderate nonaggressive mucosal thickening. The temporal bone structures are well-aerated.     BONES/SOFT TISSUES: The calvarium and skull base are unremarkable.       Impression    IMPRESSION:     1. Senescent changes and sequelae of chronic microangiopathy without acute intracranial abnormality.         Billing: This patient is critically ill: Yes. Total critical care time today 32 min exclusive of procedures or teaching.

## 2023-01-01 NOTE — CONSULTS
Consult request for a soft tissue biopsy reviewed. The retroperitoneal soft tissue lesions seen on the noncontrast CT are difficult or impossible to safely access with a percutaneous procedure. A contrast-enhanced CT of the chest, abdomen, and pelvis would probably be the most helpful at this point, as a more accessible lesion may become visible. If a contrast-enhanced CT does not reveal additional biopsy targets then the nodule near the duodenum may be accessible via endoscopy. Other approaches also could yield tissue. Discussed with Dr. Uriostegui.

## 2023-01-01 NOTE — PROGRESS NOTES
Place of Service:  [facility]     Reason for follow up: Hydronephrosis     SUBJECTIVE:  Events: no acute events overnight    Patient reports she is still feeling well. Denies pain or soreness near right PNT which was placed last night. Hall in place.     OBJECTIVE:  PHYSICAL EXAM:  Temp: 97.7  F (36.5  C) Temp src: Oral BP: 109/66 Pulse: 76   Resp: 27 SpO2: 99 % O2 Device: Nasal cannula Oxygen Delivery: 2 LPM  General: NAD, alert, cooperative  Head: normocephalic, without abnormality / atraumatic  Abdomen: soft, non tender, non distended. no suprapubic fullness, no suprapubic tenderness. no CVA tenderness,   Genitourinary: hall in place, R PNT in place. Both draining clear yellow urine.   Skin: No rashes or lesions  Musculoskeletal: moves all four extremities equally; no calf edema or tenderness  Psychological: alert and oriented, answers questions appropriately    LABS:  Creatinine   Date Value Ref Range Status   01/01/2023 2.30 (H) 0.51 - 0.95 mg/dL Final     WBC Count   Date Value Ref Range Status   01/01/2023 54.0 (HH) 4.0 - 11.0 10e3/uL Final     Hemoglobin   Date Value Ref Range Status   01/01/2023 8.6 (L) 11.7 - 15.7 g/dL Final   ]  Platelet Count   Date Value Ref Range Status   01/01/2023 302 150 - 450 10e3/uL Final       UA:  UA RESULTS:  Recent Labs   Lab Test 12/31/22 2003 12/31/22  1508 10/17/18  0933   COLOR Brown*   < > Yellow   APPEARANCE Cloudy*   < > Clear   URINEGLC 70*   < > Negative   URINEBILI Negative   < > Negative   URINEKETONE Negative   < > Negative   SG 1.045*   < > >=1.030   UBLD >1.0 mg/dL*   < > Negative   URINEPH 5.5   < > 5.5   PROTEIN 100*   < > Negative   UROBILINOGEN  --   --  0.2 E.U./dL   NITRITE Negative   < > Positive*   LEUKEST 500 Danial/uL*   < > Small*   RBCU >182*   < > None Seen   WBCU >182*   < > 5-10*    < > = values in this interval not displayed.       Cultures:  Urine 12/31: pending  Blood 12/31: NGTD    Lab Results: personally reviewed.      ASSESSMENT/PLAN:  Stella Greene is being seen by [facility] for Hydronephrosis now s/p Right nephrostomy tube placement 12/23/22 per IR.    - UC pending. Receiving Vanco and Zosyn. Antibiotics per primary team based off culture sensitives.   - Creatinine is trending down s/p PNT placement.  - Chronic PNT with management/exchanges per IR.   - Waddell management per primary team.   - Appreciate Gynecologic Oncology.   - Urology will sign off. Email has been sent to our office, orders placed in the dc navigator. Please do not hesitate to call or re consult with urological concerns.       Nori Vivar PA-C  [facility]   [phone]

## 2023-01-01 NOTE — CONSULTS
Care Management Initial Consult    General Information  Assessment completed with: Stella Bailey  Type of CM/SW Visit: Initial Assessment    Primary Care Provider verified and updated as needed: Yes   Readmission within the last 30 days: no previous admission in last 30 days         Advance Care Planning:            Communication Assessment  Patient's communication style: spoken language (English or Bilingual)    Hearing Difficulty or Deaf: no   Wear Glasses or Blind: no (reading glass)    Cognitive  Cognitive/Neuro/Behavioral: WDL  Level of Consciousness: alert  Arousal Level: opens eyes spontaneously  Orientation: oriented x 4  Mood/Behavior: calm, cooperative  Best Language: 0 - No aphasia  Speech: clear, spontaneous, logical    Living Environment:   People in home: spouse  Antoni  Current living Arrangements: house      Able to return to prior arrangements:         Family/Social Support:  Care provided by: self  Provides care for: no one  Marital Status:     Antoni       Description of Support System: Supportive         Current Resources:   Patient receiving home care services: No     Community Resources:    Equipment currently used at home: none  Supplies currently used at home:      Employment/Financial:  Employment Status: retired        Financial Concerns:             Lifestyle & Psychosocial Needs:  Social Determinants of Health     Tobacco Use: Low Risk      Smoking Tobacco Use: Never     Smokeless Tobacco Use: Never     Passive Exposure: Not on file   Alcohol Use: Not on file   Financial Resource Strain: Not on file   Food Insecurity: Not on file   Transportation Needs: Not on file   Physical Activity: Not on file   Stress: Not on file   Social Connections: Not on file   Intimate Partner Violence: Not on file   Depression: Not at risk     PHQ-2 Score: 1   Housing Stability: Not on file       Functional Status:  Prior to admission patient needed assistance:   Dependent ADLs::  Independent  Dependent IADLs:: Independent       Mental Health Status:          Chemical Dependency Status:                Values/Beliefs:  Spiritual, Cultural Beliefs, Christian Practices, Values that affect care:                 Additional Information:  Assessed. Pt lives at home with  Yusra Muse at baseline.Still drives.  No svcs prior. No mobility aides. No therapy consults placed at this time.  Family to transport.       CM will continue to follow plan of care,review recommendations, and assist with any discharge needs anticipated.        Abbie Tapia RN

## 2023-01-01 NOTE — CONSULTS
GYNECOLOGIC ONCOLOGY CONSULT  Patient Name: Stella Greene  Address: [address] [address] [place] [address] [address]  [facility] [address]  Age:72 year old, : 1950   Sex: female   Admission Date/Time: 2022 12:56 PM   CC: Pelvic mass, concern for uterine malignancy     HPI:   72 year old female admitted on 2022 with urosepsis, acute renal insufficiency 2/2 right ureteral obstruction (s/p emergent right PNT on )     Interval: She was admitted to ICU  with hypotension and acute renal insufficency. S/p emergent R PNT. Overnight, weaning off Levophed. On sodium bicarbonate gtt. Patient doing well this morning. Denies abdominal/pelvic pain/flank pain. Notes decreased appetite over the past two weeks, weight loss which she attributes to new medication. Hasn't had any vaginal bleeding or abnormal vaginal discharge since Menopause. Was having bowel movements. Had a syncopal episode and fall at home  which brought her the ED and then after ED visit was having increasing weakness and slightly altered mental status per her , prompting return . Had several episodes of emesis at home.     She denies fever/chills, headache, vision changes, chest pain, shortness of breath, cough, abdominal/pelvic pain, abdominal bloating, vaginal bleeding.     REVIEW OF SYSTEMS  Per HPI    OB/GYN History:    -  x 3 no complications   Hasn't seen OB/GYN in years   Menopause: early 50s, was experiencing passage of clots around that time, told she had fibroids that would resolve - no bleeding since that time   History of abnormal pap tests - per patient returned to normal and has never had LEEP/colposcopy   S/p BTL   Ho history of HRT     PAST MEDICAL HISTORY    Past Medical History:   Diagnosis Date     Adrenal insufficiency (H)     secondary, due to chronic prednisone use for asthma, tapering off with Pulmonology     Diabetes mellitus, type 2 (H) 2021    HbA1c 7% 2022 - Last visit with Endo 2022     Essential  hypertension     Created by Conversion Replacement Utility updated for latest IMO load      Mild persistent asthma without complication       PAST SURGICAL HISTORY    Past Surgical History:   Procedure Laterality Date     IR NEPHROSTOMY TUBE PLACEMENT RIGHT  12/31/2022     LAPAROSCOPIC TUBAL LIGATION Bilateral      nasal polpys Bilateral      PICC TRIPLE LUMEN PLACEMENT  12/31/2022            CURRENT MEDS    Current Facility-Administered Medications   Medication     glucose gel 15-30 g    Or     dextrose 50 % injection 25-50 mL    Or     glucagon injection 1 mg     glucose gel 15-30 g    Or     dextrose 50 % injection 25-50 mL    Or     glucagon injection 1 mg     fentaNYL (PF) (SUBLIMAZE) injection 25-50 mcg     flumazenil (ROMAZICON) injection 0.2 mg     hydrocortisone sodium succinate PF (solu-CORTEF) injection 100 mg     influenza vac high-dose quad (FLUZONE HD) injection ULISES 0.7 mL     insulin aspart (NovoLOG) injection (RAPID ACTING)     insulin glargine (LANTUS PEN) injection 15 Units     ipratropium - albuterol 0.5 mg/2.5 mg/3 mL (DUONEB) neb solution 3 mL     lactated ringers infusion     lidocaine (LMX4) cream     lidocaine 1 % 0.1-5 mL     lidocaine 1 % 1-30 mL     midazolam (VERSED) injection 0.5-2 mg     naloxone (NARCAN) injection 0.2 mg    Or     naloxone (NARCAN) injection 0.4 mg    Or     naloxone (NARCAN) injection 0.2 mg    Or     naloxone (NARCAN) injection 0.4 mg     norepinephrine (LEVOPHED) 4 mg in  mL infusion PREMIX     pantoprazole (PROTONIX) IV push injection 40 mg     piperacillin-tazobactam (ZOSYN) 3.375 g vial to attach to  mL bag     sodium chloride (PF) 0.9% PF flush 10-20 mL     sodium chloride (PF) 0.9% PF flush 10-40 mL     sodium chloride (PF) 0.9% PF flush 10-40 mL     sodium chloride (PF) 0.9% PF flush 10-40 mL     vancomycin place castro - receiving intermittent dosing     ALLERGIES/SENSITIVITIES  No Known Allergies  FAMILY HISTORY    Family History   Problem  "Relation Age of Onset     No Known Problems Mother      Heart Disease Father      SOCIAL HISTORY    Social History     Socioeconomic History     Marital status:      Spouse name: Not on file     Number of children: Not on file     Years of education: Not on file     Highest education level: Not on file   Occupational History     Not on file   Tobacco Use     Smoking status: Never     Smokeless tobacco: Never   Vaping Use     Vaping Use: Never used   Substance and Sexual Activity     Alcohol use: No     Drug use: No     Sexual activity: Not on file   Other Topics Concern     Parent/sibling w/ CABG, MI or angioplasty before 65F 55M? Not Asked   Social History Narrative     Not on file     Social Determinants of Health     Financial Resource Strain: Not on file   Food Insecurity: Not on file   Transportation Needs: Not on file   Physical Activity: Not on file   Stress: Not on file   Social Connections: Not on file   Intimate Partner Violence: Not on file   Housing Stability: Not on file      PHYSICAL EXAM  /66   Pulse 76   Temp 97.7  F (36.5  C) (Oral)   Resp 27   Ht 1.549 m (5' 1\")   Wt 66.8 kg (147 lb 4.3 oz)   SpO2 99%   BMI 27.83 kg/m    Body mass index is 27.83 kg/m .    GENERAL: appears healthy, NAD    HEENT: normocephalic, no scleral icterus, PERRL, mucosa moist  NECK: supple, no LAD  LUNGS:  No dyspnea  ABDOMEN: soft, nondistended, non-tender, no hepatosplenomegaly, no rebound or guarding. R PNT in place   RECTAL/GENITAL: deferred  EXTREMITIES:  No edema, no deformities  SKIN: warm and dry, no jaundice, no rashes  NEURO:  Cranial nerves II-XII grossly intact, alert and oriented, motor exam intact   PSYCH: appropriate mood and affect    LABS  Recent Labs   Lab Test 01/01/23 0412 12/31/22  1314   WBC 54.0* 58.4*  58.4*   RBC 3.37* 3.88   HGB 8.6* 10.1*   HCT 27.2* 31.5*   MCV 81 81   MCH 25.5* 26.0*   MCHC 31.6 32.1    396     Recent Labs   Lab Test 01/01/23 0412 12/31/22  2321 "   POTASSIUM 3.5 3.5   CHLORIDE 104 105   BUN 38.6* 38.0*       Recent Labs   Lab Test 12/31/22 2003 12/31/22  1508 12/31/22  1314   PROTEIN 100* 100*  --    BILITOTAL  --   --  0.9   AST  --   --  98*   ALT  --   --  92*     INR (no units)   Date Value   12/31/2022 1.30 (H)         IMAGING:  EXAM: CT HEAD W/O CONTRAST  LOCATION: [facility]  DATE/TIME: 12/28/2022 11:47 PM     INDICATION: Head injury  COMPARISON: None.  TECHNIQUE: Routine CT Head without IV contrast. Multiplanar reformats. Dose reduction techniques were used.     FINDINGS:  INTRACRANIAL CONTENTS: No intracranial hemorrhage, extraaxial collection, or mass effect.  6 mm calcified left frontal convexity meningioma. No CT evidence of acute infarct. Normal parenchymal attenuation. Normal ventricles and sulci.      VISUALIZED ORBITS/SINUSES/MASTOIDS: No intraorbital abnormality. FESS. No significant paranasal sinus mucosal disease. No middle ear or mastoid effusion.     BONES/SOFT TISSUES:  Left frontal scalp laceration. No skull fracture.                                                                      IMPRESSION:  1.  Left frontal scalp laceration without acute intracranial abnormality.  2.  Incidental note of a subcentimeter calcified left frontal convexity meningioma.      EXAM: CT CHEST ABDOMEN PELVIS W/O CONTRAST  LOCATION: [facility]  DATE/TIME: 12/31/2022 3:55 PM     INDICATION: sepsis, hypotension, WBC 58K   eval source of infection  COMPARISON: None.  TECHNIQUE: CT scan of the chest, abdomen, and pelvis was performed without IV contrast. Multiplanar reformats were obtained. Dose reduction techniques were used.   CONTRAST: None.     FINDINGS:   LUNGS AND PLEURA: Normal.     MEDIASTINUM/AXILLAE:  Moderate to large hiatal hernia with fluid throughout the esophagus. Low-attenuation the cardiac ventricles suggests anemia. Right PICC.      CORONARY ARTERY CALCIFICATION: None.     HEPATOBILIARY:  Hepatic steatosis with mild hepatomegaly.      PANCREAS: Normal.     SPLEEN: Normal.     ADRENAL GLANDS: Normal.     KIDNEYS/BLADDER: 1 to 2 mm nonobstructing calculus at the upper pole of the right kidney. Right hydronephrosis and hydroureter extending to the lower ureter, where it crosses over pelvic soft tissue and vessels (series 3 image 228, for example). The   urinary bladder is decompressed with a Waddell catheter.      BOWEL: Colonic diverticulosis. Normal appendix.      LYMPH NODES: 14 mm lymph node between the aorta and IVC (series 3 image 175). 41 mm x 17 mm soft tissue in the region of the lower aorta and IVC (image 192). 40 mm  by 34 mm soft tissue mass adjacent to the right external iliac vessels (image 234). 20 mm x 40 mm soft tissue adjacent to the left external iliac vessels (image 237).      VASCULATURE: Cannot assess patency on this noncontrast study.      PELVIC ORGANS:  Heterogeneous enlarged uterus with lobulation. One lobulated mass extends towards the right adnexa. There is minimal nonspecific stranding in the lower pelvis.     MUSCULOSKELETAL: No fractures.                                                                       IMPRESSION:  1.  Right hydronephrosis and hydroureter extending to the distal ureter, where the ureter crosses vessels and a soft tissue mass.  2.  Nonspecific lower pelvic stranding could be from cystitis.  3.  Retroperitoneal masses are concerning for malignancy. The uterus is heterogeneous, lobulated, and enlarged, and a uterine or right ovarian mass is possible. Recommend further evaluation of the retroperitoneal soft tissue. PET/CT may be helpful.  4.  Hepatic steatosis.  5.  Fluid throughout the esophagus with a moderate to large hiatal hernia. This raises the risk of aspiration.         ASSESSMENT AND PLAN :     72 year old female admitted on 12/31/2022 with urosepsis, acute renal insufficiency 2/2 right ureteral obstruction (s/p emergent right PNT on 12/31). CT  scan w/o contrast notable for enlarged heterogeneous uterus (14 x 8 cm) and retroperitoneal lymphadenopathy, concerning for uterine malignancy. Discussed CT findings with the patient and her .     --CA-125 level (ordered)   --Recommend IR consult for lymph node/soft tissue mass biopsy - send [facility]  --Reviewed with patient that prognosis and treatment would be determined by pathology results - aware that surgery and/or chemotherapy would be recommended for treatment   --CT Chest/Abdomen/Pelvis WITH contrast when clinically able/pending Cr results - this will be important to evaluate for metastatic disease     We'll continue to follow patient while she's inpatient. [facility] will organize outpatient follow-up for the patient.      EFRAÍN MEDINA MD   [facility] - Gynecologic Oncology

## 2023-01-01 NOTE — PROGRESS NOTES
"CLINICAL NUTRITION SERVICES - ASSESSMENT NOTE     Nutrition Prescription    RECOMMENDATIONS FOR MDs/PROVIDERS TO ORDER:      Malnutrition Status:    Does not qualify    Recommendations already ordered by Registered Dietitian (RD):  none    Future/Additional Recommendations:  Consider consistent carb diet when able to eat.     REASON FOR ASSESSMENT  Stella Greene is a/an 72 year old female assessed by the dietitian for Admission Nutrition Risk Screen for \"unsure\" wt loss and decreased intake.    NUTRITION HISTORY  Patient reports decreased po intake since a few days prior to Wendy, seven or more days.  She has lost some weight with decreased intake but also recently was started on Ozempic which accounts for some wt loss.      CURRENT NUTRITION ORDERS  Diet: NPO    LABS  Creat 2.3  FSBG 190-260  Labs reviewed    MEDICATIONS  Medications reviewed    ANTHROPOMETRICS  Height: 154.9 cm (5' 1\")  Most Recent Weight: 66.8 kg (147 lb 4.3 oz)    BMI: Overweight BMI 25-29.9  Weight History:   Wt Readings from Last 12 Encounters:   01/01/23 66.8 kg (147 lb 4.3 oz)   12/28/22 64 kg (141 lb)   12/12/22 68.5 kg (151 lb)   09/16/22 69.4 kg (153 lb)   09/12/22 69.9 kg (154 lb 3.2 oz)   08/15/22 70.2 kg (154 lb 12.8 oz)   07/15/22 70 kg (154 lb 4.8 oz)   06/24/22 69.4 kg (153 lb)   04/26/22 69.8 kg (153 lb 14.4 oz)   04/05/22 70.3 kg (154 lb 14.4 oz)   12/17/21 69.9 kg (154 lb 3.2 oz)   12/06/21 67.9 kg (149 lb 12.8 oz)   12/28 suspect stated wt.   no significant wt loss found.      Dosing Weight: 66.8 kg    ASSESSED NUTRITION NEEDS  Estimated Energy Needs: 7925-4308 kcals/day (20 - 25 kcals/kg)  Justification: Overweight  Estimated Protein Needs: 53-67 grams protein/day (0.8 - 1 grams of pro/kg)  Justification: elevated creat  Estimated Fluid Needs: 1335+ mL/day (1 mL/kcal)   Justification: Maintenance    PHYSICAL FINDINGS  See malnutrition section below.     MALNUTRITION:  % Weight Loss:  Weight loss does not meet criteria for " malnutrition   % Intake:  </= 50% for >/= 5 days (severe malnutrition)  Subcutaneous Fat Loss:  Orbital region mild depletion  Muscle Loss:  None observed  Fluid Retention:  None noted    Malnutrition Diagnosis: Patient does not meet two of the above criteria necessary for diagnosing malnutrition    NUTRITION DIAGNOSIS  Altered nutrition-related laboratory values related to DM as evidenced by elevated BG      INTERVENTIONS  Implementation  Nutrition Education: No education needs assessed at this time   Collaboration with other providers   Monitor diet start.    Goals  BG < 180  Diet advancement vs nutrition support within 2-3 days.  Patient to consume % of nutritionally adequate meals three times per day, or the equivalent with supplements/snacks.     Monitoring/Evaluation  Progress toward goals will be monitored and evaluated per protocol.

## 2023-01-02 ENCOUNTER — APPOINTMENT (OUTPATIENT)
Dept: PHYSICAL THERAPY | Facility: HOSPITAL | Age: 73
DRG: 871 | End: 2023-01-02
Attending: NURSE PRACTITIONER
Payer: MEDICARE

## 2023-01-02 ENCOUNTER — APPOINTMENT (OUTPATIENT)
Dept: OCCUPATIONAL THERAPY | Facility: HOSPITAL | Age: 73
DRG: 871 | End: 2023-01-02
Attending: NURSE PRACTITIONER
Payer: MEDICARE

## 2023-01-02 LAB
ANION GAP SERPL CALCULATED.3IONS-SCNC: 13 MMOL/L (ref 7–15)
BACTERIA UR CULT: ABNORMAL
BUN SERPL-MCNC: 43.7 MG/DL (ref 8–23)
CALCIUM SERPL-MCNC: 7.4 MG/DL (ref 8.8–10.2)
CHLORIDE SERPL-SCNC: 104 MMOL/L (ref 98–107)
CREAT SERPL-MCNC: 1.58 MG/DL (ref 0.51–0.95)
DEPRECATED HCO3 PLAS-SCNC: 24 MMOL/L (ref 22–29)
ERYTHROCYTE [DISTWIDTH] IN BLOOD BY AUTOMATED COUNT: 13.8 % (ref 10–15)
GFR SERPL CREATININE-BSD FRML MDRD: 35 ML/MIN/1.73M2
GLUCOSE BLDC GLUCOMTR-MCNC: 204 MG/DL (ref 70–99)
GLUCOSE BLDC GLUCOMTR-MCNC: 225 MG/DL (ref 70–99)
GLUCOSE BLDC GLUCOMTR-MCNC: 230 MG/DL (ref 70–99)
GLUCOSE BLDC GLUCOMTR-MCNC: 250 MG/DL (ref 70–99)
GLUCOSE SERPL-MCNC: 214 MG/DL (ref 70–99)
HCT VFR BLD AUTO: 27.1 % (ref 35–47)
HGB BLD-MCNC: 8.7 G/DL (ref 11.7–15.7)
MCH RBC QN AUTO: 26 PG (ref 26.5–33)
MCHC RBC AUTO-ENTMCNC: 32.1 G/DL (ref 31.5–36.5)
MCV RBC AUTO: 81 FL (ref 78–100)
PLATELET # BLD AUTO: 260 10E3/UL (ref 150–450)
POTASSIUM SERPL-SCNC: 2.9 MMOL/L (ref 3.4–5.3)
POTASSIUM SERPL-SCNC: 3.4 MMOL/L (ref 3.4–5.3)
POTASSIUM SERPL-SCNC: 3.4 MMOL/L (ref 3.4–5.3)
RBC # BLD AUTO: 3.34 10E6/UL (ref 3.8–5.2)
SODIUM SERPL-SCNC: 141 MMOL/L (ref 136–145)
WBC # BLD AUTO: 41.5 10E3/UL (ref 4–11)

## 2023-01-02 PROCEDURE — 85027 COMPLETE CBC AUTOMATED: CPT | Performed by: INTERNAL MEDICINE

## 2023-01-02 PROCEDURE — 250N000013 HC RX MED GY IP 250 OP 250 PS 637: Performed by: INTERNAL MEDICINE

## 2023-01-02 PROCEDURE — 97165 OT EVAL LOW COMPLEX 30 MIN: CPT | Mod: GO

## 2023-01-02 PROCEDURE — 250N000011 HC RX IP 250 OP 636: Performed by: INTERNAL MEDICINE

## 2023-01-02 PROCEDURE — 97162 PT EVAL MOD COMPLEX 30 MIN: CPT | Mod: GP

## 2023-01-02 PROCEDURE — 250N000012 HC RX MED GY IP 250 OP 636 PS 637: Performed by: INTERNAL MEDICINE

## 2023-01-02 PROCEDURE — 200N000001 HC R&B ICU

## 2023-01-02 PROCEDURE — C9113 INJ PANTOPRAZOLE SODIUM, VIA: HCPCS | Performed by: INTERNAL MEDICINE

## 2023-01-02 PROCEDURE — 84132 ASSAY OF SERUM POTASSIUM: CPT | Performed by: INTERNAL MEDICINE

## 2023-01-02 PROCEDURE — 80048 BASIC METABOLIC PNL TOTAL CA: CPT | Performed by: INTERNAL MEDICINE

## 2023-01-02 PROCEDURE — 97116 GAIT TRAINING THERAPY: CPT | Mod: GP

## 2023-01-02 PROCEDURE — 250N000013 HC RX MED GY IP 250 OP 250 PS 637: Performed by: FAMILY MEDICINE

## 2023-01-02 PROCEDURE — 97530 THERAPEUTIC ACTIVITIES: CPT | Mod: GP

## 2023-01-02 PROCEDURE — 99233 SBSQ HOSP IP/OBS HIGH 50: CPT | Performed by: INTERNAL MEDICINE

## 2023-01-02 PROCEDURE — 97110 THERAPEUTIC EXERCISES: CPT | Mod: GO

## 2023-01-02 RX ORDER — POTASSIUM CHLORIDE 1500 MG/1
40 TABLET, EXTENDED RELEASE ORAL ONCE
Status: COMPLETED | OUTPATIENT
Start: 2023-01-02 | End: 2023-01-02

## 2023-01-02 RX ORDER — POTASSIUM CHLORIDE 1500 MG/1
20 TABLET, EXTENDED RELEASE ORAL ONCE
Status: COMPLETED | OUTPATIENT
Start: 2023-01-02 | End: 2023-01-02

## 2023-01-02 RX ORDER — CEFTRIAXONE 1 G/1
1 INJECTION, POWDER, FOR SOLUTION INTRAMUSCULAR; INTRAVENOUS EVERY 24 HOURS
Status: DISCONTINUED | OUTPATIENT
Start: 2023-01-02 | End: 2023-01-05 | Stop reason: HOSPADM

## 2023-01-02 RX ORDER — PREDNISONE 10 MG/1
30 TABLET ORAL DAILY
Status: DISCONTINUED | OUTPATIENT
Start: 2023-01-02 | End: 2023-01-03

## 2023-01-02 RX ORDER — FUROSEMIDE 10 MG/ML
20 INJECTION INTRAMUSCULAR; INTRAVENOUS ONCE
Status: COMPLETED | OUTPATIENT
Start: 2023-01-02 | End: 2023-01-02

## 2023-01-02 RX ORDER — PANTOPRAZOLE SODIUM 40 MG/1
40 TABLET, DELAYED RELEASE ORAL
Status: DISCONTINUED | OUTPATIENT
Start: 2023-01-03 | End: 2023-01-05 | Stop reason: HOSPADM

## 2023-01-02 RX ADMIN — POTASSIUM CHLORIDE 20 MEQ: 1500 TABLET, EXTENDED RELEASE ORAL at 21:15

## 2023-01-02 RX ADMIN — PANTOPRAZOLE SODIUM 40 MG: 40 INJECTION, POWDER, FOR SOLUTION INTRAVENOUS at 07:50

## 2023-01-02 RX ADMIN — POTASSIUM CHLORIDE 20 MEQ: 1500 TABLET, EXTENDED RELEASE ORAL at 07:50

## 2023-01-02 RX ADMIN — PREDNISONE 30 MG: 10 TABLET ORAL at 09:21

## 2023-01-02 RX ADMIN — CEFTRIAXONE SODIUM 1 G: 1 INJECTION, POWDER, FOR SOLUTION INTRAMUSCULAR; INTRAVENOUS at 09:21

## 2023-01-02 RX ADMIN — FUROSEMIDE 20 MG: 10 INJECTION, SOLUTION INTRAMUSCULAR; INTRAVENOUS at 09:21

## 2023-01-02 RX ADMIN — HEPARIN SODIUM 5000 UNITS: 10000 INJECTION, SOLUTION INTRAVENOUS; SUBCUTANEOUS at 05:24

## 2023-01-02 RX ADMIN — HEPARIN SODIUM 5000 UNITS: 10000 INJECTION, SOLUTION INTRAVENOUS; SUBCUTANEOUS at 21:10

## 2023-01-02 RX ADMIN — POTASSIUM CHLORIDE 40 MEQ: 1500 TABLET, EXTENDED RELEASE ORAL at 14:07

## 2023-01-02 RX ADMIN — HEPARIN SODIUM 5000 UNITS: 10000 INJECTION, SOLUTION INTRAVENOUS; SUBCUTANEOUS at 14:08

## 2023-01-02 RX ADMIN — HYDROCORTISONE SODIUM SUCCINATE 100 MG: 100 INJECTION, POWDER, FOR SOLUTION INTRAMUSCULAR; INTRAVENOUS at 05:23

## 2023-01-02 RX ADMIN — INSULIN GLARGINE 15 UNITS: 100 INJECTION, SOLUTION SUBCUTANEOUS at 07:50

## 2023-01-02 ASSESSMENT — ACTIVITIES OF DAILY LIVING (ADL)
ADLS_ACUITY_SCORE: 25

## 2023-01-02 NOTE — PLAN OF CARE
[facility] - ICU    RN Progress Note:            Pertinent Assessments:      Please refer to flowsheet rows for full assessment     Neph tube remains in with adequate output. Waddell removed. Transitioned to Med/Surg. LR stopped. Lasix given X1.          Mobility Level:     Mobility Level 4-5          Key Events - This Shift:     Waddell removed.               Plan:     Plan for CT tomorrow or the following day         Point of Contact Update YES-OR-NO: No  If No, reason: Family at bedside   Name:   Phone Number:   Summary of Conversation:

## 2023-01-02 NOTE — PROGRESS NOTES
GYN/ONC PROGRESS NOTE    HPI: Doing ok, no new complaints. Denies pain or nausea. Notes her weight is up about 10 lbs from baseline. Off pressors.     EXAM:    U/O: 695 ml/24 hrs    VSS.    GENERAL: alert, NAD  RESPIRATORY: no dyspnea  EXTREMITY: no edema  SKIN: warm and dry, no jaundice no rashes      LABS  Recent Labs   Lab Test 01/02/23  0515 01/01/23  0412   WBC 41.5* 54.0*   RBC 3.34* 3.37*   HGB 8.7* 8.6*   HCT 27.1* 27.2*   MCV 81 81   MCH 26.0* 25.5*   MCHC 32.1 31.6    302     Recent Labs   Lab Test 01/02/23  0515 01/01/23  1828   POTASSIUM 3.4 2.9*   CHLORIDE 104 105   BUN 43.7* 42.5*      Recent Labs   Lab Test 12/31/22 2003 12/31/22  1508 12/31/22  1314   PROTEIN 100* 100*  --    BILITOTAL  --   --  0.9   AST  --   --  98*   ALT  --   --  92*     ASSESSMENT AND PLAN :      72 year old female admitted on 12/31/2022 with urosepsis, acute renal insufficiency 2/2 right ureteral obstruction (s/p emergent right PNT on 12/31). CT scan w/o contrast notable for enlarged heterogeneous uterus (14 x 8 cm) and retroperitoneal lymphadenopathy, concerning for uterine malignancy.      --Continue cares per primary team/intensivist.  --CA-125 level only mildly elevated at 85  -- IR consulted for lymph node/soft tissue mass biopsy - per IR retroperitoneal soft tissue lesions are not safely accessible with percutaneous bx. Recommended CT CAP with IV contrast (when Cr improved) to assess for more accessible lesion.   --Reviewed with patient that prognosis and treatment would be determined by pathology results - aware that surgery and/or chemotherapy would be recommended for treatment   --CT Chest/Abdomen/Pelvis WITH contrast when clinically able/pending Cr results - this will be important to evaluate for metastatic disease. Cr trending down, 1.58 today.      We'll continue to follow patient while she's inpatient, and organize outpatient follow-up for the patient.      Fatmata Weber PA-C  GYN Oncology  Office  [phone]

## 2023-01-02 NOTE — PROGRESS NOTES
01/02/23 1045   Appointment Info   Signing Clinician's Name / Credentials (PT) Faye Oliva DPT   Living Environment   People in Home spouse   Current Living Arrangements house   Home Accessibility stairs to enter home   Number of Stairs, Main Entrance 4   Stair Railings, Main Entrance railings safe and in good condition   Transportation Anticipated family or friend will provide   Self-Care   Usual Activity Tolerance good   Current Activity Tolerance fair   Equipment Currently Used at Home none  (owns SEC)   Fall history within last six months yes   Number of times patient has fallen within last six months 1   General Information   Onset of Illness/Injury or Date of Surgery 12/31/22   Referring Physician Betty Fernández APRN CNP   Patient/Family Therapy Goals Statement (PT) return home   Pertinent History of Current Problem (include personal factors and/or comorbidities that impact the POC) 72-year-old female with history of insulin-dependent diabetes mellitus, asthma, hypertension.  She has been feeling unwell since Christmas eve.  Sustained a fall a few days ago and hit her head.  Presented to the emergency room and had it sutured.  Today she was appearing weak to her .  He checked her sugar which was 115.  She had difficulty ambulating.  Upon arrival to the ED she was found to be hypotensive.   Existing Precautions/Restrictions other (see comments)  (drain)   Strength (Manual Muscle Testing)   Strength (Manual Muscle Testing) Deficits observed during functional mobility   Bed Mobility   Bed Mobility supine-sit;sit-supine   Supine-Sit [place] (Bed Mobility) supervision;verbal cues   Sit-Supine [place] (Bed Mobility) supervision;verbal cues   Transfers   Transfers sit-stand transfer   Sit-Stand Transfer   Sit-Stand [place] (Transfers) contact guard   Assistive Device (Sit-Stand Transfers)   (none)   Gait/Stairs (Locomotion)   [place] Level (Gait) contact guard   Assistive Device  (Gait) walker, front-wheeled   Distance in Feet 50'   Pattern (Gait) step-through   Deviations/Abnormal Patterns (Gait) gait speed decreased   Clinical Impression   Criteria for Skilled Therapeutic Intervention Yes, treatment indicated   PT Diagnosis (PT) Impaired functional mobility   Influenced by the following impairments Weakness, fatigue   Functional limitations due to impairments Impaired strength, transfers, gait   Clinical Presentation (PT Evaluation Complexity) Stable/Uncomplicated   Clinical Presentation Rationale Presents as diagnosed   Clinical Decision Making (Complexity) moderate complexity   Planned Therapy Interventions (PT) bed mobility training;gait training;home exercise program;stair training;strengthening;transfer training   Anticipated Equipment Needs at Discharge (PT) walker, rolling   Risk & Benefits of therapy have been explained patient;spouse/significant other   PT Total Evaluation Time   PT Eval, Moderate Complexity Minutes (79575) 10   Physical Therapy Goals   PT Frequency Daily   PT Predicted Duration/Target Date for Goal Attainment 01/09/23   PT Goals Bed Mobility;Transfers;Gait;Stairs   PT: Bed Mobility Independent;Supine to/from sit   PT: Transfers Independent;Sit to/from stand;Bed to/from chair   PT: Gait Modified independent;Rolling walker;Greater than 200 feet   PT: Stairs Supervision/stand-by assist;4 stairs;Rail on both sides   PT Discharge Planning   PT Plan progress amb with/out AD, 4 MARICEL, HEP   PT Discharge Recommendation (DC Rec) home with assist   PT Rationale for DC Rec Pt appears close to baseline mobility.   PT Brief overview of current status Amb. 250' with FWW, CGA.   Total Session Time   Total Session Time (sum of timed and untimed services) 10         Faye Oliva, PT, DPT  1/2/2023

## 2023-01-02 NOTE — PROGRESS NOTES
Care Management Follow Up    Length of Stay (days): 2    Expected Discharge Date: 01/02/2023     Concerns to be Addressed:  None at this time, pt will be transferring to med surg     Patient plan of care discussed at interdisciplinary rounds: Yes    Anticipated Discharge Disposition:  Home with assist     Anticipated Discharge Services:  Home with assist   Education Provided on the Discharge Plan:  Per Care Team   Patient/Family in Agreement with the Plan:  Yes    Referrals Placed by CM/SW:    Private pay costs discussed: Not applicable    Additional Information:  Chart reviewed.    Pt lives at home with  Antoni. Ind at baseline.Still drives.  No svcs prior. No mobility aides. Therapy recs home with assist, which  Antoni can provide.  Family to transport.     Per rounds pt is getting closer to stepping down from ICU.     CM will continue to follow plan of care, review recommendations, and assist with any discharge needs anticipated.         Abbie Tapia RN

## 2023-01-02 NOTE — PROGRESS NOTES
Occupational Therapy       01/02/23 1400   Appointment Info   Signing Clinician's Name / Credentials (OT) Fatmata Nicholas, OTR/L   Living Environment   People in Home spouse   Current Living Arrangements house   Home Accessibility stairs to enter home   Number of Stairs, Main Entrance 4   Stair Railings, Main Entrance railings safe and in good condition   Transportation Anticipated family or friend will provide   Living Environment Comments Patient independent at baseline   Self-Care   Usual Activity Tolerance good   Current Activity Tolerance fair   Equipment Currently Used at Home cane, straight   Fall history within last six months yes   Number of times patient has fallen within last six months 1   General Information   Onset of Illness/Injury or Date of Surgery 12/31/23   Referring Physician Betty Fernández APRN CNP   Patient/Family Therapy Goal Statement (OT) get home   Additional Occupational Profile Info/Pertinent History of Current Problem 72-year-old female with history of insulin-dependent diabetes mellitus, asthma, hypertension.  She has been feeling unwell since Christmas eve.  Sustained a fall a few days ago and hit her head.  Presented to the emergency room and had it sutured.  Today she was appearing weak to her .  He checked her sugar which was 115.  She had difficulty ambulating.  Upon arrival to the ED she was found to be hypotensive.  Not responsive to fluid so she was started on vasopressors.  CT of the abdomen is showing right-sided hydronephrosis secondary to an obstructing mass probably coming from the uterus.   Existing Precautions/Restrictions no known precautions/restrictions   Cognitive Status Examination   Orientation Status orientation to person, place and time   Range of Motion Comprehensive   General Range of Motion no range of motion deficits identified   Strength Comprehensive (MMT)   General Manual Muscle Testing (MMT) Assessment no strength deficits identified   Bed Mobility    Bed Mobility supine-sit;sit-supine   Supine-Sit [place] (Bed Mobility) independent   Sit-Supine [place] (Bed Mobility) independent   Transfers   Transfers sit-stand transfer   Sit-Stand Transfer   Sit-Stand [place] (Transfers) independent   Sit/Stand Transfer Comments Independent with toileting task and independent with g/h while standing   Clinical Impression   Criteria for Skilled Therapeutic Interventions Met (OT) Yes, treatment indicated   OT Diagnosis decreased ADL independence   OT Problem List-Impairments impacting ADL problems related to;activity tolerance impaired   Assessment of Occupational Performance 1-3 Performance Deficits   Identified Performance Deficits endurance   Planned Therapy Interventions (OT) progressive activity/exercise   Clinical Decision Making Complexity (OT) low complexity   Risk & Benefits of therapy have been explained evaluation/treatment results reviewed;care plan/treatment goals reviewed   OT Total Evaluation Time   OT Eval, Low Complexity Minutes (42913) 15   OT Goals   Therapy Frequency (OT) One time eval and treatment   OT Predicted Duration/Target Date for Goal Attainment 01/03/23   OT Goals Aerobic Activity   OT: Perform aerobic activity with stable cardiovascular response continuous activity;10 minutes   Interventions   Interventions Quick Adds Therapeutic Procedures/Exercise   Therapeutic Procedures/Exercise   Therapeutic Procedure: strength, endurance, ROM, flexibillity minutes (52265) 10   Symptoms Noted During/After Treatment none   Treatment Detail/Skilled Intervention Patient ambulated ~210 feet with CGA, ambulates slowly, cues for PLB. OT encourage patient to continue to ambulate/do exercise while here in  hospital. Patient verbalized understanding.   OT Discharge Planning   OT Plan DC OT   OT Discharge Recommendation (DC Rec) home   OT Rationale for DC Rec Patient apperas close to ADL baseline, no need for further occupational therapy   OT Brief  overview of current status Independent with ADLs   Total Session Time   Timed Code Treatment Minutes 10   Total Session Time (sum of timed and untimed services) 25

## 2023-01-02 NOTE — PROGRESS NOTES
"Critical Care progress note      01/02/2023    Name: Stella Greene MRN#: [id number]   Age: 72 year old YOB: 1950                    Problem List:   Principal Problem:    Elevated liver enzymes  Active Problems:    Acute renal insufficiency    Elevated troponin    Septic shock (H)    Urinary tract infection without hematuria, site unspecified    Leukocytosis, unspecified type    Acute kidney failure with tubular necrosis (H)    Clinically Significant Risk Factors        # Hypokalemia: Lowest K = 2.9 mmol/L in last 2 days, will replace as needed      # Anion Gap Metabolic Acidosis: Highest Anion Gap = 19 mmol/L in last 2 days, will monitor and treat as appropriate  # Hypoalbuminemia: Lowest albumin = 3 g/dL at 12/31/2022  1:14 PM, will monitor as appropriate            # Overweight: Estimated body mass index is 29.06 kg/m  as calculated from the following:    Height as of this encounter: 1.549 m (5' 1\").    Weight as of this encounter: 69.8 kg (153 lb 12.8 oz)., PRESENT ON ADMISSION                    HPI:     72-year-old female with history of insulin-dependent diabetes mellitus, asthma, hypertension.  She has been feeling unwell since [place] eve.  Sustained a fall a few days ago and hit her head.  Presented to the emergency room and had it sutured.  Today she was appearing weak to her .  He checked her sugar which was 115.  She had difficulty ambulating.  Upon arrival to the ED she was found to be hypotensive.  Not responsive to fluid so she was started on vasopressors.  CT of the abdomen is showing right-sided hydronephrosis secondary to an obstructing mass probably coming from the uterus.      Assessment and plan :       I have personally reviewed the labs, imaging studies, cultures and discussed the case with referring physician and consulting physicians.     My assessment and plan by system for this patient is as follows:    Neurology/Psychiatry:   Awake and answering " questions      Cardiovascular:   Septic shock secondary to UTI -resolved    Dose of Lasix  Stop hydrocortisone  Start prednisone 30 mg daily which would equal to hydrocortisone 50 mg IV every 8 hours    She does have adrenal insufficiency secondary to long-term steroid use.  Please do not stop steroids abruptly.  The outpatient setting she is slowly being weaned off steroids.  She was down to 5 mg daily    Pulmonary/Ventilator Management:   Mild persistent asthma  Follows at the pulmonary clinic.  Steroids, Singulair, bronchodilators.      GI and Nutrition :   Started p.o. diet and tolerating well      Renal/Fluids/Electrolytes:   Acute renal failure secondary to probably ATN due to urosepsis  Right-sided hydronephrosis secondary to obstructing mass compressing the ureter  Status post nephrostomy tube    Renal function is improving.  Good urine output with clear urine  Dose of Lasix today  Stop IV fluids    Think she will be able to get her CAT scan tomorrow if her creatinine continues to improve    - monitor function and electrolytes as needed with replacement per ICU protocols. - generally avoid nephrotoxic agents such as NSAID, IV contrast unless specifically required  - adjust medications as needed for renal clearance  - follow I/O's as appropriate.    DC Waddell catheter    Infectious Disease:   Urosepsis with E. coli in the urine  Pansensitive E. coli.  Switch to Rocephin      Endocrine:   Insulin-dependent diabetes mellitus  Crease Lantus to 25 units every morning    Plan  - ICU insulin protocol, goal sugar <180      Hematology/Oncology:   Leukocytosis secondary to sepsis    Uterine mass compressing the right ureter  GYN onc consulted.  Attempted biopsy but after discussion with radiologist this is not a good target for needle biopsy.  I think she will be able to get a CT chest abdomen and pelvis with contrast by tomorrow considering her renal function is improving quickly, good urine output with good looking  urine  Another option would be to have GI attempt endoscopically to biopsy one of the lymph nodes               Medical History:     Past Medical History:   Diagnosis Date     Adrenal insufficiency (H)     secondary, due to chronic prednisone use for asthma, tapering off with Pulmonology     Diabetes mellitus, type 2 (H) 12/06/2021    HbA1c 7% 9/2022 - Last visit with Endo 9/2022     Essential hypertension     Created by Conversion Replacement Utility updated for latest IMO load      Mild persistent asthma without complication      Past Surgical History:   Procedure Laterality Date     IR NEPHROSTOMY TUBE PLACEMENT RIGHT  12/31/2022     LAPAROSCOPIC TUBAL LIGATION Bilateral      nasal polpys Bilateral      PICC TRIPLE LUMEN PLACEMENT  12/31/2022          Social History     Socioeconomic History     Marital status:      Spouse name: Not on file     Number of children: Not on file     Years of education: Not on file     Highest education level: Not on file   Occupational History     Not on file   Tobacco Use     Smoking status: Never     Smokeless tobacco: Never   Vaping Use     Vaping Use: Never used   Substance and Sexual Activity     Alcohol use: No     Drug use: No     Sexual activity: Not on file   Other Topics Concern     Parent/sibling w/ CABG, MI or angioplasty before 65F 55M? Not Asked   Social History Narrative     Not on file     Social Determinants of Health     Financial Resource Strain: Not on file   Food Insecurity: Not on file   Transportation Needs: Not on file   Physical Activity: Not on file   Stress: Not on file   Social Connections: Not on file   Intimate Partner Violence: Not on file   Housing Stability: Not on file      No Known Allergies           Key Medications:       cefTRIAXone  1 g Intravenous Q24H     heparin ANTICOAGULANT  5,000 Units Subcutaneous Q8H     influenza vac high-dose quad  0.7 mL Intramuscular Prior to discharge     insulin aspart  1-7 Units Subcutaneous TID AC      insulin aspart  1-5 Units Subcutaneous At Bedtime     [START ON 1/3/2023] insulin glargine  25 Units Subcutaneous QAM AC     [START ON 1/3/2023] pantoprazole  40 mg Oral QAM AC     predniSONE  30 mg Oral Daily     sodium chloride (PF)  10-40 mL Intracatheter Q7 Days          Home Meds  No current facility-administered medications on file prior to encounter.  ADVAIR DISKUS 250-50 MCG/DOSE inhaler, [ADVAIR DISKUS 250-50 MCG/DOSE DISKUS] INHALE 1 DOSE BY MOUTH TWICE DAILY  albuterol (PROAIR HFA/PROVENTIL HFA/VENTOLIN HFA) 108 (90 Base) MCG/ACT inhaler, Inhale 2 puffs into the lungs every 6 hours as needed  blood glucose test (CONTOUR NEXT STRIPS) strips, [BLOOD GLUCOSE TEST (CONTOUR NEXT STRIPS) STRIPS] Test four times daily.  Dx code DM2.  cetirizine (ZYRTEC) 10 MG tablet, [CETIRIZINE (ZYRTEC) 10 MG TABLET] Take 10 mg by mouth daily.  dexamethasone (DECADRON) 4 MG/ML injection, Inject 4 mg for adrenal crisis  fluticasone propionate (FLONASE) 50 mcg/actuation nasal spray, [FLUTICASONE PROPIONATE (FLONASE) 50 MCG/ACTUATION NASAL SPRAY] 1 spray into each nostril daily.  Glucagon (GVOKE HYPOPEN 1-PACK) 1 MG/0.2ML SOAJ, Inject 1 mg Subcutaneous as needed (low BG levels)  Glucagon 0.5 MG/0.1ML SOAJ, Inject 1 mg Subcutaneous daily as needed (severe hypoglycemia)  insulin aspart prot & aspart (NOVOLOG MIX 70/30 FLEXPEN) (70-30) 100 UNIT/ML pen, 25 units before breakfast, 15 units before evening meal  insulin pen needle (31G X 6 MM) 31G X 6 MM miscellaneous, Use 2 pen needles daily or as directed.  lansoprazole (PREVACID) 15 MG capsule, [LANSOPRAZOLE (PREVACID) 15 MG CAPSULE] Take 15 mg by mouth daily.  lisinopril (ZESTRIL) 20 MG tablet, [LISINOPRIL (PRINIVIL,ZESTRIL) 20 MG TABLET] Take 1 tablet by mouth once daily  montelukast (SINGULAIR) 10 MG tablet, Take 1 tablet (10 mg) by mouth At Bedtime  multivitamin therapeutic tablet, [MULTIVITAMIN THERAPEUTIC TABLET] Take 1 tablet by mouth daily.  predniSONE (DELTASONE) 5 MG tablet,  Take 1 tablet (5 mg) by mouth daily  Vitamin D, Cholecalciferol, 25 MCG (1000 UT) TABS,   metFORMIN (GLUCOPHAGE XR) 500 MG 24 hr tablet, Take 4 tablets (2,000 mg) by mouth daily (with dinner)  predniSONE (DELTASONE) 1 MG tablet, Take 2 tablets (2 mg) by mouth daily (Patient not taking: Reported on 12/31/2022)  rosuvastatin (CRESTOR) 10 MG tablet, Take 1 tablet (10 mg) by mouth daily (Patient not taking: Reported on 12/31/2022)  Semaglutide, 1 MG/DOSE, (OZEMPIC, 1 MG/DOSE,) 4 MG/3ML SOPN, Inject 2 mg Subcutaneous once a week (Patient not taking: Reported on 12/31/2022)               Physical Examination:   Temp:  [97.8  F (36.6  C)-98.1  F (36.7  C)] 97.9  F (36.6  C)  Pulse:  [68-82] 70  Resp:  [16-26] 20  BP: (104-131)/(61-77) 115/75  SpO2:  [93 %-97 %] 95 %    Intake/Output Summary (Last 24 hours) at 12/31/2022 1830  Last data filed at 12/31/2022 1800  Gross per 24 hour   Intake 3250 ml   Output 125 ml   Net 3125 ml     Wt Readings from Last 4 Encounters:   01/02/23 69.8 kg (153 lb 12.8 oz)   12/28/22 64 kg (141 lb)   12/12/22 68.5 kg (151 lb)   09/16/22 69.4 kg (153 lb)     BP - Mean:  [78-98] 91  Resp: 20    No lab results found in last 7 days.    GEN: no acute distress   HEENT: head ncat, sclera anicteric, OP patent, trachea midline   PULM:  clear anteriorly    CV/COR: RRR S1S2 no gallop,  No rub, no murmur  ABD: soft nontender, hypoactive bowel sounds, no mass  EXT: No edema  NEURO: grossly intact  SKIN: no obvious rash  LINES: clean, dry intact         Data:   All data and imaging reviewed     ROUTINE ICU LABS (Last four results)  CMP  Recent Labs   Lab 01/02/23  1157 01/02/23  1149 01/02/23  0728 01/02/23  0515 01/01/23  2044 01/01/23  1828 01/01/23  0800 01/01/23  0412 01/01/23  0001 12/31/22  2321 12/31/22  1456 12/31/22  1314   NA  --   --   --  141  --  143  --  143  --  140   < > 141   POTASSIUM  --  2.9*  --  3.4  --  2.9*  --  3.5  --  3.5   < > 3.6   CHLORIDE  --   --   --  104  --  105  --  104  --   105   < > 102   CO2  --   --   --  24  --  22  --  23  --  21*   < > 18*   ANIONGAP  --   --   --  13  --  16*  --  16*  --  14   < > 21*   *  --  225* 214* 225* 249*   < > 246*   < > 260*   < > 155*   BUN  --   --   --  43.7*  --  42.5*  --  38.6*  --  38.0*   < > 33.5*   CR  --   --   --  1.58*  --  1.86*  --  2.30*  --  2.57*   < > 2.99*   GFRESTIMATED  --   --   --  35*  --  28*  --  22*  --  19*   < > 16*   DAVID  --   --   --  7.4*  --  7.5*  --  7.5*  --  7.5*   < > 9.0   PROTTOTAL  --   --   --   --   --   --   --   --   --   --   --  6.8   ALBUMIN  --   --   --   --   --   --   --   --   --   --   --  3.0*   BILITOTAL  --   --   --   --   --   --   --   --   --   --   --  0.9   ALKPHOS  --   --   --   --   --   --   --   --   --   --   --  487*   AST  --   --   --   --   --   --   --   --   --   --   --  98*   ALT  --   --   --   --   --   --   --   --   --   --   --  92*    < > = values in this interval not displayed.     CBC  Recent Labs   Lab 01/02/23  0515 01/01/23  0412 12/31/22  1314 12/28/22  1079   WBC 41.5* 54.0* 58.4*  58.4* 20.4*   RBC 3.34* 3.37* 3.88 4.38   HGB 8.7* 8.6* 10.1* 11.4*   HCT 27.1* 27.2* 31.5* 37.0   MCV 81 81 81 85   MCH 26.0* 25.5* 26.0* 26.0*   MCHC 32.1 31.6 32.1 30.8*   RDW 13.8 13.6 13.5 13.1    302 396 384     INR  Recent Labs   Lab 12/31/22  1314   INR 1.30*     Arterial Blood GasNo lab results found in last 7 days.    All cultures:  No results for input(s): CULT in the last 168 hours.  Recent Results (from the past 24 hour(s))   CT Chest Abdomen Pelvis w/o Contrast    Narrative    EXAM: CT CHEST ABDOMEN PELVIS W/O CONTRAST  LOCATION: [facility]  DATE/TIME: 12/31/2022 3:55 PM    INDICATION: sepsis, hypotension, WBC 58K   eval source of infection  COMPARISON: None.  TECHNIQUE: CT scan of the chest, abdomen, and pelvis was performed without IV contrast. Multiplanar reformats were obtained. Dose reduction techniques were used.   CONTRAST:  None.    FINDINGS:   LUNGS AND PLEURA: Normal.    MEDIASTINUM/AXILLAE:  Moderate to large hiatal hernia with fluid throughout the esophagus. Low-attenuation the cardiac ventricles suggests anemia. Right PICC.     CORONARY ARTERY CALCIFICATION: None.    HEPATOBILIARY: Hepatic steatosis with mild hepatomegaly.     PANCREAS: Normal.    SPLEEN: Normal.    ADRENAL GLANDS: Normal.    KIDNEYS/BLADDER: 1 to 2 mm nonobstructing calculus at the upper pole of the right kidney. Right hydronephrosis and hydroureter extending to the lower ureter, where it crosses over pelvic soft tissue and vessels (series 3 image 228, for example). The   urinary bladder is decompressed with a Waddell catheter.     BOWEL: Colonic diverticulosis. Normal appendix.     LYMPH NODES: 14 mm lymph node between the aorta and IVC (series 3 image 175). 41 mm x 17 mm soft tissue in the region of the lower aorta and IVC (image 192). 40 mm  by 34 mm soft tissue mass adjacent to the right external iliac vessels (image 234). 20 mm   x 40 mm soft tissue adjacent to the left external iliac vessels (image 237).     VASCULATURE: Cannot assess patency on this noncontrast study.     PELVIC ORGANS:  Heterogeneous enlarged uterus with lobulation. One lobulated mass extends towards the right adnexa. There is minimal nonspecific stranding in the lower pelvis.    MUSCULOSKELETAL: No fractures.       Impression    IMPRESSION:  1.  Right hydronephrosis and hydroureter extending to the distal ureter, where the ureter crosses vessels and a soft tissue mass.  2.  Nonspecific lower pelvic stranding could be from cystitis.  3.  Retroperitoneal masses are concerning for malignancy. The uterus is heterogeneous, lobulated, and enlarged, and a uterine or right ovarian mass is possible. Recommend further evaluation of the retroperitoneal soft tissue. PET/CT may be helpful.  4.  Hepatic steatosis.  5.  Fluid throughout the esophagus with a moderate to large hiatal hernia. This raises  the risk of aspiration.      Discussed with Dr. Fernandez by me over telephone at 4:45 PM.   Head CT w/o contrast    Narrative    EXAM: CT HEAD W/O CONTRAST  LOCATION: [facility]  DATE/TIME: 12/31/2022 3:59 PM    INDICATION: Confusion  COMPARISON: CT head dated 12/28/2022  TECHNIQUE: Routine CT Head without IV contrast. Multiplanar reformats. Dose reduction techniques were used.    FINDINGS:   INTRACRANIAL CONTENTS: No acute intracranial hemorrhage. No CT evidence of acute infarct. Sequelae of mild chronic microangiopathy. Mild cerebral volume loss without hydrocephalus. No extra-axial fluid collections.  Patent basal cisterns.     VISUALIZED ORBITS/SINUSES/MASTOIDS: The orbits are unremarkable. Paranasal sinus is postsurgical change with mild to moderate nonaggressive mucosal thickening. The temporal bone structures are well-aerated.     BONES/SOFT TISSUES: The calvarium and skull base are unremarkable.       Impression    IMPRESSION:     1. Senescent changes and sequelae of chronic microangiopathy without acute intracranial abnormality.         31 minutes spent with the patient today

## 2023-01-02 NOTE — PLAN OF CARE
[facility] - ICU    RN Progress Note:            Pertinent Assessments:      Please refer to flowsheet rows for full assessment     Neph tube and hall draining clear yellow urine.          Mobility Level:     Level 3 with assist of 1         Key Events - This Shift:     Updated MD of low potassium, ordered protocol, replaced as necessary.             Plan:     Monitor I and O. And lab levels.          Point of Contact Update no  If No, reason: not present          Problem: Acute Kidney Injury/Impairment  Goal: Fluid and Electrolyte Balance  Outcome: Progressing   Goal Outcome Evaluation:

## 2023-01-02 NOTE — PLAN OF CARE
Occupational Therapy Discharge Summary    Reason for therapy discharge:    Discharged to remains in hospital    Progress towards therapy goal(s). See goals on Care Plan in [facility] electronic health record for goal details.  Goals met    Therapy recommendation(s):    No further therapy is recommended.

## 2023-01-03 ENCOUNTER — APPOINTMENT (OUTPATIENT)
Dept: CT IMAGING | Facility: HOSPITAL | Age: 73
DRG: 871 | End: 2023-01-03
Attending: FAMILY MEDICINE
Payer: MEDICARE

## 2023-01-03 ENCOUNTER — APPOINTMENT (OUTPATIENT)
Dept: PHYSICAL THERAPY | Facility: HOSPITAL | Age: 73
DRG: 871 | End: 2023-01-03
Payer: MEDICARE

## 2023-01-03 DIAGNOSIS — N17.0 ACUTE KIDNEY FAILURE WITH TUBULAR NECROSIS (H): Primary | ICD-10-CM

## 2023-01-03 LAB
ANION GAP SERPL CALCULATED.3IONS-SCNC: 10 MMOL/L (ref 7–15)
BUN SERPL-MCNC: 45.4 MG/DL (ref 8–23)
CALCIUM SERPL-MCNC: 8.2 MG/DL (ref 8.8–10.2)
CHLORIDE SERPL-SCNC: 104 MMOL/L (ref 98–107)
CREAT SERPL-MCNC: 1.25 MG/DL (ref 0.51–0.95)
DEPRECATED HCO3 PLAS-SCNC: 26 MMOL/L (ref 22–29)
ERYTHROCYTE [DISTWIDTH] IN BLOOD BY AUTOMATED COUNT: 13.6 % (ref 10–15)
GFR SERPL CREATININE-BSD FRML MDRD: 46 ML/MIN/1.73M2
GLUCOSE BLDC GLUCOMTR-MCNC: 131 MG/DL (ref 70–99)
GLUCOSE BLDC GLUCOMTR-MCNC: 149 MG/DL (ref 70–99)
GLUCOSE BLDC GLUCOMTR-MCNC: 179 MG/DL (ref 70–99)
GLUCOSE BLDC GLUCOMTR-MCNC: 220 MG/DL (ref 70–99)
GLUCOSE SERPL-MCNC: 173 MG/DL (ref 70–99)
HCT VFR BLD AUTO: 28.2 % (ref 35–47)
HGB BLD-MCNC: 9.1 G/DL (ref 11.7–15.7)
MCH RBC QN AUTO: 26.2 PG (ref 26.5–33)
MCHC RBC AUTO-ENTMCNC: 32.3 G/DL (ref 31.5–36.5)
MCV RBC AUTO: 81 FL (ref 78–100)
PLATELET # BLD AUTO: 286 10E3/UL (ref 150–450)
POTASSIUM SERPL-SCNC: 3.6 MMOL/L (ref 3.4–5.3)
POTASSIUM SERPL-SCNC: 3.8 MMOL/L (ref 3.4–5.3)
RBC # BLD AUTO: 3.47 10E6/UL (ref 3.8–5.2)
SODIUM SERPL-SCNC: 140 MMOL/L (ref 136–145)
WBC # BLD AUTO: 39.2 10E3/UL (ref 4–11)

## 2023-01-03 PROCEDURE — 74177 CT ABD & PELVIS W/CONTRAST: CPT | Mod: MG

## 2023-01-03 PROCEDURE — 250N000013 HC RX MED GY IP 250 OP 250 PS 637: Performed by: INTERNAL MEDICINE

## 2023-01-03 PROCEDURE — 97110 THERAPEUTIC EXERCISES: CPT | Mod: GP

## 2023-01-03 PROCEDURE — 258N000003 HC RX IP 258 OP 636: Performed by: FAMILY MEDICINE

## 2023-01-03 PROCEDURE — 120N000001 HC R&B MED SURG/OB

## 2023-01-03 PROCEDURE — 250N000013 HC RX MED GY IP 250 OP 250 PS 637: Performed by: FAMILY MEDICINE

## 2023-01-03 PROCEDURE — 250N000011 HC RX IP 250 OP 636: Performed by: FAMILY MEDICINE

## 2023-01-03 PROCEDURE — 80048 BASIC METABOLIC PNL TOTAL CA: CPT | Performed by: INTERNAL MEDICINE

## 2023-01-03 PROCEDURE — 85027 COMPLETE CBC AUTOMATED: CPT | Performed by: INTERNAL MEDICINE

## 2023-01-03 PROCEDURE — 250N000012 HC RX MED GY IP 250 OP 636 PS 637: Performed by: INTERNAL MEDICINE

## 2023-01-03 PROCEDURE — 97116 GAIT TRAINING THERAPY: CPT | Mod: GP

## 2023-01-03 PROCEDURE — 84132 ASSAY OF SERUM POTASSIUM: CPT | Performed by: FAMILY MEDICINE

## 2023-01-03 PROCEDURE — 250N000011 HC RX IP 250 OP 636: Performed by: INTERNAL MEDICINE

## 2023-01-03 PROCEDURE — 99233 SBSQ HOSP IP/OBS HIGH 50: CPT | Performed by: FAMILY MEDICINE

## 2023-01-03 RX ORDER — CETIRIZINE HYDROCHLORIDE 10 MG/1
10 TABLET ORAL DAILY
Status: DISCONTINUED | OUTPATIENT
Start: 2023-01-03 | End: 2023-01-05 | Stop reason: HOSPADM

## 2023-01-03 RX ORDER — SODIUM CHLORIDE 9 MG/ML
INJECTION, SOLUTION INTRAVENOUS CONTINUOUS
Status: DISCONTINUED | OUTPATIENT
Start: 2023-01-03 | End: 2023-01-04

## 2023-01-03 RX ORDER — ROSUVASTATIN CALCIUM 10 MG/1
10 TABLET, COATED ORAL DAILY
Status: DISCONTINUED | OUTPATIENT
Start: 2023-01-03 | End: 2023-01-03

## 2023-01-03 RX ORDER — ALBUTEROL SULFATE 90 UG/1
2 AEROSOL, METERED RESPIRATORY (INHALATION) EVERY 6 HOURS PRN
Status: DISCONTINUED | OUTPATIENT
Start: 2023-01-03 | End: 2023-01-05 | Stop reason: HOSPADM

## 2023-01-03 RX ORDER — MECOBALAMIN 5000 MCG
15 TABLET,DISINTEGRATING ORAL DAILY
Status: DISCONTINUED | OUTPATIENT
Start: 2023-01-03 | End: 2023-01-03

## 2023-01-03 RX ORDER — FLUTICASONE FUROATE AND VILANTEROL 100; 25 UG/1; UG/1
1 POWDER RESPIRATORY (INHALATION) DAILY
Status: DISCONTINUED | OUTPATIENT
Start: 2023-01-04 | End: 2023-01-05 | Stop reason: HOSPADM

## 2023-01-03 RX ORDER — IOPAMIDOL 755 MG/ML
75 INJECTION, SOLUTION INTRAVASCULAR ONCE
Status: COMPLETED | OUTPATIENT
Start: 2023-01-03 | End: 2023-01-03

## 2023-01-03 RX ORDER — PREDNISONE 20 MG/1
20 TABLET ORAL DAILY
Status: DISCONTINUED | OUTPATIENT
Start: 2023-01-04 | End: 2023-01-05 | Stop reason: HOSPADM

## 2023-01-03 RX ORDER — MONTELUKAST SODIUM 10 MG/1
10 TABLET ORAL AT BEDTIME
Status: DISCONTINUED | OUTPATIENT
Start: 2023-01-03 | End: 2023-01-05 | Stop reason: HOSPADM

## 2023-01-03 RX ADMIN — CEFTRIAXONE SODIUM 1 G: 1 INJECTION, POWDER, FOR SOLUTION INTRAMUSCULAR; INTRAVENOUS at 09:31

## 2023-01-03 RX ADMIN — HEPARIN SODIUM 5000 UNITS: 10000 INJECTION, SOLUTION INTRAVENOUS; SUBCUTANEOUS at 05:17

## 2023-01-03 RX ADMIN — CETIRIZINE HYDROCHLORIDE 10 MG: 10 TABLET, FILM COATED ORAL at 17:39

## 2023-01-03 RX ADMIN — PREDNISONE 30 MG: 10 TABLET ORAL at 09:31

## 2023-01-03 RX ADMIN — SODIUM CHLORIDE: 9 INJECTION, SOLUTION INTRAVENOUS at 13:57

## 2023-01-03 RX ADMIN — MONTELUKAST 10 MG: 10 TABLET, FILM COATED ORAL at 20:18

## 2023-01-03 RX ADMIN — HEPARIN SODIUM 5000 UNITS: 10000 INJECTION, SOLUTION INTRAVENOUS; SUBCUTANEOUS at 20:18

## 2023-01-03 RX ADMIN — HEPARIN SODIUM 5000 UNITS: 10000 INJECTION, SOLUTION INTRAVENOUS; SUBCUTANEOUS at 13:57

## 2023-01-03 RX ADMIN — PANTOPRAZOLE SODIUM 40 MG: 40 TABLET, DELAYED RELEASE ORAL at 07:52

## 2023-01-03 RX ADMIN — IOPAMIDOL 75 ML: 755 INJECTION, SOLUTION INTRAVENOUS at 18:17

## 2023-01-03 ASSESSMENT — ACTIVITIES OF DAILY LIVING (ADL)
ADLS_ACUITY_SCORE: 23
ADLS_ACUITY_SCORE: 21
ADLS_ACUITY_SCORE: 25
ADLS_ACUITY_SCORE: 20
ADLS_ACUITY_SCORE: 25
ADLS_ACUITY_SCORE: 23
ADLS_ACUITY_SCORE: 25
ADLS_ACUITY_SCORE: 20
ADLS_ACUITY_SCORE: 23
ADLS_ACUITY_SCORE: 20

## 2023-01-03 NOTE — PLAN OF CARE
Report given to Pooja FARMER at 1030 via phone. No belongings as sent home. Awaiting to hear when room is clean. To move to room 416 via W/ch. Medications in front  of chart.

## 2023-01-03 NOTE — PLAN OF CARE
Problem: Plan of Care - These are the overarching goals to be used throughout the patient stay.    Goal: Plan of Care Review  Description: The Plan of Care Review/Shift note should be completed every shift.  The Outcome Evaluation is a brief statement about your assessment that the patient is improving, declining, or no change.  This information will be displayed automatically on your shift note.  Outcome: Progressing   Goal Outcome Evaluation:       Educated pt on treatment plan, pt voiced understanding.      Problem: Plan of Care - These are the overarching goals to be used throughout the patient stay.    Goal: Optimal Comfort and Wellbeing  Outcome: Progressing   Denies pain.

## 2023-01-03 NOTE — PROGRESS NOTES
GYN/Oncology: Planning to see patient shortly. Discussed lack of ability to biopsy LN with Dr. Phalen. Recommendation is to attempt better imaging to clarify pelvic anatomy. I recommend a pelvic MRI. Will order.     Addendum: Because the CT scan with contrast has been ordered and plan to complete today, I would like to hold off on the MRI until we have those results. Reviewed with the patient and her nurse.     Shari Alvarez MD  Gynecologic Oncology  [facility]  [facility]: [phone]

## 2023-01-03 NOTE — PROGRESS NOTES
[facility]    Medicine Progress Note - Hospitalist Service    Date of Admission:  12/31/2022    Assessment & Plan      72-year-old female with history of diabetes and asthma came into the emergency room department for evaluation of weakness.  Found to have sepsis secondary to complicated UTI complicated by right-sided hydronephrosis from obstructing mass.    Sepsis  Septic Shock due to complicated E. Coli urinary tract infection.   -resolved: Diagnosis based on HR > 90 bpm, RR > 20 breaths/min, WBC > 12 and hypotension despite appropriate volume resuscitation due to sepsis.  Now improved and off pressor therapy  -- Cultures growing E. Coli.  -- Given dose of Lasix in ICU  --- Status post nephrostomy tube placement  --- Continue Rocephin  ---stop tele  ---resume low dose IVF for now until stable Cr in am    INGRID  --- Renal function significantly improved.  Secondary to obstruction  --- Status post PNT  --- Monitor closely with contrast given.  Low-dose IV fluids    Adrenal insufficiency  --- Has long-term steroid use.  --- Now back on prednisone; decrease dose slightly for tomorrow  --- Monitor blood pressures closely  --- Appears her home dose is 7 mg daily    Uterine mass  --- Reviewed gynecologic oncology notes.  --- CT chest abdomen pelvis with contrast ordered today as per previous recommendations  --- Gynecologic oncology recommending pelvic MRI  --- Appreciate input    Diabetes mellitus, type II  --- Blood sugars reviewed.  Overall adequate  --- Weaning steroids so anticipate improved glucose control.  --- To new to hold metformin  --- She is on 7030 at home.  Anticipate return to this when oral intake stable.  --- Holding semaglutide     Hypokalemia--replacing per protocol    Asthma/allergies  --- Today resumed Singulair, Zyrtec, fluticasone, Advair, and and as needed albuterol    Hyperlipidemia--continue Crestor       Diet: High Consistent Carb (75 g CHO per Meal) Diet    DVT  "Prophylaxis: Heparin SQ  Waddell Catheter: Not present  Lines: PRESENT      PICC 12/31/22 Triple Lumen Right Basilic Vasopressor-Site Assessment: WDL      Cardiac Monitoring: ACTIVE order. Indication: ICU  Code Status: Full Code      Clinically Significant Risk Factors        # Hypokalemia: Lowest K = 2.9 mmol/L in last 2 days, will replace as needed       # Hypoalbuminemia: Lowest albumin = 3 g/dL at 12/31/2022  1:14 PM, will monitor as appropriate           # Overweight: Estimated body mass index is 29.06 kg/m  as calculated from the following:    Height as of this encounter: 1.549 m (5' 1\").    Weight as of this encounter: 69.8 kg (153 lb 12.8 oz)., PRESENT ON ADMISSION         Disposition Plan     Expected Discharge Date: 01/03/2023      Destination: home            Natividad Harmon MD  Hospitalist Service  [facility]  Securely message with [employer] (more info)  Text page via [employer] Paging/Directory   ______________________________________________________________________    Interval History   --- Patient seen in the presence of her .  They are anxious regarding how biopsy will be done and possibility of CT scan.  --- Pain minimal on her right side.  --- Denies dysuria  --- No vomiting or diarrhea  --- Urinating well with Waddell    Physical Exam   Vital Signs: Temp: 98.2  F (36.8  C) Temp src: Oral BP: 126/74 Pulse: 74   Resp: 16 SpO2: 97 % O2 Device: None (Room air)    Weight: 153 lbs 12.8 oz    General Appearance: Pleasant female no apparent distress  Respiratory: Clear to auscultation bilaterally  Cardiovascular: Regular rate and rhythm without significant murmurs rubs or gallops  GI: Obese soft and nontender  Skin: No significant lower extremity edema  Other: Neurologically gross intact without focal deficits patient    Medical Decision Making       20 MINUTES SPENT BY ME on the date of service doing chart review, history, exam, documentation & further activities per the " note.  NOTE(S)/MEDICAL RECORDS REVIEWED over the past 24 hours: Consultantations with ICU and Gynecology   Tests ORDERED in the past 24 hours:   CT scan    Data     I have personally reviewed the following data over the past 24 hrs:    39.2 (H)  \   9.1 (L)   / 286     140 104 45.4 (H) /  149 (H)   3.6 26 1.25 (H) \       Imaging results reviewed over the past 24 hrs:   No results found for this or any previous visit (from the past 24 hour(s)).

## 2023-01-03 NOTE — PLAN OF CARE
Physical Therapy Discharge Summary    Reason for therapy discharge:    All goals and outcomes met, no further needs identified.    Progress towards therapy goal(s). See goals on Care Plan in [facility] electronic health record for goal details.  Goals met. Pt demonstrates independence with transfers, ambulation and stairs. No further acute PT services indicated. Will complete orders.     Therapy recommendation(s):    Continue home exercise program.      Faye Oliva, PT, DPT  1/3/2023

## 2023-01-03 NOTE — PROGRESS NOTES
GYN/ONC PROGRESS NOTE    HPI: Doing ok, transferred out of ICU today. Ambulating in room. Denies n/v.  Nephrostomy tube draining well with yellow urine.  Denies a history of vaginal bleeding or pain.  Does endorse slight abdominal bloating, however had nausea with vomiting over the holiday week.    EXAM:    U/O: 1970 ml/24 hrs    VSS.    GENERAL: alert, NAD  RESPIRATORY: no dyspnea  EXTREMITY: no edema  SKIN: warm and dry, no jaundice no rashes      LABS  Recent Labs   Lab Test 01/03/23 0311 01/02/23  0515   WBC 39.2* 41.5*   RBC 3.47* 3.34*   HGB 9.1* 8.7*   HCT 28.2* 27.1*   MCV 81 81   MCH 26.2* 26.0*   MCHC 32.3 32.1    260     Recent Labs   Lab Test 01/03/23 0311 01/02/23  1930 01/02/23  1149 01/02/23  0515   POTASSIUM 3.6 3.4   < > 3.4   CHLORIDE 104  --   --  104   BUN 45.4*  --   --  43.7*    < > = values in this interval not displayed.      Recent Labs   Lab Test 12/31/22 2003 12/31/22  1508 12/31/22  1314   PROTEIN 100* 100*  --    BILITOTAL  --   --  0.9   AST  --   --  98*   ALT  --   --  92*     ASSESSMENT AND PLAN :   72 year old female admitted on 12/31/2022 with urosepsis, acute renal insufficiency 2/2 right ureteral obstruction (s/p emergent right PNT on 12/31). CT scan w/o contrast notable for enlarged heterogeneous uterus (14 x 8 cm) and retroperitoneal lymphadenopathy, concerning for uterine malignancy.      --Reviewed with radiologist today.  Uncertain pelvic anatomy.  Imaging as per below.  --Continue cares per primary team/intensivist.  --CA-125 level only mildly elevated at 85  -- IR consulted for lymph node/soft tissue mass biopsy - per IR retroperitoneal soft tissue lesions are not safely accessible with percutaneous bx. Recommended CT CAP with IV contrast (when Cr improved) to assess for more accessible lesion.   --Reviewed with patient that prognosis and treatment would be determined by pathology results - aware that surgery and/or chemotherapy would be recommended for treatment    --CT Chest/Abdomen/Pelvis WITH contrast when clinically able/pending Cr results - this will be important to evaluate for metastatic disease. Cr trending down, 1.25 today.      We'll continue to follow patient while she's inpatient, and organize outpatient follow-up for the patient.      Shari Alvarez MD  Gynecologic Oncology  [facility]  [facility]: [phone]

## 2023-01-04 ENCOUNTER — TELEPHONE (OUTPATIENT)
Dept: INTERNAL MEDICINE | Facility: CLINIC | Age: 73
End: 2023-01-04

## 2023-01-04 ENCOUNTER — APPOINTMENT (OUTPATIENT)
Dept: MRI IMAGING | Facility: HOSPITAL | Age: 73
DRG: 871 | End: 2023-01-04
Attending: PHYSICIAN ASSISTANT
Payer: MEDICARE

## 2023-01-04 LAB
ANION GAP SERPL CALCULATED.3IONS-SCNC: 9 MMOL/L (ref 7–15)
BUN SERPL-MCNC: 32.8 MG/DL (ref 8–23)
CALCIUM SERPL-MCNC: 8.6 MG/DL (ref 8.8–10.2)
CHLORIDE SERPL-SCNC: 106 MMOL/L (ref 98–107)
CREAT SERPL-MCNC: 1.04 MG/DL (ref 0.51–0.95)
DEPRECATED HCO3 PLAS-SCNC: 27 MMOL/L (ref 22–29)
ERYTHROCYTE [DISTWIDTH] IN BLOOD BY AUTOMATED COUNT: 13.3 % (ref 10–15)
GFR SERPL CREATININE-BSD FRML MDRD: 57 ML/MIN/1.73M2
GLUCOSE BLDC GLUCOMTR-MCNC: 174 MG/DL (ref 70–99)
GLUCOSE BLDC GLUCOMTR-MCNC: 195 MG/DL (ref 70–99)
GLUCOSE BLDC GLUCOMTR-MCNC: 93 MG/DL (ref 70–99)
GLUCOSE BLDC GLUCOMTR-MCNC: 98 MG/DL (ref 70–99)
GLUCOSE SERPL-MCNC: 102 MG/DL (ref 70–99)
HCT VFR BLD AUTO: 30.6 % (ref 35–47)
HGB BLD-MCNC: 9.6 G/DL (ref 11.7–15.7)
MCH RBC QN AUTO: 25.9 PG (ref 26.5–33)
MCHC RBC AUTO-ENTMCNC: 31.4 G/DL (ref 31.5–36.5)
MCV RBC AUTO: 83 FL (ref 78–100)
PLATELET # BLD AUTO: 285 10E3/UL (ref 150–450)
POTASSIUM SERPL-SCNC: 3.5 MMOL/L (ref 3.4–5.3)
RBC # BLD AUTO: 3.7 10E6/UL (ref 3.8–5.2)
SODIUM SERPL-SCNC: 142 MMOL/L (ref 136–145)
WBC # BLD AUTO: 21.3 10E3/UL (ref 4–11)

## 2023-01-04 PROCEDURE — 85027 COMPLETE CBC AUTOMATED: CPT | Performed by: FAMILY MEDICINE

## 2023-01-04 PROCEDURE — 250N000011 HC RX IP 250 OP 636: Performed by: FAMILY MEDICINE

## 2023-01-04 PROCEDURE — 250N000013 HC RX MED GY IP 250 OP 250 PS 637: Performed by: FAMILY MEDICINE

## 2023-01-04 PROCEDURE — 250N000012 HC RX MED GY IP 250 OP 636 PS 637: Performed by: FAMILY MEDICINE

## 2023-01-04 PROCEDURE — 80048 BASIC METABOLIC PNL TOTAL CA: CPT | Performed by: FAMILY MEDICINE

## 2023-01-04 PROCEDURE — A9585 GADOBUTROL INJECTION: HCPCS | Performed by: FAMILY MEDICINE

## 2023-01-04 PROCEDURE — 250N000013 HC RX MED GY IP 250 OP 250 PS 637: Performed by: INTERNAL MEDICINE

## 2023-01-04 PROCEDURE — 120N000001 HC R&B MED SURG/OB

## 2023-01-04 PROCEDURE — 72196 MRI PELVIS W/DYE: CPT | Mod: MG

## 2023-01-04 PROCEDURE — 250N000011 HC RX IP 250 OP 636: Performed by: INTERNAL MEDICINE

## 2023-01-04 PROCEDURE — 99232 SBSQ HOSP IP/OBS MODERATE 35: CPT | Performed by: FAMILY MEDICINE

## 2023-01-04 PROCEDURE — 255N000002 HC RX 255 OP 636: Performed by: FAMILY MEDICINE

## 2023-01-04 RX ORDER — ACETAMINOPHEN 325 MG/1
650 TABLET ORAL EVERY 4 HOURS PRN
Status: DISCONTINUED | OUTPATIENT
Start: 2023-01-04 | End: 2023-01-05 | Stop reason: HOSPADM

## 2023-01-04 RX ORDER — NALOXONE HYDROCHLORIDE 0.4 MG/ML
0.4 INJECTION, SOLUTION INTRAMUSCULAR; INTRAVENOUS; SUBCUTANEOUS
Status: DISCONTINUED | OUTPATIENT
Start: 2023-01-04 | End: 2023-01-05 | Stop reason: HOSPADM

## 2023-01-04 RX ORDER — GADOBUTROL 604.72 MG/ML
7 INJECTION INTRAVENOUS ONCE
Status: COMPLETED | OUTPATIENT
Start: 2023-01-04 | End: 2023-01-04

## 2023-01-04 RX ORDER — NALOXONE HYDROCHLORIDE 0.4 MG/ML
0.2 INJECTION, SOLUTION INTRAMUSCULAR; INTRAVENOUS; SUBCUTANEOUS
Status: DISCONTINUED | OUTPATIENT
Start: 2023-01-04 | End: 2023-01-05 | Stop reason: HOSPADM

## 2023-01-04 RX ORDER — FUROSEMIDE 10 MG/ML
20 INJECTION INTRAMUSCULAR; INTRAVENOUS ONCE
Status: COMPLETED | OUTPATIENT
Start: 2023-01-04 | End: 2023-01-04

## 2023-01-04 RX ORDER — BISACODYL 10 MG
10 SUPPOSITORY, RECTAL RECTAL DAILY PRN
Status: DISCONTINUED | OUTPATIENT
Start: 2023-01-04 | End: 2023-01-05 | Stop reason: HOSPADM

## 2023-01-04 RX ORDER — POLYETHYLENE GLYCOL 3350 17 G/17G
17 POWDER, FOR SOLUTION ORAL 2 TIMES DAILY
Status: DISCONTINUED | OUTPATIENT
Start: 2023-01-04 | End: 2023-01-05 | Stop reason: HOSPADM

## 2023-01-04 RX ORDER — DOCUSATE SODIUM 100 MG/1
100 CAPSULE, LIQUID FILLED ORAL 2 TIMES DAILY
Status: DISCONTINUED | OUTPATIENT
Start: 2023-01-04 | End: 2023-01-05 | Stop reason: HOSPADM

## 2023-01-04 RX ADMIN — PANTOPRAZOLE SODIUM 40 MG: 40 TABLET, DELAYED RELEASE ORAL at 06:03

## 2023-01-04 RX ADMIN — MONTELUKAST 10 MG: 10 TABLET, FILM COATED ORAL at 20:51

## 2023-01-04 RX ADMIN — GADOBUTROL 7 ML: 604.72 INJECTION INTRAVENOUS at 13:50

## 2023-01-04 RX ADMIN — HEPARIN SODIUM 5000 UNITS: 10000 INJECTION, SOLUTION INTRAVENOUS; SUBCUTANEOUS at 14:32

## 2023-01-04 RX ADMIN — HEPARIN SODIUM 5000 UNITS: 10000 INJECTION, SOLUTION INTRAVENOUS; SUBCUTANEOUS at 20:52

## 2023-01-04 RX ADMIN — CETIRIZINE HYDROCHLORIDE 10 MG: 10 TABLET, FILM COATED ORAL at 09:08

## 2023-01-04 RX ADMIN — DOCUSATE SODIUM 100 MG: 100 CAPSULE, LIQUID FILLED ORAL at 08:55

## 2023-01-04 RX ADMIN — PREDNISONE 20 MG: 20 TABLET ORAL at 08:56

## 2023-01-04 RX ADMIN — POLYETHYLENE GLYCOL 3350 17 G: 17 POWDER, FOR SOLUTION ORAL at 08:55

## 2023-01-04 RX ADMIN — POLYETHYLENE GLYCOL 3350 17 G: 17 POWDER, FOR SOLUTION ORAL at 20:51

## 2023-01-04 RX ADMIN — FLUTICASONE FUROATE AND VILANTEROL TRIFENATATE 1 PUFF: 100; 25 POWDER RESPIRATORY (INHALATION) at 08:57

## 2023-01-04 RX ADMIN — FUROSEMIDE 20 MG: 10 INJECTION, SOLUTION INTRAMUSCULAR; INTRAVENOUS at 08:56

## 2023-01-04 RX ADMIN — CEFTRIAXONE SODIUM 1 G: 1 INJECTION, POWDER, FOR SOLUTION INTRAMUSCULAR; INTRAVENOUS at 09:02

## 2023-01-04 RX ADMIN — DOCUSATE SODIUM 100 MG: 100 CAPSULE, LIQUID FILLED ORAL at 20:51

## 2023-01-04 RX ADMIN — HEPARIN SODIUM 5000 UNITS: 10000 INJECTION, SOLUTION INTRAVENOUS; SUBCUTANEOUS at 06:03

## 2023-01-04 ASSESSMENT — ACTIVITIES OF DAILY LIVING (ADL)
ADLS_ACUITY_SCORE: 20

## 2023-01-04 NOTE — PROGRESS NOTES
Care Management Follow Up    Length of Stay (days): 4    Expected Discharge Date: 01/05/2023     Concerns to be Addressed:     Discharge planning  Patient plan of care discussed at interdisciplinary rounds: Yes    Anticipated Discharge Disposition: home no needs      Anticipated Discharge Services:  n/a  Anticipated Discharge DME:  n/a    Education Provided on the Discharge Plan:  n/a  Patient/Family in Agreement with the Plan:  yes    Referrals Placed by CM/SW:  jasmin - home with assist  Private pay costs discussed: Not applicable    Additional Information:  No needs anticipated from CM at discharge per pt; independent at baseline. Pt to follow up with PCP post discharge if needs arise. Family to transport home.  9:30 AM    MIKHAIL Maradiaga  1/4/2023

## 2023-01-04 NOTE — PROGRESS NOTES
GYN/ONC PROGRESS NOTE    HPI: Pt reports mild abdominal discomfort and bloating, no BM x 4 days. Passing small amount of flatus. Low appetite, denies n/v. Has remote history of abnormal pap smear 6-7 years ago.     EXAM:    Intake/Output Summary (Last 24 hours) at 1/4/2023 0701  Last data filed at 1/4/2023 0604  Gross per 24 hour   Intake 2252 ml   Output 1575 ml   Net 677 ml       VSS.    GENERAL: alert, NAD  RESPIRATORY: no dyspnea  EXTREMITY: no edema  SKIN: warm and dry, no jaundice, no rashes      LABS  Recent Labs   Lab Test 01/04/23  0605 01/03/23  0311   WBC 21.3* 39.2*   RBC 3.70* 3.47*   HGB 9.6* 9.1*   HCT 30.6* 28.2*   MCV 83 81   MCH 25.9* 26.2*   MCHC 31.4* 32.3    286     Recent Labs   Lab Test 01/04/23  0605 01/03/23  1901 01/03/23  0311   POTASSIUM 3.5 3.8 3.6   CHLORIDE 106  --  104   BUN 32.8*  --  45.4*      Recent Labs   Lab Test 12/31/22 2003 12/31/22  1508 12/31/22  1314   PROTEIN 100* 100*  --    BILITOTAL  --   --  0.9   AST  --   --  98*   ALT  --   --  92*     ASSESSMENT AND PLAN :   72 year old female admitted on 12/31/2022 with urosepsis, acute renal insufficiency 2/2 right ureteral obstruction (s/p emergent right PNT on 12/31). CT scan w/o contrast notable for enlarged heterogeneous uterus (14 x 8 cm) and retroperitoneal lymphadenopathy, concerning for gyn malignancy.     - Pt has remote history of abnormal pap smear 6-7 years ago. No PMB.   - Continue cares per primary team  - CA-125 level only mildly elevated at 85  - Repeat CT CAP with IV contrast done 1/3/22 demonstrates retroperitoneal adenopathy and a poorly defined pelvic mass involving the right adnexa, uterus, and potentially cervix.  - IR re-consulted for CT-guided lymph node percutaneous bx.   - Prognosis and treatment will be determined by pathology results - discussed with patient that surgery and/or chemotherapy would be recommended for treatment   - Cr trending down, 1.04 today.      We'll continue to follow  patient while she's inpatient, and organize outpatient follow-up for the patient.      Discussed with Dr. Dev Alvarez, RN.     Yarelis Herrera PA-C  Gynecologic Oncology  [facility]  [facility]: [phone]

## 2023-01-04 NOTE — PLAN OF CARE
Patient Aox4. VSS on RA. Denies chest pain, n/v and SOB. Up independently; steady gait. PICC in place; wnl. IVF infusing. Fall precautions in place. Using call lig appropriately.    Goal Outcome Evaluation:  Problem: Plan of Care - These are the overarching goals to be used throughout the patient stay.    Goal: Absence of Hospital-Acquired Illness or Injury  Intervention: Identify and Manage Fall Risk  Recent Flowsheet Documentation  Taken 1/4/2023 0100 by Minal Robledo RN  Safety Promotion/Fall Prevention:    assistive device/personal items within reach    fall prevention program maintained    nonskid shoes/slippers when out of bed    patient and family education     Problem: Acute Kidney Injury/Impairment  Goal: Effective Renal Function  Intervention: Monitor and Support Renal Function  Recent Flowsheet Documentation  Taken 1/4/2023 0100 by Minal Robledo, RN  Medication Review/Management: medications reviewed

## 2023-01-04 NOTE — TELEPHONE ENCOUNTER
Hannah from [facility] calling to get this patients demographics and insurance faxed over to them.    [facility]   looking for demographics and insurance   Hannah  [phone]  Fax : [phone]

## 2023-01-04 NOTE — PROGRESS NOTES
[facility]    Medicine Progress Note - Hospitalist Service    Date of Admission:  12/31/2022    Assessment & Plan      72-year-old female with history of diabetes and asthma came into the emergency room department for evaluation of weakness.  Found to have sepsis secondary to complicated UTI complicated by right-sided hydronephrosis from obstructing mass.    Sepsis  Septic Shock due to complicated E. Coli urinary tract infection.   -resolved: Diagnosis based on HR > 90 bpm, RR > 20 breaths/min, WBC > 12 and hypotension despite appropriate volume resuscitation due to sepsis.  Now improved and off pressor therapy  ---out of ICU care 1/2  -- Cultures growing E. Coli. Pan sensitive  -- Given dose of Lasix in ICU, repeat today due to pleural effusion on CT  --- Status post nephrostomy tube placement  --- Continue Rocephin--transition to levofloxacin for discharge  ---stop tele  --- stop low dose IVF   --- Leukocytosis improving more today    INGRID  --- resolved -  ---  Secondary to obstruction  --- Status post PNT  --- stable with contrast given.    ---with stability stop low-dose IV fluids  ----lasix times one d/t pleural effusion  --holding home metformin    Adrenal insufficiency  --- Has long-term steroid use.  --- Now back on prednisone; decrease dose slightly for tomorrow; continue to wean down  --- Monitor blood pressures closely  --- Appears her home dose is 7 mg daily    Uterine mass  --- Reviewed gynecologic oncology notes.  --- CT chest abdomen pelvis with contrast done 1/3  --- Gynecologic oncology recommending pelvic MRI; ordered today showing tumor infiltration of uterine myometrium and cervical stroma along with locally invasive tumor involvement of right vagina, parametrium, right internal iliac vein and posterior bladder wall and fallopian tube adnexa ovary and gonadal vein.  Also shows bilateral external iliac  --- Appreciate input from [place] [facility]    Diabetes mellitus, type  "II  ---well contrilled with A1c 5.8  --- Blood sugars reviewed.  Overall adequate but note high sugars with meals and CDE notes reviewed  ---add meal times insulin  --- Weaning steroids so anticipate improved glucose control.  --- continue tohold metformin  --- She is on 7030 at home.  Anticipate return to this when oral intake stable.  --- Holding semaglutide     Hypokalemia--replacing per protocol    Asthma/allergies  --- Today resumed Singulair, Zyrtec, fluticasone, Advair, and and as needed albuterol    Hyperlipidemia--continue Crestor       Diet: High Consistent Carb (75 g CHO per Meal) Diet    DVT Prophylaxis: Heparin SQ  Waddell Catheter: Not present  Lines: PRESENT      PICC 12/31/22 Triple Lumen Right Basilic Vasopressor-Site Assessment: WDL      Cardiac Monitoring: None  Code Status: Full Code      Clinically Significant Risk Factors              # Hypoalbuminemia: Lowest albumin = 3 g/dL at 12/31/2022  1:14 PM, will monitor as appropriate           # Overweight: Estimated body mass index is 29.06 kg/m  as calculated from the following:    Height as of this encounter: 1.549 m (5' 1\").    Weight as of this encounter: 69.8 kg (153 lb 12.8 oz)., PRESENT ON ADMISSION         Disposition Plan      Expected Discharge Date: 01/05/2023    Discharge Delays: Procedure Pending (enter procedure & time in comments)  Destination: home            Natividad Harmon MD  Hospitalist Service  [facility]  Securely message with [employer] (more info)  Text page via [employer] Paging/Directory   ______________________________________________________________________    Interval History   --- Patient seen in the presence of her .  They are anxious regarding how biopsy will be done and possibility of CT scan.  --- Pain minimal on her right side.  --- Denies dysuria  --- No vomiting or diarrhea  --- Urinating well with Waddell    Physical Exam   Vital Signs: Temp: 98.8  F (37.1  C) Temp src: Oral BP: 131/76 Pulse: 89   " Resp: 18 SpO2: 96 % O2 Device: None (Room air)    Weight: 153 lbs 12.8 oz    General Appearance: Pleasant female no apparent distress  Respiratory: Clear to auscultation bilaterally  Cardiovascular: Regular rate and rhythm without significant murmurs rubs or gallops  GI: Obese soft and nontender  Skin: No significant lower extremity edema  Other: Neurologically gross intact without focal deficits patient    Medical Decision Making           Data     I have personally reviewed the following data over the past 24 hrs:    21.3 (H)  \   9.6 (L)   / 285     142 106 32.8 (H) /  174 (H)   3.5 27 1.04 (H) \       Imaging results reviewed over the past 24 hrs:   Recent Results (from the past 24 hour(s))   CT Chest/Abdomen/Pelvis w Contrast    Narrative    EXAM: CT CHEST/ABDOMEN/PELVIS W CONTRAST  LOCATION: [facility]  DATE/TIME: 1/3/2023 6:16 PM    INDICATION: uterine mass causing obstruction, eval for metastatic disease, possible bx  COMPARISON: 12/31/2022  TECHNIQUE: CT scan of the chest, abdomen, and pelvis was performed following injection of IV contrast. Multiplanar reformats were obtained. Dose reduction techniques were used.   CONTRAST: isovue 370  75ml    FINDINGS:   LUNGS AND PLEURA: New small bilateral pleural effusions, right greater than left. Associated compressive atelectasis. No pneumothorax.    MEDIASTINUM/AXILLAE: Right-sided PICC line terminates at the cavoatrial junction. No significant mediastinal or hilar adenopathy. Large hiatal hernia redemonstrated.    CORONARY ARTERY CALCIFICATION: Mild.    HEPATOBILIARY: 4.3 cm posterior right hepatic lobe low-attenuation lesion near the dome favored to represent hemangioma.    PANCREAS: Unremarkable    SPLEEN: Unremarkable    ADRENAL GLANDS: Unremarkable    KIDNEYS/BLADDER: Right-sided percutaneous nephrostomy in place with interval resolution of hydronephrosis and perinephric stranding. No new fluid collections. Minimal prominence of the  left intrarenal collecting system, likely unchanged. Completely   decompressed bladder.    BOWEL: Diverticulosis. No bowel obstruction.    LYMPH NODES: Retroperitoneal adenopathy redemonstrated. Previous index adenopathy includes 2.0 cm aortocaval node, image 168, previously 1.4 cm; conglomerate aortocaval adenopathy, image 184, 4.2 x 1.9 cm, previously 4.1 x 1.7 cm; right-sided iliac chain   conglomerate adenopathy, image 122, 4.4 x 3.2 cm, previously 4.0 x 3.4 cm; left external iliac chain adenopathy, image 227, 2.1 x 1.9 cm, previously 2.0 x 1.4 cm.    VASCULATURE: No abdominal aortic aneurysm. Patent portal vein.    PELVIC ORGANS: Diffuse uterine enlargement and heterogeneity with areas of associated necrosis, similar in appearance to previous examination. Abnormal soft tissue extends from the uterus and the right adnexa measuring up to 4.47 m in size, image 2027,   similar to prior exam. Abnormal confluent enhancing soft tissue in the region of the cervix noted, particularly to the right of midline.    MUSCULOSKELETAL: No acute bony abnormalities.      Impression    IMPRESSION:  1.  Interval resolution of right-sided hydronephrosis status post percutaneous nephrostomy placement.  2.  Poorly defined pelvic mass involving the right adnexa, uterus, and potentially cervix compatible with malignancy.  3.  Increasing size of retroperitoneal and pelvic sidewall adenopathy.  4.  Probable hepatic hemangioma.  5.  New bilateral pleural effusions.   MR Pelvis (GYN) w Contrast    Narrative    EXAM: MR PELVIS (GYN) W CONTRAST  LOCATION: [facility]  DATE/TIME: 1/4/2023 2:06 PM    INDICATION: Pelvic mass with para-aortic and pelvic lymphadenopathy. Concern for gyn malignancy, unclear source  uterine vs cervical  COMPARISON: 01/03/2023 contrast-enhanced CT CAP and 12/31/2022 noncontrast CT CAP.  TECHNIQUE: Routine MRI female pelvis protocol including T1 in/out phase, diffusion, thin section high  resolution multiplane T2, and post contrast T1.  CONTRAST: Gadavist 7 mL    FINDINGS:     UTERUS: Tumor infiltration of the uterine fundus, body and lower uterine segment myometrium as well as the anterior, right lateral and posterior cervical stroma (maximal dimensions 14 cm CC, 12 cm ML, 7 cm AP) with relative sparing of the endometrial   cavity and the endocervical canal.    Tumor extension throughout the right anterolateral vaginal wall from the level of the fornices inferiorly to a point just above the level external urethral meatus (series 7 sagittal images 19-23).    Tumor extension into the right parametrial tissue, intravascular tumor extension into all of the right internal iliac vein branches and abutment of the developing right sciatic nerve.    Tumor involvement of the posterior bladder wall over an area 3 x 3 cm and about 1 cm AP thickness (series 7 image 23).    Tumor extension through the right cornual region into the right fallopian tube with adnexal, ovarian and right gonadal vein involvement that in total measures about 6 x 5 x 3 cm (series 7 sagittal image 12 and series 15 axial image 8).    ENDOMETRIAL CAVITY: The relatively uniform endometrium measures about 3-6 mm.    ADNEXA: Right adnexal involvement as described above. Left adnexa and ovary within normal limits.    PELVIC LYMPH NODES: Bilateral external iliac lymph node lymphadenopathy with a 4.4 x 3.1 cm right external iliac lymph node and a 1.8 cm left external iliac lymph node.      Impression    IMPRESSION:  1.  Tumor infiltration of the uterine myometrium and most of the cervical stroma with relative sparing of the endometrial cavity and endocervical canal raises the possibility of endocervical type adenocarcinoma and makes endometrial adenocarcinoma and   the cervical squamosal cell carcinoma less likely.  2.  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  3.  Bilateral external iliac lymphadenopathy.

## 2023-01-04 NOTE — PLAN OF CARE
Problem: Plan of Care - These are the overarching goals to be used throughout the patient stay.    Goal: Absence of Hospital-Acquired Illness or Injury  Intervention: Identify and Manage Fall Risk  Recent Flowsheet Documentation  Taken 1/3/2023 1623 by Caio Mendes RN  Safety Promotion/Fall Prevention:    clutter free environment maintained    mobility aid in reach    nonskid shoes/slippers when out of bed    patient and family education     Problem: Plan of Care - These are the overarching goals to be used throughout the patient stay.    Goal: Absence of Hospital-Acquired Illness or Injury  Intervention: Prevent Skin Injury  Recent Flowsheet Documentation  Taken 1/3/2023 1623 by Caio Mendes RN  Body Position: position changed independently   Goal Outcome Evaluation:         Patient denies pain at rest. Patient able to turn and reposition independently to prevent skin injury.

## 2023-01-04 NOTE — CONSULTS
INTERVENTIONAL RADIOLOGY CONSULT    Contrast enhanced CT chest, abdomen and pelvis 01/03/2023 reviewed. Again demonstrated is a large heterogeneous mass involving the entire uterus and cervix, a smaller right adnexal mass and para-aortic and bilateral iliac lymphadenopathy. Unfortunately the para-aortic and bilateral iliac lymphadenopathy is surrounded by vascular structures and not amenable to percutaneous CT-guided biopsy. Percutaneous biopsy of the uterine mass or right adnexal mass would require passing the biopsy needle through the peritoneal space which should be avoided in this patient with no evidence of peritoneal disease. Options for tissue diagnosis would include pelvic exam with transcervical sampling of the abnormal cervical and uterine tissue or endoscopic ultrasound-guided FNA of an upper para-aortic lymph node that is abutting the third duodenal segment.    Discussed with Yarelis HAAS

## 2023-01-04 NOTE — PLAN OF CARE
"Goal Outcome Evaluation:                 Problem: Plan of Care - These are the overarching goals to be used throughout the patient stay.    Goal: Plan of Care Review  Description: The Plan of Care Review/Shift note should be completed every shift.  The Outcome Evaluation is a brief statement about your assessment that the patient is improving, declining, or no change.  This information will be displayed automatically on your shift note.  Outcome: Progressing  Goal: Patient-Specific Goal (Individualized)  Description: You can add care plan individualizations to a care plan. Examples of Individualization might be:  \"Parent requests to be called daily at 9am for status\", \"I have a hard time hearing out of my right ear\", or \"Do not touch me to wake me up as it startles me\".  Outcome: Progressing  Goal: Absence of Hospital-Acquired Illness or Injury  Outcome: Progressing  Intervention: Identify and Manage Fall Risk  Recent Flowsheet Documentation  Taken 1/4/2023 0820 by Regine West, RN  Safety Promotion/Fall Prevention:   clutter free environment maintained   nonskid shoes/slippers when out of bed  Goal: Optimal Comfort and Wellbeing  Outcome: Progressing  Goal: Readiness for Transition of Care  Outcome: Progressing     Problem: Acute Kidney Injury/Impairment  Goal: Fluid and Electrolyte Balance  Outcome: Progressing  Goal: Optimal Nutrition Intake  Outcome: Progressing  Goal: Effective Renal Function  Outcome: Progressing  Intervention: Monitor and Support Renal Function  Recent Flowsheet Documentation  Taken 1/4/2023 0820 by Regine West, RN  Medication Review/Management: medications reviewed   MR pelvis completed. Pt ate late lunch with meal insulin coverage.         "

## 2023-01-05 VITALS
HEIGHT: 61 IN | TEMPERATURE: 99 F | BODY MASS INDEX: 29.04 KG/M2 | OXYGEN SATURATION: 94 % | DIASTOLIC BLOOD PRESSURE: 70 MMHG | HEART RATE: 73 BPM | RESPIRATION RATE: 17 BRPM | WEIGHT: 153.8 LBS | SYSTOLIC BLOOD PRESSURE: 140 MMHG

## 2023-01-05 LAB
ANION GAP SERPL CALCULATED.3IONS-SCNC: 10 MMOL/L (ref 7–15)
BACTERIA BLD CULT: NO GROWTH
BACTERIA BLD CULT: NO GROWTH
BUN SERPL-MCNC: 19.9 MG/DL (ref 8–23)
CALCIUM SERPL-MCNC: 8.4 MG/DL (ref 8.8–10.2)
CHLORIDE SERPL-SCNC: 103 MMOL/L (ref 98–107)
CREAT SERPL-MCNC: 0.93 MG/DL (ref 0.51–0.95)
DEPRECATED HCO3 PLAS-SCNC: 30 MMOL/L (ref 22–29)
ERYTHROCYTE [DISTWIDTH] IN BLOOD BY AUTOMATED COUNT: 13.2 % (ref 10–15)
GFR SERPL CREATININE-BSD FRML MDRD: 65 ML/MIN/1.73M2
GLUCOSE BLDC GLUCOMTR-MCNC: 82 MG/DL (ref 70–99)
GLUCOSE SERPL-MCNC: 75 MG/DL (ref 70–99)
HCT VFR BLD AUTO: 19.6 % (ref 35–47)
HGB BLD-MCNC: 6.1 G/DL (ref 11.7–15.7)
HGB BLD-MCNC: 9.5 G/DL (ref 11.7–15.7)
MCH RBC QN AUTO: 25.7 PG (ref 26.5–33)
MCHC RBC AUTO-ENTMCNC: 31.1 G/DL (ref 31.5–36.5)
MCV RBC AUTO: 83 FL (ref 78–100)
PLATELET # BLD AUTO: 372 10E3/UL (ref 150–450)
POTASSIUM SERPL-SCNC: 3.2 MMOL/L (ref 3.4–5.3)
RBC # BLD AUTO: 2.37 10E6/UL (ref 3.8–5.2)
SODIUM SERPL-SCNC: 143 MMOL/L (ref 136–145)
WBC # BLD AUTO: 25.8 10E3/UL (ref 4–11)

## 2023-01-05 PROCEDURE — 99239 HOSP IP/OBS DSCHRG MGMT >30: CPT | Performed by: FAMILY MEDICINE

## 2023-01-05 PROCEDURE — 85018 HEMOGLOBIN: CPT | Performed by: FAMILY MEDICINE

## 2023-01-05 PROCEDURE — 80048 BASIC METABOLIC PNL TOTAL CA: CPT | Performed by: FAMILY MEDICINE

## 2023-01-05 PROCEDURE — 250N000011 HC RX IP 250 OP 636: Performed by: INTERNAL MEDICINE

## 2023-01-05 PROCEDURE — 250N000012 HC RX MED GY IP 250 OP 636 PS 637: Performed by: FAMILY MEDICINE

## 2023-01-05 PROCEDURE — 250N000013 HC RX MED GY IP 250 OP 250 PS 637: Performed by: FAMILY MEDICINE

## 2023-01-05 PROCEDURE — 36415 COLL VENOUS BLD VENIPUNCTURE: CPT | Performed by: FAMILY MEDICINE

## 2023-01-05 PROCEDURE — 250N000013 HC RX MED GY IP 250 OP 250 PS 637: Performed by: INTERNAL MEDICINE

## 2023-01-05 PROCEDURE — 85014 HEMATOCRIT: CPT | Performed by: FAMILY MEDICINE

## 2023-01-05 RX ORDER — LISINOPRIL 20 MG/1
10 TABLET ORAL DAILY
Qty: 90 TABLET | Refills: 11
Start: 2023-01-05 | End: 2023-01-19

## 2023-01-05 RX ORDER — LEVOFLOXACIN 750 MG/1
750 TABLET, FILM COATED ORAL DAILY
Qty: 5 TABLET | Refills: 0 | Status: SHIPPED | OUTPATIENT
Start: 2023-01-05 | End: 2023-01-10

## 2023-01-05 RX ORDER — PREDNISONE 5 MG/1
TABLET ORAL
Qty: 30 TABLET | Refills: 3
Start: 2023-01-05 | End: 2023-01-05

## 2023-01-05 RX ORDER — PREDNISONE 5 MG/1
TABLET ORAL
Qty: 60 TABLET | Refills: 3 | Status: SHIPPED | OUTPATIENT
Start: 2023-01-05 | End: 2023-01-01

## 2023-01-05 RX ORDER — METFORMIN HCL 500 MG
1000 TABLET, EXTENDED RELEASE 24 HR ORAL
Qty: 90 TABLET | Refills: 4
Start: 2023-01-05 | End: 2023-03-23

## 2023-01-05 RX ORDER — ACETAMINOPHEN 325 MG/1
650 TABLET ORAL EVERY 4 HOURS PRN
Start: 2023-01-05

## 2023-01-05 RX ORDER — POTASSIUM CHLORIDE 1500 MG/1
40 TABLET, EXTENDED RELEASE ORAL ONCE
Status: COMPLETED | OUTPATIENT
Start: 2023-01-05 | End: 2023-01-05

## 2023-01-05 RX ADMIN — CETIRIZINE HYDROCHLORIDE 10 MG: 10 TABLET, FILM COATED ORAL at 08:54

## 2023-01-05 RX ADMIN — PANTOPRAZOLE SODIUM 40 MG: 40 TABLET, DELAYED RELEASE ORAL at 05:50

## 2023-01-05 RX ADMIN — PREDNISONE 20 MG: 20 TABLET ORAL at 08:54

## 2023-01-05 RX ADMIN — FLUTICASONE FUROATE AND VILANTEROL TRIFENATATE 1 PUFF: 100; 25 POWDER RESPIRATORY (INHALATION) at 09:01

## 2023-01-05 RX ADMIN — POTASSIUM CHLORIDE 40 MEQ: 1500 TABLET, EXTENDED RELEASE ORAL at 08:53

## 2023-01-05 RX ADMIN — HEPARIN SODIUM 5000 UNITS: 10000 INJECTION, SOLUTION INTRAVENOUS; SUBCUTANEOUS at 05:52

## 2023-01-05 RX ADMIN — CEFTRIAXONE SODIUM 1 G: 1 INJECTION, POWDER, FOR SOLUTION INTRAMUSCULAR; INTRAVENOUS at 09:11

## 2023-01-05 ASSESSMENT — ACTIVITIES OF DAILY LIVING (ADL)
ADLS_ACUITY_SCORE: 20

## 2023-01-05 NOTE — PROVIDER NOTIFICATION
Pt GEORGE Ayonирина in rm 416 has a hgb of 6.1 this a.m. Lab is questioning if the sample was contaminated somehow. Contacted Dr. Harmon to see if she'd  like to redraw her, this time with a stick instead of the PICC. Regine FARMER will be her nurse for daylight. Report given.

## 2023-01-05 NOTE — PROGRESS NOTES
Care Management Discharge Note    Discharge Date: 01/05/2023       Discharge Disposition:  Home no needs    Discharge Services:  n/a    Discharge DME:  n/a    Discharge Transportation: family or friend will provide    Private pay costs discussed: Not applicable    Education Provided on the Discharge Plan:  Home no needs  Persons Notified of Discharge Plans: yes  Patient/Family in Agreement with the Plan:  yes    Handoff Referral Completed: Yes    Additional Information:  No needs anticipated from CM at discharge per pt; independent at baseline. Pt to follow up with PCP post discharge if needs arise. Family to transport home.  9:21 AM    MIKHAIL Maradiaga  1/5/2023

## 2023-01-05 NOTE — DISCHARGE SUMMARY
[facility]  Hospitalist Discharge Summary      Date of Admission:  12/31/2022  Date of Discharge:  1/5/2023 12:10 PM  Discharging Provider: Natividad Harmon MD  Discharge Service: Hospitalist Service    Discharge Diagnoses       Severe sepsis with septic shock, resolved    E. 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  Currently on steroid taper    Diabetes mellitus, type II, well controlled A1c 5.8    Moderate persistent asthma    Environmental allergies    Hyperlipidemia    Follow-ups Needed After Discharge   Follow-up Appointments     Follow-up and recommended labs and tests       Follow up with primary care provider, Lacho Gunter, within 7 days   to evaluate medication change and for hospital follow- up.  The following   labs/tests are recommended: cbc and bmp  Follow up with Dr. Rick Alvarez as recommended for office visit and   biopsy.    Continue on decreased amount of lisinopril and metformin until seen by   primary care to assess if you should go back up to full dosing             Discharge Disposition   Discharged to home  Condition at discharge: Stable      Hospital Course   72-year-old female with history of diabetes and asthma came into the emergency room department for evaluation of weakness.  Found to have sepsis secondary to complicated UTI complicated by right-sided hydronephrosis from obstructing mass.  Patient was brought to the emergency room department where she was given aggressive IV fluid resuscitation and did require pressor therapy and IV antibiotics.  Due to obstruction urgent percutaneous nephrostomy tube was placed.  She improved.  Gynecologic oncology was consulted.  Patient underwent CT chest abdomen pelvis confirming uterine mass.  No percutaneous easy biopsy was felt possible and planning outpatient biopsy with gynecologic oncology.   Improved transferred out of ICU with cultures growing E. coli pansensitive.  Transition onto oral antibiotics.  Passed PT OT evaluation.  Stable for home discharge with detailed hospital course per below    Sepsis  Septic Shock due to complicated E. Coli urinary tract infection.   -resolved: Diagnosis based on HR > 90 bpm, RR > 20 breaths/min, WBC > 12 and hypotension despite appropriate volume resuscitation due to sepsis.  Now improved and off pressor therapy  ---out of ICU care 1/2  -- Cultures growing E. Coli. Pan sensitive  -- Given dose of Lasix x2   --- Status post nephrostomy tube placement, will need nephrostomy tube in place until obstruction resolved with cancer treatment.  --- was on Rocephin--transition to levofloxacin for discharge  ----Follow-up outpatient for leukocytosis resolution, improving    INGRID  --- resolved -  ---  Secondary to obstruction  --- Status post PNT  --- stable with contrast given.    --- Okay to resume home metformin at half dosing    Adrenal insufficiency  --- Has long-term steroid use.  --- Now back on prednisone; continue slow wean over the next 9 days back to home dosing  --- Monitor blood pressures closely  --- Appears her home dose is 5-7 mg daily    Uterine mass  --- Reviewed gynecologic oncology notes.  --- CT chest abdomen pelvis with contrast done 1/3  --- Gynecologic oncology recommending pelvic MRI; ordered today showing tumor infiltration of uterine myometrium and cervical stroma along with locally invasive tumor involvement of right vagina, parametrium, right internal iliac vein and posterior bladder wall and fallopian tube adnexa ovary and gonadal vein.  Also shows bilateral external iliac  --- Appreciate input from [place] [facility]  --- Planning outpatient biopsy with [place] [facility]    Diabetes mellitus, type II  ---well contrilled with A1c 5.8  --- Blood sugars reviewed.  Overall adequate but note high sugars with meals and CDE notes reviewed  ---add meal  times insulin  --- Weaning steroids so anticipate improved glucose control.  --- continue tohold metformin  --- She is on 7030 at home.  Anticipate return to this when oral intake stable.  --- Holding semaglutide       Consultations This Hospital Stay   PHARMACY TO DOSE VANCO  VASCULAR ACCESS ADULT IP CONSULT  PHARMACY TO DOSE VANCO  OB GYN IP CONSULT  GYNECOLOGIC ONCOLOGY IP CONSULT  CARE MANAGEMENT / SOCIAL WORK IP CONSULT  INTERVENTIONAL RADIOLOGY ADULT/PEDS IP CONSULT  PHYSICAL THERAPY ADULT IP CONSULT  OCCUPATIONAL THERAPY ADULT IP CONSULT  INTERVENTIONAL RADIOLOGY ADULT/PEDS IP CONSULT    Code Status   Prior    Time Spent on this Encounter   I, Natividad Harmon MD, personally saw the patient today and spent greater than 30 minutes discharging this patient.       Natividad Harmon MD  [place] [id number]  Phone: [phone]  Fax: [phone]  ______________________________________________________________________    Physical Exam   Vital Signs: Temp: 99  F (37.2  C) Temp src: Oral BP: (!) 140/70 Pulse: 73   Resp: 17 SpO2: 94 % O2 Device: None (Room air)    Weight: 153 lbs 12.8 oz  General Appearance: Pleasant female no apparent distress  Respiratory: Clear to auscultation bilaterally  Cardiovascular: Regular rate and rhythm without murmurs rubs or gallop  GI: Soft obese and nontender.  No clear masses palpated.  Skin: No significant lower extremity edema.  No rash  Other: Neurologically gross intact without focal deficits appreciated       Primary Care Physician   Lacho Gunter    Discharge Orders      Brief Discharge Instructions    Percutaneous Nephrostomy Tube (PNT) Discharge Instructions:  You had a nephrostomy tube (PNT) placed. This is a catheter (plastic tube) that is inserted through your skin into your kidney. The nephrostomy tube is placed to drain urine from your body into a collecting bag outside your body. Please follow the below instructions  regarding care of your nephrostomy tube:    Care Instructions:  - If you received sedation for your procedure, do not drive or operate heavy machinery for the rest of the day.  - Rest after your drain was placed. Avoid strenuous activity and heavy lifting for the next 2 days. Return to your normal activities as you tolerate after the 2 day restriction.  - Keep the nephrostomy tube site clean and dry.   - You may shower, but do not submerge the nephrostomy tube site in water (avoid tub baths, Jacuzzis, hot tubs and pools)  - If you have a gauze dressing, change the gauze and tape dressing as needed to keep site clean and dry. If you have a securement device, leave in place until your next exchange.  - Clean around nephrostomy tubes exit sites with soap and water, pat and apply new split gauze around the tube at the exit site.  - Urine going into the bag may be red with blood for the first few days.  - Keep the drainage bag lower than your kidney to keep urine from backing up.  - Inspect the tube often for kinks.  - Empty your drainage bag when it is approximately half way fluid. Follow below instructions for emptying bag:  - Clean hands well with soap and water.  - Place a container near the outlet valve of the drainage bag.  - To open the valve:  - If you have a [facility] leg bag: Twist the blue valve at the bottom of the bag while holding the valve over your container to empty bag. Re-twist the valve closed on the drainage bag once complete.  - If you have a [facility] leg bag: Turn the lever downward while holding the valve over your container to empty the bag. Turn the valve upward to close once complete.  - Discard drainage into toilet once urine drained.  - Flush your drainage tube with 10mL sterile Normal Saline .       Flushing Your Drainage Tube: f tube stops draining or becomes occluded  1. Collect flushing supplies: 10mL of sterile normal saline in syringe, alcohol pad.  2. Clean the flushing (center)  "port with alcohol and attach the flushing syringe by twisting into place.  3. Turn the 3 way stopcock valve \"off\" to the drainage bag/bulb.  4. Gently inject/flush the drain so fluid is moving towards your body through the drainage catheter.   5. Turn the stopcock valve back to its center position to allow for drainage to resume.   6. Remove flushing syringe from flushing port by twisting off.    Follow-Up:  - Routine (every 2-3 months) nephrostomy tube exchange is recommended. Your Urologist may order and schedule exchanges by calling [facility] at [phone].  - Follow up with your Urologist.    Call [facility] ([phone]) if you experience the following:  - Nephrostomy tube(s) stops draining urine.  - Nephrostomy tube(s) site is leaking urine.  - Extreme pain at the nephrostomy tube site.  - Nephrostomy tube appears to be falling out or has fallen out, becomes clogged or breaks.    Seek Medical Evaluation for the following:  - Fever (greater than 101 F (38.3C)).  - Purulent (yellow/green/foul smelling) drainage from nephrostomy tube exit sites.  - Significant bleeding from nephrostomy tube site(s).  - Significant or worsening pain at nephrostomy tube exit site(s) or back.     Reason for your hospital stay    Sepsis from complicated urinary tract infection, pelvic mass     Follow-up and recommended labs and tests     Follow up with primary care provider, Lacho Gunter, within 7 days to evaluate medication change and for hospital follow- up.  The following labs/tests are recommended: cbc and bmp  Follow up with Dr. Rick Alvarez as recommended for office visit and biopsy.    Continue on decreased amount of lisinopril and metformin until seen by primary care to assess if you should go back up to full dosing     Activity    Your activity upon discharge: activity as tolerated     Tubes and drains    You are going home with the following tubes or drains: percutanous nephrostomy tube.  Tube cares " per hospital or home care instructions     Diet    Follow this diet upon discharge: Orders Placed This Encounter      High Consistent Carb (75 g CHO per Meal) Diet       Significant Results and Procedures   Most Recent 3 CBC's:Recent Labs   Lab Test 01/05/23  0820 01/05/23  0605 01/04/23  0605 01/03/23 0311   WBC  --  25.8* 21.3* 39.2*   HGB 9.5* 6.1* 9.6* 9.1*   MCV  --  83 83 81   PLT  --  372 285 286     Most Recent 3 BMP's:Recent Labs   Lab Test 01/05/23  0730 01/05/23  0605 01/04/23  2100 01/04/23  0615 01/04/23  0605 01/03/23 2025 01/03/23  1901 01/03/23  0744 01/03/23 0311   NA  --  143  --   --  142  --   --   --  140   POTASSIUM  --  3.2*  --   --  3.5  --  3.8  --  3.6   CHLORIDE  --  103  --   --  106  --   --   --  104   CO2  --  30*  --   --  27  --   --   --  26   BUN  --  19.9  --   --  32.8*  --   --   --  45.4*   CR  --  0.93  --   --  1.04*  --   --   --  1.25*   ANIONGAP  --  10  --   --  9  --   --   --  10   DAVID  --  8.4*  --   --  8.6*  --   --   --  8.2*   GLC 82 75 93   < > 102*   < >  --    < > 173*    < > = values in this interval not displayed.   ,   Results for orders placed or performed during the hospital encounter of 12/31/22   Head CT w/o contrast    Narrative    EXAM: CT HEAD W/O CONTRAST  LOCATION: [facility]  DATE/TIME: 12/31/2022 3:59 PM    INDICATION: Confusion  COMPARISON: CT head dated 12/28/2022  TECHNIQUE: Routine CT Head without IV contrast. Multiplanar reformats. Dose reduction techniques were used.    FINDINGS:   INTRACRANIAL CONTENTS: No acute intracranial hemorrhage. No CT evidence of acute infarct. Sequelae of mild chronic microangiopathy. Mild cerebral volume loss without hydrocephalus. No extra-axial fluid collections.  Patent basal cisterns.     VISUALIZED ORBITS/SINUSES/MASTOIDS: The orbits are unremarkable. Paranasal sinus is postsurgical change with mild to moderate nonaggressive mucosal thickening. The temporal bone structures are  well-aerated.     BONES/SOFT TISSUES: The calvarium and skull base are unremarkable.       Impression    IMPRESSION:     1. Senescent changes and sequelae of chronic microangiopathy without acute intracranial abnormality.   CT Chest Abdomen Pelvis w/o Contrast    Narrative    EXAM: CT CHEST ABDOMEN PELVIS W/O CONTRAST  LOCATION: [facility]  DATE/TIME: 12/31/2022 3:55 PM    INDICATION: sepsis, hypotension, WBC 58K   eval source of infection  COMPARISON: None.  TECHNIQUE: CT scan of the chest, abdomen, and pelvis was performed without IV contrast. Multiplanar reformats were obtained. Dose reduction techniques were used.   CONTRAST: None.    FINDINGS:   LUNGS AND PLEURA: Normal.    MEDIASTINUM/AXILLAE:  Moderate to large hiatal hernia with fluid throughout the esophagus. Low-attenuation the cardiac ventricles suggests anemia. Right PICC.     CORONARY ARTERY CALCIFICATION: None.    HEPATOBILIARY: Hepatic steatosis with mild hepatomegaly.     PANCREAS: Normal.    SPLEEN: Normal.    ADRENAL GLANDS: Normal.    KIDNEYS/BLADDER: 1 to 2 mm nonobstructing calculus at the upper pole of the right kidney. Right hydronephrosis and hydroureter extending to the lower ureter, where it crosses over pelvic soft tissue and vessels (series 3 image 228, for example). The   urinary bladder is decompressed with a Waddell catheter.     BOWEL: Colonic diverticulosis. Normal appendix.     LYMPH NODES: 14 mm lymph node between the aorta and IVC (series 3 image 175). 41 mm x 17 mm soft tissue in the region of the lower aorta and IVC (image 192). 40 mm  by 34 mm soft tissue mass adjacent to the right external iliac vessels (image 234). 20 mm   x 40 mm soft tissue adjacent to the left external iliac vessels (image 237).     VASCULATURE: Cannot assess patency on this noncontrast study.     PELVIC ORGANS:  Heterogeneous enlarged uterus with lobulation. One lobulated mass extends towards the right adnexa. There is minimal  nonspecific stranding in the lower pelvis.    MUSCULOSKELETAL: No fractures.       Impression    IMPRESSION:  1.  Right hydronephrosis and hydroureter extending to the distal ureter, where the ureter crosses vessels and a soft tissue mass.  2.  Nonspecific lower pelvic stranding could be from cystitis.  3.  Retroperitoneal masses are concerning for malignancy. The uterus is heterogeneous, lobulated, and enlarged, and a uterine or right ovarian mass is possible. Recommend further evaluation of the retroperitoneal soft tissue. PET/CT may be helpful.  4.  Hepatic steatosis.  5.  Fluid throughout the esophagus with a moderate to large hiatal hernia. This raises the risk of aspiration.      Discussed with Dr. Fernandez by me over telephone at 4:45 PM.   IR Nephrostomy Tube Placement Right    Narrative    [facility]  LOCATION: [facility]  DATE: 12/31/2022    PROCEDURE: PERCUTANEOUS ANTEGRADE PYELOGRAM AND PERCUTANEOUS NEPHROSTOMY PLACEMENT    ATTENDING: Caio Edward MD.    INDICATION: 72-year-old female with right renal hydronephrosis.    CONSENT: The risks, benefits, and alternatives of antegrade pyelography and percutaneous nephrostomy tube placement  were discussed with the patient in detail. All questions were answered. Informed consent was given to proceed with the procedure.    MODERATE SEDATION: Versed 1.5 mg IV; Fentanyl 100 mcg IV.  During the timeout, immediately prior to the administration of medications, the patient was reassessed for adequacy to receive conscious sedation. Under physician supervision, Versed and fentanyl   were administered for moderate sedation. Pulse oximetry, heart rate and blood pressure were continuously monitored by an independent trained observer. The physician spent 10 minutes of face-to-face sedation time with the patient.    ADDITIONAL MEDICATIONS: None.    FLUOROSCOPIC TIME: 0.3 minutes.    AIR KERMA:  7 mGy.    CONTRAST: 10 mL Omnipaque into the  collecting system.    UNIVERSAL PRECAUTIONS: The procedure was performed utilizing maximum sterile barrier technique. Prior to the start of the procedure, a standard pause for patient safety was performed with site marking as indicated.    COMPLICATIONS: No immediate complications.    PROCEDURE:    Using real-time sonographic guidance, an 18 gauge trocar needle was inserted into the right renal collecting system from a subcostal approach.  An antegrade pyelogram was performed. The tract was dilated over a wire and exchange was made for a  10 Swedish   nephrostomy tube.  The look was formed within the renal pelvis. A post placement nephrostogram was performed. The catheter was sutured to the skin and placed to gravity bag drainage.    FINDINGS:  Ultrasound demonstrates moderate right renal hydronephrosis. A permanent image was stored.    The completion image shows the right nephrostomy entering a lower pole calyx with loop in the renal pelvis.      Impression    IMPRESSION:    Successful right percutaneous nephrostomy placement, as detailed above.   CT Chest/Abdomen/Pelvis w Contrast    Narrative    EXAM: CT CHEST/ABDOMEN/PELVIS W CONTRAST  LOCATION: [facility]  DATE/TIME: 1/3/2023 6:16 PM    INDICATION: uterine mass causing obstruction, eval for metastatic disease, possible bx  COMPARISON: 12/31/2022  TECHNIQUE: CT scan of the chest, abdomen, and pelvis was performed following injection of IV contrast. Multiplanar reformats were obtained. Dose reduction techniques were used.   CONTRAST: isovue 370  75ml    FINDINGS:   LUNGS AND PLEURA: New small bilateral pleural effusions, right greater than left. Associated compressive atelectasis. No pneumothorax.    MEDIASTINUM/AXILLAE: Right-sided PICC line terminates at the cavoatrial junction. No significant mediastinal or hilar adenopathy. Large hiatal hernia redemonstrated.    CORONARY ARTERY CALCIFICATION: Mild.    HEPATOBILIARY: 4.3 cm posterior  right hepatic lobe low-attenuation lesion near the dome favored to represent hemangioma.    PANCREAS: Unremarkable    SPLEEN: Unremarkable    ADRENAL GLANDS: Unremarkable    KIDNEYS/BLADDER: Right-sided percutaneous nephrostomy in place with interval resolution of hydronephrosis and perinephric stranding. No new fluid collections. Minimal prominence of the left intrarenal collecting system, likely unchanged. Completely   decompressed bladder.    BOWEL: Diverticulosis. No bowel obstruction.    LYMPH NODES: Retroperitoneal adenopathy redemonstrated. Previous index adenopathy includes 2.0 cm aortocaval node, image 168, previously 1.4 cm; conglomerate aortocaval adenopathy, image 184, 4.2 x 1.9 cm, previously 4.1 x 1.7 cm; right-sided iliac chain   conglomerate adenopathy, image 122, 4.4 x 3.2 cm, previously 4.0 x 3.4 cm; left external iliac chain adenopathy, image 227, 2.1 x 1.9 cm, previously 2.0 x 1.4 cm.    VASCULATURE: No abdominal aortic aneurysm. Patent portal vein.    PELVIC ORGANS: Diffuse uterine enlargement and heterogeneity with areas of associated necrosis, similar in appearance to previous examination. Abnormal soft tissue extends from the uterus and the right adnexa measuring up to 4.47 m in size, image 2027,   similar to prior exam. Abnormal confluent enhancing soft tissue in the region of the cervix noted, particularly to the right of midline.    MUSCULOSKELETAL: No acute bony abnormalities.      Impression    IMPRESSION:  1.  Interval resolution of right-sided hydronephrosis status post percutaneous nephrostomy placement.  2.  Poorly defined pelvic mass involving the right adnexa, uterus, and potentially cervix compatible with malignancy.  3.  Increasing size of retroperitoneal and pelvic sidewall adenopathy.  4.  Probable hepatic hemangioma.  5.  New bilateral pleural effusions.   MR Pelvis (GYN) w Contrast    Narrative    EXAM: MR PELVIS (GYN) W CONTRAST  LOCATION: [facility]  HOSP[facility]  DATE/TIME: 1/4/2023 2:06 PM    INDICATION: Pelvic mass with para-aortic and pelvic lymphadenopathy. Concern for gyn malignancy, unclear source  uterine vs cervical  COMPARISON: 01/03/2023 contrast-enhanced CT CAP and 12/31/2022 noncontrast CT CAP.  TECHNIQUE: Routine MRI female pelvis protocol including T1 in/out phase, diffusion, thin section high resolution multiplane T2, and post contrast T1.  CONTRAST: Gadavist 7 mL    FINDINGS:     UTERUS: Tumor infiltration of the uterine fundus, body and lower uterine segment myometrium as well as the anterior, right lateral and posterior cervical stroma (maximal dimensions 14 cm CC, 12 cm ML, 7 cm AP) with relative sparing of the endometrial   cavity and the endocervical canal.    Tumor extension throughout the right anterolateral vaginal wall from the level of the fornices inferiorly to a point just above the level external urethral meatus (series 7 sagittal images 19-23).    Tumor extension into the right parametrial tissue, intravascular tumor extension into all of the right internal iliac vein branches and abutment of the developing right sciatic nerve.    Tumor involvement of the posterior bladder wall over an area 3 x 3 cm and about 1 cm AP thickness (series 7 image 23).    Tumor extension through the right cornual region into the right fallopian tube with adnexal, ovarian and right gonadal vein involvement that in total measures about 6 x 5 x 3 cm (series 7 sagittal image 12 and series 15 axial image 8).    ENDOMETRIAL CAVITY: The relatively uniform endometrium measures about 3-6 mm.    ADNEXA: Right adnexal involvement as described above. Left adnexa and ovary within normal limits.    PELVIC LYMPH NODES: Bilateral external iliac lymph node lymphadenopathy with a 4.4 x 3.1 cm right external iliac lymph node and a 1.8 cm left external iliac lymph node.      Impression    IMPRESSION:  1.  Tumor infiltration of the uterine myometrium and most of the cervical  stroma with relative sparing of the endometrial cavity and endocervical canal raises the possibility of endocervical type adenocarcinoma and makes endometrial adenocarcinoma and   the cervical squamosal cell carcinoma less likely.  2.  Locally invasive tumor involvement of the right anterolateral vagina, right parametrium, right internal iliac veins, posterior bladder wall and right fallopian tube, adnexa, ovary and right gonadal vein as well as tumor abutment of the right sciatic   nerve.  3.  Bilateral external iliac lymphadenopathy.           Discharge Medications   Discharge Medication List as of 1/5/2023 11:00 AM      START taking these medications    Details   acetaminophen (TYLENOL) 325 MG tablet Take 2 tablets (650 mg) by mouth every 4 hours as needed for mild pain or fever, No Print Out      levofloxacin (LEVAQUIN) 750 MG tablet Take 1 tablet (750 mg) by mouth daily for 5 days, Disp-5 tablet, R-0, E-Prescribe         CONTINUE these medications which have CHANGED    Details   lisinopril (ZESTRIL) 20 MG tablet Take 0.5 tablets (10 mg) by mouth daily [LISINOPRIL (PRINIVIL,ZESTRIL) 20 MG TABLET] Take 1 tablet by mouth once daily, Disp-90 tablet, R-11, No Print Out      metFORMIN (GLUCOPHAGE XR) 500 MG 24 hr tablet Take 2 tablets (1,000 mg) by mouth daily (with dinner), Disp-90 tablet, R-4, No Print Out      predniSONE (DELTASONE) 5 MG tablet Take three tablets a day for three days then two tablets a day for three days and then go back to your usual one tablet a day, Disp-30 tablet, R-3, No Print Out         CONTINUE these medications which have NOT CHANGED    Details   ADVAIR DISKUS 250-50 MCG/DOSE inhaler [ADVAIR DISKUS 250-50 MCG/DOSE DISKUS] INHALE 1 DOSE BY MOUTH TWICE DAILY, Disp-60 each, R-4, ZURDO, E-Prescribe      albuterol (PROAIR HFA/PROVENTIL HFA/VENTOLIN HFA) 108 (90 Base) MCG/ACT inhaler Inhale 2 puffs into the lungs every 6 hours as needed, Disp-18 g, R-11, ZURDO, E-PrescribePharmacy may dispense  brand covered by insurance (Proair, or proventil or ventolin or generic albuterol inhaler)      blood glucose test (CONTOUR NEXT STRIPS) strips [BLOOD GLUCOSE TEST (CONTOUR NEXT STRIPS) STRIPS] Test four times daily.  Dx code DM2., Disp-30 strip, R-6, Normal      cetirizine (ZYRTEC) 10 MG tablet [CETIRIZINE (ZYRTEC) 10 MG TABLET] Take 10 mg by mouth daily., Historical      dexamethasone (DECADRON) 4 MG/ML injection Inject 4 mg for adrenal crisis, Disp-2 mL, R-3, E-PrescribeDispense injection kit      fluticasone propionate (FLONASE) 50 mcg/actuation nasal spray [FLUTICASONE PROPIONATE (FLONASE) 50 MCG/ACTUATION NASAL SPRAY] 1 spray into each nostril daily., Disp-16 g, R-3, Normal      Glucagon (GVOKE HYPOPEN 1-PACK) 1 MG/0.2ML SOAJ Inject 1 mg Subcutaneous as needed (low BG levels), Disp-0.2 mL, R-1, E-PrescribeFormulary change      Glucagon 0.5 MG/0.1ML SOAJ Inject 1 mg Subcutaneous daily as needed (severe hypoglycemia), Disp-0.6 mL, R-3, E-Prescribe      insulin aspart prot & aspart (NOVOLOG MIX 70/30 FLEXPEN) (70-30) 100 UNIT/ML pen 25 units before breakfast, 15 units before evening meal, Disp-40 mL, R-3, E-Prescribe      insulin pen needle (31G X 6 MM) 31G X 6 MM miscellaneous Use 2 pen needles daily or as directed.Disp-180 each, X-2S-Vftthtoml      lansoprazole (PREVACID) 15 MG capsule [LANSOPRAZOLE (PREVACID) 15 MG CAPSULE] Take 15 mg by mouth daily., Historical      montelukast (SINGULAIR) 10 MG tablet Take 1 tablet (10 mg) by mouth At Bedtime, Disp-30 tablet, R-11, E-Prescribe      multivitamin therapeutic tablet [MULTIVITAMIN THERAPEUTIC TABLET] Take 1 tablet by mouth daily., Historical      Vitamin D, Cholecalciferol, 25 MCG (1000 UT) TABS Historical      rosuvastatin (CRESTOR) 10 MG tablet Take 1 tablet (10 mg) by mouth daily, Disp-90 tablet, R-4, E-Prescribe         STOP taking these medications       Semaglutide, 1 MG/DOSE, (OZEMPIC, 1 MG/DOSE,) 4 MG/3ML SOPN Comments:   Reason for Stopping:              Allergies   No Known Allergies

## 2023-01-05 NOTE — PROGRESS NOTES
GYN/Oncology:     I called and spoke with Stella and Antoni this AM. 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 Biopsy is needed. I have Follow-up in the office for her tomorrow at 115 pm. I am ordering a PET/CT to be done within the week. Unfortunately, she has a Hgb of 6.1 this am, but is asymptomatic. Recheck pending. Possible transfusion. I may look into an exam in the bed and Bx prior to discharge, which is less desirable if she needs to stay in the hospital.     Shari Alvarez MD  Gynecologic Oncology  [facility]  [facility]: [phone]

## 2023-01-05 NOTE — PLAN OF CARE
Problem: Risk for Delirium  Goal: Optimal Coping  Outcome: Progressing     Problem: Oral Intake Inadequate  Goal: Improved Oral Intake  Outcome: Progressing     Problem: Acute Kidney Injury/Impairment  Goal: Fluid and Electrolyte Balance  Outcome: Progressing   Goal Outcome Evaluation:  Pt alert and oriented x4. Made her needs known. Vital signs wdl. Denied pain.

## 2023-01-07 ENCOUNTER — PATIENT OUTREACH (OUTPATIENT)
Dept: CARE COORDINATION | Facility: CLINIC | Age: 73
End: 2023-01-07

## 2023-01-07 NOTE — PROGRESS NOTES
"Clinic Care Coordination Contact  [facility]: Post-Discharge Note  SITUATION                                                      Admission:    Admission Date: 12/31/22   Reason for Admission: 1. Septic shock (H)  A41.9      R65.21       2. Leukocytosis, unspecified type  D72.829       3. Urinary tract infection without hematuria, site unspecified  N39.0       4. Elevated troponin  R77.8       5. Acute renal insufficiency  N28.9       6. Elevated liver enzymes  R74.8       7. Pelvic mass  R19.00  Discharge:   Discharge Date: 01/05/23  Discharge Diagnosis: Severe sepsis with septic shock, resolved  E. 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  Currently on steroid taper  Diabetes mellitus, type II, well controlled A1c 5.8  Moderate persistent asthma  Environmental allergies  Hyperlipidemia    BACKGROUND                                                      Per hospital discharge summary and inpatient provider notes:    Stella Greene is a 72 year old female, history of DM2, HTN and asthma / COPD, who presents to this ED for evaluation of dizziness and generalized weakness.     Per chart review, the patient was seen on 12/28/22 for evaluation of hypoglycemia and a forehead laceration at [facility] ED. During this visit, the patient had a CT head wo contrast with results as follows: 1.  Left frontal scalp laceration without acute intracranial abnormality.  2.  Incidental note of a subcentimeter calcified left frontal convexity meningioma. Patient's blood sugar remained stable while in the ED, and was discharged home following laceration repair.      Per the patient's , she normally gets out of bed around 9am, but he had to wake her at 11am this morning as she was still sleeping. The patient's  noticed that she seemed \"spacy\" and generally weak, so he checked her blood sugar, " "which was 115. He states that it was very difficult to get her downstairs and into the car as she was weak and unsteady. The patient endorses dizziness, which she describes as feeling lightheaded and off balance, but denies room-spinning sensation. She also reports generalized weakness without focal weakness.  reports that she vomited during the night and was not aware of this and has vomited twice more since waking this morning.      She denies headache, chest pain, abdominal pain. She reports mild shortness of breath due to her asthma.     No blood thinners. Blood glucose in the room was 139.      Of note, the patient was seen on Wednesday as documented in chart review above. Her  states that her blood pressure was 95/60 at that time. It is 78/47 on initial exam today.      She has otherwise been in her usual state of health and denies diarrhea, cough, fever or other concerns.      ASSESSMENT           Discharge Assessment  How are you doing now that you are home?: Patient states she is feeling \"Pretty crappy\". Patient states she is being treated for cancer and is taking medications.  States they haven't started the chemo.  Has been having nausea since Christmas Eve.  Patient feels like her symptoms are getting worse.  Patient is following with GYN/Oncology and they prescribed medications to help.  Has another follow-up on Monday.  How are your symptoms? (Red Flag symptoms escalate to triage hotline per guidelines): Worsening  Do you feel your condition is stable enough to be safe at home until your provider visit?: Yes  Does the patient have their discharge instructions? : Yes  Does the patient have questions regarding their discharge instructions? : No  Were you started on any new medications or were there changes to any of your previous medications? : Yes  Does the patient have all of their medications?: Yes  Do you have questions regarding any of your medications? : No  Do you have all of your " needed medical supplies or equipment (DME)?  (i.e. oxygen tank, CPAP, cane, etc.): Yes  Discharge follow-up appointment scheduled within 14 calendar days? : Yes  Discharge Follow Up Appointment Date: 01/06/23  Discharge Follow Up Appointment Scheduled with?: Specialty Care Provider (GYN/Onc)         Post-op (Clinicians Only)  Did the patient have surgery or a procedure: Yes  Drainage: No  Bleeding: none  Fever: No  Chills: No  Redness: No  Warmth: No  Swelling: No  Incision site pain: No (Slight discomfort but not a pain.)  Eating & Drinking:  (Patient states she does not have an appetite.  Has been able to keep down some food and fluids but eventually seems to come up.)  PO Intake: other (High consistent carb diet)  Additional Symptoms: decreased appetite;nausea;vomiting  Frequency: About 3-4 in last 24 hours  Bowel Function: loose stools  Date of last BM: 01/05/23  Urinary Status: voiding without complaint/concerns        PLAN                                                      Outpatient Plan:  Follow up with primary care provider, Lacho Gunter, within 7 days   to evaluate medication change and for hospital follow- up.  The following   labs/tests are recommended: cbc and bmp  Follow up with Dr. Rick Alvarez as recommended for office visit and   biopsy.    Continue on decreased amount of lisinopril and metformin until seen by   primary care to assess if you should go back up to full dosing         Future Appointments   Date Time Provider Department Center   4/4/2023  9:30 AM Chris Romo MD [facility]   4/17/2023 10:15 AM Brandy Bazzi APRN CNP [facility]         For any urgent concerns, please contact our 24 hour nurse triage line: [phone] ([phone])         Jenae Galvan RN

## 2023-01-10 ENCOUNTER — TELEPHONE (OUTPATIENT)
Dept: INTERNAL MEDICINE | Facility: CLINIC | Age: 73
End: 2023-01-10
Payer: MEDICARE

## 2023-01-10 ENCOUNTER — TELEPHONE (OUTPATIENT)
Dept: INTERVENTIONAL RADIOLOGY/VASCULAR | Facility: CLINIC | Age: 73
End: 2023-01-10

## 2023-01-10 NOTE — TELEPHONE ENCOUNTER
"Patient calling to see if she needs a pre op before her surgery on 1/13/2023, she wasn't told to do so.    She was told to check in with primary about the instructions for medications.    \"Check with your Primary Care Provider about holding metformin and novolog the morning of procedure because you will be NPO for 8 hours.\"    Patient requesting a call back on the medication and if she needs a pre op before 1/13/2023    Call Back   [phone]   "

## 2023-01-10 NOTE — TELEPHONE ENCOUNTER
Lacho Gunter MD  You 35 minutes ago (3:31 PM)     MB  Needs preop please. And may need to see another provider as my schedule likely full. MICAH

## 2023-01-11 NOTE — TELEPHONE ENCOUNTER
[facility] TO [facility] APPT. SEEMS LIKE [facility] HAS 1PM ON 1/12 , ADV THAT TO PT IF PT WANTS TO BE SEEN HERE AT [facility] 1/11/2023 TV

## 2023-01-13 ENCOUNTER — HOSPITAL ENCOUNTER (OUTPATIENT)
Dept: INTERVENTIONAL RADIOLOGY/VASCULAR | Facility: CLINIC | Age: 73
Discharge: HOME | End: 2023-01-13
Attending: OBSTETRICS & GYNECOLOGY | Admitting: RADIOLOGY
Payer: MEDICARE

## 2023-01-13 VITALS
DIASTOLIC BLOOD PRESSURE: 72 MMHG | OXYGEN SATURATION: 99 % | HEART RATE: 96 BPM | SYSTOLIC BLOOD PRESSURE: 141 MMHG | RESPIRATION RATE: 23 BRPM

## 2023-01-13 DIAGNOSIS — C53.9 CERVICAL CANCER (H): ICD-10-CM

## 2023-01-13 PROCEDURE — 36561 INSERT TUNNELED CV CATH: CPT

## 2023-01-13 PROCEDURE — 250N000011 HC RX IP 250 OP 636: Performed by: PHYSICIAN ASSISTANT

## 2023-01-13 PROCEDURE — 272N000602 HC WOUND GLUE CR1

## 2023-01-13 PROCEDURE — 250N000011 HC RX IP 250 OP 636: Performed by: RADIOLOGY

## 2023-01-13 PROCEDURE — 250N000009 HC RX 250: Performed by: PHYSICIAN ASSISTANT

## 2023-01-13 PROCEDURE — 272N000500 HC NEEDLE CR2

## 2023-01-13 PROCEDURE — 250N000009 HC RX 250: Performed by: RADIOLOGY

## 2023-01-13 PROCEDURE — C1788 PORT, INDWELLING, IMP: HCPCS

## 2023-01-13 PROCEDURE — 258N000003 HC RX IP 258 OP 636: Performed by: PHYSICIAN ASSISTANT

## 2023-01-13 PROCEDURE — 99152 MOD SED SAME PHYS/QHP 5/>YRS: CPT

## 2023-01-13 RX ORDER — LIDOCAINE 40 MG/G
CREAM TOPICAL
Status: DISCONTINUED | OUTPATIENT
Start: 2023-01-13 | End: 2023-01-14 | Stop reason: HOSPADM

## 2023-01-13 RX ORDER — NALOXONE HYDROCHLORIDE 0.4 MG/ML
0.4 INJECTION, SOLUTION INTRAMUSCULAR; INTRAVENOUS; SUBCUTANEOUS
Status: DISCONTINUED | OUTPATIENT
Start: 2023-01-13 | End: 2023-01-14 | Stop reason: HOSPADM

## 2023-01-13 RX ORDER — LIDOCAINE HYDROCHLORIDE AND EPINEPHRINE 10; 10 MG/ML; UG/ML
20 INJECTION, SOLUTION INFILTRATION; PERINEURAL ONCE
Status: COMPLETED | OUTPATIENT
Start: 2023-01-13 | End: 2023-01-13

## 2023-01-13 RX ORDER — CEFAZOLIN SODIUM 2 G/100ML
2 INJECTION, SOLUTION INTRAVENOUS
Status: COMPLETED | OUTPATIENT
Start: 2023-01-13 | End: 2023-01-13

## 2023-01-13 RX ORDER — SODIUM CHLORIDE 9 MG/ML
INJECTION, SOLUTION INTRAVENOUS CONTINUOUS
Status: DISCONTINUED | OUTPATIENT
Start: 2023-01-13 | End: 2023-01-14 | Stop reason: HOSPADM

## 2023-01-13 RX ORDER — FLUMAZENIL 0.1 MG/ML
0.2 INJECTION, SOLUTION INTRAVENOUS
Status: DISCONTINUED | OUTPATIENT
Start: 2023-01-13 | End: 2023-01-14 | Stop reason: HOSPADM

## 2023-01-13 RX ORDER — NALOXONE HYDROCHLORIDE 0.4 MG/ML
0.2 INJECTION, SOLUTION INTRAMUSCULAR; INTRAVENOUS; SUBCUTANEOUS
Status: DISCONTINUED | OUTPATIENT
Start: 2023-01-13 | End: 2023-01-14 | Stop reason: HOSPADM

## 2023-01-13 RX ORDER — FENTANYL CITRATE 50 UG/ML
25-50 INJECTION, SOLUTION INTRAMUSCULAR; INTRAVENOUS EVERY 5 MIN PRN
Status: DISCONTINUED | OUTPATIENT
Start: 2023-01-13 | End: 2023-01-14 | Stop reason: HOSPADM

## 2023-01-13 RX ADMIN — SODIUM CHLORIDE 500 ML: 9 INJECTION, SOLUTION INTRAVENOUS at 12:55

## 2023-01-13 RX ADMIN — HEPARIN 5 ML: 100 SYRINGE at 13:10

## 2023-01-13 RX ADMIN — LIDOCAINE HYDROCHLORIDE,EPINEPHRINE BITARTRATE 40 ML: 10; .01 INJECTION, SOLUTION INFILTRATION; PERINEURAL at 12:57

## 2023-01-13 RX ADMIN — SODIUM CHLORIDE: 9 INJECTION, SOLUTION INTRAVENOUS at 12:30

## 2023-01-13 RX ADMIN — FENTANYL CITRATE 50 MCG: 50 INJECTION, SOLUTION INTRAMUSCULAR; INTRAVENOUS at 13:04

## 2023-01-13 RX ADMIN — CEFAZOLIN SODIUM 2 G: 2 INJECTION, SOLUTION INTRAVENOUS at 12:45

## 2023-01-13 RX ADMIN — MIDAZOLAM HYDROCHLORIDE 1 MG: 1 INJECTION, SOLUTION INTRAMUSCULAR; INTRAVENOUS at 13:04

## 2023-01-13 NOTE — PROGRESS NOTES
Patient Name: Stella Greene  Medical Record Number: [id number]  Today's Date: 1/13/2023    Procedure: Chest port placement  Proceduralist: Dr. Caio Edward    Procedure Start: 1302  Procedure end: 1317  Sedation medications administered: 1 mg midazolam and 50 mcg fentanyl   Sedation time: 15 minutes    Report given to: Pre/post IR RN  : N/a    Other Notes: Pt arrived to IR room 1 from Pre/post bay 3. Consent reviewed. Pt denies any questions or concerns regarding procedure. Pt positioned supine and monitored per protocol. Pt tolerated procedure without any noted complications. VSS on RA. Pt transferred back to Pre/post bay 3.

## 2023-01-13 NOTE — IP AVS SNAPSHOT
[facility] Interventional Radiology  [address] [address]  Phone: [phone]  Fax: [phone]                                    After Visit Summary   1/13/2023    Stella Greene   MRN: [id number]           After Visit Summary Signature Page    I have received my discharge instructions, and my questions have been answered. I have discussed any challenges I see with this plan with the nurse or doctor.    ..........................................................................................................................................  Patient/Patient Representative Signature      ..........................................................................................................................................  Patient Representative Print Name and Relationship to Patient    ..................................................               ................................................  Date                                   Time    ..........................................................................................................................................  Reviewed by Signature/Title    ...................................................              ..............................................  Date                                               Time          22EPIC Rev 08/18

## 2023-01-13 NOTE — DISCHARGE INSTRUCTIONS
Port Placement Procedure Discharge Instructions:  You had a port placed. A port is a small medical device that is placed under the skin and is connected to a vein with a catheter (thin, flexible tube). Ports can be used to administer IV medications, fluids or blood products (including chemotherapy) or for blood lab draws. Please follow the below instructions:  Care Instructions:  - If you received sedation for your procedure, do not drive or operate heavy machinery for the rest of the day.  - You may shower beginning post procedure day #1.  Do not scrub site until well healed; pat dry.  - Avoid submerging the port site under water (tub baths, Jacuzzis, hot tubs and pools) for 10 days or until glue falls off.  - You may take over the counter pain medication for discomfort. Follow the package directions.  - Avoid heavy lifting (greater than 10 pounds) and strenuous activities for 3 days.   - If you experience significant bleeding at site, apply pressure with hands above the clavicle bone, sit upright and seek immediate medical assistance.    Call [facility] ([phone])*** if you experience the following:   - Uncontrolled bleeding from port site  - Fever (greater than 101 F (38.3C))  - Purulent (yellow/green/foul smelling) drainage from port insertion site.  - Increasing pain at port site  - Increasing redness at port site

## 2023-01-18 DIAGNOSIS — E11.8 TYPE 2 DIABETES MELLITUS WITH COMPLICATION, WITHOUT LONG-TERM CURRENT USE OF INSULIN (H): ICD-10-CM

## 2023-01-19 RX ORDER — LISINOPRIL 20 MG/1
20 TABLET ORAL DAILY
Qty: 90 TABLET | Refills: 0 | Status: SHIPPED | OUTPATIENT
Start: 2023-01-19 | End: 2023-01-01

## 2023-01-19 NOTE — TELEPHONE ENCOUNTER
"Outpatient Medication Detail     Disp Refills Start End ZURDO   lisinopril (ZESTRIL) 20 MG tablet 90 tablet 11 1/5/2023  No   Sig - Route: Take 0.5 tablets (10 mg) by mouth daily [LISINOPRIL (PRINIVIL,ZESTRIL) 20 MG TABLET] Take 1 tablet by mouth once daily - Oral   Class: No Print Out   Order: [id number]     CLASS:  No print.  Routing to provider per protocol.    Routing refill request to provider for review/approval because:  Labs not current:  K+  Patient needs to be seen because it has been more than 1 year since last office visit.  BP not current    Last Written Prescription Date:  12/6/21  Last Fill Quantity: 90,  # refills: 11   Last office visit provider:  12/6/21     Requested Prescriptions   Pending Prescriptions Disp Refills     lisinopril (ZESTRIL) 20 MG tablet [Pharmacy Med Name: Lisinopril 20 MG Oral Tablet] 90 tablet 0     Sig: Take 1 tablet by mouth once daily       ACE Inhibitors (Including Combos) Protocol Failed - 1/19/2023  2:57 PM        Failed - Blood pressure under 140/90 in past 12 months     BP Readings from Last 3 Encounters:   01/13/23 (!) 141/72   01/05/23 (!) 140/70   12/29/22 92/55                 Failed - Recent (12 mo) or future (30 days) visit within the authorizing provider's specialty     Patient has had an office visit with the authorizing provider or a provider within the authorizing providers department within the previous 12 mos or has a future within next 30 days. See \"Patient Info\" tab in inbasket, or \"Choose Columns\" in Meds & Orders section of the refill encounter.              Failed - Normal serum potassium on file in past 12 months     Recent Labs   Lab Test 01/05/23  0605   POTASSIUM 3.2*             Passed - Medication is active on med list        Passed - Patient is age 18 or older        Passed - No active pregnancy on record        Passed - Normal serum creatinine on file in past 12 months     Recent Labs   Lab Test 01/05/23  0605   CR 0.93       Ok to refill " medication if creatinine is low          Passed - No positive pregnancy test within past 12 months             Elmer Morrell RN 01/19/23 3:01 PM

## 2023-02-05 ENCOUNTER — HEALTH MAINTENANCE LETTER (OUTPATIENT)
Age: 73
End: 2023-02-05

## 2023-03-23 ENCOUNTER — OFFICE VISIT (OUTPATIENT)
Dept: INTERNAL MEDICINE | Facility: CLINIC | Age: 73
End: 2023-03-23
Payer: MEDICARE

## 2023-03-23 VITALS
HEART RATE: 100 BPM | TEMPERATURE: 98.7 F | DIASTOLIC BLOOD PRESSURE: 74 MMHG | BODY MASS INDEX: 26.01 KG/M2 | WEIGHT: 137.8 LBS | HEIGHT: 61 IN | OXYGEN SATURATION: 100 % | SYSTOLIC BLOOD PRESSURE: 128 MMHG

## 2023-03-23 DIAGNOSIS — J45.40 MODERATE PERSISTENT ASTHMA WITHOUT COMPLICATION: ICD-10-CM

## 2023-03-23 DIAGNOSIS — Z01.818 PREOP GENERAL PHYSICAL EXAM: Primary | ICD-10-CM

## 2023-03-23 DIAGNOSIS — N85.8 UTERINE MASS: ICD-10-CM

## 2023-03-23 DIAGNOSIS — E11.8 TYPE 2 DIABETES MELLITUS WITH COMPLICATION, WITHOUT LONG-TERM CURRENT USE OF INSULIN (H): ICD-10-CM

## 2023-03-23 DIAGNOSIS — I10 ESSENTIAL HYPERTENSION: ICD-10-CM

## 2023-03-23 DIAGNOSIS — E27.40 ADRENAL INSUFFICIENCY (H): ICD-10-CM

## 2023-03-23 LAB
ALBUMIN SERPL BCG-MCNC: 4.3 G/DL (ref 3.5–5.2)
ALP SERPL-CCNC: 94 U/L (ref 35–104)
ALT SERPL W P-5'-P-CCNC: 15 U/L (ref 10–35)
ANION GAP SERPL CALCULATED.3IONS-SCNC: 13 MMOL/L (ref 7–15)
AST SERPL W P-5'-P-CCNC: 21 U/L (ref 10–35)
BASOPHILS # BLD AUTO: 0.1 10E3/UL (ref 0–0.2)
BASOPHILS NFR BLD AUTO: 1 %
BILIRUB SERPL-MCNC: 0.4 MG/DL
BUN SERPL-MCNC: 19.6 MG/DL (ref 8–23)
CALCIUM SERPL-MCNC: 10.2 MG/DL (ref 8.8–10.2)
CHLORIDE SERPL-SCNC: 103 MMOL/L (ref 98–107)
CREAT SERPL-MCNC: 0.8 MG/DL (ref 0.51–0.95)
DEPRECATED HCO3 PLAS-SCNC: 24 MMOL/L (ref 22–29)
EOSINOPHIL # BLD AUTO: 0.1 10E3/UL (ref 0–0.7)
EOSINOPHIL NFR BLD AUTO: 1 %
ERYTHROCYTE [DISTWIDTH] IN BLOOD BY AUTOMATED COUNT: 23.5 % (ref 10–15)
GFR SERPL CREATININE-BSD FRML MDRD: 78 ML/MIN/1.73M2
GLUCOSE SERPL-MCNC: 126 MG/DL (ref 70–99)
HBA1C MFR BLD: 6 % (ref 0–5.6)
HCT VFR BLD AUTO: 34.1 % (ref 35–47)
HGB BLD-MCNC: 10.9 G/DL (ref 11.7–15.7)
IMM GRANULOCYTES # BLD: 0.1 10E3/UL
IMM GRANULOCYTES NFR BLD: 1 %
LYMPHOCYTES # BLD AUTO: 2.7 10E3/UL (ref 0.8–5.3)
LYMPHOCYTES NFR BLD AUTO: 24 %
MCH RBC QN AUTO: 29.1 PG (ref 26.5–33)
MCHC RBC AUTO-ENTMCNC: 32 G/DL (ref 31.5–36.5)
MCV RBC AUTO: 91 FL (ref 78–100)
MONOCYTES # BLD AUTO: 0.7 10E3/UL (ref 0–1.3)
MONOCYTES NFR BLD AUTO: 7 %
NEUTROPHILS # BLD AUTO: 7.5 10E3/UL (ref 1.6–8.3)
NEUTROPHILS NFR BLD AUTO: 66 %
NRBC # BLD AUTO: 0 10E3/UL
NRBC BLD AUTO-RTO: 0 /100
PLATELET # BLD AUTO: 273 10E3/UL (ref 150–450)
POTASSIUM SERPL-SCNC: 5 MMOL/L (ref 3.4–5.3)
PROT SERPL-MCNC: 7.9 G/DL (ref 6.4–8.3)
RBC # BLD AUTO: 3.74 10E6/UL (ref 3.8–5.2)
SODIUM SERPL-SCNC: 140 MMOL/L (ref 136–145)
WBC # BLD AUTO: 11.1 10E3/UL (ref 4–11)

## 2023-03-23 PROCEDURE — 80053 COMPREHEN METABOLIC PANEL: CPT | Performed by: NURSE PRACTITIONER

## 2023-03-23 PROCEDURE — 83036 HEMOGLOBIN GLYCOSYLATED A1C: CPT | Performed by: NURSE PRACTITIONER

## 2023-03-23 PROCEDURE — 85025 COMPLETE CBC W/AUTO DIFF WBC: CPT | Performed by: NURSE PRACTITIONER

## 2023-03-23 PROCEDURE — 36415 COLL VENOUS BLD VENIPUNCTURE: CPT | Performed by: NURSE PRACTITIONER

## 2023-03-23 PROCEDURE — 93005 ELECTROCARDIOGRAM TRACING: CPT | Performed by: NURSE PRACTITIONER

## 2023-03-23 PROCEDURE — 99214 OFFICE O/P EST MOD 30 MIN: CPT | Performed by: NURSE PRACTITIONER

## 2023-03-23 NOTE — PROGRESS NOTES
[address] [address]  Phone: [phone]  Fax: [phone]  Primary Provider: Lacho Gunter  Pre-op Performing Provider: MAXIMILIAN FERNANDES      PREOPERATIVE EVALUATION:  Today's date: 3/23/2023    Stella Greene is a 72 year old female who presents for a preoperative evaluation.  Additional Questions 12/6/2021   Roomed by JLS     Surgical Information:  Surgery/Procedure: hyserectomy  Surgery Location: [facility]  Surgeon: Dr. Braswell  Surgery Date: 04/05/2023  Time of Surgery: 11:15 AM  Where patient plans to recover: At home with family  Fax number for surgical facility: [facility]     Assessment & Plan     The proposed surgical procedure is considered INTERMEDIATE risk.    Problem List Items Addressed This Visit        Endocrine    Adrenal insufficiency (H)       Circulatory    Essential Hypertension   Other Visit Diagnoses     Preop general physical exam    -  Primary    Relevant Orders    CBC with platelets and differential    Comprehensive metabolic panel    EKG 12-lead, tracing only (Completed)    Type 2 diabetes mellitus with complication, without long-term current use of insulin (H)        Relevant Orders    Hemoglobin A1c    Uterine mass        Moderate persistent asthma without complication                 Implanted Device:   - Type of device: port Patient advised to bring device information on day of surgery.      Risks and Recommendations:  The patient has the following additional risks and recommendations for perioperative complications:   - No identified additional risk factors other than previously addressed    Medication Instructions:   - ACE/ARB: May be continued on the day of surgery.    - mixed insulin (70/30m 75/25, 50/50): HOLD day of surgery   - LABA, inhaled corticosteroid, long-acting anticholinergics: Continue without modification.   - rescue Inhaler: Continue PRN. Bring to hospital on the day of surgery.   -  Take usual dose on day of surgery    RECOMMENDATION:  APPROVAL GIVEN to proceed with proposed procedure, without further diagnostic evaluation.      Subjective     HPI related to upcoming procedure: patient has known uterine mass and dx of cancer. Patient has undergone chemotherapy and is now scheduled for total hysterectomy. Patient denies any symptoms.     Preop Questions 3/23/2023   1. Have you ever had a heart attack or stroke? No   2. Have you ever had surgery on your heart or blood vessels, such as a stent placement, a coronary artery bypass, or surgery on an artery in your head, neck, heart, or legs? No   3. Do you have chest pain with activity? No   4. Do you have a history of  heart failure? No   5. Do you currently have a cold, bronchitis or symptoms of other infection? No   6. Do you have a cough, shortness of breath, or wheezing? No   7. Do you or anyone in your family have previous history of blood clots? No   8. Do you or does anyone in your family have a serious bleeding problem such as prolonged bleeding following surgeries or cuts? No   9. Have you ever had problems with anemia or been told to take iron pills? YES -    10. Have you had any abnormal blood loss such as black, tarry or bloody stools, or abnormal vaginal bleeding? No   11. Have you ever had a blood transfusion? No   12. Are you willing to have a blood transfusion if it is medically needed before, during, or after your surgery? Yes   13. Have you or any of your relatives ever had problems with anesthesia? YES -    14. Do you have sleep apnea, excessive snoring or daytime drowsiness? YES -    14a. Do you have a CPAP machine? No   15. Do you have any artifical heart valves or other implanted medical devices like a pacemaker, defibrillator, or continuous glucose monitor? YES -    15a. What type of device do you have? port   15b. Name of the clinic that manages your device:  [facility]   16. Do you have artificial joints? No   17. Are you  allergic to latex? No       Health Care Directive:  Patient does not have a Health Care Directive or Living Will: Discussed advance care planning with patient; however, patient declined at this time.    Preoperative Review of :   reviewed - no record of controlled substances prescribed.      Status of Chronic Conditions:  See problem list for active medical problems.  Problems all longstanding and stable, except as noted/documented.  See ROS for pertinent symptoms related to these conditions.      Review of Systems  Unremarkable other than listed above and below       Patient Active Problem List    Diagnosis Date Noted     Adrenal insufficiency (H) 03/23/2023     Priority: Medium     Acute renal insufficiency 12/31/2022     Priority: Medium     Elevated liver enzymes 12/31/2022     Priority: Medium     Elevated troponin 12/31/2022     Priority: Medium     Septic shock (H) 12/31/2022     Priority: Medium     Urinary tract infection without hematuria, site unspecified 12/31/2022     Priority: Medium     Leukocytosis, unspecified type 12/31/2022     Priority: Medium     Acute kidney failure with tubular necrosis (H) 12/31/2022     Priority: Medium     Diabetes mellitus, type 2 (H) 12/06/2021     Priority: Medium     Essential Hypertension      Priority: Medium     Created by Conversion  Replacement Utility updated for latest IMO load         Chronic Obstructive Pulmonary Disease      Priority: Medium     Created by Conversion          Past Medical History:   Diagnosis Date     Adrenal insufficiency (H)     secondary, due to chronic prednisone use for asthma, tapering off with Pulmonology     Diabetes mellitus, type 2 (H) 12/06/2021    HbA1c 7% 9/2022 - Last visit with Endo 9/2022     Essential hypertension     Created by Conversion Replacement Utility updated for latest IMO load      Mild persistent asthma without complication      Past Surgical History:   Procedure Laterality Date     IR CHEST PORT PLACEMENT > 5  YRS OF AGE  1/13/2023     IR NEPHROSTOMY TUBE PLACEMENT RIGHT  12/31/2022     LAPAROSCOPIC TUBAL LIGATION Bilateral      nasal polpys Bilateral      PICC TRIPLE LUMEN PLACEMENT  12/31/2022          Current Outpatient Medications   Medication Sig Dispense Refill     acetaminophen (TYLENOL) 325 MG tablet Take 2 tablets (650 mg) by mouth every 4 hours as needed for mild pain or fever       ADVAIR DISKUS 250-50 MCG/DOSE inhaler [ADVAIR DISKUS 250-50 MCG/DOSE DISKUS] INHALE 1 DOSE BY MOUTH TWICE DAILY 60 each 4     albuterol (PROAIR HFA/PROVENTIL HFA/VENTOLIN HFA) 108 (90 Base) MCG/ACT inhaler Inhale 2 puffs into the lungs every 6 hours as needed 18 g 11     blood glucose test (CONTOUR NEXT STRIPS) strips [BLOOD GLUCOSE TEST (CONTOUR NEXT STRIPS) STRIPS] Test four times daily.  Dx code DM2. 30 strip 6     cetirizine (ZYRTEC) 10 MG tablet [CETIRIZINE (ZYRTEC) 10 MG TABLET] Take 10 mg by mouth daily.       fluticasone propionate (FLONASE) 50 mcg/actuation nasal spray [FLUTICASONE PROPIONATE (FLONASE) 50 MCG/ACTUATION NASAL SPRAY] 1 spray into each nostril daily. 16 g 3     Glucagon (GVOKE HYPOPEN 1-PACK) 1 MG/0.2ML SOAJ Inject 1 mg Subcutaneous as needed (low BG levels) 0.2 mL 1     Glucagon 0.5 MG/0.1ML SOAJ Inject 1 mg Subcutaneous daily as needed (severe hypoglycemia) 0.6 mL 3     insulin aspart prot & aspart (NOVOLOG MIX 70/30 FLEXPEN) (70-30) 100 UNIT/ML pen 25 units before breakfast, 15 units before evening meal 40 mL 3     insulin pen needle (31G X 6 MM) 31G X 6 MM miscellaneous Use 2 pen needles daily or as directed. 180 each 4     lansoprazole (PREVACID) 15 MG capsule [LANSOPRAZOLE (PREVACID) 15 MG CAPSULE] Take 15 mg by mouth daily.       lisinopril (ZESTRIL) 20 MG tablet Take 1 tablet (20 mg) by mouth daily 90 tablet 0     montelukast (SINGULAIR) 10 MG tablet Take 1 tablet (10 mg) by mouth At Bedtime 30 tablet 11     multivitamin therapeutic tablet [MULTIVITAMIN THERAPEUTIC TABLET] Take 1 tablet by mouth daily.   "     predniSONE (DELTASONE) 5 MG tablet Take three tablets a day for three days then two tablets a day for three days and then go back to your usual one tablet a day 60 tablet 3     Vitamin D, Cholecalciferol, 25 MCG (1000 UT) TABS          No Known Allergies     Social History     Tobacco Use     Smoking status: Never     Smokeless tobacco: Never   Substance Use Topics     Alcohol use: No     Family History   Problem Relation Age of Onset     No Known Problems Mother      Heart Disease Father      History   Drug Use No         Objective     /74 (BP Location: Right arm, Patient Position: Sitting, Cuff Size: Adult Regular)   Pulse 100   Temp 98.7  F (37.1  C) (Oral)   Ht 1.556 m (5' 1.25\")   Wt 62.5 kg (137 lb 12.8 oz)   SpO2 100%   BMI 25.82 kg/m      Physical Exam    GENERAL APPEARANCE: healthy, alert and no distress     EYES: EOMI, PERRL     HENT: ear canals and TM's normal and nose and mouth without ulcers or lesions     NECK: no adenopathy, no asymmetry, masses, or scars and thyroid normal to palpation     RESP: lungs clear to auscultation - no rales, rhonchi or wheezes     CV: regular rates and rhythm, normal S1 S2, no S3 or S4 and no murmur, click or rub     ABDOMEN:  soft, nontender, no HSM or masses and bowel sounds normal     MS: extremities normal- no gross deformities noted, no evidence of inflammation in joints, FROM in all extremities.     SKIN: no suspicious lesions or rashes     NEURO: Normal strength and tone, sensory exam grossly normal, mentation intact and speech normal     PSYCH: mentation appears normal. and affect normal/bright     LYMPHATICS: No cervical adenopathy    Recent Labs   Lab Test 01/05/23  0820 01/05/23  0605 01/04/23  0605 12/31/22  1628 12/31/22  1314 09/20/22  1132 09/09/22  1000   HGB 9.5* 6.1* 9.6*   < > 10.1*   < >  --    PLT  --  372 285   < > 396   < >  --    INR  --   --   --   --  1.30*  --   --    NA  --  143 142   < > 141   < >  --    POTASSIUM  --  3.2* 3.5  "  < > 3.6   < >  --    CR  --  0.93 1.04*   < > 2.99*   < >  --    A1C  --   --   --   --  5.8*  --  7.1*    < > = values in this interval not displayed.        Diagnostics:  Labs pending at this time.  Results will be reviewed when available.  Recent Results (from the past 24 hour(s))   EKG 12-lead, tracing only    Collection Time: 03/23/23  9:49 AM   Result Value Ref Range    Systolic Blood Pressure  mmHg    Diastolic Blood Pressure  mmHg    Ventricular Rate 96 BPM    Atrial Rate 96 BPM    MS Interval 174 ms    QRS Duration 122 ms     ms    QTc 464 ms    P Axis 83 degrees    R AXIS -8 degrees    T Axis 28 degrees    Interpretation ECG       Sinus rhythm  Right bundle branch block  Abnormal ECG  When compared with ECG of 31-DEC-2022 13:13,  Premature atrial complexes are no longer Present            Revised Cardiac Risk Index (RCRI):  The patient has the following serious cardiovascular risks for perioperative complications:   - Diabetes Mellitus (on Insulin) = 1 point     RCRI Interpretation: 1 point: Class II (low risk - 0.9% complication rate)           Signed Electronically by: Oma Lynch NP  Copy of this evaluation report is provided to requesting physician.

## 2023-03-24 ENCOUNTER — TELEPHONE (OUTPATIENT)
Dept: INTERVENTIONAL RADIOLOGY/VASCULAR | Facility: CLINIC | Age: 73
End: 2023-03-24
Payer: MEDICARE

## 2023-03-24 LAB
ATRIAL RATE - MUSE: 96 BPM
DIASTOLIC BLOOD PRESSURE - MUSE: NORMAL MMHG
INTERPRETATION ECG - MUSE: NORMAL
P AXIS - MUSE: 83 DEGREES
PR INTERVAL - MUSE: 174 MS
QRS DURATION - MUSE: 122 MS
QT - MUSE: 368 MS
QTC - MUSE: 464 MS
R AXIS - MUSE: -8 DEGREES
SYSTOLIC BLOOD PRESSURE - MUSE: NORMAL MMHG
T AXIS - MUSE: 28 DEGREES
VENTRICULAR RATE- MUSE: 96 BPM

## 2023-03-28 ENCOUNTER — TELEPHONE (OUTPATIENT)
Dept: INTERNAL MEDICINE | Facility: CLINIC | Age: 73
End: 2023-03-28
Payer: MEDICARE

## 2023-03-28 NOTE — TELEPHONE ENCOUNTER
Patient calling to report that she believes her pre op report from Oma Lynch last week was sent to the [facility] hospital.  Preop should have been sent to [facility] and patient received puzzling phone call from [facility] asking when her surgery was supposed to be taking place as they didn't have record of it.  Patient did not have fax number available at time of call, but is requesting that preop be re-faxed to [facility].  Patient is scheduled for surgery on 4/5/23.    Any questions, patient can be reached at [phone].

## 2023-03-28 NOTE — PROGRESS NOTES
Interventional Radiology - Pre-Procedure Note:  Outpatient - [facility]  03/28/2023     Procedure Requested: R ureteral stent placement  Requested by: Shari Alvarez MD    History and Physical Reviewed: H&P documented within 30 days (Oma Reyez, NP on 3/23/2023).  I have personally reviewed the patient's medical history and have updated the medical record as necessary.    Past Medical History:   Diagnosis Date     Adrenal insufficiency (H)     secondary, due to chronic prednisone use for asthma, tapering off with Pulmonology     Diabetes mellitus, type 2 (H) 12/06/2021    HbA1c 7% 9/2022 - Last visit with Endo 9/2022     Essential hypertension     Created by Conversion Replacement Utility updated for latest IMO load      Mild persistent asthma without complication      Past Surgical History:   Procedure Laterality Date     IR CHEST PORT PLACEMENT > 5 YRS OF AGE  1/13/2023     IR NEPHROSTOMY TUBE PLACEMENT RIGHT  12/31/2022     LAPAROSCOPIC TUBAL LIGATION Bilateral      nasal polpys Bilateral      PICC TRIPLE LUMEN PLACEMENT  12/31/2022              Brief HPI: Stella Greene is a 72 year old female with endometrial  cancer s/p chemotherapy. Plan for hysterectomy 4/5 at [facility]. She has been referred by Dr. Alvarez for right anterograde stent placement. Pt has perc nephrostomy tube (placed 12/2022).      IMAGING:  EXAM: CT CHEST/ABDOMEN/PELVIS W CONTRAST  LOCATION: [facility]  DATE/TIME: 1/3/2023 6:16 PM     INDICATION: uterine mass causing obstruction, eval for metastatic disease, possible bx  COMPARISON: 12/31/2022  TECHNIQUE: CT scan of the chest, abdomen, and pelvis was performed following injection of IV contrast. Multiplanar reformats were obtained. Dose reduction techniques were used.   CONTRAST: isovue 370  75ml     FINDINGS:   LUNGS AND PLEURA: New small bilateral pleural effusions, right greater than left. Associated compressive  atelectasis. No pneumothorax.     MEDIASTINUM/AXILLAE: Right-sided PICC line terminates at the cavoatrial junction. No significant mediastinal or hilar adenopathy. Large hiatal hernia redemonstrated.     CORONARY ARTERY CALCIFICATION: Mild.     HEPATOBILIARY: 4.3 cm posterior right hepatic lobe low-attenuation lesion near the dome favored to represent hemangioma.     PANCREAS: Unremarkable     SPLEEN: Unremarkable     ADRENAL GLANDS: Unremarkable     KIDNEYS/BLADDER: Right-sided percutaneous nephrostomy in place with interval resolution of hydronephrosis and perinephric stranding. No new fluid collections. Minimal prominence of the left intrarenal collecting system, likely unchanged. Completely   decompressed bladder.     BOWEL: Diverticulosis. No bowel obstruction.     LYMPH NODES: Retroperitoneal adenopathy redemonstrated. Previous index adenopathy includes 2.0 cm aortocaval node, image 168, previously 1.4 cm; conglomerate aortocaval adenopathy, image 184, 4.2 x 1.9 cm, previously 4.1 x 1.7 cm; right-sided iliac chain   conglomerate adenopathy, image 122, 4.4 x 3.2 cm, previously 4.0 x 3.4 cm; left external iliac chain adenopathy, image 227, 2.1 x 1.9 cm, previously 2.0 x 1.4 cm.     VASCULATURE: No abdominal aortic aneurysm. Patent portal vein.     PELVIC ORGANS: Diffuse uterine enlargement and heterogeneity with areas of associated necrosis, similar in appearance to previous examination. Abnormal soft tissue extends from the uterus and the right adnexa measuring up to 4.47 m in size, image 2027,   similar to prior exam. Abnormal confluent enhancing soft tissue in the region of the cervix noted, particularly to the right of midline.     MUSCULOSKELETAL: No acute bony abnormalities.                                                                      IMPRESSION:  1.  Interval resolution of right-sided hydronephrosis status post percutaneous nephrostomy placement.  2.  Poorly defined pelvic mass involving the right  adnexa, uterus, and potentially cervix compatible with malignancy.  3.  Increasing size of retroperitoneal and pelvic sidewall adenopathy.  4.  Probable hepatic hemangioma.  5.  New bilateral pleural effusions.    [facility]  LOCATION: [facility]  DATE: 12/31/2022     PROCEDURE: PERCUTANEOUS ANTEGRADE PYELOGRAM AND PERCUTANEOUS NEPHROSTOMY PLACEMENT     ATTENDING: Caio Edward MD.     INDICATION: 72-year-old female with right renal hydronephrosis.     CONSENT: The risks, benefits, and alternatives of antegrade pyelography and percutaneous nephrostomy tube placement  were discussed with the patient in detail. All questions were answered. Informed consent was given to proceed with the procedure.     MODERATE SEDATION: Versed 1.5 mg IV; Fentanyl 100 mcg IV.  During the timeout, immediately prior to the administration of medications, the patient was reassessed for adequacy to receive conscious sedation. Under physician supervision, Versed and fentanyl   were administered for moderate sedation. Pulse oximetry, heart rate and blood pressure were continuously monitored by an independent trained observer. The physician spent 10 minutes of face-to-face sedation time with the patient.     ADDITIONAL MEDICATIONS: None.     FLUOROSCOPIC TIME: 0.3 minutes.     AIR KERMA:  7 mGy.     CONTRAST: 10 mL Omnipaque into the collecting system.     UNIVERSAL PRECAUTIONS: The procedure was performed utilizing maximum sterile barrier technique. Prior to the start of the procedure, a standard pause for patient safety was performed with site marking as indicated.     COMPLICATIONS: No immediate complications.     PROCEDURE:    Using real-time sonographic guidance, an 18 gauge trocar needle was inserted into the right renal collecting system from a subcostal approach.  An antegrade pyelogram was performed. The tract was dilated over a wire and exchange was made for a  10 Yi   nephrostomy tube.  The look was  formed within the renal pelvis. A post placement nephrostogram was performed. The catheter was sutured to the skin and placed to gravity bag drainage.     FINDINGS:  Ultrasound demonstrates moderate right renal hydronephrosis. A permanent image was stored.     The completion image shows the right nephrostomy entering a lower pole calyx with loop in the renal pelvis.                                                                      IMPRESSION:    Successful right percutaneous nephrostomy placement, as detailed above.    ANTICOAGULANTS: none  ANTIBIOTICS: Rocephin pre procedure    ALLERGIES  No Known Allergies      LABS:  INR   Date Value Ref Range Status   12/31/2022 1.30 (H) 0.85 - 1.15 Final      Hemoglobin   Date Value Ref Range Status   03/23/2023 10.9 (L) 11.7 - 15.7 g/dL Final   ]  Platelet Count   Date Value Ref Range Status   03/23/2023 273 150 - 450 10e3/uL Final     Creatinine   Date Value Ref Range Status   03/23/2023 0.80 0.51 - 0.95 mg/dL Final     Potassium   Date Value Ref Range Status   03/23/2023 5.0 3.4 - 5.3 mmol/L Final   04/05/2022 4.1 3.5 - 5.0 mmol/L Final         ASSESSMENT/PLAN:   Stella Gerene is a 72 year old female with endometrial  cancer s/p chemotherapy. Plan for hysterectomy 4/5 at [facility]. She has been referred by Dr. Alvarez for right anterograde stent placement. Pt has perc nephrostomy tube (placed 12/2022).       EMR and note composed. No formal physical assessment completed.    STEPH ESCOBEDO, APRN CNP  Interventional Radiology

## 2023-03-28 NOTE — TELEPHONE ENCOUNTER
Contacted [facility] surgery dept and staff provided fax number [phone].  Pre-op was printed and faxed to above with confirmation.

## 2023-03-29 ENCOUNTER — HOSPITAL ENCOUNTER (OUTPATIENT)
Dept: INTERVENTIONAL RADIOLOGY/VASCULAR | Facility: CLINIC | Age: 73
Discharge: HOME | End: 2023-03-29
Attending: OBSTETRICS & GYNECOLOGY | Admitting: RADIOLOGY
Payer: MEDICARE

## 2023-03-29 VITALS
OXYGEN SATURATION: 99 % | RESPIRATION RATE: 17 BRPM | HEART RATE: 81 BPM | SYSTOLIC BLOOD PRESSURE: 126 MMHG | DIASTOLIC BLOOD PRESSURE: 76 MMHG

## 2023-03-29 DIAGNOSIS — N13.5 URETERAL OBSTRUCTION: ICD-10-CM

## 2023-03-29 DIAGNOSIS — C54.1 ENDOMETRIAL CARCINOMA (H): ICD-10-CM

## 2023-03-29 LAB
GLUCOSE BLDC GLUCOMTR-MCNC: 119 MG/DL (ref 70–99)
HGB BLD-MCNC: 10.4 G/DL (ref 11.7–15.7)
INR PPP: 0.98 (ref 0.85–1.15)
PLATELET # BLD AUTO: 360 10E3/UL (ref 150–450)

## 2023-03-29 PROCEDURE — 99152 MOD SED SAME PHYS/QHP 5/>YRS: CPT

## 2023-03-29 PROCEDURE — 250N000011 HC RX IP 250 OP 636: Performed by: RADIOLOGY

## 2023-03-29 PROCEDURE — 85610 PROTHROMBIN TIME: CPT | Performed by: PHYSICIAN ASSISTANT

## 2023-03-29 PROCEDURE — C1769 GUIDE WIRE: HCPCS

## 2023-03-29 PROCEDURE — 250N000011 HC RX IP 250 OP 636: Performed by: PHYSICIAN ASSISTANT

## 2023-03-29 PROCEDURE — 82962 GLUCOSE BLOOD TEST: CPT

## 2023-03-29 PROCEDURE — 85049 AUTOMATED PLATELET COUNT: CPT | Performed by: PHYSICIAN ASSISTANT

## 2023-03-29 PROCEDURE — 36591 DRAW BLOOD OFF VENOUS DEVICE: CPT | Performed by: PHYSICIAN ASSISTANT

## 2023-03-29 PROCEDURE — 85018 HEMOGLOBIN: CPT | Performed by: PHYSICIAN ASSISTANT

## 2023-03-29 PROCEDURE — C1758 CATHETER, URETERAL: HCPCS

## 2023-03-29 PROCEDURE — 99153 MOD SED SAME PHYS/QHP EA: CPT

## 2023-03-29 PROCEDURE — 250N000009 HC RX 250: Performed by: RADIOLOGY

## 2023-03-29 PROCEDURE — 258N000003 HC RX IP 258 OP 636: Performed by: PHYSICIAN ASSISTANT

## 2023-03-29 PROCEDURE — 50694 PLMT URETERAL STENT PRQ: CPT

## 2023-03-29 RX ORDER — NALOXONE HYDROCHLORIDE 0.4 MG/ML
0.2 INJECTION, SOLUTION INTRAMUSCULAR; INTRAVENOUS; SUBCUTANEOUS
Status: DISCONTINUED | OUTPATIENT
Start: 2023-03-29 | End: 2023-03-30 | Stop reason: HOSPADM

## 2023-03-29 RX ORDER — NALOXONE HYDROCHLORIDE 0.4 MG/ML
0.4 INJECTION, SOLUTION INTRAMUSCULAR; INTRAVENOUS; SUBCUTANEOUS
Status: DISCONTINUED | OUTPATIENT
Start: 2023-03-29 | End: 2023-03-30 | Stop reason: HOSPADM

## 2023-03-29 RX ORDER — FLUMAZENIL 0.1 MG/ML
0.2 INJECTION, SOLUTION INTRAVENOUS
Status: DISCONTINUED | OUTPATIENT
Start: 2023-03-29 | End: 2023-03-30 | Stop reason: HOSPADM

## 2023-03-29 RX ORDER — CEFTRIAXONE 1 G/1
1 INJECTION, POWDER, FOR SOLUTION INTRAMUSCULAR; INTRAVENOUS
Status: COMPLETED | OUTPATIENT
Start: 2023-03-29 | End: 2023-03-29

## 2023-03-29 RX ORDER — SODIUM CHLORIDE 9 MG/ML
INJECTION, SOLUTION INTRAVENOUS CONTINUOUS
Status: DISCONTINUED | OUTPATIENT
Start: 2023-03-29 | End: 2023-03-30 | Stop reason: HOSPADM

## 2023-03-29 RX ORDER — HEPARIN SODIUM (PORCINE) LOCK FLUSH IV SOLN 100 UNIT/ML 100 UNIT/ML
5-10 SOLUTION INTRAVENOUS
Status: DISCONTINUED | OUTPATIENT
Start: 2023-03-29 | End: 2023-03-30 | Stop reason: HOSPADM

## 2023-03-29 RX ORDER — FENTANYL CITRATE 50 UG/ML
25-50 INJECTION, SOLUTION INTRAMUSCULAR; INTRAVENOUS EVERY 5 MIN PRN
Status: DISCONTINUED | OUTPATIENT
Start: 2023-03-29 | End: 2023-03-30 | Stop reason: HOSPADM

## 2023-03-29 RX ORDER — LIDOCAINE 40 MG/G
CREAM TOPICAL
Status: DISCONTINUED | OUTPATIENT
Start: 2023-03-29 | End: 2023-03-30 | Stop reason: HOSPADM

## 2023-03-29 RX ADMIN — FENTANYL CITRATE 50 MCG: 50 INJECTION, SOLUTION INTRAMUSCULAR; INTRAVENOUS at 15:10

## 2023-03-29 RX ADMIN — SODIUM CHLORIDE: 9 INJECTION, SOLUTION INTRAVENOUS at 13:50

## 2023-03-29 RX ADMIN — LIDOCAINE HYDROCHLORIDE 10 ML: 10 INJECTION, SOLUTION EPIDURAL; INFILTRATION; INTRACAUDAL; PERINEURAL at 14:54

## 2023-03-29 RX ADMIN — HEPARIN 5 ML: 100 SYRINGE at 16:18

## 2023-03-29 RX ADMIN — MIDAZOLAM 1 MG: 1 INJECTION INTRAMUSCULAR; INTRAVENOUS at 15:10

## 2023-03-29 RX ADMIN — MIDAZOLAM 1 MG: 1 INJECTION INTRAMUSCULAR; INTRAVENOUS at 14:54

## 2023-03-29 RX ADMIN — FENTANYL CITRATE 50 MCG: 50 INJECTION, SOLUTION INTRAMUSCULAR; INTRAVENOUS at 14:54

## 2023-03-29 RX ADMIN — CEFTRIAXONE 1 G: 1 INJECTION, POWDER, FOR SOLUTION INTRAMUSCULAR; INTRAVENOUS at 13:49

## 2023-03-29 NOTE — PROGRESS NOTES
Patient Name: Stella Greene  Medical Record Number: [id number]  Today's Date: 3/29/2023    Procedure: Image Guided Right Ureteral Stent Placement and Nephrostogram with moderate sedation  Proceduralist: Dr. Esteban Ramos  Pathology present: n/a    Procedure Start: 1455  Procedure end: 1525  Sedation medications administered: Fentanyl 100 mcg, Versed 2 mg    Report given to: DARIAN Jiménez IR  : n/a    Other Notes: Pt arrived to IR room #1 from IR Pre / Post 1. Consent reviewed. Pt denies any questions or concerns regarding procedure. Pt positioned prone and monitored per protocol. Pt tolerated procedure without any noted complications. Pt transferred back to IR Pre / Post 1.    8 fr x 22 cm Percuflex Right Ureteral Stent placed by Dr. Ramos

## 2023-03-29 NOTE — IR NOTE
RADIOLOGY POST PROCEDURE NOTE    Patient name: Stella Greene  MRN: [id number]  : 1950    Pre-procedure diagnosis: Right ureteral obstruction.  Post-procedure diagnosis: Same    Procedure Date/Time: 2023  3:24 PM  Procedure:  1.  Right nephrostogram  2.  Placement of 8 Fr X 22 cm ureteral stent.  3.  Completion nephrostogram demonstrates contrast easily passing into the bladder and process was relatively atraumatic, therefore, PCN removed.      Estimated blood loss: 1 ml  Specimen(s) collected with description: PCN removed.    The patient tolerated the procedure well with no immediate complications.  Significant findings:  Please see above.    See imaging dictation for procedural details.    Provider name: Esteban Ramos MD  Assistant(s):None

## 2023-03-29 NOTE — DISCHARGE INSTRUCTIONS
Discharge Instructions after Ureteral Stent Placement    The ureteral stent is a soft plastic tube with holes in it. It is temporarily inserted into a ureter to help drain urine into the bladder. One end goes in the kidney. The other end goes in the bladder. A coil on each end holds the stent in place. The stent can t be seen from outside the body. It shouldn t interfere with your normal routine.   The stent may be used:  -To bypass a blockage in a kidney or ureter.  -During kidney stone removal.  -To let a ureter heal after surgery.            While you have the stent:    Some discomfort is normal. Certain movements may trigger pain or a feeling that you need to urinate. You may also feel mild soreness or pressure before or during urination. These symptoms should go away a few days after the stent is removed.   Medication to control pain or bladder spasms or to prevent infection may be prescribed. Take these as directed.   Drink plenty of fluids to help flush out your urinary tract.  Your urine may be slightly pink or red. This is due to bleeding caused by minor irritation from the stent. This may happen on and off while you have the stent.   The stent is often taken out after the blockage in the ureter is treated or the ureter has healed. This may take 1-2 weeks, or longer. If a stent is needed for a long time, it may need to be changed every few months.   Call your doctor at [phone] if:  Your urine contains blood clots.  You constantly leak urine.  You have a fever over 101 degrees, chills, nausea or vomiting.  The pain is not relieved with medication.  The end of the stent comes out of the urethra.

## 2023-03-29 NOTE — IR NOTE
Interventional Radiology Pre-Procedure Sedation Assessment   Time of Assessment: 2:41 PM    Expected Level: Moderate Sedation    Indication: Sedation is required for the following type of Procedure:     Sedation and procedural consent: Risks, benefits and alternatives were discussed with Patient    PO Intake: Appropriately NPO for procedure    ASA Class: Class 2 - MILD SYSTEMIC DISEASE, NO ACUTE PROBLEMS, NO FUNCTIONAL LIMITATIONS.    Mallampati: Grade 3:  Soft palate visible, posterior pharyngeal wall not visible    Lungs: Lungs Clear with good breath sounds bilaterally    Heart: Normal heart sounds and rate    History and physical reviewed and no updates needed. I have reviewed the lab findings, diagnostic data, medications, and the plan for sedation. I have determined this patient to be an appropriate candidate for the planned sedation and procedure and have reassessed the patient IMMEDIATELY PRIOR to sedation and procedure.    Esteban Ramos MD

## 2023-03-29 NOTE — IP AVS SNAPSHOT
[facility] Interventional Radiology  [address] [facility] [address]  Phone: [phone]  Fax: [phone]                              After Visit Summary   3/29/2023    Stella Greene   MRN: [id number]           After Visit Summary Signature Page    I have received my discharge instructions, and my questions have been answered. I have discussed any challenges I see with this plan with the nurse or doctor.    ..........................................................................................................................................  Patient/Patient Representative Signature      ..........................................................................................................................................  Patient Representative Print Name and Relationship to Patient    ..................................................               ................................................  Date                                   Time    ..........................................................................................................................................  Reviewed by Signature/Title    ...................................................              ..............................................  Date                                               Time    22EPIC Rev 08/18

## 2023-03-29 NOTE — IR NOTE
RADIOLOGY POST PROCEDURE NOTE    Patient name: Stella Greene  MRN: [id number]  : 1950    Pre-procedure diagnosis: LUE swelling  Post-procedure diagnosis: Same    Procedure Date/Time: 2023  2:36 PM  Procedure:  1.  LUE fistulagram with 6 mm PTA within graft of the upper arm.  2. 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  The segment of vein has been occluded for 9 months.    Swelling related to chronic Subclavian Vein occlusion (with pacemaker), and unfortunately, there are not great options.  Offered 2 options for patient.  First, continue as is.  Second, consultation with vascular surgery to discuss options.  If Swelling is problematic for patient, may need to ligate the graft and create a new fistula in contralateral RUE, though requires discussion with vascular surgeon.        Estimated blood loss: 10 ml  Specimen(s) collected with description: none    The patient tolerated the procedure well with no immediate complications.  Significant findings:  Please see above.    See imaging dictation for procedural details.    Provider name: Esteban Ramos MD  Assistant(s):None

## 2023-04-03 NOTE — PROGRESS NOTES
Subjective:    Established patient    Stella Greene is a 72 year old female who presents for the following endocrinopathies. The following is a comprehensive summary of her Endocrine care to date (chart fully reviewed except for bone health).      Current DM therapy:  -NovoLog 70/30 25 units before breakfast and 15 units before evening meal  -Ozempic was stopped 12/2022 in the setting of her cancer diagnosis   -She stopped glipizide and stopped metformin 4/2022     She has been checking a glucose about 3 times per week, and she checks fasting in the AM and it's ~100 - 125 mg/dL. No recent symptoms to suggest hypoglycemia. She had an episode of hypoglycemia in 12/2022 that required an ED visit. The hypoglycemia occurred after taking insulin and then changing plans and not eating.     Interim events: diagnosed with endometrial cancer and treated with chemotherapy (early 2023), she is scheduled for a hysterectomy on 4/5/2023. She had a right ureteral stent placed because of compression on the right ureter. She is planned for adjuvant chemotherapy as well starting 5/1/2023. She follows with [facility].     Objective:    BMI 25.7 kg/m2, /70.  Pleasant and conversational.  Pedal pulses palpable although she does have scattered generally healed foot ulcerations, none are actively infected.    4/2022: pedal pulses palpable, markedly reduced sensation to 10 gram monofilament testing bilaterally, multiple healing/healed lower extremity/foot ulcerations, none are actively infected     Assessment/Plan:    # DM-2  -HbA1c 10.7% 4/2022 with glucose 290 mg/dL   -9/2022: HbA1c 7.1%, 12/2022: HbA1c 5.8%, 3/2023: HbA1c 6.0% (in the setting of new anemia since 9/2022)  -CT A/P 1/2023: pancreas unremarkable  # No DM retinopathy on eye exam 6/2022  # Hypertension, on lisinopril    -12/2021: urine microalbumin normal   -4/2022: GFR 79  # Peripheral neuropathy, multiple lower extremity ulcerations  -most recently seen in the  Vascular wound care clinic 12/2021  # No prior ASCVD event, not on ASA   # Dyslipidemia, not on a statin   -9/2022: , HDL 60, LDL 78, TG 99  -She has a glucagon kit at home and we reviewed use today    We reviewed that in the setting of her upcoming surgery and adjuvant chemotherapy that her current insulin is not ideal if her appetite is poor because she will be at higher risk for hypoglycemia. Currently she has maintained her weight and is eating 3 meals per day + snacks. If she has a persistently poor appetite we can switch to a plan such as glargine plus aspart.  She notes that she tolerated her neoadjuvant chemotherapy well and so she has a preference not to switch today and to play this by ear.    For now do the following:     If appetite is good: NovoLog 70/30 22 units before breakfast and 12 units before evening meal     If appetite is ok appetite but still eating 3 meals: NovoLog 70/30 14 units before breakfast and 6 units before evening meal     No appetite: no insulin     Check a glucose twice daily (times can include fasting, before meals, at night).     Goal glucose <180 mg/dL.     We can use a Freestyle Miya sensor as needed, she has a preference to continue with her glucometer at this time.    With chemotherapy if you're receiving steroids (prednisone, dexamethasone, etc.) you'll like need more morning insulin that day and for a day or 2 after because the main effect is high glucose after eating.     Return to see me at my next opening.  We will have Stella schedule an appointment with our CDE team to check in regularly in the setting of upcoming chemotherapy.  She will let me know if she is having any issues and I can always add her onto my calendar sooner as well.    # Perioperative recommendations for hysterectomy on 4/5/2023    Tonight (4/4/2023) take 7 units of insulin.  No insulin before surgery tomorrow.    -Immediately prior to surgery I recommend 100 mg of IV hydrocortisone and then for  "the next 24 hours 25 mg of IV hydrocortisone every 8 hours (or if discharged prior to 24 hours then just 10 mg prednisone daily), then when she can take PO transition to prednisone 10 mg daily for 2 days then when back to baseline return to prednisone 5 mg daily      # Secondary adrenal insufficiency  -she had been on prednisone 10 mg daily for about 25 years for asthma.    Currently she is on prednisone 5 mg daily and is following with pulmonology.      Adrenal insufficiency sick day rules:  -When ill, take prednisone 10 mg daily for 3 days or until feeling back to baseline  -If Stella is too ill to take prednisone by mouth (ex. significant nausea, vomiting), she should inject dexamethasone 4 mg intramuscular or 100 mg of hydrocortisone and call 911 or proceed to the ED, she has an injection kit at home   -In the event of hospitalization, surgery/procedure, car accident, etc. she will need IV steroids, a typical initial dose is hydrocortisone 100 mg IV, I can be contacted for dosing specifics as needed   -She will obtain a medical alert bracelet that states \"secondary adrenal insufficiency\"    On chemotherapy days and for a day after depending on how you feel we can always give prednisone 10 mg daily if you won't otherwise be getting a steroid.     # Bone health  -She takes a MVI daily and vitamin D 1000 international unit(s) daily   -no recent DEXA, no prior fracture   -In 4/2022 she had teeth extracted   -Need to complete a chart review for her bone health      Not addressed today.     45 minutes spent on the date of the encounter doing chart review, history and exam, documentation and further activities as noted above.       "

## 2023-04-04 ENCOUNTER — OFFICE VISIT (OUTPATIENT)
Dept: ENDOCRINOLOGY | Facility: CLINIC | Age: 73
End: 2023-04-04
Payer: MEDICARE

## 2023-04-04 VITALS
DIASTOLIC BLOOD PRESSURE: 70 MMHG | SYSTOLIC BLOOD PRESSURE: 118 MMHG | HEART RATE: 60 BPM | WEIGHT: 137 LBS | BODY MASS INDEX: 25.68 KG/M2

## 2023-04-04 DIAGNOSIS — E78.5 DYSLIPIDEMIA: ICD-10-CM

## 2023-04-04 DIAGNOSIS — I10 HYPERTENSION, UNSPECIFIED TYPE: ICD-10-CM

## 2023-04-04 DIAGNOSIS — Z79.4 TYPE 2 DIABETES MELLITUS WITH DIABETIC POLYNEUROPATHY, WITH LONG-TERM CURRENT USE OF INSULIN (H): ICD-10-CM

## 2023-04-04 DIAGNOSIS — C54.1 ENDOMETRIAL CANCER (H): ICD-10-CM

## 2023-04-04 DIAGNOSIS — L97.909 ULCER OF LOWER EXTREMITY, UNSPECIFIED LATERALITY, UNSPECIFIED ULCER STAGE (H): ICD-10-CM

## 2023-04-04 DIAGNOSIS — E24.2 CUSHINGOID SIDE EFFECT OF STEROIDS (H): ICD-10-CM

## 2023-04-04 DIAGNOSIS — Z92.21 STATUS POST CHEMOTHERAPY: ICD-10-CM

## 2023-04-04 DIAGNOSIS — J45.909 ASTHMA, UNSPECIFIED ASTHMA SEVERITY, UNSPECIFIED WHETHER COMPLICATED, UNSPECIFIED WHETHER PERSISTENT: ICD-10-CM

## 2023-04-04 DIAGNOSIS — E11.42 TYPE 2 DIABETES MELLITUS WITH DIABETIC POLYNEUROPATHY, WITH LONG-TERM CURRENT USE OF INSULIN (H): ICD-10-CM

## 2023-04-04 DIAGNOSIS — E66.3 OVERWEIGHT (BMI 25.0-29.9): ICD-10-CM

## 2023-04-04 DIAGNOSIS — E27.40 ADRENAL INSUFFICIENCY (H): Primary | ICD-10-CM

## 2023-04-04 PROCEDURE — 99215 OFFICE O/P EST HI 40 MIN: CPT | Performed by: INTERNAL MEDICINE

## 2023-04-04 RX ORDER — PREDNISONE 5 MG/1
5 TABLET ORAL DAILY
Qty: 150 TABLET | Refills: 3 | Status: ON HOLD | OUTPATIENT
Start: 2023-04-04 | End: 2023-01-01

## 2023-04-04 NOTE — LETTER
4/4/2023         RE: Stella Greene  [address]  [place] [address]        Dear Colleague,    Thank you for referring your patient, Stella Greene, to the [facility]. Please see a copy of my visit note below.    Subjective:    Established patient    Stella Greene is a 72 year old female who presents for the following endocrinopathies. The following is a comprehensive summary of her Endocrine care to date (chart fully reviewed except for bone health).      Current DM therapy:  -NovoLog 70/30 25 units before breakfast and 15 units before evening meal  -Ozempic was stopped 12/2022 in the setting of her cancer diagnosis   -She stopped glipizide and stopped metformin 4/2022     She has been checking a glucose about 3 times per week, and she checks fasting in the AM and it's ~100 - 125 mg/dL. No recent symptoms to suggest hypoglycemia. She had an episode of hypoglycemia in 12/2022 that required an ED visit. The hypoglycemia occurred after taking insulin and then changing plans and not eating.     Interim events: diagnosed with endometrial cancer and treated with chemotherapy (early 2023), she is scheduled for a hysterectomy on 4/5/2023. She had a right ureteral stent placed because of compression on the right ureter. She is planned for adjuvant chemotherapy as well starting 5/1/2023. She follows with [facility].     Objective:    BMI 25.7 kg/m2, /70.  Pleasant and conversational.  Pedal pulses palpable although she does have scattered generally healed foot ulcerations, none are actively infected.    4/2022: pedal pulses palpable, markedly reduced sensation to 10 gram monofilament testing bilaterally, multiple healing/healed lower extremity/foot ulcerations, none are actively infected     Assessment/Plan:    # DM-2  -HbA1c 10.7% 4/2022 with glucose 290 mg/dL   -9/2022: HbA1c 7.1%, 12/2022: HbA1c 5.8%, 3/2023: HbA1c 6.0% (in the setting of new anemia since 9/2022)  -CT A/P  1/2023: pancreas unremarkable  # No DM retinopathy on eye exam 6/2022  # Hypertension, on lisinopril    -12/2021: urine microalbumin normal   -4/2022: GFR 79  # Peripheral neuropathy, multiple lower extremity ulcerations  -most recently seen in the Vascular wound care clinic 12/2021  # No prior ASCVD event, not on ASA   # Dyslipidemia, not on a statin   -9/2022: , HDL 60, LDL 78, TG 99  -She has a glucagon kit at home and we reviewed use today    We reviewed that in the setting of her upcoming surgery and adjuvant chemotherapy that her current insulin is not ideal if her appetite is poor because she will be at higher risk for hypoglycemia. Currently she has maintained her weight and is eating 3 meals per day + snacks. If she has a persistently poor appetite we can switch to a plan such as glargine plus aspart.  She notes that she tolerated her neoadjuvant chemotherapy well and so she has a preference not to switch today and to play this by ear.    For now do the following:     If appetite is good: NovoLog 70/30 22 units before breakfast and 12 units before evening meal     If appetite is ok appetite but still eating 3 meals: NovoLog 70/30 14 units before breakfast and 6 units before evening meal     No appetite: no insulin     Check a glucose twice daily (times can include fasting, before meals, at night).     Goal glucose <180 mg/dL.     We can use a Freestyle Miya sensor as needed, she has a preference to continue with her glucometer at this time.    With chemotherapy if you're receiving steroids (prednisone, dexamethasone, etc.) you'll like need more morning insulin that day and for a day or 2 after because the main effect is high glucose after eating.     Return to see me at my next opening.  We will have Stella schedule an appointment with our CDE team to check in regularly in the setting of upcoming chemotherapy.  She will let me know if she is having any issues and I can always add her onto my calendar  "sooner as well.    # Perioperative recommendations for hysterectomy on 4/5/2023    Tonight (4/4/2023) take 7 units of insulin.  No insulin before surgery tomorrow.    -Immediately prior to surgery I recommend 100 mg of IV hydrocortisone and then for the next 24 hours 25 mg of IV hydrocortisone every 8 hours (or if discharged prior to 24 hours then just 10 mg prednisone daily), then when she can take PO transition to prednisone 10 mg daily for 2 days then when back to baseline return to prednisone 5 mg daily      # Secondary adrenal insufficiency  -she had been on prednisone 10 mg daily for about 25 years for asthma.    Currently she is on prednisone 5 mg daily and is following with pulmonology.      Adrenal insufficiency sick day rules:  -When ill, take prednisone 10 mg daily for 3 days or until feeling back to baseline  -If Stella is too ill to take prednisone by mouth (ex. significant nausea, vomiting), she should inject dexamethasone 4 mg intramuscular or 100 mg of hydrocortisone and call 911 or proceed to the ED, she has an injection kit at home   -In the event of hospitalization, surgery/procedure, car accident, etc. she will need IV steroids, a typical initial dose is hydrocortisone 100 mg IV, I can be contacted for dosing specifics as needed   -She will obtain a medical alert bracelet that states \"secondary adrenal insufficiency\"    On chemotherapy days and for a day after depending on how you feel we can always give prednisone 10 mg daily if you won't otherwise be getting a steroid.     # Bone health  -She takes a MVI daily and vitamin D 1000 international unit(s) daily   -no recent DEXA, no prior fracture   -In 4/2022 she had teeth extracted   -Need to complete a chart review for her bone health      Not addressed today.     45 minutes spent on the date of the encounter doing chart review, history and exam, documentation and further activities as noted above.           Again, thank you for allowing me to " participate in the care of your patient.        Sincerely,        Chris Romo MD

## 2023-05-08 PROBLEM — R11.2 NAUSEA AND VOMITING, UNSPECIFIED VOMITING TYPE: Status: ACTIVE | Noted: 2023-01-01

## 2023-05-08 NOTE — ED TRIAGE NOTES
Patient has a history of adrenal gland failure, on chemo for uterine cancer. Patient developed vomiting on Wednesday, which is not unusual with the chemo, but it has continued. Yesterday was feeling better but today has been unable to keep anything down, including her prednisone this morning. Was instructed by doctor to be seen if she is unable to keep down due to the adrenal insufficiency.      Triage Assessment     Row Name 05/07/23 3039       Triage Assessment (Adult)    Airway WDL WDL       Respiratory WDL    Respiratory WDL WDL       Skin Circulation/Temperature WDL    Skin Circulation/Temperature WDL WDL       Cardiac WDL    Cardiac WDL WDL       Peripheral/Neurovascular WDL    Peripheral Neurovascular WDL WDL       Cognitive/Neuro/Behavioral WDL    Cognitive/Neuro/Behavioral WDL WDL

## 2023-05-08 NOTE — H&P
"[facility]    History and Physical - Hospitalist Service       Date of Admission:  5/7/2023    Assessment & Plan      Stella Gerene is a 72 year old female with medical history of endometrial cancer on chemotherapy, last dose 3 days ago, adrenal insufficiency, hypertension, T2DM and COPD admitted on 5/7/2023 with intractable vomiting and lower abdominal pain.  ED work-up showed INGRID with elevated BUN to creatinine ratio, mild metabolic acidosis with CO2 at 16, normocytic anemia and hyperglycemia.    #Chemotherapy induced vomiting:  - Keep n.p.o. for bowel rest for 24 hours.  - Antiemetics.  - Start on graded oral feeding when vomiting is controlled.    #INGRID due to dehydration.  - IV fluids.    #Hypertensive urgency:  - Give parenteral as needed antihypertensives pending tolerance of p.o. feeding when p.o. antihypertensives can be resumed.    #T2DM with hyperglycemia:  - Insulin sliding scale.  - IV fluids.    #Adrenal insufficiency:  - Put on stress dose of steroids parenterally.     Diet: NPO for Medical/Clinical Reasons Except for: Ice Chips    DVT Prophylaxis: Pneumatic Compression Devices  Waddell Catheter: Not present  Lines:   Port A Cath Single 01/13/23 Right Chest wall-Site Assessment: WDL      Cardiac Monitoring: None  Code Status:  DNR    Clinically Significant Risk Factors Present on Admission                  # Hypertension: home medication list includes antihypertensive(s)      # Overweight: Estimated body mass index is 25.68 kg/m  as calculated from the following:    Height as of this encounter: 1.556 m (5' 1.25\").    Weight as of this encounter: 62.1 kg (137 lb).           Disposition Plan Home     Expected Discharge Date: 05/10/2023                  Elisa Soriano MD  Hospitalist Service  [facility]  Securely message with [employer] (more info)  Text page via [facility] Paging/Directory "     ______________________________________________________________________    Chief Complaint   Vomiting for 1 day    History is obtained from the patient, electronic health record and emergency department physician    History of Present Illness   Stella Greene is a 72 year old female who presented to the ED with intractable nonbilious and nonbloody vomiting for 1 day, following recent chemotherapy.  Admits mild lower abdominal pain aggravated by vomiting.  No fever, chest pain, shortness of breath, change in bowel habits or urinary symptoms.      Past Medical History    Past Medical History:   Diagnosis Date     Adrenal insufficiency (H)     secondary, due to chronic prednisone use for asthma, tapering off with Pulmonology     Diabetes mellitus, type 2 (H) 12/06/2021    HbA1c 7% 9/2022 - Last visit with Endo 9/2022     Essential hypertension     Created by Conversion [employer] Utility updated for latest IMO load      Mild persistent asthma without complication        Past Surgical History   Past Surgical History:   Procedure Laterality Date     IR CHEST PORT PLACEMENT > 5 YRS OF AGE  1/13/2023     IR NEPHROSTOMY TUBE PLACEMENT RIGHT  12/31/2022     IR URETERAL STENT PLACEMENT RIGHT  3/29/2023     LAPAROSCOPIC TUBAL LIGATION Bilateral      nasal polpys Bilateral      PICC TRIPLE LUMEN PLACEMENT  12/31/2022            Prior to Admission Medications   Prior to Admission Medications   Prescriptions Last Dose Informant Patient Reported? Taking?   ADVAIR DISKUS 250-50 MCG/DOSE inhaler   No No   Sig: [ADVAIR DISKUS 250-50 MCG/DOSE DISKUS] INHALE 1 DOSE BY MOUTH TWICE DAILY   Glucagon (GVOKE HYPOPEN 1-PACK) 1 MG/0.2ML SOAJ   No No   Sig: Inject 1 mg Subcutaneous as needed (low BG levels)   Glucagon 0.5 MG/0.1ML SOAJ   No No   Sig: Inject 1 mg Subcutaneous daily as needed (severe hypoglycemia)   Vitamin D, Cholecalciferol, 25 MCG (1000 UT) TABS   Yes No   acetaminophen (TYLENOL) 325 MG tablet   No No   Sig: Take 2  tablets (650 mg) by mouth every 4 hours as needed for mild pain or fever   albuterol (PROAIR HFA/PROVENTIL HFA/VENTOLIN HFA) 108 (90 Base) MCG/ACT inhaler   No No   Sig: Inhale 2 puffs into the lungs every 6 hours as needed   blood glucose test (CONTOUR NEXT STRIPS) strips   No No   Sig: [BLOOD GLUCOSE TEST (CONTOUR NEXT STRIPS) STRIPS] Test four times daily.  Dx code DM2.   cetirizine (ZYRTEC) 10 MG tablet   Yes No   Sig: [CETIRIZINE (ZYRTEC) 10 MG TABLET] Take 10 mg by mouth daily.   fluticasone propionate (FLONASE) 50 mcg/actuation nasal spray   No No   Sig: [FLUTICASONE PROPIONATE (FLONASE) 50 MCG/ACTUATION NASAL SPRAY] 1 spray into each nostril daily.   insulin aspart prot & aspart (NOVOLOG MIX 70/30 FLEXPEN) (70-30) 100 UNIT/ML pen   No No   Si units before breakfast, 15 units before evening meal   insulin pen needle (31G X 6 MM) 31G X 6 MM miscellaneous   No No   Sig: Use 2 pen needles daily or as directed.   lansoprazole (PREVACID) 15 MG capsule   Yes No   Sig: [LANSOPRAZOLE (PREVACID) 15 MG CAPSULE] Take 15 mg by mouth daily.   lisinopril (ZESTRIL) 20 MG tablet   No No   Sig: Take 1 tablet (20 mg) by mouth daily   montelukast (SINGULAIR) 10 MG tablet   No No   Sig: Take 1 tablet (10 mg) by mouth At Bedtime   multivitamin therapeutic tablet   Yes No   Sig: [MULTIVITAMIN THERAPEUTIC TABLET] Take 1 tablet by mouth daily.   predniSONE (DELTASONE) 5 MG tablet   No No   Sig: Take three tablets a day for three days then two tablets a day for three days and then go back to your usual one tablet a day   predniSONE (DELTASONE) 5 MG tablet   No No   Sig: Take 1 tablet (5 mg) by mouth daily 5-10 mg daily depending on whether receiving chemotherapy      Facility-Administered Medications Last Administration Doses Remaining   lidocaine (XYLOCAINE) 2 % external gel 2021  7:54 AM            Review of Systems    The 10 point Review of Systems is negative other than noted in the HPI       Physical Exam   Vital  Signs: Temp: 98.2  F (36.8  C) Temp src: Oral BP: (!) 150/69 Pulse: 98   Resp: 18 SpO2: 99 % O2 Device: None (Room air)    Weight: 137 lbs 0 oz    General Appearance: Well-nourished, well-developed, in no acute distress.  Respiratory: Clear to auscultation with good air entry bilaterally.  Cardiovascular: Regular rate and rhythm.  S1 and S2 normal.  No murmurs, gallops or rubs.  GI: Soft, not distended.  Mild bilateral flank tenderness without guarding or rebound tenderness.  Skin: Good turgor.  Warm and dry.  Alopecia.  Other: Alert, oriented x3.  No focal signs.    Medical Decision Making     76 MINUTES SPENT BY ME on the date of service doing chart review, history, exam, documentation & further activities per the note.      Data     I have personally reviewed the following data over the past 24 hrs:    6.4  \   8.8 (L)   / 320     136 101 32.7 (H) /  239 (H)   3.9 19 (L) 1.09 (H) \       Imaging results reviewed over the past 24 hrs:   No results found for this or any previous visit (from the past 24 hour(s)).

## 2023-05-08 NOTE — PROGRESS NOTES
POST MIDNIGHT ADMISSION :           Assessment & Plan  Stella Greene is a 72 year old female with medical history of endometrial cancer on chemotherapy, last dose 3 days ago, adrenal insufficiency, hypertension, T2DM and COPD admitted on 5/7/2023 with intractable vomiting and lower abdominal pain.  ED work-up showed INGRID with elevated BUN to creatinine ratio, mild metabolic acidosis with CO2 at 16, normocytic anemia and hyperglycemia.      5/7 :       Continues to have nausea and not having any apetite  Continue anti emetics prn, iv fluids  On clear liquid diet    Not medically ready for discharge at this time.      A/p :        #Chemotherapy induced vomiting:  - Keep n.p.o. for bowel rest for 24 hours.  - Antiemetics.  - Start on graded oral feeding when vomiting is controlled.     #INGRID due to dehydration.  - IV fluids.     #Hypertensive urgency:  - Give parenteral as needed antihypertensives pending tolerance of p.o. feeding when p.o. antihypertensives can be resumed.     #T2DM with hyperglycemia:  - Insulin sliding scale.  - IV fluids.     #Adrenal insufficiency:  - Put on stress dose of steroids parenterally.            GENERAL: The patient is not in any acute distressed. Awake and alert.  HEENT: Nonicteric sclerae, PERRLA, EOMI. Oropharynx clear. Moist mucous membranes. Conjunctivae appear well perfused.  HEART: Regular rate and rhythm without murmurs.  LUNGS: Clear to auscultation bilaterally. No wheezing or crackles.  ABDOMEN: Soft, positive bowel sounds, nontender.  SKIN: No rash, no excessive bruising, petechiae, or purpura.  EXTREMITIES : no rashes, no swelling in legs.  NEUROLOGIC: conscious and oriented, follows commands, no obvious focal deficits.  ROS: All other systems negative ;

## 2023-05-08 NOTE — CONSULTS
Care Management Initial Consult    General Information  Assessment completed with: Patient,    Type of CM/SW Visit: Initial Assessment    Primary Care Provider verified and updated as needed:     Readmission within the last 30 days: no previous admission in last 30 days      Reason for Consult: discharge planning  Advance Care Planning:            Communication Assessment  Patient's communication style: spoken language (English or Bilingual)             Cognitive  Cognitive/Neuro/Behavioral: WDL  Level of Consciousness: alert  Arousal Level: opens eyes spontaneously  Orientation: oriented x 4  Mood/Behavior: behavior appropriate to situation, cooperative, calm          Living Environment:   People in home: spouse     Current living Arrangements: house      Able to return to prior arrangements: yes       Family/Social Support:  Care provided by: self, spouse/significant other  Provides care for: no one  Marital Status:             Description of Support System: Supportive, Involved    Support Assessment: Adequate family and caregiver support    Current Resources:   Patient receiving home care services: No     Community Resources: None  Equipment currently used at home:    Supplies currently used at home: None    Employment/Financial:  Employment Status: retired        Financial Concerns: No concerns identified   Referral to Financial Worker: No       Does the patient's insurance plan have a 3 day qualifying hospital stay waiver?  Yes   Will the waiver be used for post-acute placement? No    Lifestyle & Psychosocial Needs:  Social Determinants of Health     Tobacco Use: Low Risk  (5/8/2023)    Patient History      Smoking Tobacco Use: Never      Smokeless Tobacco Use: Never      Passive Exposure: Not on file   Alcohol Use: Not on file   Financial Resource Strain: Not on file   Food Insecurity: Not on file   Transportation Needs: Not on file   Physical Activity: Not on file   Stress: Not on file   Social  Connections: Not on file   Intimate Partner Violence: Not on file   Depression: Not at risk (3/23/2023)    PHQ-2      PHQ-2 Score: 0   Housing Stability: Not on file       Functional Status:  Prior to admission patient needed assistance:   Dependent ADLs:: Independent  Dependent IADLs:: Independent, Transportation       Mental Health Status:  Mental Health Status: No Current Concerns       Chemical Dependency Status:  Chemical Dependency Status: No Current Concerns             Values/Beliefs:  Spiritual, Cultural Beliefs, [employer] Practices, Values that affect care: no               Additional Information:  CM met with patient in room to discuss discharge planning and complete initial assessment.     Patient lives in a house with her . She is independent at baseline,  assists with transportation. Patient anticipates discharge home,  to transport. No CM needs identified.     ISMAEL MominW

## 2023-05-08 NOTE — PHARMACY-ADMISSION MEDICATION HISTORY
Pharmacist Admission Medication History    Admission medication history is complete. The information provided in this note is only as accurate as the sources available at the time of the update.    Medication reconciliation/reorder completed by provider prior to medication history? No    Information Source(s): Patient, Family member, Prescription bottles and CareEverywhere/SureScripts via in-person    Pertinent Information: Patient has been ill recently and was told by her Endocrinologist that she should take 10-15 units of insulin in the morning and 10 units in the evening. Patient said she hasn't been eating much or at all so has only been taking a dose of 10 units in the evening.     Patient also increased her Prednisone to 10 mg daily while ill.     Changes made to PTA medication list:    Added: None    Deleted: None    Changed: Inuslin decreased from 25 units before breakfast, 15 units before evening meal , now 10-15 units before breakfast, 10 units before evening meal.     Medication Affordability:       Allergies reviewed with patient and updates made in EHR: yes    Medication History Completed By: TAMICA HERZOG [facility] 5/8/2023 10:04 AM    Prior to Admission medications    Medication Sig Last Dose Taking? Auth Provider Long Term End Date   acetaminophen (TYLENOL) 325 MG tablet Take 2 tablets (650 mg) by mouth every 4 hours as needed for mild pain or fever 5/7/2023 at am Yes Natividad Harmon MD     albuterol (PROAIR HFA/PROVENTIL HFA/VENTOLIN HFA) 108 (90 Base) MCG/ACT inhaler Inhale 2 puffs into the lungs every 6 hours as needed More than a month at prn Yes Lacho Gunter MD Yes    cetirizine (ZYRTEC) 10 MG tablet [CETIRIZINE (ZYRTEC) 10 MG TABLET] Take 10 mg by mouth daily. 5/7/2023 at am Yes Provider, Historical     fluticasone propionate (FLONASE) 50 mcg/actuation nasal spray [FLUTICASONE PROPIONATE (FLONASE) 50 MCG/ACTUATION NASAL SPRAY] 1 spray into each nostril daily. 5/7/2023 at am Yes Lyric  Lacho MANZANO MD     fluticasone-salmeterol (ADVAIR) 250-50 MCG/ACT inhaler Inhale 1 puff into the lungs daily 5/7/2023 at am Yes Unknown, Entered By History No    Glucagon (GVOKE HYPOPEN 1-PACK) 1 MG/0.2ML SOAJ Inject 1 mg Subcutaneous as needed (low BG levels) Unknown Yes Chris Romo MD Yes    insulin aspart prot & aspart (NOVOLOG MIX 70/30 FLEXPEN) (70-30) 100 UNIT/ML pen 10-15 units before breakfast, 10 units before evening meal Past Week at 10 units in PM Yes Chris Romo MD Yes    lansoprazole (PREVACID) 15 MG capsule [LANSOPRAZOLE (PREVACID) 15 MG CAPSULE] Take 15 mg by mouth daily. 5/7/2023 at am Yes Provider, Historical     lisinopril (ZESTRIL) 20 MG tablet Take 1 tablet (20 mg) by mouth daily 5/7/2023 at am Yes Lacho Gunter MD Yes    montelukast (SINGULAIR) 10 MG tablet Take 1 tablet (10 mg) by mouth At Bedtime 5/7/2023 at pm Yes Margarita Barbosa MD Yes    multivitamin therapeutic tablet [MULTIVITAMIN THERAPEUTIC TABLET] Take 1 tablet by mouth daily. 5/7/2023 at am Yes Provider, Historical     predniSONE (DELTASONE) 5 MG tablet Take 2 tablets (10 mg) daily in the morning 5/7/2023 at am Yes Chris Romo MD     Vitamin D, Cholecalciferol, 25 MCG (1000 UT) TABS Take 1 tablet by mouth daily 5/7/2023 at am Yes Reported, Patient     blood glucose test (CONTOUR NEXT STRIPS) strips [BLOOD GLUCOSE TEST (CONTOUR NEXT STRIPS) STRIPS] Test four times daily.  Dx code DM2.   Lacho Gunter MD     Glucagon 0.5 MG/0.1ML SOAJ Inject 1 mg Subcutaneous daily as needed (severe hypoglycemia)   Chris Romo MD Yes    insulin pen needle (31G X 6 MM) 31G X 6 MM miscellaneous Use 2 pen needles daily or as directed.   Chris Romo MD

## 2023-05-08 NOTE — ED PROVIDER NOTES
EMERGENCY DEPARTMENT ENCOUNTER      NAME: Stella Greene  AGE: 72 year old female  YOB: 1950  MRN: [id number]  EVALUATION DATE & TIME: 5/7/2023 11:31 PM    PCP: Lacho Gunter    ED PROVIDER: Ben Cordova M.D.      Chief Complaint   Patient presents with     Vomiting         FINAL IMPRESSION:  1. Nausea and vomiting, unspecified vomiting type    2. Adrenal insufficiency (H)    3. Malignant neoplasm of uterus, unspecified site (H)          ED COURSE & MEDICAL DECISION MAKING:    Pertinent Labs & Imaging studies reviewed. (See chart for details)  72 year old female presents to the Emergency Department for evaluation of vomiting.  Patient status post chemotherapy on Monday.  Has had vomiting before.  Has a history of adrenal insufficiency.  Has not been able to keep her steroids down.  Port is accessed.  IV fluids are given.  Given then IV Zofran followed by IV Compazine with minimal improvement.  Continues to vomit.  Did give her 50 mg of IV hydrocortisone for stress dose.  She is typically on 5 mg of prednisone.  Patient's labs are notable for creatinine 1.09.  Anion gap is 16.  Given patient is unable to tolerate p.o. does need to be admitted.  Abdomen is otherwise nontender.  Onset seeing signs of obstruction.  Patient was admitted for further care.  Discussed with the hospitalist    11:35 PM I met with the patient to gather history and to perform my initial exam. I discussed the plan for care while in the Emergency Department. PPE: surgical mask  2:14 AM I discussed the case with hospitalist, Dr Soriano, who accepts the patient.    At the conclusion of the encounter I discussed the results of all of the tests and the disposition. The questions were answered. The patient or family acknowledged understanding and was agreeable with the care plan.     Medical Decision Making    History:    Supplemental history from: Documented in chart, if applicable    External Record(s) reviewed: Documented in  chart, if applicable.    Work Up:    Chart documentation includes differential considered and any EKGs or imaging independently interpreted by provider, where specified.    In additional to work up documented, I considered the following work up: Documented in chart, if applicable.    External consultation:    Discussion of management with another provider: Hospitalist    Complicating factors:    Care impacted by chronic illness: Cancer/Chemotherapy, Chronic Lung Disease, Diabetes, Hypertension and Other: Acute renal insufficiency    Care affected by social determinants of health: N/A    Disposition considerations: Admit.             MEDICATIONS GIVEN IN THE EMERGENCY:  Medications   heparin lock flush 10 UNIT/ML injection 5-10 mL (has no administration in time range)   heparin lock flush 10 UNIT/ML injection 5-10 mL (5 mLs Intracatheter Not Given 5/8/23 0213)   heparin 100 unit/mL injection 5-10 mL (5 mLs Intracatheter Not Given 5/8/23 0212)   ondansetron (ZOFRAN) injection 4 mg (4 mg Intravenous $Given 5/8/23 0200)   lidocaine 1 % 0.1-1 mL (has no administration in time range)   lidocaine (LMX4) cream (has no administration in time range)   sodium chloride (PF) 0.9% PF flush 3 mL (3 mLs Intracatheter Not Given 5/8/23 0340)   sodium chloride (PF) 0.9% PF flush 3 mL (has no administration in time range)   glucose gel 15-30 g (has no administration in time range)     Or   dextrose 50 % injection 25-50 mL (has no administration in time range)     Or   glucagon injection 1 mg (has no administration in time range)   dextrose 5% and 0.9% NaCl + KCl 20 mEq/L infusion ( Intravenous Rate/Dose Verify 5/8/23 0533)   acetaminophen (TYLENOL) tablet 650 mg (has no administration in time range)     Or   acetaminophen (TYLENOL) Suppository 650 mg (has no administration in time range)   ondansetron (ZOFRAN) injection 4 mg (has no administration in time range)   prochlorperazine (COMPAZINE) injection 5 mg (has no administration in  time range)     Or   prochlorperazine (COMPAZINE) suppository 12.5 mg (has no administration in time range)   famotidine (PEPCID) injection 20 mg (20 mg Intravenous $Given 5/8/23 0334)   insulin aspart (NovoLOG) injection (RAPID ACTING) (2 Units Subcutaneous $Given 5/8/23 0337)   hydrocortisone sodium succinate PF (solu-CORTEF) injection 50 mg (has no administration in time range)   lactated ringers BOLUS 1,000 mL (0 mLs Intravenous Stopped 5/8/23 0126)     Followed by   lactated ringers BOLUS 1,000 mL (0 mLs Intravenous Stopped 5/8/23 0235)   hydrocortisone sodium succinate PF (solu-CORTEF) injection 50 mg (50 mg Intravenous $Given 5/7/23 8524)   prochlorperazine (COMPAZINE) injection 5 mg (5 mg Intravenous $Given 5/8/23 0122)       NEW PRESCRIPTIONS STARTED AT TODAY'S ER VISIT  New Prescriptions    No medications on file          =================================================================    HPI    Patient information was obtained from: Patient     Use of : N/A         Stella Greene is a 72 year old female with a pertinent history of endometrial carcinoma, HTN, acute renal insufficiency, DM2, and COPD, who presents to this ED by walk in for evaluation of vomiting.    Patient reports a history uterine cancer and has chemotherapy every 3 weeks. The patient states that her last chemotherapy treatment was last Monday (~6 days ago). Since Thursday (~3 days ago), the patient endorses nausea and vomiting. The patient states that her nausea is not unusual, but her vomiting is new. The patient adds that she has been taking Zofran and another medication for her symptoms, but denies improvement. She denies bowel/bladder changes. She endorses lower abdominal pain, which is worse than usual, but she attributes this to her vomiting. She denies fevers. Additionally, the patient reports a history of renal insufficiency. The patient states that she was on Prednisone for ~25 years for asthma. The patient was told  to take 10 mg instead of her usual 5 mg of Prednisone for her nausea symptoms. Tonight, she was unable to take her medication secondary to her vomiting. The patient otherwise denies any other symptoms or complaints at this time.     REVIEW OF SYSTEMS   Review of Systems   Constitutional: Negative for fever.   Gastrointestinal: Positive for abdominal pain, nausea and vomiting. Negative for blood in stool, constipation and diarrhea.   Genitourinary: Negative.    All other systems reviewed and are negative.       PAST MEDICAL HISTORY:  Past Medical History:   Diagnosis Date     Adrenal insufficiency (H)     secondary, due to chronic prednisone use for asthma, tapering off with Pulmonology     Diabetes mellitus, type 2 (H) 12/06/2021    HbA1c 7% 9/2022 - Last visit with Endo 9/2022     Essential hypertension     Created by Conversion Replacement Utility updated for latest IMO load      Mild persistent asthma without complication        PAST SURGICAL HISTORY:  Past Surgical History:   Procedure Laterality Date     IR CHEST PORT PLACEMENT > 5 YRS OF AGE  1/13/2023     IR NEPHROSTOMY TUBE PLACEMENT RIGHT  12/31/2022     IR URETERAL STENT PLACEMENT RIGHT  3/29/2023     LAPAROSCOPIC TUBAL LIGATION Bilateral      nasal polpys Bilateral      PICC TRIPLE LUMEN PLACEMENT  12/31/2022                CURRENT MEDICATIONS:    Current Facility-Administered Medications   Medication     acetaminophen (TYLENOL) tablet 650 mg    Or     acetaminophen (TYLENOL) Suppository 650 mg     dextrose 5% and 0.9% NaCl + KCl 20 mEq/L infusion     glucose gel 15-30 g    Or     dextrose 50 % injection 25-50 mL    Or     glucagon injection 1 mg     famotidine (PEPCID) injection 20 mg     heparin 100 unit/mL injection 5-10 mL     heparin lock flush 10 UNIT/ML injection 5-10 mL     heparin lock flush 10 UNIT/ML injection 5-10 mL     hydrocortisone sodium succinate PF (solu-CORTEF) injection 50 mg     insulin aspart (NovoLOG) injection (RAPID ACTING)      "lidocaine (LMX4) cream     lidocaine (XYLOCAINE) 2 % external gel     lidocaine 1 % 0.1-1 mL     ondansetron (ZOFRAN) injection 4 mg     ondansetron (ZOFRAN) injection 4 mg     prochlorperazine (COMPAZINE) injection 5 mg    Or     prochlorperazine (COMPAZINE) suppository 12.5 mg     sodium chloride (PF) 0.9% PF flush 3 mL     sodium chloride (PF) 0.9% PF flush 3 mL     Current Outpatient Medications   Medication     acetaminophen (TYLENOL) 325 MG tablet     ADVAIR DISKUS 250-50 MCG/DOSE inhaler     albuterol (PROAIR HFA/PROVENTIL HFA/VENTOLIN HFA) 108 (90 Base) MCG/ACT inhaler     blood glucose test (CONTOUR NEXT STRIPS) strips     cetirizine (ZYRTEC) 10 MG tablet     fluticasone propionate (FLONASE) 50 mcg/actuation nasal spray     Glucagon (GVOKE HYPOPEN 1-PACK) 1 MG/0.2ML SOAJ     Glucagon 0.5 MG/0.1ML SOAJ     insulin aspart prot & aspart (NOVOLOG MIX 70/30 FLEXPEN) (70-30) 100 UNIT/ML pen     insulin pen needle (31G X 6 MM) 31G X 6 MM miscellaneous     lansoprazole (PREVACID) 15 MG capsule     lisinopril (ZESTRIL) 20 MG tablet     montelukast (SINGULAIR) 10 MG tablet     multivitamin therapeutic tablet     predniSONE (DELTASONE) 5 MG tablet     predniSONE (DELTASONE) 5 MG tablet     Vitamin D, Cholecalciferol, 25 MCG (1000 UT) TABS         ALLERGIES:  No Known Allergies    FAMILY HISTORY:  Family History   Problem Relation Age of Onset     No Known Problems Mother      Heart Disease Father        SOCIAL HISTORY:   Social History     Socioeconomic History     Marital status:    Tobacco Use     Smoking status: Never     Smokeless tobacco: Never   Vaping Use     Vaping status: Never Used   Substance and Sexual Activity     Alcohol use: No     Drug use: No       VITALS:  BP (!) 150/69   Pulse 98   Temp 98.2  F (36.8  C) (Oral)   Resp 18   Ht 1.556 m (5' 1.25\")   Wt 62.1 kg (137 lb)   SpO2 99%   BMI 25.68 kg/m      PHYSICAL EXAM    Physical Exam  Vitals and nursing note reviewed.   Constitutional:      "  General: She is not in acute distress.     Appearance: She is not diaphoretic.   HENT:      Head: Atraumatic.      Mouth/Throat:      Pharynx: No oropharyngeal exudate.   Eyes:      General: No scleral icterus.     Pupils: Pupils are equal, round, and reactive to light.   Cardiovascular:      Rate and Rhythm: Normal rate and regular rhythm.      Heart sounds: Normal heart sounds.   Pulmonary:      Effort: No respiratory distress.      Breath sounds: Normal breath sounds.   Abdominal:      Palpations: Abdomen is soft.      Tenderness: There is no abdominal tenderness. There is no guarding or rebound. Negative signs include Copeland's sign.   Musculoskeletal:         General: No tenderness.   Skin:     General: Skin is warm.      Findings: No rash.   Neurological:      General: No focal deficit present.      Mental Status: She is alert.           LAB:  All pertinent labs reviewed and interpreted.  Labs Ordered and Resulted from Time of ED Arrival to Time of ED Departure   BASIC METABOLIC PANEL - Abnormal       Result Value    Sodium 136      Potassium 3.9      Chloride 101      Carbon Dioxide (CO2) 19 (*)     Anion Gap 16 (*)     Urea Nitrogen 32.7 (*)     Creatinine 1.09 (*)     Calcium 9.1      Glucose 239 (*)     GFR Estimate 54 (*)    CBC WITH PLATELETS AND DIFFERENTIAL - Abnormal    WBC Count 6.4      RBC Count 3.00 (*)     Hemoglobin 8.8 (*)     Hematocrit 27.3 (*)     MCV 91      MCH 29.3      MCHC 32.2      RDW 15.1 (*)     Platelet Count 320      % Neutrophils 82      % Lymphocytes 13      % Monocytes 3      % Eosinophils 1      % Basophils 0      % Immature Granulocytes 1      NRBCs per 100 WBC 0      Absolute Neutrophils 5.3      Absolute Lymphocytes 0.9      Absolute Monocytes 0.2      Absolute Eosinophils 0.0      Absolute Basophils 0.0      Absolute Immature Granulocytes 0.1      Absolute NRBCs 0.0     COMPREHENSIVE METABOLIC PANEL - Abnormal    Sodium 137      Potassium 4.1      Chloride 104      Carbon  Dioxide (CO2) 20 (*)     Anion Gap 13      Urea Nitrogen 23.3 (*)     Creatinine 0.78      Calcium 9.1      Glucose 243 (*)     Alkaline Phosphatase 100      AST 10      ALT 16      Protein Total 7.2      Albumin 3.5      Bilirubin Total 0.3      GFR Estimate 81     CBC WITH PLATELETS - Abnormal    WBC Count 4.6      RBC Count 2.76 (*)     Hemoglobin 8.2 (*)     Hematocrit 25.5 (*)     MCV 92      MCH 29.7      MCHC 32.2      RDW 15.1 (*)     Platelet Count 275     GLUCOSE BY METER - Abnormal    GLUCOSE BY METER POCT 228 (*)    GLUCOSE MONITOR NURSING POCT   GLUCOSE MONITOR NURSING POCT       RADIOLOGY:  Reviewed all pertinent imaging. Please see official radiology report.  No orders to display         I, Cayetano Acosta, am serving as a scribe to document services personally performed by Dr. Ben Cordova, based on my observation and the provider's statements to me. I, Ben Cordova MD attest that Cayetano Acosta is acting in a scribe capacity, has observed my performance of the services and has documented them in accordance with my direction.    Ben Cordova M.D.  Emergency Medicine  [facility] EMERGENCY DEPARTMENT  [facility] [facility] [address]  [phone]  Dept: [phone]     Ben Cordova MD  05/08/23 0619

## 2023-05-09 NOTE — PLAN OF CARE
Goal Outcome Evaluation:      Plan of Care Reviewed With: patient    Overall Patient Progress: improvingOverall Patient Progress: improving         Alert and oriented. Denies lower abdominal pain. Last compazine given at 0418 prior to that was 1440 on 5/8/23.  On room air. Continent of bowel and bladder. Independent in the room.. Fluids (D5+0.9NS+20K) running at 75 ml/hr. Denies pain. Q 4 blood glucose checks 170, 220, 185 and 132. ADAT, patient is tolerating clears so far, advanced to full.

## 2023-05-09 NOTE — CONSULTS
GYN/ONC CONSULT  Patient Name: Stella Greene  Address: [address] [address] [place] [id number]  Age:72 year old, : 1950   Sex: female   Admission Date/Time: 2023 11:31 PM   CC: I was asked to see Ms. Stella Greene in consultation for endometrial cancer on chemo, admitted for nausea and vomiting.     HPI:   The patient is 72 year old female with past medical history significant for endometrial cancer on chemo, adrenal insufficiency, HTN, DM, and COPD who was admitted on 2023 with intractable nausea and vomiting. She received cycle 4 of carboplatin/paclitaxel on 23. She recently underwent RATLH, BSO, PPALND, APPY, OMX, radical tumor debulking 23 following 3 cycles of neoadjuvant chemo (see history below). Pathology showed stage IIIC1 endometrial carcinosarcoma. Patient presented to the ER on  with persistent nausea/vomiting for one day, as well as mild lower abdominal pain exacerbated by vomiting. In the ER, workup was notable for INGRID, mild metabolic acidosis, and normocytic anemia.    Oncologic History (per clinic note):  22: Stella presented to the ED for evaluation of laceration of forehead following a head injury. A head CT was performed, which showed left frontal scalp laceration without acute intracranial abnormality. Incidental note of a subcentimeter calcified left frontal convexity meningioma.    22 - 23: The patient was admitted to the [facility] due to septic shock, leukocytosis, UTI without hematuria, elevated troponin, acute renal insufficiency, and elevated liver enzymes.   Due to sepsis, hypotension, and elevated white count at 58k, a CT CAP was performed on 22. The report showed right hydronephrosis and hydroureter extending to the distal ureter, where the ureter crosses vessels and a soft tissue mass. Nonspecific lower pelvic stranding could be from cystitis. Retroperitoneal masses are concerning for malignancy. The uterus is  heterogeneous, lobulated, and enlarged, and a uterine or right ovarian mass is possible. Recommended further evaluation of the retroperitoneal soft tissue. Hepatic steatosis. Fluid throughout the esophagus with a moderate to large hiatal hernia. This raises the risk of aspiration.  Head CT showed senescent changes and sequelae of chronic microangiopathy without acute intracranial abnormality.  She underwent a right nephrostomy tube placement on 12/31/22.  1/1/23: CA-125 = 85.   CT CAP on 1/3/23 showed interval resolution of right-sided hydronephrosis status post percutaneous nephrostomy placement. Poorly defined pelvic mass involving the right adnexa, uterus, and potentially cervix compatible with malignancy. Increasing size of retroperitoneal and pelvic sidewall adenopathy. Probable hepatic hemangioma. New bilateral pleural effusions.  1/4/23: Pelvic MRI showed tumor infiltration of the uterine myometrium and most of the cervical stroma with relative sparing of the endometrial cavity and endocervical canal raises the possibility of endocervical type adenocarcinoma and makes endometrial adenocarcinoma and the cervical squamosal cell carcinoma less likely. Locally invasive tumor involvement of the right anterolateral vagina, right parametrium, right internal iliac veins, posterior bladder wall and right fallopian tube, adnexa, ovary and right gonadal vein as well as tumor abutment of the right sciatic nerve. Bilateral external iliac lymphadenopathy.  1/6/23: PET-CT scan showed a large FDG avid infiltrative lesion diffusely involving the cervix, endometrium, and myometrium measuring 11.2 x 10.7 x 13.4 cm. Bilateral ovarian FDG avid lesions with involvement of retroperitoneal lymphadenopathy, including the pelvis, mid abdomen, and periesophageal space.   1/6/23: Endometrial/cervical bx. Pathology showing adenocarcinoma of the endometrium, favor endometrioid (grade 2 or 3) vs serous carcinoma. DNA mismatch repair  enzymes intact.   1/16/23: Cycle 1 carboplatin (AUC 6)/paclitaxel (175 mg/m )  2/6/23: Cycle 2 carboplatin (AUC 6)/paclitaxel (175 mg/m ).  = 56.3.  2/27/2023: Cycle 3 carboplatin (AUC 6)/paclitaxel (175 mg/m ).  = 30.1.  3/13/23: CT CAP showed decreased size of the infiltrative uterine mass and exophytic components, arising from the right lateral and anterior fundal uterus and decrease in aortocaval adenopathy. A heterogeneous attenuation lesion along the right iliac chain at the bifurcation of external and Internal iliac artery branches is slightly larger. Right percutaneous nephrostomy. Minimal urothelial thickening of the right renal pelvis and upper right ureter. Diverticulosis of the colon. Unchanged hiatal hernia.  4/5/23: Patient underwent RATLH, BSO, PPALND, APPY, OMX, radical tumor debulking. Pathology showed stage IIIC1 endometrial carcinosarcoma with disease primarily within the uterus and the right pelvic lymph node peeled off the external iliac vessels.     Today, Stella states she continues to be nauseated and has vomited even pills.  She states she was feeling really well at 3 AM when she was provided Compazine and Pepcid and states immediately she felt the need to vomit.  She states she had a normal and formed stool yesterday.  She denies seeing any blood in her stool, blood in her urine.  She does feel somewhat bloated but feels it is primarily from the fluids.  She denies any chest pain or abdominal pain.  She denies any dysuria.  Denies any swelling in her feet or legs.    REVIEW OF SYSTEMS  Negative except as noted above.    PAST MEDICAL HISTORY  Past Medical History:   Diagnosis Date     Adrenal insufficiency (H)     secondary, due to chronic prednisone use for asthma, tapering off with Pulmonology     Diabetes mellitus, type 2 (H) 12/06/2021    HbA1c 7% 9/2022 - Last visit with Endo 9/2022     Essential hypertension     Created by Conversion Replacement Utility updated for latest  IMO load      Mild persistent asthma without complication       PAST SURGICAL HISTORY  Past Surgical History:   Procedure Laterality Date     IR CHEST PORT PLACEMENT > 5 YRS OF AGE  1/13/2023     IR NEPHROSTOMY TUBE PLACEMENT RIGHT  12/31/2022     IR URETERAL STENT PLACEMENT RIGHT  3/29/2023     LAPAROSCOPIC TUBAL LIGATION Bilateral      nasal polpys Bilateral      PICC TRIPLE LUMEN PLACEMENT  12/31/2022            CURRENT MEDS  Current Facility-Administered Medications   Medication     acetaminophen (TYLENOL) tablet 650 mg    Or     acetaminophen (TYLENOL) Suppository 650 mg     dextrose 5% and 0.9% NaCl + KCl 20 mEq/L infusion     glucose gel 15-30 g    Or     dextrose 50 % injection 25-50 mL    Or     glucagon injection 1 mg     famotidine (PEPCID) injection 20 mg     heparin 100 unit/mL injection 5-10 mL     heparin lock flush 10 UNIT/ML injection 5-10 mL     heparin lock flush 10 UNIT/ML injection 5-10 mL     hydrALAZINE (APRESOLINE) injection 10 mg     hydrocortisone sodium succinate PF (solu-CORTEF) injection 50 mg     insulin aspart (NovoLOG) injection (RAPID ACTING)     lidocaine (LMX4) cream     lidocaine 1 % 0.1-1 mL     ondansetron (ZOFRAN) injection 4 mg     ondansetron (ZOFRAN) injection 4 mg     prochlorperazine (COMPAZINE) injection 5 mg    Or     prochlorperazine (COMPAZINE) suppository 12.5 mg     sodium chloride (PF) 0.9% PF flush 3 mL     sodium chloride (PF) 0.9% PF flush 3 mL     ALLERGIES/SENSITIVITIES  No Known Allergies  FAMILY HISTORY  Family History   Problem Relation Age of Onset     No Known Problems Mother      Heart Disease Father      SOCIAL HISTORY  Social History     Socioeconomic History     Marital status:      Spouse name: Not on file     Number of children: Not on file     Years of education: Not on file     Highest education level: Not on file   Occupational History     Not on file   Tobacco Use     Smoking status: Never     Smokeless tobacco: Never   Vaping Use      "Vaping status: Never Used   Substance and Sexual Activity     Alcohol use: No     Drug use: No     Sexual activity: Not on file   Other Topics Concern     Parent/sibling w/ CABG, MI or angioplasty before 65F 55M? Not Asked   Social History Narrative     Not on file     Social Determinants of Health     Financial Resource Strain: Not on file   Food Insecurity: Not on file   Transportation Needs: Not on file   Physical Activity: Not on file   Stress: Not on file   Social Connections: Not on file   Intimate Partner Violence: Not on file   Housing Stability: Not on file      PHYSICAL EXAM  BP (!) 168/84 (BP Location: Left arm)   Pulse 97   Temp 98.3  F (36.8  C) (Oral)   Resp 17   Ht 1.556 m (5' 1.25\")   Wt 62.1 kg (137 lb)   SpO2 97%   BMI 25.68 kg/m    Body mass index is 25.68 kg/m .    GENERAL: alert, NAD    HEENT: normocephalic, no scleral icterus, mucosa moist  CV: RRR, no murmurs  LUNGS:  No dyspnea, clear bilat  ABDOMEN: Non distended, soft, non-tender, no peritoneal signs, no hernias, laparoscopic incisions well healed    EXTREMITIES:  No edema, no deformities  SKIN: warm and dry, no jaundice, no rashes  NEURO:  Cranial nerves II-XII grossly intact, alert and oriented, motor exam intact   PSYCH: appropriate mood and affect    LABS  Recent Labs   Lab Test 05/08/23  0552 05/07/23  2351   WBC 4.6 6.4   RBC 2.76* 3.00*   HGB 8.2* 8.8*   HCT 25.5* 27.3*   MCV 92 91   MCH 29.7 29.3   MCHC 32.2 32.2    320     Recent Labs   Lab Test 05/08/23  0547 05/07/23  2351   POTASSIUM 4.1 3.9   CHLORIDE 104 101   BUN 23.3* 32.7*       Recent Labs   Lab Test 05/08/23  0547 03/23/23  1001 12/31/22 2003 12/31/22  1508   PROTEIN  --   --  100* 100*   BILITOTAL 0.3 0.4  --   --    AST 10 21  --   --    ALT 16 15  --   --      INR (no units)   Date Value   03/29/2023 0.98         ASSESSMENT AND PLAN :   Stella Greene is a 72 year old female who is s/p cycle 4 of carboplatin/paclitaxel on 5/1/23 for endometrial cancer, now " admitted for intractable nausea/vomiting.    1.  Nausea/vomiting.  -Now 8 days status post chemotherapy with carboplatin and paclitaxel.  Likely related to recent chemotherapy as there is no sign of infection, bowel obstruction or alternative cause for persistent nausea and vomiting.  However, this is quite delayed and extensive in comparison to typical nausea and vomiting related to chemotherapy.  If Stella does not see an improvement, she likely warrants a CT scan of the abdomen and pelvis.  -Continue as needed Zofran, Compazine.  -Add olanzapine daily, Ativan as needed.  Stella could be experiencing anticipatory nausea related to her recent chemotherapy, and the Ativan may be of more assistance than alternative antiemetics.    2.  Endometrial cancer  -Status post interval surgical debulking April 5, 2023  -Continues on chemotherapy with carboplatin and paclitaxel, cycle 4 5/1/2023.  Currently within steve with a normal-appearing WBC.    Thank you so much for taking such excellent care of this very sweet patient.    Shari Alvarez MD  Gynecologic Oncology  [facility]  [facility]: [phone]

## 2023-05-10 NOTE — PROGRESS NOTES
"GYN/ONC PROGRESS NOTE    cc: endometrial cancer on chemo, admitted for nausea and vomiting    HPI: Nausea and vomiting improved. Tolerating sips of tea. No abdominal pain, bloating.     EXAM:    Intake/Output Summary (Last 24 hours) at 5/10/2023 0823  Last data filed at 5/9/2023 2115  Gross per 24 hour   Intake 2471.67 ml   Output 1 ml   Net 2470.67 ml           BP (!) (P) 142/99 (BP Location: Left arm)   Pulse (P) 100   Temp (P) 98.4  F (36.9  C) (Oral)   Resp (P) 20   Ht 1.556 m (5' 1.25\")   Wt 62.1 kg (137 lb)   SpO2 (P) 98%   BMI 25.68 kg/m      GENERAL: alert, NAD  CV: RRR, no murmurs  RESPIRATORY: no dyspnea, clear anteriorly  ABDOMEN: non-distended, +BS, soft, non-tender  EXTREMITY: no edema  SKIN: warm and dry, no jaundice no rashes  NEURO: cranial nerves II-XII grossly intact, motor exam intact    LABS  Recent Labs   Lab Test 05/08/23  0552 05/07/23  2351   WBC 4.6 6.4   RBC 2.76* 3.00*   HGB 8.2* 8.8*   HCT 25.5* 27.3*   MCV 92 91   MCH 29.7 29.3   MCHC 32.2 32.2    320       Recent Labs   Lab Test 05/08/23  0547 05/07/23  2351   POTASSIUM 4.1 3.9   CHLORIDE 104 101   BUN 23.3* 32.7*       Recent Labs   Lab Test 05/08/23  0547 03/23/23  1001 12/31/22 2003 12/31/22  1508   PROTEIN  --   --  100* 100*   BILITOTAL 0.3 0.4  --   --    AST 10 21  --   --    ALT 16 15  --   --        IMAGING  N/A      ASSESSMENT AND PLAN :   Stella Greene is a 72 year old female who is s/p cycle 4 of carboplatin/paclitaxel on 5/1/23 for endometrial cancer, admitted for intractable nausea/vomiting, now improving.     1.  Nausea/vomiting  - 9 days status post chemotherapy with carboplatin and paclitaxel.  Likely related to recent chemotherapy as there is no sign of infection, bowel obstruction or alternative cause for persistent nausea and vomiting.  However, this is quite delayed and extensive in comparison to typical nausea and vomiting related to chemotherapy.  If Stella does not see an improvement, she likely " warrants a CT scan of the abdomen and pelvis.  -Continue as needed Zofran, Compazine.  - Continue olanzapine daily, Ativan and Compazine as needed.    - On fulls, Advance diet as tolerated.    2.  Endometrial cancer  - Status post interval surgical debulking April 5, 2023  - Continues on chemotherapy with carboplatin and paclitaxel, cycle 4 given 5/1/2023.  Currently within steve with a normal-appearing WBC.          Yarelis Herrera PA-C  Gynecologic Oncology  [facility]: [phone]

## 2023-05-10 NOTE — PROGRESS NOTES
"Care Management Follow Up    Length of Stay (days): 2    Expected Discharge Date: 05/11/2023     Concerns to be Addressed: all concerns addressed in this encounter     Patient plan of care discussed at interdisciplinary rounds: Yes    Anticipated Discharge Disposition:  Home     Anticipated Discharge Services:  NA  Anticipated Discharge DME:  [facility]    Patient/family educated on Medicare website which has current facility and service quality ratings:  NA  Education Provided on the Discharge Plan:  Per Team  Patient/Family in Agreement with the Plan:  Yes    Referrals Placed by CM/SW:  [facility]  Private pay costs discussed: Not applicable    Additional Information:  Chart Reviewed.     Per MD note yesterday not tolerating oral diet.    Social HX: \"Patient lives in a house with her . She is independent at baseline,  assists with transportation. Patient anticipates discharge home,  to transport. No CM needs identified.\"    CM will continue to follow care progression and aide in discharge planning as needed.     Elena Saini RN        "

## 2023-05-10 NOTE — PLAN OF CARE
Problem: Nausea and Vomiting  Goal: Nausea and Vomiting Relief  Intervention: Prevent and Manage Nausea and Vomiting  Recent Flowsheet Documentation  Taken 5/10/2023 0045 by Joann Feliz, RN  Nausea/Vomiting Interventions: (declines antiemetic at this time.) --     PRN compazine given for nausea x1.    Sea bands in place.  Sleeping between cares and rounding.    Joann Feliz, DARIAN

## 2023-05-10 NOTE — PLAN OF CARE
Problem: Nausea and Vomiting  Goal: Nausea and Vomiting Relief  Outcome: Progressing  Intervention: Prevent and Manage Nausea and Vomiting  Recent Flowsheet Documentation  Taken 5/10/2023 1615 by Hannah Cedeño, RN  Nausea/Vomiting Interventions:    acupressure applied    antiemetic  Taken 5/10/2023 0830 by Hannah Cedeño, RN  Nausea/Vomiting Interventions:    acupressure applied    antiemetic     Goal Outcome Evaluation:    Nausea better managed with prn ativan and scheduled reglan.   Prn ativan x2 today with some relief.   Patient has been tolerating clears and had some pudding today. Tolerated well.   Diet advanced to regular. Having some crackers and soup for dinner and some fruit. So far tolerating well.  Up independently in the room.Denies bowel movement and passing gas. Voiding with no issues.

## 2023-05-10 NOTE — PLAN OF CARE
Goal Outcome Evaluation:    Problem: Plan of Care - These are the overarching goals to be used throughout the patient stay.    Goal: Optimal Comfort and Wellbeing  Outcome: Progressing  Denies pain or discomfort when asked     Problem: Nausea and Vomiting  Goal: Nausea and Vomiting Relief  Outcome: Progressing  Reports that nausea is improving with new scheduled and PRN medications.

## 2023-05-10 NOTE — PROGRESS NOTES
POST MIDNIGHT ADMISSION :           Assessment & Plan  Stella Greene is a 72 year old female with medical history of endometrial cancer on chemotherapy, last dose 3 days ago, adrenal insufficiency, hypertension, T2DM and COPD admitted on 5/7/2023 with intractable vomiting and lower abdominal pain.  ED work-up showed INGRID with elevated BUN to creatinine ratio, mild metabolic acidosis with CO2 at 16, normocytic anemia and hyperglycemia.      5/9 :       Continues to have nausea and not having any apetite  Continue anti emetics prn, iv fluids  On full liquid diet  Started on scheduled reglan, ativan prn    Not medically ready for discharge at this time.  Not able to tolerate oral diet      A/p :        #Chemotherapy induced vomiting:  - Keep n.p.o. for bowel rest for 24 hours.  - Antiemetics.  - Start on graded oral feeding when vomiting is controlled.     #INGRID due to dehydration.  - IV fluids.     #Hypertensive urgency:  - Give parenteral as needed antihypertensives pending tolerance of p.o. feeding when p.o. antihypertensives can be resumed.     #T2DM with hyperglycemia:  - Insulin sliding scale.  - IV fluids.     #Adrenal insufficiency:  - Put on stress dose of steroids parenterally.            GENERAL: The patient is not in any acute distressed. Awake and alert.  HEENT: Nonicteric sclerae, PERRLA, EOMI. Oropharynx clear. Moist mucous membranes. Conjunctivae appear well perfused.  HEART: Regular rate and rhythm without murmurs.  LUNGS: Clear to auscultation bilaterally. No wheezing or crackles.  ABDOMEN: Soft, positive bowel sounds, nontender.  SKIN: No rash, no excessive bruising, petechiae, or purpura.  EXTREMITIES : no rashes, no swelling in legs.  NEUROLOGIC: conscious and oriented, follows commands, no obvious focal deficits.  ROS: All other systems negative ;

## 2023-05-11 NOTE — PROGRESS NOTES
Care Management Discharge Note    Discharge Date: 05/11/2023       Discharge Disposition:  Home    Discharge Services:  None    Discharge DME:  None    Discharge Transportation: family or friend will provide    Private pay costs discussed: Not applicable    Does the patient's insurance plan have a 3 day qualifying hospital stay waiver?  No    Education Provided on the Discharge Plan:  Yes  Persons Notified of Discharge Plans: MD, RN, SW, patient  Patient/Family in Agreement with the Plan:  Yes    Handoff Referral Completed: Yes    Additional Information:  Patient discharging home with spouse. Spouse to transport.         ISMAEL MominW

## 2023-05-11 NOTE — PLAN OF CARE
Pt set to discharge home accompanied by her .  Discharge instructions were reviewed with pt prior to leaving.  Dr. Dev Alvarez's group cleared pt for discharge via telephone call.

## 2023-05-11 NOTE — PLAN OF CARE
Goal Outcome Evaluation:  Patient denied having any N/V. Patient denied pain. Patient slept very well during the night, reports it was her best night sleep. Patient was given insulin during the night per mar and Bgs. Patient has IVF running.

## 2023-05-11 NOTE — PLAN OF CARE
Goal Outcome Evaluation:  Denies pain.  Nausea improving with scheduled and prn medications. Regular diet but appetite poor. Ativan given at 2220  IVF hydration.  Up to the BR independently. Voiding well.

## 2023-05-11 NOTE — PROGRESS NOTES
POST MIDNIGHT ADMISSION :           Assessment & Plan  Stella Greene is a 72 year old female with medical history of endometrial cancer on chemotherapy, last dose 3 days ago, adrenal insufficiency, hypertension, T2DM and COPD admitted on 5/7/2023 with intractable vomiting and lower abdominal pain.  ED work-up showed INGRID with elevated BUN to creatinine ratio, mild metabolic acidosis with CO2 at 16, normocytic anemia and hyperglycemia.      5/10 :       Continues to have nausea and not having any apetite  Continue anti emetics prn, iv fluids  On full liquid diet  Started on scheduled reglan, ativan prn    Discharge in am       A/p :        #Chemotherapy induced vomiting:  - Keep n.p.o. for bowel rest for 24 hours.  - Antiemetics.  - Start on graded oral feeding when vomiting is controlled.     #INGRID due to dehydration.  - IV fluids.     #Hypertensive urgency:  - Give parenteral as needed antihypertensives pending tolerance of p.o. feeding when p.o. antihypertensives can be resumed.     #T2DM with hyperglycemia:  - Insulin sliding scale.  - IV fluids.     #Adrenal insufficiency:  - Put on stress dose of steroids parenterally.            GENERAL: The patient is not in any acute distressed. Awake and alert.  HEENT: Nonicteric sclerae, PERRLA, EOMI. Oropharynx clear. Moist mucous membranes. Conjunctivae appear well perfused.  HEART: Regular rate and rhythm without murmurs.  LUNGS: Clear to auscultation bilaterally. No wheezing or crackles.  ABDOMEN: Soft, positive bowel sounds, nontender.  SKIN: No rash, no excessive bruising, petechiae, or purpura.  EXTREMITIES : no rashes, no swelling in legs.  NEUROLOGIC: conscious and oriented, follows commands, no obvious focal deficits.  ROS: All other systems negative ;

## 2023-05-25 NOTE — TELEPHONE ENCOUNTER
"Last Written Prescription Date:  1/19/23  Last Fill Quantity: 90,  # refills: 0   Last office visit provider:   3/23/23    Requested Prescriptions   Pending Prescriptions Disp Refills     lisinopril (ZESTRIL) 20 MG tablet [Pharmacy Med Name: Lisinopril 20 MG Oral Tablet] 90 tablet 0     Sig: Take 1 tablet by mouth once daily       ACE Inhibitors (Including Combos) Protocol Passed - 5/24/2023  7:58 AM        Passed - Blood pressure under 140/90 in past 12 months     BP Readings from Last 3 Encounters:   05/11/23 135/70   04/04/23 118/70   03/29/23 126/76                 Passed - Recent (12 mo) or future (30 days) visit within the authorizing provider's specialty     Patient has had an office visit with the authorizing provider or a provider within the authorizing providers department within the previous 12 mos or has a future within next 30 days. See \"Patient Info\" tab in inbasket, or \"Choose Columns\" in Meds & Orders section of the refill encounter.              Passed - Medication is active on med list        Passed - Patient is age 18 or older        Passed - No active pregnancy on record        Passed - Normal serum creatinine on file in past 12 months     Recent Labs   Lab Test 05/08/23  0547   CR 0.78       Ok to refill medication if creatinine is low          Passed - Normal serum potassium on file in past 12 months     Recent Labs   Lab Test 05/08/23  0547   POTASSIUM 4.1             Passed - No positive pregnancy test within past 12 months             Shirin Orellana RN 05/24/23 9:24 PM  "

## 2023-06-17 NOTE — TELEPHONE ENCOUNTER
"Routing refill request to provider for review/approval because:  Labs out of range: asthma control failed assessment score.  Do you wish to refill?     Last Written Prescription Date:  5/8/23  Last Fill Quantity:50 MCG, # refills: 0   Last office visit provider:  3/23/23     Requested Prescriptions   Pending Prescriptions Disp Refills     ADVAIR DISKUS 250-50 MCG/ACT inhaler [Pharmacy Med Name: Advair Diskus 250-50 MCG/DOSE Inhalation Aerosol Powder Breath Activated]  0     Sig: INHALE 1 DOSE BY MOUTH TWICE DAILY       Inhaled Steroids Protocol Failed - 6/16/2023 10:32 AM        Failed - Asthma control assessment score within normal limits in last 6 months     Please review ACT score.           Passed - Patient is age 12 or older        Passed - Medication is active on med list        Passed - Recent (6 mo) or future (30 days) visit within the authorizing provider's specialty     Patient had office visit in the last 6 months or has a visit in the next 30 days with authorizing provider or within the authorizing provider's specialty.  See \"Patient Info\" tab in inbasket, or \"Choose Columns\" in Meds & Orders section of the refill encounter.           Long-Acting Beta Agonist Inhalers Protocol  Failed - 6/16/2023 10:32 AM        Failed - Asthma control assessment score within normal limits in last 6 months     Please review ACT score.           Passed - Patient is age 12 or older        Passed - Medication is active on med list        Passed - Recent (6 mo) or future (30 days) visit within the authorizing provider's specialty     Patient had office visit in the last 6 months or has a visit in the next 30 days with authorizing provider or within the authorizing provider's specialty.  See \"Patient Info\" tab in inbasket, or \"Choose Columns\" in Meds & Orders section of the refill encounter.                 Cara James RN 06/17/23 12:16 AM  "

## 2023-06-19 NOTE — DISCHARGE SUMMARY
[facility]  Hospitalist Discharge Summary      Date of Admission:  5/7/2023  Date of Discharge:  5/11/2023  2:02 PM  Discharging Provider: Siddharth Murray MD  Discharge Service: Hospitalist Service    Discharge Diagnoses         Stella Greene is a 72 year old female with medical history of endometrial cancer on chemotherapy, last dose 3 days ago, adrenal insufficiency, hypertension, T2DM and COPD admitted on 5/7/2023 with intractable vomiting and lower abdominal pain.  ED work-up showed INGRID with elevated BUN to creatinine ratio, mild metabolic acidosis with CO2 at 16, normocytic anemia and hyperglycemia.        5/11 :         Medically stable  Discharge today        A/p :         #Chemotherapy induced vomiting:  - Keep n.p.o. for bowel rest for 24 hours.  - Antiemetics.  - Start on graded oral feeding when vomiting is controlled.    Resolved, tolerating oral diet.     #INGRID due to dehydration.  - IV fluids. resolved     #Hypertensive urgency:  - Give parenteral as needed antihypertensives pending tolerance of p.o. feeding when p.o. antihypertensives can be resumed.     #T2DM with hyperglycemia:  - Insulin sliding scale.  - IV fluids.     #Adrenal insufficiency:  - Put on stress dose of steroids parenterally  Stable , back on home meds      Clinically Significant Risk Factors          Follow-ups Needed After Discharge   Follow-up Appointments     Follow-up and recommended labs and tests       Follow up with primary care provider, Lacho Gunter, within 7 days   for hospital follow- up.  No follow up labs or test are needed.    Follow up with Oncology       As advised         {Additional follow-up instructions/to-do's for PCP    : none    Unresulted Labs Ordered in the Past 30 Days of this Admission     No orders found from 4/7/2023 to 5/8/2023.      These results will be followed up by PCP    Discharge Disposition   Discharged to home  Condition at discharge: Stable      Consultations This  Hospital Stay   CARE MANAGEMENT / SOCIAL WORK IP CONSULT    Code Status   Prior    Time Spent on this Encounter   I, Siddharth Murray MD, personally saw the patient today and spent greater than 30 minutes discharging this patient.       Siddharth Murray MD  [address] [address]  Phone: [phone]  Fax: [phone]  ______________________________________________________________________    Physical Exam   Vital Signs:                    Weight: 137 lbs 0 oz       GENERAL: The patient is not in any acute distressed. Awake and alert.  HEENT: Nonicteric sclerae, PERRLA, EOMI. Oropharynx clear. Moist mucous membranes. Conjunctivae appear well perfused.  HEART: Regular rate and rhythm without murmurs.  LUNGS: Clear to auscultation bilaterally. No wheezing or crackles.  ABDOMEN: Soft, positive bowel sounds, nontender.  SKIN: No rash, no excessive bruising, petechiae, or purpura.  EXTREMITIES : no rashes, no swelling in legs.  NEUROLOGIC: conscious and oriented, follows commands, no obvious focal deficits.  ROS: All other systems negative          Primary Care Physician   Lacho Gunter    Discharge Orders      Primary Care - Care Coordination Referral      Reason for your hospital stay    Nausea, vomiting     Follow-up and recommended labs and tests     Follow up with primary care provider, Lacho Gunter, within 7 days for hospital follow- up.  No follow up labs or test are needed.    Follow up with Oncology       As advised     Activity    Your activity upon discharge: activity as tolerated     Diet    Follow this diet upon discharge: Orders Placed This Encounter      Room Service      Regular Diet Adult       Significant Results and Procedures   Most Recent 3 CBC's:Recent Labs   Lab Test 05/08/23  0552 05/07/23  2351 03/29/23  1319 03/23/23  1001   WBC 4.6 6.4  --  11.1*   HGB 8.2* 8.8* 10.4* 10.9*   MCV 92 91  --  91    320 360 273     Most  Recent 3 BMP's:Recent Labs   Lab Test 05/11/23  1145 05/11/23  0745 05/11/23  0609 05/08/23  0736 05/08/23  0547 05/08/23  0331 05/07/23  2351 03/29/23  1322 03/23/23  1001   NA  --   --   --   --  137  --  136  --  140   POTASSIUM  --   --   --   --  4.1  --  3.9  --  5.0   CHLORIDE  --   --   --   --  104  --  101  --  103   CO2  --   --   --   --  20*  --  19*  --  24   BUN  --   --   --   --  23.3*  --  32.7*  --  19.6   CR  --   --   --   --  0.78  --  1.09*  --  0.80   ANIONGAP  --   --   --   --  13  --  16*  --  13   DAVID  --   --   --   --  9.1  --  9.1  --  10.2   * 184* 180*   < > 243*   < > 239*   < > 126*    < > = values in this interval not displayed.     Most Recent 2 LFT's:Recent Labs   Lab Test 05/08/23  0547 03/23/23  1001   AST 10 21   ALT 16 15   ALKPHOS 100 94   BILITOTAL 0.3 0.4     Most Recent 3 INR's:Recent Labs   Lab Test 03/29/23  1319 12/31/22  1314   INR 0.98 1.30*       Discharge Medications   Discharge Medication List as of 5/11/2023  1:48 PM      START taking these medications    Details   LORazepam (ATIVAN) 0.5 MG tablet Take 1 tablet (0.5 mg) by mouth every 6 hours as needed for nausea, Disp-20 tablet, R-0, Local Print      metoclopramide (REGLAN) 5 MG tablet Take reglan 5 mg 3 times a day - for a week and then 3 times daily as needed for nausea, Disp-30 tablet, R-1, E-Prescribe      pantoprazole (PROTONIX) 20 MG EC tablet Take 20 mg bid for 2 weeks and then once daily, Disp-60 tablet, R-1, E-Prescribe         CONTINUE these medications which have CHANGED    Details   OLANZapine (ZYPREXA) 2.5 MG tablet Take 1 tablet (2.5 mg) by mouth At Bedtime Take 2.5 mg bedtime prn for nausea, Disp-30 tablet, R-0, Local Print      predniSONE (DELTASONE) 5 MG tablet Take 10 mg daily - starting today - for 3 days, then 5-10 mg daily depending on whether receiving chemotherapy, Disp-150 tablet, R-3, E-Prescribe         CONTINUE these medications which have NOT CHANGED    Details   acetaminophen  (TYLENOL) 325 MG tablet Take 2 tablets (650 mg) by mouth every 4 hours as needed for mild pain or fever, No Print Out      albuterol (PROAIR HFA/PROVENTIL HFA/VENTOLIN HFA) 108 (90 Base) MCG/ACT inhaler Inhale 2 puffs into the lungs every 6 hours as needed, Disp-18 g, R-11, ZURDO, E-PrescribePharmacy may dispense brand covered by insurance (Proair, or proventil or ventolin or generic albuterol inhaler)      cetirizine (ZYRTEC) 10 MG tablet [CETIRIZINE (ZYRTEC) 10 MG TABLET] Take 10 mg by mouth daily., Historical      fluticasone propionate (FLONASE) 50 mcg/actuation nasal spray [FLUTICASONE PROPIONATE (FLONASE) 50 MCG/ACTUATION NASAL SPRAY] 1 spray into each nostril daily., Disp-16 g, R-3, Normal      Glucagon (GVOKE HYPOPEN 1-PACK) 1 MG/0.2ML SOAJ Inject 1 mg Subcutaneous as needed (low BG levels), Disp-0.2 mL, R-1, E-PrescribeFormulary change      insulin aspart prot & aspart (NOVOLOG MIX 70/30 FLEXPEN) (70-30) 100 UNIT/ML pen 25 units before breakfast, 15 units before evening meal, Disp-40 mL, R-3, E-Prescribe      montelukast (SINGULAIR) 10 MG tablet Take 1 tablet (10 mg) by mouth At Bedtime, Disp-30 tablet, R-11, E-Prescribe      multivitamin therapeutic tablet [MULTIVITAMIN THERAPEUTIC TABLET] Take 1 tablet by mouth daily., Historical      Vitamin D, Cholecalciferol, 25 MCG (1000 UT) TABS Take 1 tablet by mouth daily, Historical      fluticasone-salmeterol (ADVAIR) 250-50 MCG/ACT inhaler Inhale 1 puff into the lungs daily, Historical      lisinopril (ZESTRIL) 20 MG tablet Take 1 tablet (20 mg) by mouth daily, Disp-90 tablet, R-0, E-Prescribe      blood glucose test (CONTOUR NEXT STRIPS) strips [BLOOD GLUCOSE TEST (CONTOUR NEXT STRIPS) STRIPS] Test four times daily.  Dx code DM2., Disp-30 strip, R-6, Normal      Glucagon 0.5 MG/0.1ML SOAJ Inject 1 mg Subcutaneous daily as needed (severe hypoglycemia), Disp-0.6 mL, R-3, E-Prescribe      insulin pen needle (31G X 6 MM) 31G X 6 MM miscellaneous Use 2 pen needles  daily or as directed.Disp-180 each, [id number]         STOP taking these medications       lansoprazole (PREVACID) 15 MG capsule Comments:   Reason for Stopping:             Allergies   No Known Allergies

## 2023-10-26 NOTE — PROGRESS NOTES
[address] [address]  Phone: [phone]  Fax: [phone]  Primary Provider: Lacho Gunter  Pre-op Performing Provider: SURINDER SAINI        PREOPERATIVE EVALUATION:  Today's date: 10/30/2023    Stella is a 73 year old female who presents for a preoperative evaluation.    Surgical Information:  Surgery/Procedure: CYSTOSCOPY, RIGHT RETROGRADE PYELOGRAM WITH RIGHT URETERAL STENT EXCHANGE   Surgery Location: [facility]  Surgeon: Hernan Santana MD   Surgery Date: 11/10/2023  Time of Surgery: 10:20 AM  Where patient plans to recover: At home with family  Fax number for surgical facility: Note does not need to be faxed, will be available electronically in Epic.    Assessment & Plan     The proposed surgical procedure is considered INTERMEDIATE risk.    Problem List Items Addressed This Visit       Essential hypertension     She has lost about 20% of her body weight in the past 10 months since starting treatment for cancer. She has been having hypotension per patient report based on BP readings when she is seen by her oncologist  LOWER: lisinopril from 20 mg to 5 mg            Relevant Medications    lisinopril (ZESTRIL) 5 MG tablet    Other specified chronic obstructive pulmonary disease     Symptoms stable with current inhaler and montelukast          Relevant Medications    albuterol (PROAIR HFA/PROVENTIL HFA/VENTOLIN HFA) 108 (90 Base) MCG/ACT inhaler    Diabetes mellitus, type 2 (H)     Previously insulin dependent but with significant weight loss, her A1C is 5.0% and she is off of diabetes medication.          Elevated liver enzymes     Repeat lipid profile today shows that LFTs have normalized          Relevant Orders    Hepatic panel (Albumin, ALT, AST, Bili, Alk Phos, TP) (Completed)    Malignant neoplasm of uterus, unspecified site (H)     Followed by oncology          Relevant Medications    pantoprazole (PROTONIX) 20 MG EC tablet      Other Visit Diagnoses       Preoperative examination    -  Primary    Relevant Orders    EKG 12-lead, tracing only (Completed)    Other hydronephrosis        Type 2 diabetes mellitus with complication, without long-term current use of insulin (H)        Relevant Medications    lisinopril (ZESTRIL) 5 MG tablet    albuterol (PROAIR HFA/PROVENTIL HFA/VENTOLIN HFA) 108 (90 Base) MCG/ACT inhaler    Other Relevant Orders    Basic metabolic panel (Completed)    Lipid panel reflex to direct LDL Fasting (Completed)    CBC with platelets (Completed)    Hemoglobin A1c (Completed)                   Risks and Recommendations:  The patient has the following additional risks and recommendations for perioperative complications:   - No identified additional risk factors other than previously addressed    Antiplatelet or Anticoagulation Medication Instructions:   - Patient is on no antiplatelet or anticoagulation medications.    Additional Medication Instructions:  Patient is to take all scheduled medications on the day of surgery EXCEPT for modifications listed below:  - hold lisinopril morning of procedure     RECOMMENDATION:  APPROVAL GIVEN to proceed with proposed procedure, without further diagnostic evaluation.      Subjective       HPI related to upcoming procedure: Stella Greene is here for a preoperative evaluation.  She was diagnosed with uterine cancer in December 2022.  She was also diagnosed with a right hydronephrosis and UTI due to compression of the ureter.  IR placed a right nephrostomy tube initially in December 2022.  Now she has an indwelling 8 Czech 22 cm ureteral stent placed in March 2023.     She is being treated for uterine cancer. She will have a PET scan next month to follow up on an adnexal lesion seen on her MRI. She has neuropathy as a side effect of her treatments.     She has a diagnosis of type 2 diabetes. She has not used the insulin at all for the past 1 month because she was having  hypoglycemia. Her appetite is quite variable. Readings in the morning are generally less than 115.     Asthma- she used to take prednisone prescribed through pulmonology. 2 months ago she stopped taking it. No recent issues or exacerbations in asthma.         10/23/2023    10:11 AM   Preop Questions   1. Have you ever had a heart attack or stroke? No   2. Have you ever had surgery on your heart or blood vessels, such as a stent placement, a coronary artery bypass, or surgery on an artery in your head, neck, heart, or legs? No   3. Do you have chest pain with activity? No   4. Do you have a history of  heart failure? No   5. Do you currently have a cold, bronchitis or symptoms of other infection? No   6. Do you have a cough, shortness of breath, or wheezing? No   7. Do you or anyone in your family have previous history of blood clots? No   8. Do you or does anyone in your family have a serious bleeding problem such as prolonged bleeding following surgeries or cuts? No   9. Have you ever had problems with anemia or been told to take iron pills? YES - since diagnosis of uterine cancer. She is not currently taking an iron supplement (SE of constipation)   10. Have you had any abnormal blood loss such as black, tarry or bloody stools, or abnormal vaginal bleeding? No   11. Have you ever had a blood transfusion? No   12. Are you willing to have a blood transfusion if it is medically needed before, during, or after your surgery? Yes   13. Have you or any of your relatives ever had problems with anesthesia? No   14. Do you have sleep apnea, excessive snoring or daytime drowsiness? No   15. Do you have any artifical heart valves or other implanted medical devices like a pacemaker, defibrillator, or continuous glucose monitor? No   16. Do you have artificial joints? No   17. Are you allergic to latex? No       Preoperative Review of :   reviewed - no concerning prescribing patterns      Review of  Systems  CONSTITUTIONAL: NEGATIVE for fever, chills, +change in weight (weight loss in the past 10 months)   INTEGUMENTARY/SKIN: NEGATIVE for worrisome rashes, moles or lesions  EYES: NEGATIVE for vision changes or irritation  ENT/MOUTH: NEGATIVE for ear, mouth and throat problems  RESP: NEGATIVE for significant cough or SOB  CV: NEGATIVE for chest pain, palpitations or peripheral edema +trace right ankle edema   GI: NEGATIVE for nausea, abdominal pain, heartburn, or change in bowel habits +intermittent bowel urgency and diarrhea   : NEGATIVE for frequency, dysuria, or hematuria  MUSCULOSKELETAL: NEGATIVE for significant arthralgias or myalgia  NEURO: NEGATIVE for weakness, dizziness +neuropathy upper and lower extremities   ENDOCRINE: NEGATIVE for temperature intolerance, skin/hair changes  HEME: NEGATIVE for bleeding problems  PSYCHIATRIC: NEGATIVE for changes in mood or affect    Patient Active Problem List    Diagnosis Date Noted    Malignant neoplasm of uterus, unspecified site (H) 05/08/2023     Priority: Medium    Adrenal insufficiency (H24) 03/23/2023     Priority: Medium    Acute renal insufficiency 12/31/2022     Priority: Medium    Elevated liver enzymes 12/31/2022     Priority: Medium    Elevated troponin 12/31/2022     Priority: Medium    Acute kidney failure with tubular necrosis (H24) 12/31/2022     Priority: Medium    Diabetes mellitus, type 2 (H) 12/06/2021     Priority: Medium    Essential hypertension      Priority: Medium     Created by Conversion  Replacement Utility updated for latest IMO load        Other specified chronic obstructive pulmonary disease      Priority: Medium     Created by Conversion          Past Medical History:   Diagnosis Date    Adrenal insufficiency (H24)     secondary, due to chronic prednisone use for asthma, tapering off with Pulmonology    Diabetes mellitus, type 2 (H) 12/06/2021    HbA1c 7% 9/2022 - Last visit with Endo 9/2022    Essential hypertension     Created  by Conversion Replacement Utility updated for latest IMO load     Mild persistent asthma without complication      Past Surgical History:   Procedure Laterality Date    IR CHEST PORT PLACEMENT > 5 YRS OF AGE  1/13/2023    IR NEPHROSTOMY TUBE PLACEMENT RIGHT  12/31/2022    IR URETERAL STENT PLACEMENT RIGHT  3/29/2023    LAPAROSCOPIC TUBAL LIGATION Bilateral     nasal polpys Bilateral     PICC TRIPLE LUMEN PLACEMENT  12/31/2022          Current Outpatient Medications   Medication Sig Dispense Refill    acetaminophen (TYLENOL) 325 MG tablet Take 2 tablets (650 mg) by mouth every 4 hours as needed for mild pain or fever      ADVAIR DISKUS 250-50 MCG/ACT inhaler INHALE 1 DOSE BY MOUTH TWICE DAILY 60 each 2    albuterol (PROAIR HFA/PROVENTIL HFA/VENTOLIN HFA) 108 (90 Base) MCG/ACT inhaler Inhale 2 puffs into the lungs every 6 hours as needed for shortness of breath or wheezing 18 g 1    blood glucose test (CONTOUR NEXT STRIPS) strips [BLOOD GLUCOSE TEST (CONTOUR NEXT STRIPS) STRIPS] Test four times daily.  Dx code DM2. 30 strip 6    cetirizine (ZYRTEC) 10 MG tablet [CETIRIZINE (ZYRTEC) 10 MG TABLET] Take 10 mg by mouth daily.      fluticasone propionate (FLONASE) 50 mcg/actuation nasal spray [FLUTICASONE PROPIONATE (FLONASE) 50 MCG/ACTUATION NASAL SPRAY] 1 spray into each nostril daily. 16 g 3    Glucagon (GVOKE HYPOPEN 1-PACK) 1 MG/0.2ML SOAJ Inject 1 mg Subcutaneous as needed (low BG levels) 0.2 mL 1    Glucagon 0.5 MG/0.1ML SOAJ Inject 1 mg Subcutaneous daily as needed (severe hypoglycemia) 0.6 mL 3    insulin pen needle (31G X 6 MM) 31G X 6 MM miscellaneous Use 2 pen needles daily or as directed. 180 each 4    lisinopril (ZESTRIL) 5 MG tablet Take 1 tablet (5 mg) by mouth daily 90 tablet 0    LORazepam (ATIVAN) 0.5 MG tablet Take 1 tablet (0.5 mg) by mouth every 6 hours as needed for nausea 20 tablet 0    metoclopramide (REGLAN) 5 MG tablet Take reglan 5 mg 3 times a day - for a week and then 3 times daily as needed  "for nausea 30 tablet 1    montelukast (SINGULAIR) 10 MG tablet Take 1 tablet (10 mg) by mouth At Bedtime 30 tablet 11    multivitamin therapeutic tablet [MULTIVITAMIN THERAPEUTIC TABLET] Take 1 tablet by mouth daily.      OLANZapine (ZYPREXA) 2.5 MG tablet Take 1 tablet (2.5 mg) by mouth At Bedtime Take 2.5 mg bedtime prn for nausea 30 tablet 0    pantoprazole (PROTONIX) 20 MG EC tablet Take 1 tablet (20 mg) by mouth daily Take 20 mg bid for 2 weeks and then once daily      Vitamin D, Cholecalciferol, 25 MCG (1000 UT) TABS Take 1 tablet by mouth daily         No Known Allergies     Social History     Tobacco Use    Smoking status: Never    Smokeless tobacco: Never   Substance Use Topics    Alcohol use: No     Family History   Problem Relation Age of Onset    No Known Problems Mother     Heart Disease Father        History   Drug Use No         Objective     /58 (BP Location: Right arm, Patient Position: Sitting, Cuff Size: Adult Regular)   Pulse 86   Temp 98.2  F (36.8  C)   Resp 17   Ht 1.556 m (5' 1.25\")   Wt 57.8 kg (127 lb 8 oz)   LMP  (LMP Unknown)   Breastfeeding No   BMI 23.89 kg/m      Wt Readings from Last 5 Encounters:   10/30/23 57.8 kg (127 lb 8 oz)   05/07/23 62.1 kg (137 lb)   04/04/23 62.1 kg (137 lb)   03/23/23 62.5 kg (137 lb 12.8 oz)   01/02/23 69.8 kg (153 lb 12.8 oz)         Physical Exam    GENERAL APPEARANCE: Alert and no distress     EYES: EOMI     HENT: ear canals and TM's normal and nose and mouth without ulcers or lesions     NECK: no adenopathy, no asymmetry, masses, or scars and thyroid normal to palpation     RESP: lungs clear to auscultation - no rales, rhonchi or wheezes     CV: regular rates and rhythm, normal S1 S2, no S3 or S4 and no murmur, click or rub     ABDOMEN:  soft, nontender, no HSM or masses and bowel sounds normal     MS: extremities normal- no gross deformities noted     NEURO: Normal strength and tone, mentation intact and speech normal     PSYCH: " mentation appears normal. and affect normal/bright      Recent Labs   Lab Test 05/08/23  0552 05/08/23  0547 05/07/23  2351 03/29/23  1319 03/23/23  1001 12/31/22  1628 12/31/22  1314   HGB 8.2*  --  8.8* 10.4* 10.9*   < > 10.1*     --  320 360 273   < > 396   INR  --   --   --  0.98  --   --  1.30*   NA  --  137 136  --  140   < > 141   POTASSIUM  --  4.1 3.9  --  5.0   < > 3.6   CR  --  0.78 1.09*  --  0.80   < > 2.99*   A1C  --   --   --   --  6.0*  --  5.8*    < > = values in this interval not displayed.        Diagnostics:  Recent Results (from the past 48 hour(s))   EKG 12-lead, tracing only    Collection Time: 10/30/23 12:02 PM   Result Value Ref Range    Systolic Blood Pressure  mmHg    Diastolic Blood Pressure  mmHg    Ventricular Rate 76 BPM    Atrial Rate 76 BPM    TX Interval 164 ms    QRS Duration 126 ms     ms    QTc 481 ms    P Axis 62 degrees    R AXIS -13 degrees    T Axis 35 degrees    Interpretation ECG       Sinus rhythm  Right bundle branch block  Abnormal ECG  When compared with ECG of 23-MAR-2023 09:49,  No significant change was found  Confirmed by NIRANJAN CONNELLY MD LOC:[facility] ([id number]) on 10/30/2023 4:06:27 PM     Basic metabolic panel    Collection Time: 10/30/23 12:21 PM   Result Value Ref Range    Sodium 138 135 - 145 mmol/L    Potassium 3.5 3.4 - 5.3 mmol/L    Chloride 101 98 - 107 mmol/L    Carbon Dioxide (CO2) 26 22 - 29 mmol/L    Anion Gap 11 7 - 15 mmol/L    Urea Nitrogen 10.7 8.0 - 23.0 mg/dL    Creatinine 0.71 0.51 - 0.95 mg/dL    GFR Estimate 90 >60 mL/min/1.73m2    Calcium 9.5 8.8 - 10.2 mg/dL    Glucose 97 70 - 99 mg/dL   Lipid panel reflex to direct LDL Fasting    Collection Time: 10/30/23 12:21 PM   Result Value Ref Range    Cholesterol 158 <200 mg/dL    Triglycerides 136 <150 mg/dL    Direct Measure HDL 50 >=50 mg/dL    LDL Cholesterol Calculated 81 <=100 mg/dL    Non HDL Cholesterol 108 <130 mg/dL   CBC with platelets    Collection Time: 10/30/23 12:21 PM   Result  Value Ref Range    WBC Count 5.1 4.0 - 11.0 10e3/uL    RBC Count 3.18 (L) 3.80 - 5.20 10e6/uL    Hemoglobin 9.2 (L) 11.7 - 15.7 g/dL    Hematocrit 28.8 (L) 35.0 - 47.0 %    MCV 91 78 - 100 fL    MCH 28.9 26.5 - 33.0 pg    MCHC 31.9 31.5 - 36.5 g/dL    RDW 14.6 10.0 - 15.0 %    Platelet Count 330 150 - 450 10e3/uL   Hepatic panel (Albumin, ALT, AST, Bili, Alk Phos, TP)    Collection Time: 10/30/23 12:21 PM   Result Value Ref Range    Protein Total 7.4 6.4 - 8.3 g/dL    Albumin 3.9 3.5 - 5.2 g/dL    Bilirubin Total 0.5 <=1.2 mg/dL    Alkaline Phosphatase 87 35 - 104 U/L    AST 16 0 - 45 U/L    ALT 6 0 - 50 U/L    Bilirubin Direct <0.20 0.00 - 0.30 mg/dL   Hemoglobin A1c    Collection Time: 10/30/23 12:21 PM   Result Value Ref Range    Hemoglobin A1C 5.0 0.0 - 5.6 %      EKG: appears normal, NSR, Right Bundle Branch Block, unchanged from previous tracings    Revised Cardiac Risk Index (RCRI):  The patient has the following serious cardiovascular risks for perioperative complications:   - No serious cardiac risks = 0 points     RCRI Interpretation: 0 points: Class I (very low risk - 0.4% complication rate)         Signed Electronically by: Nadira Jarrett NP  Copy of this evaluation report is provided to requesting physician.

## 2023-10-26 NOTE — H&P (VIEW-ONLY)
[address] [address]  Phone: [phone]  Fax: [phone]  Primary Provider: Lacho Gunter  Pre-op Performing Provider: SURINDER SAINI        PREOPERATIVE EVALUATION:  Today's date: 10/30/2023    Stella is a 73 year old female who presents for a preoperative evaluation.    Surgical Information:  Surgery/Procedure: CYSTOSCOPY, RIGHT RETROGRADE PYELOGRAM WITH RIGHT URETERAL STENT EXCHANGE   Surgery Location: [facility]  Surgeon: Hernan Santana MD   Surgery Date: 11/10/2023  Time of Surgery: 10:20 AM  Where patient plans to recover: At home with family  Fax number for surgical facility: Note does not need to be faxed, will be available electronically in Epic.    Assessment & Plan     The proposed surgical procedure is considered INTERMEDIATE risk.    Problem List Items Addressed This Visit       Essential hypertension     She has lost about 20% of her body weight in the past 10 months since starting treatment for cancer. She has been having hypotension per patient report based on BP readings when she is seen by her oncologist  LOWER: lisinopril from 20 mg to 5 mg            Relevant Medications    lisinopril (ZESTRIL) 5 MG tablet    Other specified chronic obstructive pulmonary disease     Symptoms stable with current inhaler and montelukast          Relevant Medications    albuterol (PROAIR HFA/PROVENTIL HFA/VENTOLIN HFA) 108 (90 Base) MCG/ACT inhaler    Diabetes mellitus, type 2 (H)     Previously insulin dependent but with significant weight loss, her A1C is 5.0% and she is off of diabetes medication.          Elevated liver enzymes     Repeat lipid profile today shows that LFTs have normalized          Relevant Orders    Hepatic panel (Albumin, ALT, AST, Bili, Alk Phos, TP) (Completed)    Malignant neoplasm of uterus, unspecified site (H)     Followed by oncology          Relevant Medications    pantoprazole (PROTONIX) 20 MG EC tablet      Other Visit Diagnoses       Preoperative examination    -  Primary    Relevant Orders    EKG 12-lead, tracing only (Completed)    Other hydronephrosis        Type 2 diabetes mellitus with complication, without long-term current use of insulin (H)        Relevant Medications    lisinopril (ZESTRIL) 5 MG tablet    albuterol (PROAIR HFA/PROVENTIL HFA/VENTOLIN HFA) 108 (90 Base) MCG/ACT inhaler    Other Relevant Orders    Basic metabolic panel (Completed)    Lipid panel reflex to direct LDL Fasting (Completed)    CBC with platelets (Completed)    Hemoglobin A1c (Completed)                   Risks and Recommendations:  The patient has the following additional risks and recommendations for perioperative complications:   - No identified additional risk factors other than previously addressed    Antiplatelet or Anticoagulation Medication Instructions:   - Patient is on no antiplatelet or anticoagulation medications.    Additional Medication Instructions:  Patient is to take all scheduled medications on the day of surgery EXCEPT for modifications listed below:  - hold lisinopril morning of procedure     RECOMMENDATION:  APPROVAL GIVEN to proceed with proposed procedure, without further diagnostic evaluation.      Subjective       HPI related to upcoming procedure: Stella Greene is here for a preoperative evaluation.  She was diagnosed with uterine cancer in December 2022.  She was also diagnosed with a right hydronephrosis and UTI due to compression of the ureter.  IR placed a right nephrostomy tube initially in December 2022.  Now she has an indwelling 8 Hungarian 22 cm ureteral stent placed in March 2023.     She is being treated for uterine cancer. She will have a PET scan next month to follow up on an adnexal lesion seen on her MRI. She has neuropathy as a side effect of her treatments.     She has a diagnosis of type 2 diabetes. She has not used the insulin at all for the past 1 month because she was having  hypoglycemia. Her appetite is quite variable. Readings in the morning are generally less than 115.     Asthma- she used to take prednisone prescribed through pulmonology. 2 months ago she stopped taking it. No recent issues or exacerbations in asthma.         10/23/2023    10:11 AM   Preop Questions   1. Have you ever had a heart attack or stroke? No   2. Have you ever had surgery on your heart or blood vessels, such as a stent placement, a coronary artery bypass, or surgery on an artery in your head, neck, heart, or legs? No   3. Do you have chest pain with activity? No   4. Do you have a history of  heart failure? No   5. Do you currently have a cold, bronchitis or symptoms of other infection? No   6. Do you have a cough, shortness of breath, or wheezing? No   7. Do you or anyone in your family have previous history of blood clots? No   8. Do you or does anyone in your family have a serious bleeding problem such as prolonged bleeding following surgeries or cuts? No   9. Have you ever had problems with anemia or been told to take iron pills? YES - since diagnosis of uterine cancer. She is not currently taking an iron supplement (SE of constipation)   10. Have you had any abnormal blood loss such as black, tarry or bloody stools, or abnormal vaginal bleeding? No   11. Have you ever had a blood transfusion? No   12. Are you willing to have a blood transfusion if it is medically needed before, during, or after your surgery? Yes   13. Have you or any of your relatives ever had problems with anesthesia? No   14. Do you have sleep apnea, excessive snoring or daytime drowsiness? No   15. Do you have any artifical heart valves or other implanted medical devices like a pacemaker, defibrillator, or continuous glucose monitor? No   16. Do you have artificial joints? No   17. Are you allergic to latex? No       Preoperative Review of :   reviewed - no concerning prescribing patterns      Review of  Systems  CONSTITUTIONAL: NEGATIVE for fever, chills, +change in weight (weight loss in the past 10 months)   INTEGUMENTARY/SKIN: NEGATIVE for worrisome rashes, moles or lesions  EYES: NEGATIVE for vision changes or irritation  ENT/MOUTH: NEGATIVE for ear, mouth and throat problems  RESP: NEGATIVE for significant cough or SOB  CV: NEGATIVE for chest pain, palpitations or peripheral edema +trace right ankle edema   GI: NEGATIVE for nausea, abdominal pain, heartburn, or change in bowel habits +intermittent bowel urgency and diarrhea   : NEGATIVE for frequency, dysuria, or hematuria  MUSCULOSKELETAL: NEGATIVE for significant arthralgias or myalgia  NEURO: NEGATIVE for weakness, dizziness +neuropathy upper and lower extremities   ENDOCRINE: NEGATIVE for temperature intolerance, skin/hair changes  HEME: NEGATIVE for bleeding problems  PSYCHIATRIC: NEGATIVE for changes in mood or affect    Patient Active Problem List    Diagnosis Date Noted    Malignant neoplasm of uterus, unspecified site (H) 05/08/2023     Priority: Medium    Adrenal insufficiency (H24) 03/23/2023     Priority: Medium    Acute renal insufficiency 12/31/2022     Priority: Medium    Elevated liver enzymes 12/31/2022     Priority: Medium    Elevated troponin 12/31/2022     Priority: Medium    Acute kidney failure with tubular necrosis (H24) 12/31/2022     Priority: Medium    Diabetes mellitus, type 2 (H) 12/06/2021     Priority: Medium    Essential hypertension      Priority: Medium     Created by Conversion  Replacement Utility updated for latest IMO load        Other specified chronic obstructive pulmonary disease      Priority: Medium     Created by Conversion          Past Medical History:   Diagnosis Date    Adrenal insufficiency (H24)     secondary, due to chronic prednisone use for asthma, tapering off with Pulmonology    Diabetes mellitus, type 2 (H) 12/06/2021    HbA1c 7% 9/2022 - Last visit with Endo 9/2022    Essential hypertension     Created  by Conversion Replacement Utility updated for latest IMO load     Mild persistent asthma without complication      Past Surgical History:   Procedure Laterality Date    IR CHEST PORT PLACEMENT > 5 YRS OF AGE  1/13/2023    IR NEPHROSTOMY TUBE PLACEMENT RIGHT  12/31/2022    IR URETERAL STENT PLACEMENT RIGHT  3/29/2023    LAPAROSCOPIC TUBAL LIGATION Bilateral     nasal polpys Bilateral     PICC TRIPLE LUMEN PLACEMENT  12/31/2022          Current Outpatient Medications   Medication Sig Dispense Refill    acetaminophen (TYLENOL) 325 MG tablet Take 2 tablets (650 mg) by mouth every 4 hours as needed for mild pain or fever      ADVAIR DISKUS 250-50 MCG/ACT inhaler INHALE 1 DOSE BY MOUTH TWICE DAILY 60 each 2    albuterol (PROAIR HFA/PROVENTIL HFA/VENTOLIN HFA) 108 (90 Base) MCG/ACT inhaler Inhale 2 puffs into the lungs every 6 hours as needed for shortness of breath or wheezing 18 g 1    blood glucose test (CONTOUR NEXT STRIPS) strips [BLOOD GLUCOSE TEST (CONTOUR NEXT STRIPS) STRIPS] Test four times daily.  Dx code DM2. 30 strip 6    cetirizine (ZYRTEC) 10 MG tablet [CETIRIZINE (ZYRTEC) 10 MG TABLET] Take 10 mg by mouth daily.      fluticasone propionate (FLONASE) 50 mcg/actuation nasal spray [FLUTICASONE PROPIONATE (FLONASE) 50 MCG/ACTUATION NASAL SPRAY] 1 spray into each nostril daily. 16 g 3    Glucagon (GVOKE HYPOPEN 1-PACK) 1 MG/0.2ML SOAJ Inject 1 mg Subcutaneous as needed (low BG levels) 0.2 mL 1    Glucagon 0.5 MG/0.1ML SOAJ Inject 1 mg Subcutaneous daily as needed (severe hypoglycemia) 0.6 mL 3    insulin pen needle (31G X 6 MM) 31G X 6 MM miscellaneous Use 2 pen needles daily or as directed. 180 each 4    lisinopril (ZESTRIL) 5 MG tablet Take 1 tablet (5 mg) by mouth daily 90 tablet 0    LORazepam (ATIVAN) 0.5 MG tablet Take 1 tablet (0.5 mg) by mouth every 6 hours as needed for nausea 20 tablet 0    metoclopramide (REGLAN) 5 MG tablet Take reglan 5 mg 3 times a day - for a week and then 3 times daily as needed  "for nausea 30 tablet 1    montelukast (SINGULAIR) 10 MG tablet Take 1 tablet (10 mg) by mouth At Bedtime 30 tablet 11    multivitamin therapeutic tablet [MULTIVITAMIN THERAPEUTIC TABLET] Take 1 tablet by mouth daily.      OLANZapine (ZYPREXA) 2.5 MG tablet Take 1 tablet (2.5 mg) by mouth At Bedtime Take 2.5 mg bedtime prn for nausea 30 tablet 0    pantoprazole (PROTONIX) 20 MG EC tablet Take 1 tablet (20 mg) by mouth daily Take 20 mg bid for 2 weeks and then once daily      Vitamin D, Cholecalciferol, 25 MCG (1000 UT) TABS Take 1 tablet by mouth daily         No Known Allergies     Social History     Tobacco Use    Smoking status: Never    Smokeless tobacco: Never   Substance Use Topics    Alcohol use: No     Family History   Problem Relation Age of Onset    No Known Problems Mother     Heart Disease Father        History   Drug Use No         Objective     /58 (BP Location: Right arm, Patient Position: Sitting, Cuff Size: Adult Regular)   Pulse 86   Temp 98.2  F (36.8  C)   Resp 17   Ht 1.556 m (5' 1.25\")   Wt 57.8 kg (127 lb 8 oz)   LMP  (LMP Unknown)   Breastfeeding No   BMI 23.89 kg/m      Wt Readings from Last 5 Encounters:   10/30/23 57.8 kg (127 lb 8 oz)   05/07/23 62.1 kg (137 lb)   04/04/23 62.1 kg (137 lb)   03/23/23 62.5 kg (137 lb 12.8 oz)   01/02/23 69.8 kg (153 lb 12.8 oz)         Physical Exam    GENERAL APPEARANCE: Alert and no distress     EYES: EOMI     HENT: ear canals and TM's normal and nose and mouth without ulcers or lesions     NECK: no adenopathy, no asymmetry, masses, or scars and thyroid normal to palpation     RESP: lungs clear to auscultation - no rales, rhonchi or wheezes     CV: regular rates and rhythm, normal S1 S2, no S3 or S4 and no murmur, click or rub     ABDOMEN:  soft, nontender, no HSM or masses and bowel sounds normal     MS: extremities normal- no gross deformities noted     NEURO: Normal strength and tone, mentation intact and speech normal     PSYCH: " mentation appears normal. and affect normal/bright      Recent Labs   Lab Test 05/08/23  0552 05/08/23  0547 05/07/23  2351 03/29/23  1319 03/23/23  1001 12/31/22  1628 12/31/22  1314   HGB 8.2*  --  8.8* 10.4* 10.9*   < > 10.1*     --  320 360 273   < > 396   INR  --   --   --  0.98  --   --  1.30*   NA  --  137 136  --  140   < > 141   POTASSIUM  --  4.1 3.9  --  5.0   < > 3.6   CR  --  0.78 1.09*  --  0.80   < > 2.99*   A1C  --   --   --   --  6.0*  --  5.8*    < > = values in this interval not displayed.        Diagnostics:  Recent Results (from the past 48 hour(s))   EKG 12-lead, tracing only    Collection Time: 10/30/23 12:02 PM   Result Value Ref Range    Systolic Blood Pressure  mmHg    Diastolic Blood Pressure  mmHg    Ventricular Rate 76 BPM    Atrial Rate 76 BPM    SD Interval 164 ms    QRS Duration 126 ms     ms    QTc 481 ms    P Axis 62 degrees    R AXIS -13 degrees    T Axis 35 degrees    Interpretation ECG       Sinus rhythm  Right bundle branch block  Abnormal ECG  When compared with ECG of 23-MAR-2023 09:49,  No significant change was found  Confirmed by NIRANJAN CONNELLY MD LOC:JN ([id number]) on 10/30/2023 4:06:27 PM     Basic metabolic panel    Collection Time: 10/30/23 12:21 PM   Result Value Ref Range    Sodium 138 135 - 145 mmol/L    Potassium 3.5 3.4 - 5.3 mmol/L    Chloride 101 98 - 107 mmol/L    Carbon Dioxide (CO2) 26 22 - 29 mmol/L    Anion Gap 11 7 - 15 mmol/L    Urea Nitrogen 10.7 8.0 - 23.0 mg/dL    Creatinine 0.71 0.51 - 0.95 mg/dL    GFR Estimate 90 >60 mL/min/1.73m2    Calcium 9.5 8.8 - 10.2 mg/dL    Glucose 97 70 - 99 mg/dL   Lipid panel reflex to direct LDL Fasting    Collection Time: 10/30/23 12:21 PM   Result Value Ref Range    Cholesterol 158 <200 mg/dL    Triglycerides 136 <150 mg/dL    Direct Measure HDL 50 >=50 mg/dL    LDL Cholesterol Calculated 81 <=100 mg/dL    Non HDL Cholesterol 108 <130 mg/dL   CBC with platelets    Collection Time: 10/30/23 12:21 PM   Result  Value Ref Range    WBC Count 5.1 4.0 - 11.0 10e3/uL    RBC Count 3.18 (L) 3.80 - 5.20 10e6/uL    Hemoglobin 9.2 (L) 11.7 - 15.7 g/dL    Hematocrit 28.8 (L) 35.0 - 47.0 %    MCV 91 78 - 100 fL    MCH 28.9 26.5 - 33.0 pg    MCHC 31.9 31.5 - 36.5 g/dL    RDW 14.6 10.0 - 15.0 %    Platelet Count 330 150 - 450 10e3/uL   Hepatic panel (Albumin, ALT, AST, Bili, Alk Phos, TP)    Collection Time: 10/30/23 12:21 PM   Result Value Ref Range    Protein Total 7.4 6.4 - 8.3 g/dL    Albumin 3.9 3.5 - 5.2 g/dL    Bilirubin Total 0.5 <=1.2 mg/dL    Alkaline Phosphatase 87 35 - 104 U/L    AST 16 0 - 45 U/L    ALT 6 0 - 50 U/L    Bilirubin Direct <0.20 0.00 - 0.30 mg/dL   Hemoglobin A1c    Collection Time: 10/30/23 12:21 PM   Result Value Ref Range    Hemoglobin A1C 5.0 0.0 - 5.6 %      EKG: appears normal, NSR, Right Bundle Branch Block, unchanged from previous tracings    Revised Cardiac Risk Index (RCRI):  The patient has the following serious cardiovascular risks for perioperative complications:   - No serious cardiac risks = 0 points     RCRI Interpretation: 0 points: Class I (very low risk - 0.4% complication rate)         Signed Electronically by: Nadira Jarrett NP  Copy of this evaluation report is provided to requesting physician.

## 2023-10-30 NOTE — ASSESSMENT & PLAN NOTE
She has lost about 20% of her body weight in the past 10 months since starting treatment for cancer. She has been having hypotension per patient report based on BP readings when she is seen by her oncologist  LOWER: lisinopril from 20 mg to 5 mg

## 2023-10-31 PROBLEM — D72.829 LEUKOCYTOSIS, UNSPECIFIED TYPE: Status: RESOLVED | Noted: 2022-12-31 | Resolved: 2023-01-01

## 2023-10-31 PROBLEM — A41.9 SEPTIC SHOCK (H): Status: RESOLVED | Noted: 2022-12-31 | Resolved: 2023-01-01

## 2023-10-31 PROBLEM — R11.2 NAUSEA AND VOMITING, UNSPECIFIED VOMITING TYPE: Status: RESOLVED | Noted: 2023-01-01 | Resolved: 2023-01-01

## 2023-10-31 PROBLEM — N39.0 URINARY TRACT INFECTION WITHOUT HEMATURIA, SITE UNSPECIFIED: Status: RESOLVED | Noted: 2022-12-31 | Resolved: 2023-01-01

## 2023-10-31 PROBLEM — R65.21 SEPTIC SHOCK (H): Status: RESOLVED | Noted: 2022-12-31 | Resolved: 2023-01-01

## 2023-10-31 NOTE — ASSESSMENT & PLAN NOTE
Previously insulin dependent but with significant weight loss, her A1C is 5.0% and she is off of diabetes medication.

## 2023-11-10 NOTE — INTERVAL H&P NOTE
I have reviewed the surgical (or preoperative) H&P that is linked to this encounter, and examined the patient. There are no significant changes    Clinical Conditions Present on Arrival:  Clinically Significant Risk Factors Present on Admission                     Hernan Santana MD  [facility]  (p) [phone]

## 2023-11-10 NOTE — BRIEF OP NOTE
[facility]     Brief Operative Note    Pre-operative diagnosis: Unspecified hydronephrosis [N13.30]  Post-operative diagnosis Same as pre-operative diagnosis    Procedure: CYSTOSCOPY, RIGHT RETROGRADE PYELOGRAM WITH RIGHT URETERAL STENT EXCHANGE, Right - Ureter    Surgeon: Surgeon(s) and Role:     * Hernan Santana MD - Primary  Anesthesia: General   Estimated Blood Loss: 1 mL from 11/10/2023 12:23 PM to 11/10/2023  1:00 PM      Drains: Right 7 fr x 22 cm double J ureteral stent  Specimens: * No specimens in log *  Findings:   Right stent exchanged with good curls .  Complications: None.  Implants:   Implant Name Type Inv. Item Serial No.  Lot No. LRB No. Used Action   RIGHT URETERAL STENT-3/29/2023 Stent   [employer] [id number] Right 1 Explanted   STENT URETERAL PERCUFLEX PLUS [id number] - [id number] Stent STENT URETERAL PERCUFLEX PLUS [id number]  [employer] [id number] Right 1 Implanted         Ok to discharge home once PACU criteria are met  Stent exchange in 4-6 months    Hernan Santana MD  [facility]  (p) [phone]

## 2023-11-10 NOTE — ANESTHESIA PROCEDURE NOTES
Airway       Patient location during procedure: OR       Procedure Start/Stop Times: 11/10/2023 12:34 PM and 11/10/2023 12:36 PM  Staff -        CRNA: Govind Ware APRN CRNA       Performed By: CRNA  Consent for Airway        Urgency: elective  Indications and Patient Condition       Indications for airway management: jennifer-procedural       Induction type:intravenous       Mask difficulty assessment: 1 - vent by mask    Final Airway Details       Final airway type: endotracheal airway       Successful airway: ETT - single  Endotracheal Airway Details        ETT size (mm): 7.0       Cuffed: yes       Successful intubation technique: direct laryngoscopy       DL Blade Type: Ernandez 2       Grade View of Cords: 1       Adjucts: stylet       Position: Right       Measured from: lips       Secured at (cm): 20       Bite block used: None    Post intubation assessment        Placement verified by: capnometry, equal breath sounds and chest rise        Number of attempts at approach: 1       Number of other approaches attempted: 0       Secured with: silk tape       Ease of procedure: easy       Dentition: Intact and Unchanged    Medication(s) Administered   Medication Administration Time: 11/10/2023 12:34 PM

## 2023-11-10 NOTE — OP NOTE
OPERATIVE REPORT  11/10/23    PREOPERATIVE DIAGNOSIS: Right hydronephrosis    POSTOPERATIVE DIAGNOSIS:  Same    PROCEDURES PERFORMED:   1. Cystourethroscopy with right retrograde pyelography  2  Right ureteral stent exchange  3. Intraoperative interpretation of fluoroscopic imaging.     SURGEON: Hernan Santana MD    ANESTHESIA: General    ESTIMATED BLOOD LOSS: 1 mL    DRAINS: Right 7 Fr x 22 cm double J ureteral stent    OPERATIVE INDICATIONS:   Stella Greene is a 73 year old female who presented with a history of right hydronephrosis secondary to malignant obstruction from uterine cancer. This was initially managed with a right PNT that was since internalized to a right ureteral stent by IR. Recent pelvic MRI showed a new large right sided cystic mass concerning for recurrence of disease. The patient was counseled on the alternatives, risks, and benefits and elected to proceed with the above stated procedure.    DESCRIPTION OF PROCEDURE:    After informed consent was obtained, the patient was taken to the operating room, and moved to the operating table.  After adequate anesthesia was induced, the patient was repositioned in dorsal lithotomy position and prepped and draped in the usual sterile fashion. A timeout was taken to confirm correct patient, procedure and laterality.     A 22-Cymraes cystoscope was inserted into a well lubricated urethra. The urethra was unremarkable.  The bladder was free of tumors, stones or diverticuli.  The media was clear.  Bilateral ureteral orifices were orthotopic. The right ureteral orifice was cannulated with a Sensor guidewire which was advanced to renal pelvis with the aid of a 5-Cymraes open ended ureteral catheter.  The wire was removed and a retrograde pyelogram demonstrated mild hydronephrosis but no filling defects. The wire was replaced and open ended catheter backed out. The pre-existing right ureteral stent was grasped with a flexible stent grasper and removed in its  entirety. It demonstrated minimal encrustation. Over the wire we advanced a 7 Fr x 22 cm double J ureteral stent under fluoroscopic guidance with a good proximal curl in the renal pelvis. The scope was reinserted and directly visualized a good distal in and  bladder.  The stent passed up easily. The bladder was then drained. The procedure was concluded. The patient tolerated the procedure well.  There were no complications.         PLAN:   - Discharge home once PACU criteria are met  - Follow up in 6 months for repeat right ureteral stent exchange    Hernan Santana MD  [facility]  p) [phone]

## 2023-11-10 NOTE — ANESTHESIA POSTPROCEDURE EVALUATION
Patient: Stella Greene    Procedure: Procedure(s):  CYSTOSCOPY, RIGHT RETROGRADE PYELOGRAM WITH RIGHT URETERAL STENT EXCHANGE       Anesthesia Type:  General    Note:  Disposition: Outpatient   Postop Pain Control: Uneventful            Sign Out: Well controlled pain   PONV: No   Neuro/Psych: Uneventful            Sign Out: Acceptable/Baseline neuro status   Airway/Respiratory: Uneventful            Sign Out: Acceptable/Baseline resp. status   CV/Hemodynamics: Uneventful            Sign Out: Acceptable CV status; No obvious hypovolemia; No obvious fluid overload   Other NRE: NONE   DID A NON-ROUTINE EVENT OCCUR? No    Event details/Postop Comments:  Patient recovering comfortably.        Last vitals:  Vitals Value Taken Time   /64 11/10/23 1330   Temp 36.8  C (98.2  F) 11/10/23 1330   Pulse 79 11/10/23 1344   Resp 18 11/10/23 1330   SpO2 99 % 11/10/23 1344   Vitals shown include unfiled device data.    Electronically Signed By: Christiano Ybarra DO  November 10, 2023  3:13 PM

## 2023-11-10 NOTE — ANESTHESIA CARE TRANSFER NOTE
Patient: Stella Greene    Procedure: Procedure(s):  CYSTOSCOPY, RIGHT RETROGRADE PYELOGRAM WITH RIGHT URETERAL STENT EXCHANGE       Diagnosis: Unspecified hydronephrosis [N13.30]  Diagnosis Additional Information: No value filed.    Anesthesia Type:   General     Note:    Oropharynx: oropharynx clear of all foreign objects  Level of Consciousness: awake  Oxygen Supplementation: face mask  Level of Supplemental Oxygen (L/min / FiO2): 5  Independent Airway: airway patency satisfactory and stable  Dentition: dentition unchanged  Vital Signs Stable: post-procedure vital signs reviewed and stable  Report to RN Given: handoff report given  Patient transferred to: PACU    Handoff Report: Identifed the Patient, Identified the Reponsible Provider, Reviewed the pertinent medical history, Discussed the surgical course, Reviewed Intra-OP anesthesia mangement and issues during anesthesia, Set expectations for post-procedure period and Allowed opportunity for questions and acknowledgement of understanding    Vitals:  Vitals Value Taken Time   /68 11/10/23 1302   Temp 98.6    Pulse 79 11/10/23 1303   Resp 17 11/10/23 1303   SpO2 100 % 11/10/23 1303   Vitals shown include unfiled device data.    Electronically Signed By: JAZZMINE Cuevas CRNA  November 10, 2023  1:04 PM

## 2023-11-10 NOTE — ANESTHESIA PREPROCEDURE EVALUATION
Anesthesia Pre-Procedure Evaluation    Patient: Stella Greene   MRN: [id number] : 1950        Procedure : Procedure(s):  CYSTOSCOPY, RIGHT RETROGRADE PYELOGRAM WITH RIGHT URETERAL STENT EXCHANGE          Past Medical History:   Diagnosis Date     Acute renal insufficiency      Adrenal insufficiency (H24)     secondary, due to chronic prednisone use for asthma, tapering off with Pulmonology     Asthma      COPD (chronic obstructive pulmonary disease) (H)      Diabetes mellitus, type 2 (H) 2021    HbA1c 7% 2022 - Last visit with Endo 2022     Essential hypertension     Created by Conversion Replacement Utility updated for latest IMO load      Hydronephrosis      Malignant neoplasm of uterus (H)      Mild persistent asthma without complication      Neuropathy       Past Surgical History:   Procedure Laterality Date     IR CHEST PORT PLACEMENT > 5 YRS OF AGE  2023     IR NEPHROSTOMY TUBE PLACEMENT RIGHT  2022     IR URETERAL STENT PLACEMENT RIGHT  3/29/2023     LAPAROSCOPIC TUBAL LIGATION Bilateral      nasal polpys Bilateral      PICC TRIPLE LUMEN PLACEMENT  2022           No Known Allergies   Social History     Tobacco Use     Smoking status: Never     Smokeless tobacco: Never   Substance Use Topics     Alcohol use: No      Wt Readings from Last 1 Encounters:   11/10/23 58.2 kg (128 lb 6.4 oz)        Anesthesia Evaluation            ROS/MED HX  ENT/Pulmonary:     (+)                     asthma    COPD,              Neurologic:       Cardiovascular:     (+)  hypertension- -   -  - -                                      METS/Exercise Tolerance:     Hematologic:       Musculoskeletal:       GI/Hepatic:       Renal/Genitourinary:     (+) renal disease, type: CRI,            Endo:     (+)  type II DM,                    Psychiatric/Substance Use:     (+) psychiatric history depression       Infectious Disease:       Malignancy:       Other:          Physical Exam    Airway       "  Mallampati: II   TM distance: > 3 FB   Neck ROM: full     Respiratory Devices and Support         Dental           Cardiovascular   cardiovascular exam normal          Pulmonary   pulmonary exam normal            OUTSIDE LABS:  CBC:   Lab Results   Component Value Date    WBC 5.1 10/30/2023    WBC 4.6 05/08/2023    HGB 9.2 (L) 10/30/2023    HGB 8.2 (L) 05/08/2023    HCT 28.8 (L) 10/30/2023    HCT 25.5 (L) 05/08/2023     10/30/2023     05/08/2023     BMP:   Lab Results   Component Value Date     10/30/2023     05/08/2023    POTASSIUM 3.5 10/30/2023    POTASSIUM 4.1 05/08/2023    CHLORIDE 101 10/30/2023    CHLORIDE 104 05/08/2023    CO2 26 10/30/2023    CO2 20 (L) 05/08/2023    BUN 10.7 10/30/2023    BUN 23.3 (H) 05/08/2023    CR 0.71 10/30/2023    CR 0.78 05/08/2023     (H) 11/10/2023    GLC 97 10/30/2023     COAGS:   Lab Results   Component Value Date    PTT 31 12/31/2022    INR 0.98 03/29/2023     POC: No results found for: \"BGM\", \"HCG\", \"HCGS\"  HEPATIC:   Lab Results   Component Value Date    ALBUMIN 3.9 10/30/2023    PROTTOTAL 7.4 10/30/2023    ALT 6 10/30/2023    AST 16 10/30/2023    ALKPHOS 87 10/30/2023    BILITOTAL 0.5 10/30/2023     OTHER:   Lab Results   Component Value Date    LACT 1.9 12/31/2022    A1C 5.0 10/30/2023    DAVID 9.5 10/30/2023    LIPASE 15 12/31/2022       Anesthesia Plan    ASA Status:  3       Anesthesia Type: General.     - Airway: ETT              Consents    Anesthesia Plan(s) and associated risks, benefits, and realistic alternatives discussed. Questions answered and patient/representative(s) expressed understanding.     - Discussed: Risks, Benefits and Alternatives for BOTH SEDATION and the PROCEDURE were discussed     - Discussed with:  Patient      - Extended Intubation/Ventilatory Support Discussed: No.      - Patient is DNR/DNI Status: No     Use of blood products discussed: No .     Postoperative Care    Pain management: IV analgesics, Oral pain " medications.   PONV prophylaxis: Ondansetron (or other 5HT-3), Dexamethasone or Solumedrol     Comments:              Christiano Ybarra DO

## 2023-12-11 NOTE — PROGRESS NOTES
Subjective:     Established patient    Stella Greene is a 73 year old female who presents for the following endocrinopathies. The following is a comprehensive summary of her Endocrine care to date (chart fully reviewed except for bone health).      Previous DM therapy:  -NovoLog 70/30 stopped 8/2023  -Ozempic was stopped 12/2022 in the setting of her cancer diagnosis   -She stopped glipizide and stopped metformin 4/2022    Since August 2023 she has not been on any medications for diabetes.  She has infrequently checked a capillary glucose but when she has it has been well-controlled.  Fasting capillary glucose this morning was 110 mg/deciliter.    Objective:    Today BMI is 23.4 kg/meters squared.  Blood pressure 124/68.  Foot exam performed and there are no ulcerations.  Dorsalis pedis pulses are palpable and symmetric bilaterally.    4/2023: BMI 25.7 kg/m2, /70.  Pleasant and conversational.  Pedal pulses palpable although she does have scattered generally healed foot ulcerations, none are actively infected.    4/2022: pedal pulses palpable, markedly reduced sensation to 10 gram monofilament testing bilaterally, multiple healing/healed lower extremity/foot ulcerations, none are actively infected     Assessment/Plan:    # Endometrial cancer    She was diagnosed with endometrial cancer and treated with neoadjuvant chemotherapy (early 2023), and treated with a major surgery 4/2023 followed by adjuvant chemotherapy (completed 6/2023) and radiation (completed 9/2023). She had a right ureteral stent placed because of compression on the right ureter. She follows with [facility].  She started pembrolizumab 4 days ago.  She plans to start lenvatinib in early January 2024.    We did review potential endocrinopathies associated with pembrolizumab and she will let me know if she has any symptoms indicative of these.  Routine monitoring per her oncologist.    # DM-2 in remission   -HbA1c 10.7% 4/2022 with glucose 290  mg/dL   -9/2022: HbA1c 7.1%, 12/2022: HbA1c 5.8%, 3/2023: HbA1c 6.0% (in the setting of new anemia since 9/2022); 10/2023: HbA1c 5.0% with random glucose 97 mg/dL and concurrent Hgb 9.2 g/dL (stable) with normal MCV   -CT A/P 1/2023: pancreas unremarkable  # No DM retinopathy on eye exam 6/2022  -She will pursue locally   # Hypertension, on lisinopril    -12/2021: urine microalbumin normal   -10/2023: GFR 90  # Peripheral neuropathy, history of multiple lower extremity ulcerations  # No prior ASCVD event, not on ASA   # Dyslipidemia, not on a statin   -10/2023: , HDL 50, LDL 81,   -She has a glucagon kit at home    I encouraged her to check a capillary glucose at least 3-4 times per week and vary the time of day checked.  She will let me know if her glucose is consistently above 140 mg/deciliter.  She has significant neuropathy that is related to her diabetes and possibly related to chemotherapy.  She is interested in starting a medication and I prescribed gabapentin 300 mg nightly and we reviewed adverse effects.     # Adrenal function    She had been on prednisone 10 mg daily for about 25 years for asthma.  She ultimately reduced to 5 mg daily and then about 2 months ago she stop prednisone cold turkey.  She denies having symptoms suggestive of adrenal crisis.  Currently her energy is okay but her appetite is not great.  Outside of prednisone her only other recent glucocorticoid exposure has been daily Advair Diskus.  She does have an injection kit of glucocorticoid available at home but is not sure if it is dexamethasone or hydrocortisone.    Today we will check ACTH and cortisol.     # Bone health  -She takes a MVI daily and vitamin D 1000 international unit(s) daily   -no recent DEXA, no prior fracture     30 minutes spent on the date of the encounter doing chart review, history and exam, documentation and further activities as noted above.

## 2023-12-12 NOTE — LETTER
12/12/2023         RE: Stella Greene  [address]  [facility] [id number]        Dear Colleague,    Thank you for referring your patient, Stella Greene, to the [facility]. Please see a copy of my visit note below.    Subjective:     Established patient    Stella Greene is a 73 year old female who presents for the following endocrinopathies. The following is a comprehensive summary of her Endocrine care to date (chart fully reviewed except for bone health).      Previous DM therapy:  -NovoLog 70/30 stopped 8/2023  -Ozempic was stopped 12/2022 in the setting of her cancer diagnosis   -She stopped glipizide and stopped metformin 4/2022    Since August 2023 she has not been on any medications for diabetes.  She has infrequently checked a capillary glucose but when she has it has been well-controlled.  Fasting capillary glucose this morning was 110 mg/deciliter.    Objective:    Today BMI is 23.4 kg/meters squared.  Blood pressure 124/68.  Foot exam performed and there are no ulcerations.  Dorsalis pedis pulses are palpable and symmetric bilaterally.    4/2023: BMI 25.7 kg/m2, /70.  Pleasant and conversational.  Pedal pulses palpable although she does have scattered generally healed foot ulcerations, none are actively infected.    4/2022: pedal pulses palpable, markedly reduced sensation to 10 gram monofilament testing bilaterally, multiple healing/healed lower extremity/foot ulcerations, none are actively infected     Assessment/Plan:    # Endometrial cancer    She was diagnosed with endometrial cancer and treated with neoadjuvant chemotherapy (early 2023), and treated with a major surgery 4/2023 followed by adjuvant chemotherapy (completed 6/2023) and radiation (completed 9/2023). She had a right ureteral stent placed because of compression on the right ureter. She follows with [facility].  She started pembrolizumab 4 days ago.  She plans to start lenvatinib in early January  2024.    We did review potential endocrinopathies associated with pembrolizumab and she will let me know if she has any symptoms indicative of these.  Routine monitoring per her oncologist.    # DM-2 in remission   -HbA1c 10.7% 4/2022 with glucose 290 mg/dL   -9/2022: HbA1c 7.1%, 12/2022: HbA1c 5.8%, 3/2023: HbA1c 6.0% (in the setting of new anemia since 9/2022); 10/2023: HbA1c 5.0% with random glucose 97 mg/dL and concurrent Hgb 9.2 g/dL (stable) with normal MCV   -CT A/P 1/2023: pancreas unremarkable  # No DM retinopathy on eye exam 6/2022  -She will pursue locally   # Hypertension, on lisinopril    -12/2021: urine microalbumin normal   -10/2023: GFR 90  # Peripheral neuropathy, history of multiple lower extremity ulcerations  # No prior ASCVD event, not on ASA   # Dyslipidemia, not on a statin   -10/2023: , HDL 50, LDL 81,   -She has a glucagon kit at home    I encouraged her to check a capillary glucose at least 3-4 times per week and vary the time of day checked.  She will let me know if her glucose is consistently above 140 mg/deciliter.  She has significant neuropathy that is related to her diabetes and possibly related to chemotherapy.  She is interested in starting a medication and I prescribed gabapentin 300 mg nightly and we reviewed adverse effects.     # Adrenal function    She had been on prednisone 10 mg daily for about 25 years for asthma.  She ultimately reduced to 5 mg daily and then about 2 months ago she stop prednisone cold turkey.  She denies having symptoms suggestive of adrenal crisis.  Currently her energy is okay but her appetite is not great.  Outside of prednisone her only other recent glucocorticoid exposure has been daily Advair Diskus.  She does have an injection kit of glucocorticoid available at home but is not sure if it is dexamethasone or hydrocortisone.    Today we will check ACTH and cortisol.     # Bone health  -She takes a MVI daily and vitamin D 1000 international  unit(s) daily   -no recent DEXA, no prior fracture     30 minutes spent on the date of the encounter doing chart review, history and exam, documentation and further activities as noted above.       Again, thank you for allowing me to participate in the care of your patient.        Sincerely,        Chris Romo MD

## 2024-01-01 ENCOUNTER — TRANSFERRED RECORDS (OUTPATIENT)
Dept: HEALTH INFORMATION MANAGEMENT | Facility: CLINIC | Age: 74
End: 2024-01-01
Payer: MEDICARE

## 2024-01-01 ENCOUNTER — HEALTH MAINTENANCE LETTER (OUTPATIENT)
Age: 74
End: 2024-01-01

## 2024-01-01 ENCOUNTER — TRANSCRIBE ORDERS (OUTPATIENT)
Dept: VASCULAR SURGERY | Facility: CLINIC | Age: 74
End: 2024-01-01
Payer: MEDICARE

## 2024-01-01 ENCOUNTER — APPOINTMENT (OUTPATIENT)
Dept: CT IMAGING | Facility: HOSPITAL | Age: 74
DRG: 270 | End: 2024-01-01
Attending: STUDENT IN AN ORGANIZED HEALTH CARE EDUCATION/TRAINING PROGRAM
Payer: MEDICARE

## 2024-01-01 ENCOUNTER — TRANSFERRED RECORDS (OUTPATIENT)
Dept: HEALTH INFORMATION MANAGEMENT | Facility: CLINIC | Age: 74
End: 2024-01-01

## 2024-01-01 ENCOUNTER — TELEPHONE (OUTPATIENT)
Dept: NEPHROLOGY | Facility: CLINIC | Age: 74
End: 2024-01-01
Payer: MEDICARE

## 2024-01-01 ENCOUNTER — HOSPITAL ENCOUNTER (OUTPATIENT)
Facility: HOSPITAL | Age: 74
End: 2024-01-01
Attending: STUDENT IN AN ORGANIZED HEALTH CARE EDUCATION/TRAINING PROGRAM | Admitting: STUDENT IN AN ORGANIZED HEALTH CARE EDUCATION/TRAINING PROGRAM
Payer: MEDICARE

## 2024-01-01 ENCOUNTER — PRE VISIT (OUTPATIENT)
Dept: NEPHROLOGY | Facility: CLINIC | Age: 74
End: 2024-01-01

## 2024-01-01 ENCOUNTER — HOSPITAL ENCOUNTER (INPATIENT)
Facility: HOSPITAL | Age: 74
LOS: 7 days | DRG: 270 | End: 2024-04-11
Attending: STUDENT IN AN ORGANIZED HEALTH CARE EDUCATION/TRAINING PROGRAM | Admitting: INTERNAL MEDICINE
Payer: MEDICARE

## 2024-01-01 ENCOUNTER — INFUSION THERAPY VISIT (OUTPATIENT)
Dept: INFUSION THERAPY | Facility: HOSPITAL | Age: 74
DRG: 270 | End: 2024-01-01
Attending: OBSTETRICS & GYNECOLOGY
Payer: MEDICARE

## 2024-01-01 ENCOUNTER — APPOINTMENT (OUTPATIENT)
Dept: INTERVENTIONAL RADIOLOGY/VASCULAR | Facility: HOSPITAL | Age: 74
DRG: 270 | End: 2024-01-01
Attending: NURSE PRACTITIONER
Payer: MEDICARE

## 2024-01-01 ENCOUNTER — INFUSION THERAPY VISIT (OUTPATIENT)
Dept: INFUSION THERAPY | Facility: HOSPITAL | Age: 74
End: 2024-01-01
Attending: OBSTETRICS & GYNECOLOGY
Payer: MEDICARE

## 2024-01-01 ENCOUNTER — APPOINTMENT (OUTPATIENT)
Dept: CT IMAGING | Facility: HOSPITAL | Age: 74
DRG: 270 | End: 2024-01-01
Attending: RADIOLOGY
Payer: MEDICARE

## 2024-01-01 ENCOUNTER — MEDICAL CORRESPONDENCE (OUTPATIENT)
Dept: HEALTH INFORMATION MANAGEMENT | Facility: CLINIC | Age: 74
End: 2024-01-01

## 2024-01-01 ENCOUNTER — TRANSCRIBE ORDERS (OUTPATIENT)
Dept: OTHER | Age: 74
End: 2024-01-01

## 2024-01-01 VITALS
TEMPERATURE: 97.9 F | RESPIRATION RATE: 16 BRPM | SYSTOLIC BLOOD PRESSURE: 101 MMHG | DIASTOLIC BLOOD PRESSURE: 56 MMHG | HEART RATE: 66 BPM | OXYGEN SATURATION: 98 %

## 2024-01-01 VITALS
DIASTOLIC BLOOD PRESSURE: 61 MMHG | SYSTOLIC BLOOD PRESSURE: 96 MMHG | RESPIRATION RATE: 16 BRPM | HEART RATE: 67 BPM | OXYGEN SATURATION: 99 % | TEMPERATURE: 98 F

## 2024-01-01 VITALS
WEIGHT: 123.68 LBS | BODY MASS INDEX: 23.35 KG/M2 | SYSTOLIC BLOOD PRESSURE: 100 MMHG | RESPIRATION RATE: 18 BRPM | DIASTOLIC BLOOD PRESSURE: 55 MMHG | HEIGHT: 61 IN | HEART RATE: 116 BPM | TEMPERATURE: 98.2 F | OXYGEN SATURATION: 87 %

## 2024-01-01 DIAGNOSIS — D63.0 ANEMIA IN NEOPLASTIC DISEASE: Primary | ICD-10-CM

## 2024-01-01 DIAGNOSIS — R80.9 PROTEINURIA: ICD-10-CM

## 2024-01-01 DIAGNOSIS — C55 MALIGNANT NEOPLASM OF UTERUS, UNSPECIFIED SITE (H): ICD-10-CM

## 2024-01-01 DIAGNOSIS — C54.1 ENDOMETRIAL CARCINOMA (H): Primary | ICD-10-CM

## 2024-01-01 DIAGNOSIS — I89.0 LYMPHEDEMA: Primary | ICD-10-CM

## 2024-01-01 DIAGNOSIS — R58: ICD-10-CM

## 2024-01-01 DIAGNOSIS — C54.1 ENDOMETRIAL SARCOMA (H): ICD-10-CM

## 2024-01-01 LAB
ABO/RH(D): NORMAL
ALBUMIN SERPL BCG-MCNC: 2.2 G/DL (ref 3.5–5.2)
ALP SERPL-CCNC: 121 U/L (ref 40–150)
ALT SERPL W P-5'-P-CCNC: 11 U/L (ref 0–50)
ANION GAP SERPL CALCULATED.3IONS-SCNC: 15 MMOL/L (ref 7–15)
ANION GAP SERPL CALCULATED.3IONS-SCNC: 9 MMOL/L (ref 7–15)
ANTIBODY SCREEN: NEGATIVE
APTT PPP: 58 SECONDS (ref 22–38)
AST SERPL W P-5'-P-CCNC: 14 U/L (ref 0–45)
BACTERIA UR CULT: ABNORMAL
BACTERIA UR CULT: ABNORMAL
BASOPHILS # BLD AUTO: 0 10E3/UL (ref 0–0.2)
BASOPHILS # BLD AUTO: 0 10E3/UL (ref 0–0.2)
BASOPHILS NFR BLD AUTO: 0 %
BASOPHILS NFR BLD AUTO: 0 %
BILIRUB DIRECT SERPL-MCNC: 0.21 MG/DL (ref 0–0.3)
BILIRUB SERPL-MCNC: 0.5 MG/DL
BKR LAB AP TR RXN INTERPRETATION: NORMAL
BKR LAB AP TRAN RXN RECOMMENDATION: NORMAL
BKR LAB AP TRANS SIGNS AND SX: NORMAL
BKR LAB AP TRANSFUSION REACTION: NORMAL
BLD PROD TYP BPU: NORMAL
BLOOD COMPONENT TYPE: NORMAL
BUN SERPL-MCNC: 20.7 MG/DL (ref 8–23)
BUN SERPL-MCNC: 34.4 MG/DL (ref 8–23)
CALCIUM SERPL-MCNC: 6.5 MG/DL (ref 8.8–10.2)
CALCIUM SERPL-MCNC: 7.1 MG/DL (ref 8.8–10.2)
CHLORIDE SERPL-SCNC: 106 MMOL/L (ref 98–107)
CHLORIDE SERPL-SCNC: 108 MMOL/L (ref 98–107)
CO COMPONENTS: NORMAL
CODING SYSTEM: NORMAL
CREAT SERPL-MCNC: 0.98 MG/DL (ref 0.51–0.95)
CREAT SERPL-MCNC: 1.54 MG/DL (ref 0.51–0.95)
CREAT SERPL-MCNC: 1.57 MG/DL (ref 0.51–0.95)
CROSSMATCH: NORMAL
DEPRECATED HCO3 PLAS-SCNC: 17 MMOL/L (ref 22–29)
DEPRECATED HCO3 PLAS-SCNC: 23 MMOL/L (ref 22–29)
EGFRCR SERPLBLD CKD-EPI 2021: 35 ML/MIN/1.73M2
EGFRCR SERPLBLD CKD-EPI 2021: 35 ML/MIN/1.73M2
EGFRCR SERPLBLD CKD-EPI 2021: 61 ML/MIN/1.73M2
EOSINOPHIL # BLD AUTO: 0 10E3/UL (ref 0–0.7)
EOSINOPHIL # BLD AUTO: 0 10E3/UL (ref 0–0.7)
EOSINOPHIL NFR BLD AUTO: 0 %
EOSINOPHIL NFR BLD AUTO: 0 %
ERYTHROCYTE [DISTWIDTH] IN BLOOD BY AUTOMATED COUNT: 14.4 % (ref 10–15)
ERYTHROCYTE [DISTWIDTH] IN BLOOD BY AUTOMATED COUNT: 15.9 % (ref 10–15)
ERYTHROCYTE [DISTWIDTH] IN BLOOD BY AUTOMATED COUNT: 16 % (ref 10–15)
GLUCOSE BLDC GLUCOMTR-MCNC: 152 MG/DL (ref 70–99)
GLUCOSE BLDC GLUCOMTR-MCNC: 169 MG/DL (ref 70–99)
GLUCOSE BLDC GLUCOMTR-MCNC: 192 MG/DL (ref 70–99)
GLUCOSE SERPL-MCNC: 145 MG/DL (ref 70–99)
GLUCOSE SERPL-MCNC: 224 MG/DL (ref 70–99)
GRAM/CULTURE INDICATED?: NO
HCT VFR BLD AUTO: 17.5 % (ref 35–47)
HCT VFR BLD AUTO: 22.4 % (ref 35–47)
HCT VFR BLD AUTO: 22.6 % (ref 35–47)
HGB BLD-MCNC: 5.4 G/DL (ref 11.7–15.7)
HGB BLD-MCNC: 6.2 G/DL (ref 11.7–15.7)
HGB BLD-MCNC: 6.8 G/DL (ref 11.7–15.7)
HGB BLD-MCNC: 7 G/DL (ref 11.7–15.7)
HGB BLD-MCNC: 7 G/DL (ref 11.7–15.7)
HGB BLD-MCNC: 7.2 G/DL (ref 11.7–15.7)
HGB BLD-MCNC: 7.6 G/DL (ref 11.7–15.7)
IMM GRANULOCYTES # BLD: 0 10E3/UL
IMM GRANULOCYTES # BLD: 0.1 10E3/UL
IMM GRANULOCYTES NFR BLD: 0 %
IMM GRANULOCYTES NFR BLD: 1 %
INR PPP: 1.76 (ref 0.85–1.15)
ISSUE DATE AND TIME: NORMAL
LACTATE SERPL-SCNC: 1.1 MMOL/L (ref 0.7–2)
LIPASE SERPL-CCNC: 6 U/L (ref 13–60)
LYMPHOCYTES # BLD AUTO: 0.2 10E3/UL (ref 0.8–5.3)
LYMPHOCYTES # BLD AUTO: 0.5 10E3/UL (ref 0.8–5.3)
LYMPHOCYTES NFR BLD AUTO: 3 %
LYMPHOCYTES NFR BLD AUTO: 5 %
MAGNESIUM SERPL-MCNC: 1.2 MG/DL (ref 1.7–2.3)
MCH RBC QN AUTO: 27.6 PG (ref 26.5–33)
MCH RBC QN AUTO: 27.9 PG (ref 26.5–33)
MCH RBC QN AUTO: 29.8 PG (ref 26.5–33)
MCHC RBC AUTO-ENTMCNC: 30.9 G/DL (ref 31.5–36.5)
MCHC RBC AUTO-ENTMCNC: 31 G/DL (ref 31.5–36.5)
MCHC RBC AUTO-ENTMCNC: 33.9 G/DL (ref 31.5–36.5)
MCV RBC AUTO: 88 FL (ref 78–100)
MCV RBC AUTO: 89 FL (ref 78–100)
MCV RBC AUTO: 90 FL (ref 78–100)
MONOCYTES # BLD AUTO: 0.1 10E3/UL (ref 0–1.3)
MONOCYTES # BLD AUTO: 0.5 10E3/UL (ref 0–1.3)
MONOCYTES NFR BLD AUTO: 2 %
MONOCYTES NFR BLD AUTO: 5 %
NEUTROPHILS # BLD AUTO: 7 10E3/UL (ref 1.6–8.3)
NEUTROPHILS # BLD AUTO: 8.5 10E3/UL (ref 1.6–8.3)
NEUTROPHILS NFR BLD AUTO: 89 %
NEUTROPHILS NFR BLD AUTO: 95 %
NRBC # BLD AUTO: 0 10E3/UL
NRBC # BLD AUTO: 0 10E3/UL
NRBC BLD AUTO-RTO: 0 /100
NRBC BLD AUTO-RTO: 0 /100
OTHER UNITS TRANSFUSED: NORMAL
PATH REPORT.COMMENTS IMP SPEC: NORMAL
PATH REPORT.COMMENTS IMP SPEC: NORMAL
PLATELET # BLD AUTO: 154 10E3/UL (ref 150–450)
PLATELET # BLD AUTO: 259 10E3/UL (ref 150–450)
PLATELET # BLD AUTO: 310 10E3/UL (ref 150–450)
POST RXN CLERICAL CHECK: NORMAL
POST SPECIMEN APPEARANCE: NORMAL
POST-RXN ABO/RH: NORMAL
POST-RXN POLY DAT: NEGATIVE
POTASSIUM SERPL-SCNC: 3.8 MMOL/L (ref 3.4–5.3)
POTASSIUM SERPL-SCNC: 4.3 MMOL/L (ref 3.4–5.3)
PRODUCT CODE: NORMAL
PROT SERPL-MCNC: 4.8 G/DL (ref 6.4–8.3)
RBC # BLD AUTO: 1.96 10E6/UL (ref 3.8–5.2)
RBC # BLD AUTO: 2.51 10E6/UL (ref 3.8–5.2)
RBC # BLD AUTO: 2.55 10E6/UL (ref 3.8–5.2)
SODIUM SERPL-SCNC: 138 MMOL/L (ref 135–145)
SODIUM SERPL-SCNC: 140 MMOL/L (ref 135–145)
SPECIMEN EXPIRATION DATE: NORMAL
UNIT ABO/RH: NORMAL
UNIT BLOOD TYPE TRANSFUSION REACTION: NORMAL
UNIT NUMBER: NORMAL
UNIT STATUS: NORMAL
UNIT TYPE ISBT: 9500
VANCOMYCIN SERPL-MCNC: 12.7 UG/ML
WBC # BLD AUTO: 7.3 10E3/UL (ref 4–11)
WBC # BLD AUTO: 8.8 10E3/UL (ref 4–11)
WBC # BLD AUTO: 9.6 10E3/UL (ref 4–11)

## 2024-01-01 PROCEDURE — 250N000013 HC RX MED GY IP 250 OP 250 PS 637: Performed by: INTERNAL MEDICINE

## 2024-01-01 PROCEDURE — 250N000011 HC RX IP 250 OP 636: Performed by: NURSE PRACTITIONER

## 2024-01-01 PROCEDURE — 74176 CT ABD & PELVIS W/O CONTRAST: CPT | Mod: MG

## 2024-01-01 PROCEDURE — 99291 CRITICAL CARE FIRST HOUR: CPT | Performed by: INTERNAL MEDICINE

## 2024-01-01 PROCEDURE — 86923 COMPATIBILITY TEST ELECTRIC: CPT | Performed by: OBSTETRICS & GYNECOLOGY

## 2024-01-01 PROCEDURE — 85018 HEMOGLOBIN: CPT | Performed by: INTERNAL MEDICINE

## 2024-01-01 PROCEDURE — 86923 COMPATIBILITY TEST ELECTRIC: CPT | Performed by: INTERNAL MEDICINE

## 2024-01-01 PROCEDURE — 75726 ARTERY X-RAYS ABDOMEN: CPT

## 2024-01-01 PROCEDURE — 93005 ELECTROCARDIOGRAM TRACING: CPT | Performed by: STUDENT IN AN ORGANIZED HEALTH CARE EDUCATION/TRAINING PROGRAM

## 2024-01-01 PROCEDURE — 99292 CRITICAL CARE ADDL 30 MIN: CPT

## 2024-01-01 PROCEDURE — 250N000011 HC RX IP 250 OP 636

## 2024-01-01 PROCEDURE — 99418 PROLNG IP/OBS E/M EA 15 MIN: CPT | Performed by: NURSE PRACTITIONER

## 2024-01-01 PROCEDURE — 250N000011 HC RX IP 250 OP 636: Performed by: OBSTETRICS & GYNECOLOGY

## 2024-01-01 PROCEDURE — 04LH3DZ OCCLUSION OF RIGHT EXTERNAL ILIAC ARTERY WITH INTRALUMINAL DEVICE, PERCUTANEOUS APPROACH: ICD-10-PCS | Performed by: RADIOLOGY

## 2024-01-01 PROCEDURE — 96365 THER/PROPH/DIAG IV INF INIT: CPT

## 2024-01-01 PROCEDURE — 99232 SBSQ HOSP IP/OBS MODERATE 35: CPT | Performed by: INTERNAL MEDICINE

## 2024-01-01 PROCEDURE — 96375 TX/PRO/DX INJ NEW DRUG ADDON: CPT

## 2024-01-01 PROCEDURE — C1769 GUIDE WIRE: HCPCS

## 2024-01-01 PROCEDURE — P9016 RBC LEUKOCYTES REDUCED: HCPCS | Performed by: STUDENT IN AN ORGANIZED HEALTH CARE EDUCATION/TRAINING PROGRAM

## 2024-01-01 PROCEDURE — 85025 COMPLETE CBC W/AUTO DIFF WBC: CPT | Performed by: STUDENT IN AN ORGANIZED HEALTH CARE EDUCATION/TRAINING PROGRAM

## 2024-01-01 PROCEDURE — 80048 BASIC METABOLIC PNL TOTAL CA: CPT | Performed by: INTERNAL MEDICINE

## 2024-01-01 PROCEDURE — 250N000011 HC RX IP 250 OP 636: Performed by: INTERNAL MEDICINE

## 2024-01-01 PROCEDURE — 0W9G30Z DRAINAGE OF PERITONEAL CAVITY WITH DRAINAGE DEVICE, PERCUTANEOUS APPROACH: ICD-10-PCS | Performed by: RADIOLOGY

## 2024-01-01 PROCEDURE — 36430 TRANSFUSION BLD/BLD COMPNT: CPT

## 2024-01-01 PROCEDURE — 75710 ARTERY X-RAYS ARM/LEG: CPT

## 2024-01-01 PROCEDURE — 86923 COMPATIBILITY TEST ELECTRIC: CPT | Performed by: STUDENT IN AN ORGANIZED HEALTH CARE EDUCATION/TRAINING PROGRAM

## 2024-01-01 PROCEDURE — 200N000001 HC R&B ICU

## 2024-01-01 PROCEDURE — 272N000566 HC SHEATH CR3

## 2024-01-01 PROCEDURE — 250N000009 HC RX 250: Performed by: INTERNAL MEDICINE

## 2024-01-01 PROCEDURE — C1729 CATH, DRAINAGE: HCPCS

## 2024-01-01 PROCEDURE — 255N000002 HC RX 255 OP 636: Performed by: STUDENT IN AN ORGANIZED HEALTH CARE EDUCATION/TRAINING PROGRAM

## 2024-01-01 PROCEDURE — 250N000013 HC RX MED GY IP 250 OP 250 PS 637: Performed by: NURSE PRACTITIONER

## 2024-01-01 PROCEDURE — 87086 URINE CULTURE/COLONY COUNT: CPT | Performed by: RADIOLOGY

## 2024-01-01 PROCEDURE — 250N000012 HC RX MED GY IP 250 OP 636 PS 637: Performed by: INTERNAL MEDICINE

## 2024-01-01 PROCEDURE — 96366 THER/PROPH/DIAG IV INF ADDON: CPT

## 2024-01-01 PROCEDURE — 120N000001 HC R&B MED SURG/OB

## 2024-01-01 PROCEDURE — 83605 ASSAY OF LACTIC ACID: CPT

## 2024-01-01 PROCEDURE — P9016 RBC LEUKOCYTES REDUCED: HCPCS

## 2024-01-01 PROCEDURE — 258N000003 HC RX IP 258 OP 636: Performed by: INTERNAL MEDICINE

## 2024-01-01 PROCEDURE — 250N000009 HC RX 250: Performed by: STUDENT IN AN ORGANIZED HEALTH CARE EDUCATION/TRAINING PROGRAM

## 2024-01-01 PROCEDURE — 36415 COLL VENOUS BLD VENIPUNCTURE: CPT | Performed by: STUDENT IN AN ORGANIZED HEALTH CARE EDUCATION/TRAINING PROGRAM

## 2024-01-01 PROCEDURE — 86900 BLOOD TYPING SEROLOGIC ABO: CPT

## 2024-01-01 PROCEDURE — 82565 ASSAY OF CREATININE: CPT | Performed by: INTERNAL MEDICINE

## 2024-01-01 PROCEDURE — 99222 1ST HOSP IP/OBS MODERATE 55: CPT | Performed by: INTERNAL MEDICINE

## 2024-01-01 PROCEDURE — 74174 CTA ABD&PLVS W/CONTRAST: CPT | Mod: MG

## 2024-01-01 PROCEDURE — 250N000011 HC RX IP 250 OP 636: Performed by: PHYSICIAN ASSISTANT

## 2024-01-01 PROCEDURE — 36591 DRAW BLOOD OFF VENOUS DEVICE: CPT

## 2024-01-01 PROCEDURE — 272N000116 HC CATH CR1

## 2024-01-01 PROCEDURE — 99207 PR NO BILLABLE SERVICE THIS VISIT: CPT | Performed by: INTERNAL MEDICINE

## 2024-01-01 PROCEDURE — 99153 MOD SED SAME PHYS/QHP EA: CPT

## 2024-01-01 PROCEDURE — C9113 INJ PANTOPRAZOLE SODIUM, VIA: HCPCS

## 2024-01-01 PROCEDURE — 37244 VASC EMBOLIZE/OCCLUDE BLEED: CPT

## 2024-01-01 PROCEDURE — 85730 THROMBOPLASTIN TIME PARTIAL: CPT

## 2024-01-01 PROCEDURE — P9016 RBC LEUKOCYTES REDUCED: HCPCS | Performed by: OBSTETRICS & GYNECOLOGY

## 2024-01-01 PROCEDURE — 85027 COMPLETE CBC AUTOMATED: CPT | Performed by: INTERNAL MEDICINE

## 2024-01-01 PROCEDURE — 272N000188 HC ACCESSORY CR12

## 2024-01-01 PROCEDURE — 96374 THER/PROPH/DIAG INJ IV PUSH: CPT | Mod: 59

## 2024-01-01 PROCEDURE — 99233 SBSQ HOSP IP/OBS HIGH 50: CPT | Performed by: INTERNAL MEDICINE

## 2024-01-01 PROCEDURE — 99152 MOD SED SAME PHYS/QHP 5/>YRS: CPT

## 2024-01-01 PROCEDURE — C1889 IMPLANT/INSERT DEVICE, NOC: HCPCS

## 2024-01-01 PROCEDURE — P9016 RBC LEUKOCYTES REDUCED: HCPCS | Performed by: PHYSICIAN ASSISTANT

## 2024-01-01 PROCEDURE — 999N000287 HC ICU ADULT ROUNDING, EACH 10 MINS

## 2024-01-01 PROCEDURE — 86923 COMPATIBILITY TEST ELECTRIC: CPT | Performed by: PHYSICIAN ASSISTANT

## 2024-01-01 PROCEDURE — 49406 IMAGE CATH FLUID PERI/RETRO: CPT

## 2024-01-01 PROCEDURE — 36247 INS CATH ABD/L-EXT ART 3RD: CPT

## 2024-01-01 PROCEDURE — 80053 COMPREHEN METABOLIC PANEL: CPT

## 2024-01-01 PROCEDURE — 272N000500 HC NEEDLE CR2

## 2024-01-01 PROCEDURE — 250N000011 HC RX IP 250 OP 636: Performed by: STUDENT IN AN ORGANIZED HEALTH CARE EDUCATION/TRAINING PROGRAM

## 2024-01-01 PROCEDURE — 36591 DRAW BLOOD OFF VENOUS DEVICE: CPT | Performed by: PHYSICIAN ASSISTANT

## 2024-01-01 PROCEDURE — 87186 SC STD MICRODIL/AGAR DIL: CPT | Performed by: RADIOLOGY

## 2024-01-01 PROCEDURE — 85610 PROTHROMBIN TIME: CPT

## 2024-01-01 PROCEDURE — 250N000011 HC RX IP 250 OP 636: Mod: JZ | Performed by: INTERNAL MEDICINE

## 2024-01-01 PROCEDURE — 36415 COLL VENOUS BLD VENIPUNCTURE: CPT

## 2024-01-01 PROCEDURE — 3E043XZ INTRODUCTION OF VASOPRESSOR INTO CENTRAL VEIN, PERCUTANEOUS APPROACH: ICD-10-PCS | Performed by: INTERNAL MEDICINE

## 2024-01-01 PROCEDURE — 86923 COMPATIBILITY TEST ELECTRIC: CPT

## 2024-01-01 PROCEDURE — 86078 PHYS BLOOD BANK SERV REACTJ: CPT | Performed by: PATHOLOGY

## 2024-01-01 PROCEDURE — 250N000013 HC RX MED GY IP 250 OP 250 PS 637: Performed by: STUDENT IN AN ORGANIZED HEALTH CARE EDUCATION/TRAINING PROGRAM

## 2024-01-01 PROCEDURE — P9016 RBC LEUKOCYTES REDUCED: HCPCS | Performed by: INTERNAL MEDICINE

## 2024-01-01 PROCEDURE — 83690 ASSAY OF LIPASE: CPT

## 2024-01-01 PROCEDURE — 80202 ASSAY OF VANCOMYCIN: CPT | Performed by: INTERNAL MEDICINE

## 2024-01-01 PROCEDURE — 85025 COMPLETE CBC W/AUTO DIFF WBC: CPT

## 2024-01-01 PROCEDURE — 272N000123 HC CATH CR9

## 2024-01-01 PROCEDURE — 50432 PLMT NEPHROSTOMY CATHETER: CPT

## 2024-01-01 PROCEDURE — 272N000508 HC NEEDLE CR8

## 2024-01-01 PROCEDURE — 86900 BLOOD TYPING SEROLOGIC ABO: CPT | Performed by: PHYSICIAN ASSISTANT

## 2024-01-01 PROCEDURE — 83735 ASSAY OF MAGNESIUM: CPT

## 2024-01-01 PROCEDURE — 0T9030Z DRAINAGE OF RIGHT KIDNEY WITH DRAINAGE DEVICE, PERCUTANEOUS APPROACH: ICD-10-PCS | Performed by: RADIOLOGY

## 2024-01-01 PROCEDURE — P9040 RBC LEUKOREDUCED IRRADIATED: HCPCS | Performed by: STUDENT IN AN ORGANIZED HEALTH CARE EDUCATION/TRAINING PROGRAM

## 2024-01-01 PROCEDURE — 99291 CRITICAL CARE FIRST HOUR: CPT | Mod: 25

## 2024-01-01 PROCEDURE — 99223 1ST HOSP IP/OBS HIGH 75: CPT | Performed by: NURSE PRACTITIONER

## 2024-01-01 RX ORDER — HYDROMORPHONE HYDROCHLORIDE 1 MG/ML
0.5 INJECTION, SOLUTION INTRAMUSCULAR; INTRAVENOUS; SUBCUTANEOUS
Status: DISCONTINUED | OUTPATIENT
Start: 2024-01-01 | End: 2024-01-01 | Stop reason: HOSPADM

## 2024-01-01 RX ORDER — ONDANSETRON 2 MG/ML
4 INJECTION INTRAMUSCULAR; INTRAVENOUS ONCE
Status: COMPLETED | OUTPATIENT
Start: 2024-01-01 | End: 2024-01-01

## 2024-01-01 RX ORDER — LANSOPRAZOLE 30 MG/1
30 CAPSULE, DELAYED RELEASE ORAL
Status: DISCONTINUED | OUTPATIENT
Start: 2024-01-01 | End: 2024-01-01

## 2024-01-01 RX ORDER — HEPARIN SODIUM (PORCINE) LOCK FLUSH IV SOLN 100 UNIT/ML 100 UNIT/ML
SOLUTION INTRAVENOUS
Status: DISPENSED
Start: 2024-01-01 | End: 2024-01-01

## 2024-01-01 RX ORDER — NALOXONE HYDROCHLORIDE 0.4 MG/ML
0.2 INJECTION, SOLUTION INTRAMUSCULAR; INTRAVENOUS; SUBCUTANEOUS
Status: DISCONTINUED | OUTPATIENT
Start: 2024-01-01 | End: 2024-01-01

## 2024-01-01 RX ORDER — MAGNESIUM SULFATE HEPTAHYDRATE 40 MG/ML
2 INJECTION, SOLUTION INTRAVENOUS ONCE
Status: COMPLETED | OUTPATIENT
Start: 2024-01-01 | End: 2024-01-01

## 2024-01-01 RX ORDER — HEPARIN SODIUM,PORCINE 10 UNIT/ML
5-20 VIAL (ML) INTRAVENOUS DAILY PRN
Status: CANCELLED | OUTPATIENT
Start: 2024-01-01

## 2024-01-01 RX ORDER — HEPARIN SODIUM (PORCINE) LOCK FLUSH IV SOLN 100 UNIT/ML 100 UNIT/ML
5 SOLUTION INTRAVENOUS
Status: CANCELLED | OUTPATIENT
Start: 2024-01-01

## 2024-01-01 RX ORDER — GABAPENTIN 300 MG/1
300 CAPSULE ORAL
Status: DISCONTINUED | OUTPATIENT
Start: 2024-01-01 | End: 2024-01-01

## 2024-01-01 RX ORDER — METOCLOPRAMIDE HYDROCHLORIDE 5 MG/ML
5 INJECTION INTRAMUSCULAR; INTRAVENOUS EVERY 6 HOURS PRN
Status: DISCONTINUED | OUTPATIENT
Start: 2024-01-01 | End: 2024-01-01 | Stop reason: HOSPADM

## 2024-01-01 RX ORDER — FLUTICASONE PROPIONATE 50 MCG
1 SPRAY, SUSPENSION (ML) NASAL DAILY PRN
Status: DISCONTINUED | OUTPATIENT
Start: 2024-01-01 | End: 2024-01-01 | Stop reason: HOSPADM

## 2024-01-01 RX ORDER — MEROPENEM 1 G/1
1 INJECTION, POWDER, FOR SOLUTION INTRAVENOUS EVERY 12 HOURS
Status: DISCONTINUED | OUTPATIENT
Start: 2024-01-01 | End: 2024-01-01

## 2024-01-01 RX ORDER — EPINEPHRINE 1 MG/ML
0.3 INJECTION, SOLUTION INTRAMUSCULAR; SUBCUTANEOUS EVERY 5 MIN PRN
Status: CANCELLED | OUTPATIENT
Start: 2024-01-01

## 2024-01-01 RX ORDER — DIPHENHYDRAMINE HYDROCHLORIDE 50 MG/ML
50 INJECTION INTRAMUSCULAR; INTRAVENOUS
Status: DISCONTINUED | OUTPATIENT
Start: 2024-01-01 | End: 2024-01-01

## 2024-01-01 RX ORDER — CEFAZOLIN SODIUM 1 G/50ML
1250 SOLUTION INTRAVENOUS ONCE
Status: COMPLETED | OUTPATIENT
Start: 2024-01-01 | End: 2024-01-01

## 2024-01-01 RX ORDER — EPINEPHRINE 1 MG/ML
0.3 INJECTION, SOLUTION INTRAMUSCULAR; SUBCUTANEOUS EVERY 5 MIN PRN
Status: DISCONTINUED | OUTPATIENT
Start: 2024-01-01 | End: 2024-01-01

## 2024-01-01 RX ORDER — ATROPINE SULFATE 10 MG/ML
2 SOLUTION/ DROPS OPHTHALMIC EVERY 4 HOURS PRN
Status: DISCONTINUED | OUTPATIENT
Start: 2024-01-01 | End: 2024-01-01

## 2024-01-01 RX ORDER — DIPHENHYDRAMINE HYDROCHLORIDE 50 MG/ML
50 INJECTION INTRAMUSCULAR; INTRAVENOUS
Status: CANCELLED
Start: 2024-01-01

## 2024-01-01 RX ORDER — FLUTICASONE FUROATE AND VILANTEROL 100; 25 UG/1; UG/1
1 POWDER RESPIRATORY (INHALATION) DAILY
Status: DISCONTINUED | OUTPATIENT
Start: 2024-01-01 | End: 2024-01-01

## 2024-01-01 RX ORDER — LORAZEPAM 1 MG/1
1 TABLET ORAL
Status: DISCONTINUED | OUTPATIENT
Start: 2024-01-01 | End: 2024-01-01

## 2024-01-01 RX ORDER — NALOXONE HYDROCHLORIDE 0.4 MG/ML
0.4 INJECTION, SOLUTION INTRAMUSCULAR; INTRAVENOUS; SUBCUTANEOUS
Status: DISCONTINUED | OUTPATIENT
Start: 2024-01-01 | End: 2024-01-01

## 2024-01-01 RX ORDER — METHYLPREDNISOLONE SODIUM SUCCINATE 125 MG/2ML
60 INJECTION, POWDER, LYOPHILIZED, FOR SOLUTION INTRAMUSCULAR; INTRAVENOUS EVERY 6 HOURS
Status: DISCONTINUED | OUTPATIENT
Start: 2024-01-01 | End: 2024-01-01

## 2024-01-01 RX ORDER — ACETAMINOPHEN 325 MG/1
650 TABLET ORAL ONCE
Status: COMPLETED | OUTPATIENT
Start: 2024-01-01 | End: 2024-01-01

## 2024-01-01 RX ORDER — CEFAZOLIN SODIUM 1 G/50ML
750 SOLUTION INTRAVENOUS ONCE
Status: COMPLETED | OUTPATIENT
Start: 2024-01-01 | End: 2024-01-01

## 2024-01-01 RX ORDER — LORAZEPAM 2 MG/ML
0.5 CONCENTRATE ORAL
Status: DISCONTINUED | OUTPATIENT
Start: 2024-01-01 | End: 2024-01-01 | Stop reason: HOSPADM

## 2024-01-01 RX ORDER — IOPAMIDOL 755 MG/ML
90 INJECTION, SOLUTION INTRAVASCULAR ONCE
Status: COMPLETED | OUTPATIENT
Start: 2024-01-01 | End: 2024-01-01

## 2024-01-01 RX ORDER — PROCHLORPERAZINE 25 MG
12.5 SUPPOSITORY, RECTAL RECTAL EVERY 12 HOURS PRN
Status: DISCONTINUED | OUTPATIENT
Start: 2024-01-01 | End: 2024-01-01 | Stop reason: HOSPADM

## 2024-01-01 RX ORDER — ONDANSETRON 2 MG/ML
4 INJECTION INTRAMUSCULAR; INTRAVENOUS
Status: COMPLETED | OUTPATIENT
Start: 2024-01-01 | End: 2024-01-01

## 2024-01-01 RX ORDER — FLUTICASONE PROPIONATE 50 MCG
1 SPRAY, SUSPENSION (ML) NASAL DAILY PRN
COMMUNITY

## 2024-01-01 RX ORDER — ACETAMINOPHEN 325 MG/1
650 TABLET ORAL EVERY 4 HOURS PRN
Status: DISCONTINUED | OUTPATIENT
Start: 2024-01-01 | End: 2024-01-01 | Stop reason: HOSPADM

## 2024-01-01 RX ORDER — OXYCODONE HYDROCHLORIDE 5 MG/1
10 TABLET ORAL EVERY 4 HOURS PRN
Status: DISCONTINUED | OUTPATIENT
Start: 2024-01-01 | End: 2024-01-01

## 2024-01-01 RX ORDER — ACETAMINOPHEN 650 MG/1
650 SUPPOSITORY RECTAL EVERY 4 HOURS PRN
Status: DISCONTINUED | OUTPATIENT
Start: 2024-01-01 | End: 2024-01-01 | Stop reason: HOSPADM

## 2024-01-01 RX ORDER — DIPHENHYDRAMINE HCL 25 MG
25 CAPSULE ORAL EVERY 6 HOURS PRN
Status: DISCONTINUED | OUTPATIENT
Start: 2024-01-01 | End: 2024-01-01 | Stop reason: HOSPADM

## 2024-01-01 RX ORDER — HYDROMORPHONE HYDROCHLORIDE 1 MG/ML
0.3 INJECTION, SOLUTION INTRAMUSCULAR; INTRAVENOUS; SUBCUTANEOUS
Status: DISCONTINUED | OUTPATIENT
Start: 2024-01-01 | End: 2024-01-01

## 2024-01-01 RX ORDER — ONDANSETRON 2 MG/ML
INJECTION INTRAMUSCULAR; INTRAVENOUS
Status: COMPLETED
Start: 2024-01-01 | End: 2024-01-01

## 2024-01-01 RX ORDER — ATROPINE SULFATE 10 MG/ML
2 SOLUTION/ DROPS OPHTHALMIC
Status: DISCONTINUED | OUTPATIENT
Start: 2024-01-01 | End: 2024-01-01 | Stop reason: HOSPADM

## 2024-01-01 RX ORDER — HYDROMORPHONE HYDROCHLORIDE 1 MG/ML
2 SOLUTION ORAL
Status: DISCONTINUED | OUTPATIENT
Start: 2024-01-01 | End: 2024-01-01 | Stop reason: HOSPADM

## 2024-01-01 RX ORDER — CETIRIZINE HYDROCHLORIDE 10 MG/1
10 TABLET ORAL DAILY PRN
Status: DISCONTINUED | OUTPATIENT
Start: 2024-01-01 | End: 2024-01-01

## 2024-01-01 RX ORDER — HYDROMORPHONE HYDROCHLORIDE 1 MG/ML
0.5 INJECTION, SOLUTION INTRAMUSCULAR; INTRAVENOUS; SUBCUTANEOUS
Status: DISCONTINUED | OUTPATIENT
Start: 2024-01-01 | End: 2024-01-01

## 2024-01-01 RX ORDER — DEXTROSE MONOHYDRATE 25 G/50ML
25-50 INJECTION, SOLUTION INTRAVENOUS
Status: DISCONTINUED | OUTPATIENT
Start: 2024-01-01 | End: 2024-01-01

## 2024-01-01 RX ORDER — HEPARIN SODIUM (PORCINE) LOCK FLUSH IV SOLN 100 UNIT/ML 100 UNIT/ML
5 SOLUTION INTRAVENOUS
Status: DISCONTINUED | OUTPATIENT
Start: 2024-01-01 | End: 2024-01-01

## 2024-01-01 RX ORDER — HEPARIN SODIUM (PORCINE) LOCK FLUSH IV SOLN 100 UNIT/ML 100 UNIT/ML
5 SOLUTION INTRAVENOUS
Status: DISCONTINUED | OUTPATIENT
Start: 2024-01-01 | End: 2024-01-01 | Stop reason: HOSPADM

## 2024-01-01 RX ORDER — ALBUTEROL SULFATE 90 UG/1
2 AEROSOL, METERED RESPIRATORY (INHALATION) EVERY 4 HOURS PRN
Status: DISCONTINUED | OUTPATIENT
Start: 2024-01-01 | End: 2024-01-01 | Stop reason: HOSPADM

## 2024-01-01 RX ORDER — PROCHLORPERAZINE MALEATE 10 MG
10 TABLET ORAL EVERY 6 HOURS PRN
COMMUNITY

## 2024-01-01 RX ORDER — OXYCODONE HYDROCHLORIDE 5 MG/1
10 TABLET ORAL
Status: DISCONTINUED | OUTPATIENT
Start: 2024-01-01 | End: 2024-01-01

## 2024-01-01 RX ORDER — CARBOXYMETHYLCELLULOSE SODIUM 5 MG/ML
1-2 SOLUTION/ DROPS OPHTHALMIC
Status: DISCONTINUED | OUTPATIENT
Start: 2024-01-01 | End: 2024-01-01 | Stop reason: HOSPADM

## 2024-01-01 RX ORDER — EPINEPHRINE 1 MG/ML
0.3 INJECTION, SOLUTION, CONCENTRATE INTRAVENOUS EVERY 5 MIN PRN
Status: CANCELLED | OUTPATIENT
Start: 2024-01-01

## 2024-01-01 RX ORDER — LORAZEPAM 2 MG/ML
1 INJECTION INTRAMUSCULAR
Status: DISCONTINUED | OUTPATIENT
Start: 2024-01-01 | End: 2024-01-01

## 2024-01-01 RX ORDER — MEROPENEM 1 G/1
1 INJECTION, POWDER, FOR SOLUTION INTRAVENOUS EVERY 8 HOURS
Status: DISCONTINUED | OUTPATIENT
Start: 2024-01-01 | End: 2024-01-01

## 2024-01-01 RX ORDER — NALOXONE HYDROCHLORIDE 0.4 MG/ML
0.2 INJECTION, SOLUTION INTRAMUSCULAR; INTRAVENOUS; SUBCUTANEOUS
Status: DISCONTINUED | OUTPATIENT
Start: 2024-01-01 | End: 2024-01-01 | Stop reason: HOSPADM

## 2024-01-01 RX ORDER — OXYCODONE HYDROCHLORIDE 5 MG/1
5 TABLET ORAL
Status: DISCONTINUED | OUTPATIENT
Start: 2024-01-01 | End: 2024-01-01

## 2024-01-01 RX ORDER — FENTANYL CITRATE 50 UG/ML
25-50 INJECTION, SOLUTION INTRAMUSCULAR; INTRAVENOUS EVERY 5 MIN PRN
Status: DISCONTINUED | OUTPATIENT
Start: 2024-01-01 | End: 2024-01-01

## 2024-01-01 RX ORDER — GABAPENTIN 300 MG/1
300 CAPSULE ORAL
COMMUNITY

## 2024-01-01 RX ORDER — LORAZEPAM 2 MG/ML
0.5 INJECTION INTRAMUSCULAR
Status: DISCONTINUED | OUTPATIENT
Start: 2024-01-01 | End: 2024-01-01 | Stop reason: HOSPADM

## 2024-01-01 RX ORDER — ACETAMINOPHEN 325 MG/1
650 TABLET ORAL EVERY 6 HOURS PRN
Status: DISCONTINUED | OUTPATIENT
Start: 2024-01-01 | End: 2024-01-01

## 2024-01-01 RX ORDER — FLUMAZENIL 0.1 MG/ML
0.2 INJECTION, SOLUTION INTRAVENOUS
Status: DISCONTINUED | OUTPATIENT
Start: 2024-01-01 | End: 2024-01-01 | Stop reason: HOSPADM

## 2024-01-01 RX ORDER — NOREPINEPHRINE BITARTRATE 0.02 MG/ML
INJECTION, SOLUTION INTRAVENOUS
Status: DISCONTINUED
Start: 2024-01-01 | End: 2024-01-01 | Stop reason: HOSPADM

## 2024-01-01 RX ORDER — ONDANSETRON 2 MG/ML
4 INJECTION INTRAMUSCULAR; INTRAVENOUS EVERY 6 HOURS PRN
Status: DISCONTINUED | OUTPATIENT
Start: 2024-01-01 | End: 2024-01-01 | Stop reason: HOSPADM

## 2024-01-01 RX ORDER — PROCHLORPERAZINE MALEATE 5 MG
5 TABLET ORAL EVERY 6 HOURS PRN
Status: DISCONTINUED | OUTPATIENT
Start: 2024-01-01 | End: 2024-01-01 | Stop reason: HOSPADM

## 2024-01-01 RX ORDER — BISACODYL 10 MG
10 SUPPOSITORY, RECTAL RECTAL
Status: DISCONTINUED | OUTPATIENT
Start: 2024-01-01 | End: 2024-01-01 | Stop reason: HOSPADM

## 2024-01-01 RX ORDER — DEXTROSE MONOHYDRATE 25 G/50ML
25-50 INJECTION, SOLUTION INTRAVENOUS
Status: DISCONTINUED | OUTPATIENT
Start: 2024-01-01 | End: 2024-01-01 | Stop reason: HOSPADM

## 2024-01-01 RX ORDER — NICOTINE POLACRILEX 4 MG
15-30 LOZENGE BUCCAL
Status: DISCONTINUED | OUTPATIENT
Start: 2024-01-01 | End: 2024-01-01 | Stop reason: HOSPADM

## 2024-01-01 RX ORDER — DIPHENHYDRAMINE HYDROCHLORIDE 50 MG/ML
12.5 INJECTION INTRAMUSCULAR; INTRAVENOUS EVERY 6 HOURS PRN
Status: DISCONTINUED | OUTPATIENT
Start: 2024-01-01 | End: 2024-01-01 | Stop reason: HOSPADM

## 2024-01-01 RX ORDER — ONDANSETRON 4 MG/1
4 TABLET, ORALLY DISINTEGRATING ORAL EVERY 6 HOURS PRN
Status: DISCONTINUED | OUTPATIENT
Start: 2024-01-01 | End: 2024-01-01 | Stop reason: HOSPADM

## 2024-01-01 RX ORDER — APIXABAN 5 MG (74)
KIT ORAL
Status: ON HOLD | COMMUNITY
End: 2024-01-01

## 2024-01-01 RX ORDER — DEXMEDETOMIDINE HYDROCHLORIDE 4 UG/ML
.1-.7 INJECTION, SOLUTION INTRAVENOUS CONTINUOUS
Status: DISCONTINUED | OUTPATIENT
Start: 2024-01-01 | End: 2024-01-01

## 2024-01-01 RX ORDER — GABAPENTIN 300 MG/1
300 CAPSULE ORAL AT BEDTIME
Status: DISCONTINUED | OUTPATIENT
Start: 2024-01-01 | End: 2024-01-01

## 2024-01-01 RX ORDER — NICOTINE POLACRILEX 4 MG
15-30 LOZENGE BUCCAL
Status: DISCONTINUED | OUTPATIENT
Start: 2024-01-01 | End: 2024-01-01

## 2024-01-01 RX ORDER — VANCOMYCIN HYDROCHLORIDE 500 MG/10ML
500 INJECTION, POWDER, LYOPHILIZED, FOR SOLUTION INTRAVENOUS EVERY 12 HOURS
Status: DISCONTINUED | OUTPATIENT
Start: 2024-01-01 | End: 2024-01-01 | Stop reason: DRUGHIGH

## 2024-01-01 RX ORDER — ALBUTEROL SULFATE 90 UG/1
2 AEROSOL, METERED RESPIRATORY (INHALATION) EVERY 6 HOURS PRN
Status: DISCONTINUED | OUTPATIENT
Start: 2024-01-01 | End: 2024-01-01

## 2024-01-01 RX ORDER — MONTELUKAST SODIUM 10 MG/1
10 TABLET ORAL AT BEDTIME
Status: DISCONTINUED | OUTPATIENT
Start: 2024-01-01 | End: 2024-01-01

## 2024-01-01 RX ORDER — HYDROMORPHONE HYDROCHLORIDE 1 MG/ML
0.3 INJECTION, SOLUTION INTRAMUSCULAR; INTRAVENOUS; SUBCUTANEOUS
Status: DISCONTINUED | OUTPATIENT
Start: 2024-01-01 | End: 2024-01-01 | Stop reason: HOSPADM

## 2024-01-01 RX ORDER — LANSOPRAZOLE 30 MG/1
30 CAPSULE, DELAYED RELEASE ORAL DAILY
COMMUNITY

## 2024-01-01 RX ORDER — HYDROMORPHONE HCL IN WATER/PF 6 MG/30 ML
0.4 PATIENT CONTROLLED ANALGESIA SYRINGE INTRAVENOUS
Status: DISCONTINUED | OUTPATIENT
Start: 2024-01-01 | End: 2024-01-01

## 2024-01-01 RX ORDER — HEPARIN SODIUM (PORCINE) LOCK FLUSH IV SOLN 100 UNIT/ML 100 UNIT/ML
5 SOLUTION INTRAVENOUS DAILY PRN
Status: CANCELLED
Start: 2024-01-01

## 2024-01-01 RX ORDER — LORAZEPAM 2 MG/ML
0.5 INJECTION INTRAMUSCULAR EVERY 4 HOURS
Status: DISCONTINUED | OUTPATIENT
Start: 2024-01-01 | End: 2024-01-01 | Stop reason: HOSPADM

## 2024-01-01 RX ORDER — FAMOTIDINE 20 MG/1
20 TABLET, FILM COATED ORAL AT BEDTIME
COMMUNITY

## 2024-01-01 RX ORDER — NALOXONE HYDROCHLORIDE 0.4 MG/ML
0.1 INJECTION, SOLUTION INTRAMUSCULAR; INTRAVENOUS; SUBCUTANEOUS
Status: DISCONTINUED | OUTPATIENT
Start: 2024-01-01 | End: 2024-01-01 | Stop reason: HOSPADM

## 2024-01-01 RX ORDER — HEPARIN SODIUM,PORCINE 10 UNIT/ML
5-20 VIAL (ML) INTRAVENOUS DAILY PRN
Status: DISCONTINUED | OUTPATIENT
Start: 2024-01-01 | End: 2024-01-01

## 2024-01-01 RX ORDER — NOREPINEPHRINE BITARTRATE 0.02 MG/ML
.01-.6 INJECTION, SOLUTION INTRAVENOUS CONTINUOUS
Status: DISCONTINUED | OUTPATIENT
Start: 2024-01-01 | End: 2024-01-01

## 2024-01-01 RX ORDER — PANTOPRAZOLE SODIUM 40 MG/1
40 TABLET, DELAYED RELEASE ORAL
Status: DISCONTINUED | OUTPATIENT
Start: 2024-01-01 | End: 2024-01-01

## 2024-01-01 RX ORDER — HYDROMORPHONE HYDROCHLORIDE 1 MG/ML
1 SOLUTION ORAL
Status: DISCONTINUED | OUTPATIENT
Start: 2024-01-01 | End: 2024-01-01 | Stop reason: HOSPADM

## 2024-01-01 RX ORDER — SENNOSIDES 8.6 MG
1 TABLET ORAL 2 TIMES DAILY PRN
Status: DISCONTINUED | OUTPATIENT
Start: 2024-01-01 | End: 2024-01-01 | Stop reason: HOSPADM

## 2024-01-01 RX ORDER — CETIRIZINE HYDROCHLORIDE 10 MG/1
10 TABLET ORAL DAILY
Status: DISCONTINUED | OUTPATIENT
Start: 2024-01-01 | End: 2024-01-01

## 2024-01-01 RX ORDER — HEPARIN SODIUM (PORCINE) LOCK FLUSH IV SOLN 100 UNIT/ML 100 UNIT/ML
5 SOLUTION INTRAVENOUS DAILY PRN
Status: DISCONTINUED | OUTPATIENT
Start: 2024-01-01 | End: 2024-01-01

## 2024-01-01 RX ORDER — MINERAL OIL/HYDROPHIL PETROLAT
OINTMENT (GRAM) TOPICAL
Status: DISCONTINUED | OUTPATIENT
Start: 2024-01-01 | End: 2024-01-01 | Stop reason: HOSPADM

## 2024-01-01 RX ADMIN — IOPAMIDOL 90 ML: 755 INJECTION, SOLUTION INTRAVENOUS at 12:46

## 2024-01-01 RX ADMIN — IOHEXOL 10 ML: 350 INJECTION, SOLUTION INTRAVENOUS at 16:54

## 2024-01-01 RX ADMIN — HYDROMORPHONE HYDROCHLORIDE 0.5 MG: 1 INJECTION, SOLUTION INTRAMUSCULAR; INTRAVENOUS; SUBCUTANEOUS at 14:31

## 2024-01-01 RX ADMIN — LORAZEPAM 0.5 MG: 2 INJECTION, SOLUTION INTRAMUSCULAR; INTRAVENOUS at 17:58

## 2024-01-01 RX ADMIN — ONDANSETRON 8 MG: 2 INJECTION INTRAMUSCULAR; INTRAVENOUS at 19:47

## 2024-01-01 RX ADMIN — HYDROMORPHONE HYDROCHLORIDE 0.5 MG: 1 INJECTION, SOLUTION INTRAMUSCULAR; INTRAVENOUS; SUBCUTANEOUS at 19:48

## 2024-01-01 RX ADMIN — HYDROMORPHONE HYDROCHLORIDE 0.5 MG: 1 INJECTION, SOLUTION INTRAMUSCULAR; INTRAVENOUS; SUBCUTANEOUS at 21:35

## 2024-01-01 RX ADMIN — HYDROMORPHONE HYDROCHLORIDE 0.5 MG: 1 INJECTION, SOLUTION INTRAMUSCULAR; INTRAVENOUS; SUBCUTANEOUS at 05:16

## 2024-01-01 RX ADMIN — Medication 2 DROP: at 08:51

## 2024-01-01 RX ADMIN — LORAZEPAM 0.5 MG: 2 INJECTION INTRAMUSCULAR; INTRAVENOUS at 01:03

## 2024-01-01 RX ADMIN — HYDROMORPHONE HYDROCHLORIDE 0.5 MG: 1 INJECTION, SOLUTION INTRAMUSCULAR; INTRAVENOUS; SUBCUTANEOUS at 05:44

## 2024-01-01 RX ADMIN — LORAZEPAM 1 MG: 1 TABLET ORAL at 20:55

## 2024-01-01 RX ADMIN — DEXMEDETOMIDINE HYDROCHLORIDE 0.2 MCG/KG/HR: 400 INJECTION INTRAVENOUS at 22:12

## 2024-01-01 RX ADMIN — HYDROMORPHONE HYDROCHLORIDE 0.5 MG: 1 INJECTION, SOLUTION INTRAMUSCULAR; INTRAVENOUS; SUBCUTANEOUS at 02:19

## 2024-01-01 RX ADMIN — LORAZEPAM 0.5 MG: 2 INJECTION, SOLUTION INTRAMUSCULAR; INTRAVENOUS at 22:07

## 2024-01-01 RX ADMIN — OXYCODONE HYDROCHLORIDE 10 MG: 5 TABLET ORAL at 18:43

## 2024-01-01 RX ADMIN — MAGNESIUM SULFATE HEPTAHYDRATE 2 G: 40 INJECTION, SOLUTION INTRAVENOUS at 10:44

## 2024-01-01 RX ADMIN — LORAZEPAM 0.5 MG: 2 INJECTION, SOLUTION INTRAMUSCULAR; INTRAVENOUS at 14:51

## 2024-01-01 RX ADMIN — HYDROMORPHONE HYDROCHLORIDE 0.5 MG: 1 INJECTION, SOLUTION INTRAMUSCULAR; INTRAVENOUS; SUBCUTANEOUS at 11:10

## 2024-01-01 RX ADMIN — ONDANSETRON 4 MG: 4 TABLET, ORALLY DISINTEGRATING ORAL at 09:01

## 2024-01-01 RX ADMIN — HYDROMORPHONE HYDROCHLORIDE 0.4 MG: 0.2 INJECTION, SOLUTION INTRAMUSCULAR; INTRAVENOUS; SUBCUTANEOUS at 20:17

## 2024-01-01 RX ADMIN — LORAZEPAM 0.5 MG: 2 INJECTION, SOLUTION INTRAMUSCULAR; INTRAVENOUS at 06:30

## 2024-01-01 RX ADMIN — HYDROMORPHONE HYDROCHLORIDE 0.5 MG: 1 INJECTION, SOLUTION INTRAMUSCULAR; INTRAVENOUS; SUBCUTANEOUS at 21:56

## 2024-01-01 RX ADMIN — HYDROMORPHONE HYDROCHLORIDE 0.5 MG: 1 INJECTION, SOLUTION INTRAMUSCULAR; INTRAVENOUS; SUBCUTANEOUS at 17:44

## 2024-01-01 RX ADMIN — Medication 5 ML: at 13:51

## 2024-01-01 RX ADMIN — OXYCODONE HYDROCHLORIDE 10 MG: 5 TABLET ORAL at 09:00

## 2024-01-01 RX ADMIN — HYDROMORPHONE HYDROCHLORIDE 0.5 MG: 1 INJECTION, SOLUTION INTRAMUSCULAR; INTRAVENOUS; SUBCUTANEOUS at 10:45

## 2024-01-01 RX ADMIN — IOHEXOL 15 ML: 350 INJECTION, SOLUTION INTRAVENOUS at 16:25

## 2024-01-01 RX ADMIN — HYDROMORPHONE HYDROCHLORIDE 0.5 MG: 1 INJECTION, SOLUTION INTRAMUSCULAR; INTRAVENOUS; SUBCUTANEOUS at 09:43

## 2024-01-01 RX ADMIN — VANCOMYCIN HYDROCHLORIDE 1250 MG: 5 INJECTION, POWDER, LYOPHILIZED, FOR SOLUTION INTRAVENOUS at 18:52

## 2024-01-01 RX ADMIN — HYDROMORPHONE HYDROCHLORIDE 0.5 MG: 1 INJECTION, SOLUTION INTRAMUSCULAR; INTRAVENOUS; SUBCUTANEOUS at 09:01

## 2024-01-01 RX ADMIN — LORAZEPAM 0.5 MG: 2 INJECTION INTRAMUSCULAR; INTRAVENOUS at 18:53

## 2024-01-01 RX ADMIN — ONDANSETRON 4 MG: 2 INJECTION INTRAMUSCULAR; INTRAVENOUS at 15:00

## 2024-01-01 RX ADMIN — HYDROMORPHONE HYDROCHLORIDE 0.5 MG: 1 INJECTION, SOLUTION INTRAMUSCULAR; INTRAVENOUS; SUBCUTANEOUS at 21:16

## 2024-01-01 RX ADMIN — HYDROMORPHONE HYDROCHLORIDE 2 MG: 5 SOLUTION ORAL at 01:17

## 2024-01-01 RX ADMIN — HYDROMORPHONE HYDROCHLORIDE 0.5 MG: 1 INJECTION, SOLUTION INTRAMUSCULAR; INTRAVENOUS; SUBCUTANEOUS at 19:56

## 2024-01-01 RX ADMIN — LORAZEPAM 0.5 MG: 2 INJECTION INTRAMUSCULAR; INTRAVENOUS at 22:00

## 2024-01-01 RX ADMIN — OXYCODONE HYDROCHLORIDE 10 MG: 5 TABLET ORAL at 14:32

## 2024-01-01 RX ADMIN — VANCOMYCIN HYDROCHLORIDE 500 MG: 500 INJECTION, POWDER, LYOPHILIZED, FOR SOLUTION INTRAVENOUS at 06:27

## 2024-01-01 RX ADMIN — LORAZEPAM 0.5 MG: 2 INJECTION, SOLUTION INTRAMUSCULAR; INTRAVENOUS at 10:45

## 2024-01-01 RX ADMIN — Medication 0.15 MCG/KG/MIN: at 18:51

## 2024-01-01 RX ADMIN — VANCOMYCIN HYDROCHLORIDE 750 MG: 5 INJECTION, POWDER, LYOPHILIZED, FOR SOLUTION INTRAVENOUS at 09:03

## 2024-01-01 RX ADMIN — LORAZEPAM 0.5 MG: 2 INJECTION INTRAMUSCULAR; INTRAVENOUS at 04:27

## 2024-01-01 RX ADMIN — VASOPRESSIN 2.4 UNITS/HR: 20 INJECTION, SOLUTION INTRAMUSCULAR; SUBCUTANEOUS at 15:51

## 2024-01-01 RX ADMIN — PANTOPRAZOLE SODIUM 40 MG: 40 TABLET, DELAYED RELEASE ORAL at 06:31

## 2024-01-01 RX ADMIN — FENTANYL CITRATE 25 MCG: 50 INJECTION, SOLUTION INTRAMUSCULAR; INTRAVENOUS at 16:38

## 2024-01-01 RX ADMIN — Medication 0.4 MCG/KG/MIN: at 15:29

## 2024-01-01 RX ADMIN — HYDROMORPHONE HYDROCHLORIDE 2 MG: 5 SOLUTION ORAL at 03:54

## 2024-01-01 RX ADMIN — LORAZEPAM 0.5 MG: 2 INJECTION, SOLUTION INTRAMUSCULAR; INTRAVENOUS at 09:43

## 2024-01-01 RX ADMIN — HYDROMORPHONE HYDROCHLORIDE 2 MG: 5 SOLUTION ORAL at 08:51

## 2024-01-01 RX ADMIN — HYDROMORPHONE HYDROCHLORIDE 0.5 MG: 1 INJECTION, SOLUTION INTRAMUSCULAR; INTRAVENOUS; SUBCUTANEOUS at 23:18

## 2024-01-01 RX ADMIN — Medication 0.1 MCG/KG/MIN: at 14:51

## 2024-01-01 RX ADMIN — Medication 0.25 MCG/KG/MIN: at 15:12

## 2024-01-01 RX ADMIN — HYDROMORPHONE HYDROCHLORIDE 0.5 MG: 1 INJECTION, SOLUTION INTRAMUSCULAR; INTRAVENOUS; SUBCUTANEOUS at 20:02

## 2024-01-01 RX ADMIN — HYDROMORPHONE HYDROCHLORIDE 0.5 MG: 1 INJECTION, SOLUTION INTRAMUSCULAR; INTRAVENOUS; SUBCUTANEOUS at 16:54

## 2024-01-01 RX ADMIN — HYDROMORPHONE HYDROCHLORIDE 0.5 MG: 1 INJECTION, SOLUTION INTRAMUSCULAR; INTRAVENOUS; SUBCUTANEOUS at 15:39

## 2024-01-01 RX ADMIN — ONDANSETRON 4 MG: 2 INJECTION INTRAMUSCULAR; INTRAVENOUS at 12:18

## 2024-01-01 RX ADMIN — HYDROMORPHONE HYDROCHLORIDE 0.5 MG: 1 INJECTION, SOLUTION INTRAMUSCULAR; INTRAVENOUS; SUBCUTANEOUS at 10:15

## 2024-01-01 RX ADMIN — LORAZEPAM 0.5 MG: 2 LIQUID ORAL at 12:22

## 2024-01-01 RX ADMIN — OXYCODONE HYDROCHLORIDE 10 MG: 5 TABLET ORAL at 13:11

## 2024-01-01 RX ADMIN — METHYLPREDNISOLONE SODIUM SUCCINATE 62.5 MG: 125 INJECTION, POWDER, LYOPHILIZED, FOR SOLUTION INTRAMUSCULAR; INTRAVENOUS at 15:46

## 2024-01-01 RX ADMIN — LORAZEPAM 0.5 MG: 2 INJECTION, SOLUTION INTRAMUSCULAR; INTRAVENOUS at 02:00

## 2024-01-01 RX ADMIN — LORAZEPAM 0.5 MG: 2 INJECTION INTRAMUSCULAR; INTRAVENOUS at 21:56

## 2024-01-01 RX ADMIN — HYDROMORPHONE HYDROCHLORIDE 0.5 MG: 1 INJECTION, SOLUTION INTRAMUSCULAR; INTRAVENOUS; SUBCUTANEOUS at 22:00

## 2024-01-01 RX ADMIN — Medication 0.3 MCG/KG/MIN: at 17:03

## 2024-01-01 RX ADMIN — MEROPENEM 1 G: 1 INJECTION, POWDER, FOR SOLUTION INTRAVENOUS at 05:42

## 2024-01-01 RX ADMIN — HYDROMORPHONE HYDROCHLORIDE 0.5 MG: 1 INJECTION, SOLUTION INTRAMUSCULAR; INTRAVENOUS; SUBCUTANEOUS at 16:29

## 2024-01-01 RX ADMIN — ONDANSETRON 4 MG: 2 INJECTION INTRAMUSCULAR; INTRAVENOUS at 16:32

## 2024-01-01 RX ADMIN — HYDROMORPHONE HYDROCHLORIDE 0.5 MG: 1 INJECTION, SOLUTION INTRAMUSCULAR; INTRAVENOUS; SUBCUTANEOUS at 02:00

## 2024-01-01 RX ADMIN — LORAZEPAM 0.5 MG: 2 INJECTION INTRAMUSCULAR; INTRAVENOUS at 13:09

## 2024-01-01 RX ADMIN — LORAZEPAM 0.5 MG: 2 INJECTION, SOLUTION INTRAMUSCULAR; INTRAVENOUS at 14:31

## 2024-01-01 RX ADMIN — MEROPENEM 1 G: 1 INJECTION, POWDER, FOR SOLUTION INTRAVENOUS at 18:33

## 2024-01-01 RX ADMIN — PANTOPRAZOLE SODIUM 40 MG: 40 TABLET, DELAYED RELEASE ORAL at 08:59

## 2024-01-01 RX ADMIN — OXYCODONE HYDROCHLORIDE 10 MG: 5 TABLET ORAL at 22:25

## 2024-01-01 RX ADMIN — FLUTICASONE FUROATE AND VILANTEROL TRIFENATATE 1 PUFF: 100; 25 POWDER RESPIRATORY (INHALATION) at 09:02

## 2024-01-01 RX ADMIN — LORAZEPAM 0.5 MG: 2 INJECTION INTRAMUSCULAR; INTRAVENOUS at 15:47

## 2024-01-01 RX ADMIN — LORAZEPAM 0.5 MG: 2 INJECTION, SOLUTION INTRAMUSCULAR; INTRAVENOUS at 02:20

## 2024-01-01 RX ADMIN — Medication 5 ML: at 13:15

## 2024-01-01 RX ADMIN — OXYCODONE HYDROCHLORIDE 10 MG: 5 TABLET ORAL at 06:34

## 2024-01-01 RX ADMIN — HYDROMORPHONE HYDROCHLORIDE 0.5 MG: 1 INJECTION, SOLUTION INTRAMUSCULAR; INTRAVENOUS; SUBCUTANEOUS at 15:01

## 2024-01-01 RX ADMIN — LORAZEPAM 0.5 MG: 2 INJECTION INTRAMUSCULAR; INTRAVENOUS at 09:12

## 2024-01-01 RX ADMIN — ACETAMINOPHEN 650 MG: 325 TABLET ORAL at 12:18

## 2024-01-01 RX ADMIN — MEROPENEM 1 G: 1 INJECTION, POWDER, FOR SOLUTION INTRAVENOUS at 18:51

## 2024-01-01 RX ADMIN — Medication 5 ML: at 10:59

## 2024-01-01 RX ADMIN — LORAZEPAM 0.5 MG: 2 INJECTION, SOLUTION INTRAMUSCULAR; INTRAVENOUS at 18:05

## 2024-01-01 RX ADMIN — Medication 5 ML: at 14:24

## 2024-01-01 RX ADMIN — INSULIN ASPART 1 UNITS: 100 INJECTION, SOLUTION INTRAVENOUS; SUBCUTANEOUS at 08:59

## 2024-01-01 RX ADMIN — HYDROMORPHONE HYDROCHLORIDE 0.5 MG: 1 INJECTION, SOLUTION INTRAMUSCULAR; INTRAVENOUS; SUBCUTANEOUS at 05:10

## 2024-01-01 RX ADMIN — HYDROMORPHONE HYDROCHLORIDE 2 MG: 5 SOLUTION ORAL at 12:21

## 2024-01-01 RX ADMIN — HYDROMORPHONE HYDROCHLORIDE 0.5 MG: 1 INJECTION, SOLUTION INTRAMUSCULAR; INTRAVENOUS; SUBCUTANEOUS at 13:18

## 2024-01-01 RX ADMIN — METHYLPREDNISOLONE SODIUM SUCCINATE 62.5 MG: 125 INJECTION, POWDER, LYOPHILIZED, FOR SOLUTION INTRAMUSCULAR; INTRAVENOUS at 21:35

## 2024-01-01 RX ADMIN — OXYCODONE HYDROCHLORIDE 10 MG: 5 TABLET ORAL at 19:25

## 2024-01-01 RX ADMIN — Medication 0.3 MCG/KG/MIN: at 15:21

## 2024-01-01 RX ADMIN — LORAZEPAM 0.5 MG: 2 INJECTION INTRAMUSCULAR; INTRAVENOUS at 18:46

## 2024-01-01 RX ADMIN — PANTOPRAZOLE SODIUM 40 MG: 40 INJECTION, POWDER, FOR SOLUTION INTRAVENOUS at 10:03

## 2024-01-01 RX ADMIN — HYDROMORPHONE HYDROCHLORIDE 0.3 MG: 1 INJECTION, SOLUTION INTRAMUSCULAR; INTRAVENOUS; SUBCUTANEOUS at 03:23

## 2024-01-01 RX ADMIN — OXYCODONE HYDROCHLORIDE 10 MG: 5 TABLET ORAL at 05:42

## 2024-01-01 RX ADMIN — HYDROMORPHONE HYDROCHLORIDE 0.5 MG: 1 INJECTION, SOLUTION INTRAMUSCULAR; INTRAVENOUS; SUBCUTANEOUS at 23:10

## 2024-01-01 RX ADMIN — LORAZEPAM 0.5 MG: 2 INJECTION INTRAMUSCULAR; INTRAVENOUS at 08:52

## 2024-01-01 RX ADMIN — METHYLPREDNISOLONE SODIUM SUCCINATE 62.5 MG: 125 INJECTION, POWDER, LYOPHILIZED, FOR SOLUTION INTRAMUSCULAR; INTRAVENOUS at 04:37

## 2024-01-01 RX ADMIN — MEROPENEM 1 G: 1 INJECTION, POWDER, FOR SOLUTION INTRAVENOUS at 06:30

## 2024-01-01 RX ADMIN — HYDROMORPHONE HYDROCHLORIDE 0.3 MG: 1 INJECTION, SOLUTION INTRAMUSCULAR; INTRAVENOUS; SUBCUTANEOUS at 19:31

## 2024-01-01 RX ADMIN — HYDROMORPHONE HYDROCHLORIDE 0.5 MG: 1 INJECTION, SOLUTION INTRAMUSCULAR; INTRAVENOUS; SUBCUTANEOUS at 06:26

## 2024-01-01 RX ADMIN — HYDROMORPHONE HYDROCHLORIDE 0.5 MG: 1 INJECTION, SOLUTION INTRAMUSCULAR; INTRAVENOUS; SUBCUTANEOUS at 18:13

## 2024-01-01 RX ADMIN — IOHEXOL 30 ML: 350 INJECTION, SOLUTION INTRAVENOUS at 16:09

## 2024-01-01 RX ADMIN — HYDROMORPHONE HYDROCHLORIDE 0.5 MG: 1 INJECTION, SOLUTION INTRAMUSCULAR; INTRAVENOUS; SUBCUTANEOUS at 13:12

## 2024-01-01 RX ADMIN — LORAZEPAM 0.5 MG: 2 INJECTION, SOLUTION INTRAMUSCULAR; INTRAVENOUS at 21:53

## 2024-01-01 ASSESSMENT — ACTIVITIES OF DAILY LIVING (ADL)
ADLS_ACUITY_SCORE: 55
ADLS_ACUITY_SCORE: 40
ADLS_ACUITY_SCORE: 40
ADLS_ACUITY_SCORE: 55
ADLS_ACUITY_SCORE: 42
ADLS_ACUITY_SCORE: 52
ADLS_ACUITY_SCORE: 40
ADLS_ACUITY_SCORE: 33
ADLS_ACUITY_SCORE: 38
ADLS_ACUITY_SCORE: 37
ADLS_ACUITY_SCORE: 37
ADLS_ACUITY_SCORE: 33
ADLS_ACUITY_SCORE: 37
ADLS_ACUITY_SCORE: 38
ADLS_ACUITY_SCORE: 42
ADLS_ACUITY_SCORE: 52
ADLS_ACUITY_SCORE: 38
ADLS_ACUITY_SCORE: 42
ADLS_ACUITY_SCORE: 31
ADLS_ACUITY_SCORE: 40
ADLS_ACUITY_SCORE: 42
ADLS_ACUITY_SCORE: 37
ADLS_ACUITY_SCORE: 33
ADLS_ACUITY_SCORE: 41
ADLS_ACUITY_SCORE: 40
ADLS_ACUITY_SCORE: 38
ADLS_ACUITY_SCORE: 40
ADLS_ACUITY_SCORE: 37
ADLS_ACUITY_SCORE: 39
ADLS_ACUITY_SCORE: 37
ADLS_ACUITY_SCORE: 39
ADLS_ACUITY_SCORE: 42
ADLS_ACUITY_SCORE: 37
ADLS_ACUITY_SCORE: 42
ADLS_ACUITY_SCORE: 33
ADLS_ACUITY_SCORE: 38
ADLS_ACUITY_SCORE: 42
ADLS_ACUITY_SCORE: 37
ADLS_ACUITY_SCORE: 31
ADLS_ACUITY_SCORE: 52
ADLS_ACUITY_SCORE: 37
ADLS_ACUITY_SCORE: 38
ADLS_ACUITY_SCORE: 41
ADLS_ACUITY_SCORE: 37
ADLS_ACUITY_SCORE: 40
ADLS_ACUITY_SCORE: 33
ADLS_ACUITY_SCORE: 38
ADLS_ACUITY_SCORE: 37
ADLS_ACUITY_SCORE: 55
ADLS_ACUITY_SCORE: 37
ADLS_ACUITY_SCORE: 37
ADLS_ACUITY_SCORE: 31
ADLS_ACUITY_SCORE: 37
ADLS_ACUITY_SCORE: 42
ADLS_ACUITY_SCORE: 42
ADLS_ACUITY_SCORE: 31
ADLS_ACUITY_SCORE: 52
ADLS_ACUITY_SCORE: 40
ADLS_ACUITY_SCORE: 42
ADLS_ACUITY_SCORE: 40
ADLS_ACUITY_SCORE: 39
ADLS_ACUITY_SCORE: 42
ADLS_ACUITY_SCORE: 37
ADLS_ACUITY_SCORE: 42
ADLS_ACUITY_SCORE: 38
ADLS_ACUITY_SCORE: 41
ADLS_ACUITY_SCORE: 41
ADLS_ACUITY_SCORE: 42
ADLS_ACUITY_SCORE: 39
ADLS_ACUITY_SCORE: 38
ADLS_ACUITY_SCORE: 42
ADLS_ACUITY_SCORE: 41
ADLS_ACUITY_SCORE: 40
ADLS_ACUITY_SCORE: 38
ADLS_ACUITY_SCORE: 33
ADLS_ACUITY_SCORE: 38
ADLS_ACUITY_SCORE: 55
ADLS_ACUITY_SCORE: 38
ADLS_ACUITY_SCORE: 42
ADLS_ACUITY_SCORE: 42
ADLS_ACUITY_SCORE: 37
ADLS_ACUITY_SCORE: 39
ADLS_ACUITY_SCORE: 40
ADLS_ACUITY_SCORE: 37
ADLS_ACUITY_SCORE: 31
ADLS_ACUITY_SCORE: 37
ADLS_ACUITY_SCORE: 40
ADLS_ACUITY_SCORE: 38
ADLS_ACUITY_SCORE: 31
ADLS_ACUITY_SCORE: 31
ADLS_ACUITY_SCORE: 40
ADLS_ACUITY_SCORE: 37
DEPENDENT_IADLS:: TRANSPORTATION
ADLS_ACUITY_SCORE: 39
ADLS_ACUITY_SCORE: 31
ADLS_ACUITY_SCORE: 41
ADLS_ACUITY_SCORE: 38
ADLS_ACUITY_SCORE: 31
ADLS_ACUITY_SCORE: 33
ADLS_ACUITY_SCORE: 31
ADLS_ACUITY_SCORE: 38
ADLS_ACUITY_SCORE: 39
ADLS_ACUITY_SCORE: 40
ADLS_ACUITY_SCORE: 40
ADLS_ACUITY_SCORE: 37
ADLS_ACUITY_SCORE: 33
ADLS_ACUITY_SCORE: 37
ADLS_ACUITY_SCORE: 39
ADLS_ACUITY_SCORE: 38
ADLS_ACUITY_SCORE: 33
ADLS_ACUITY_SCORE: 37
ADLS_ACUITY_SCORE: 42
ADLS_ACUITY_SCORE: 42
ADLS_ACUITY_SCORE: 37
ADLS_ACUITY_SCORE: 42
ADLS_ACUITY_SCORE: 42
ADLS_ACUITY_SCORE: 37
ADLS_ACUITY_SCORE: 37
ADLS_ACUITY_SCORE: 42
ADLS_ACUITY_SCORE: 37
ADLS_ACUITY_SCORE: 55
ADLS_ACUITY_SCORE: 33
ADLS_ACUITY_SCORE: 31
ADLS_ACUITY_SCORE: 37
ADLS_ACUITY_SCORE: 37
ADLS_ACUITY_SCORE: 41
ADLS_ACUITY_SCORE: 31
ADLS_ACUITY_SCORE: 42
ADLS_ACUITY_SCORE: 37
ADLS_ACUITY_SCORE: 41
ADLS_ACUITY_SCORE: 55
ADLS_ACUITY_SCORE: 40
ADLS_ACUITY_SCORE: 42
ADLS_ACUITY_SCORE: 55
ADLS_ACUITY_SCORE: 40
ADLS_ACUITY_SCORE: 37
ADLS_ACUITY_SCORE: 40
ADLS_ACUITY_SCORE: 38
ADLS_ACUITY_SCORE: 40
ADLS_ACUITY_SCORE: 55
ADLS_ACUITY_SCORE: 45
ADLS_ACUITY_SCORE: 37
ADLS_ACUITY_SCORE: 31
ADLS_ACUITY_SCORE: 42
ADLS_ACUITY_SCORE: 52
ADLS_ACUITY_SCORE: 33
ADLS_ACUITY_SCORE: 42
ADLS_ACUITY_SCORE: 31
ADLS_ACUITY_SCORE: 31
ADLS_ACUITY_SCORE: 40
ADLS_ACUITY_SCORE: 38
ADLS_ACUITY_SCORE: 55

## 2024-01-01 ASSESSMENT — COLUMBIA-SUICIDE SEVERITY RATING SCALE - C-SSRS
1. IN THE PAST MONTH, HAVE YOU WISHED YOU WERE DEAD OR WISHED YOU COULD GO TO SLEEP AND NOT WAKE UP?: NO
6. HAVE YOU EVER DONE ANYTHING, STARTED TO DO ANYTHING, OR PREPARED TO DO ANYTHING TO END YOUR LIFE?: NO
2. HAVE YOU ACTUALLY HAD ANY THOUGHTS OF KILLING YOURSELF IN THE PAST MONTH?: NO

## 2024-01-05 PROBLEM — D63.0 ANEMIA IN NEOPLASTIC DISEASE: Status: ACTIVE | Noted: 2024-01-01

## 2024-01-05 NOTE — PROGRESS NOTES
Port accessed, T&S drawn, port de accessed via protocol  Stella will return Monday for 1u PRBC, prepare order released today  Consent 1/5/2023 [facility] ordering, Hgb 6.8 today  DARIAN Cruz

## 2024-01-08 NOTE — PROGRESS NOTES
Infusion Nursing Note:  Stella Greene presents today for 1u PRBC .    Patient seen by provider today: No   present during visit today: Not Applicable.    Note: Stella arrives A&OX4 ambulatory and stable, confirms she is here for blood transfusion, 1u PRBC. She had T&S on 1/5/24.   POC reviewed, pt stated understanding and agreed to plan      Intravenous Access:  Implanted Port.    Treatment Conditions:  Results reviewed, labs MET treatment parameters, ok to proceed with treatment, 1/5/24 Hgb 6.8 @ [facility]  Blood transfusion consent signed 1/5/2024.      Post Infusion Assessment:  Patient tolerated infusion without incident.  Access discontinued per protocol.       Discharge Plan:   Patient and/or family verbalized understanding of discharge instructions and all questions answered.  Copy of AVS reviewed with patient and/or family.  Patient will return if needed for next appointment.  Patient discharged in stable condition accompanied by: .  Departure Mode: Ambulatory.      Skylar Johnson RN

## 2024-01-12 NOTE — TELEPHONE ENCOUNTER
Spoke to patient to schedule [facility] appointment with Becki Calix for Dx: Endometrial carcinoma (H) [C54.1] // Proteinuria [R80.9] with labs prior // 01.12.2024 MCE

## 2024-01-16 NOTE — CONFIDENTIAL NOTE
DIAGNOSIS:    Endometrial carcinoma (H)   Proteinuria      DATE RECEIVED: 02.29.2024   NOTES STATUS DETAILS   OFFICE NOTE from referring provider Received 01.08.2024  Yarelis Herrera PA-C [facility]    OFFICE NOTE from other specialist      *Only VASCULITIS or LUPUS gather office notes for the following     *PULMONARY       *ENT     *DERMATOLOGY     *RHEUMATOLOGY     DISCHARGE SUMMARY from hospital     DISCHARGE REPORT from the ER     MEDICATION LIST Received / Internal    IMAGING  (NEED IMAGES AND REPORTS)     KIDNEY CT SCAN     KIDNEY ULTRASOUND     MR ABDOMEN     NUCLEAR MEDICINE RENAL     LABS     CBC Received 01.08.2024   CMP Received 01.08.2024   BMP Internal 10.30.2022   UA Internal 12.31.2022   URINE PROTEIN Internal [id number]   RENAL PANEL     BIOPSY     KIDNEY BIOPSY

## 2024-04-01 NOTE — PROGRESS NOTES
Port accessed, T&S drawn, port de accessed via protocol  Stella will return tomorrow for 1u PRBC, T&S lab/ prepare order released today  3/29/2024 Hgb 7.4  Consent 1/5/2023 [facility] ordering  DARIAN Cruz

## 2024-04-02 NOTE — PROGRESS NOTES
Infusion Nursing Note:  Stella Greene presents today for 1 unit of pRBC.    Patient seen by provider today: No   present during visit today: Not Applicable.    Note: Stella comes to infusion ambulatory and in stable condition. Reports feeling fatigued, but overall feeling OK. 1 unit of pRBC transfused and then the port was flushed and de accessed per protocol. Site covered with 2x2 gauze and secured in place with paper tape. Will return as needed. Left infusion ambulatory and in stable condition.     Intravenous Access:  Implanted Port.    Treatment Conditions:  Results reviewed, labs MET treatment parameters, ok to proceed with treatment. Hgb 7.4 on 3/29.  Blood transfusion consent signed 1/5/2024.    Post Infusion Assessment:  Patient tolerated infusion without incident.  Blood return noted pre and post infusion.  Site patent and intact, free from redness, edema or discomfort.  Access discontinued per protocol.     Discharge Plan:   Discharge instructions reviewed with: Patient.  Patient and/or family verbalized understanding of discharge instructions and all questions answered.  AVS to patient via [employer].  Patient will return as needed for next appointment.   Patient discharged in stable condition accompanied by: .  Departure Mode: Ambulatory.    Alee Lane RN

## 2024-04-04 PROBLEM — R58: Status: ACTIVE | Noted: 2024-01-01

## 2024-04-04 NOTE — ED TRIAGE NOTES
Pt coming in via EMS with c/o lightheaded, dizziness and rectal bleeding.  Pt got up around 07:30 and felt lightheaded so  helped her to the floor.  Upon their arrival EMS noted about 200-300 mls of blood on the floor.Pressures were 90/50, IV inserted and fluids started.  IV infiltrated prior to their arrival.Pt is a/o at this time.  HX uterine ca stage 4.

## 2024-04-04 NOTE — PRE-PROCEDURE
GENERAL PRE-PROCEDURE:   Procedure:  Mesenteric angio, pnt placement,  Date/Time:  4/4/2024 2:54 PM    Written consent obtained?: Yes    Risks and benefits: Risks, benefits and alternatives were discussed    Consent given by:  Patient  Patient states understanding of procedure being performed: Yes    Patient's understanding of procedure matches consent: Yes    Procedure consent matches procedure scheduled: Yes    Expected level of sedation:  Moderate  Appropriately NPO:  Yes  ASA Class:  4  Mallampati  :  Grade 2- soft palate, base of uvula, tonsillar pillars, and portion of posterior pharyngeal wall visible  Lungs:  Lungs clear with good breath sounds bilaterally  Heart:  Normal heart sounds and rate  History & Physical reviewed:  History and physical reviewed and no updates needed  Statement of review:  I have reviewed the lab findings, diagnostic data, medications, and the plan for sedation

## 2024-04-04 NOTE — PHARMACY-VANCOMYCIN DOSING SERVICE
Pharmacy Vancomycin Initial Note  Date of Service 2024  Patient's  1950  73 year old, female    Indication: Abscess    Current estimated CrCl = Estimated Creatinine Clearance: 44.6 mL/min (A) (based on SCr of 0.98 mg/dL (H)).    Creatinine for last 3 days  2024:  9:57 AM Creatinine 0.98 mg/dL    Recent Vancomycin Level(s) for last 3 days  No results found for requested labs within last 3 days.      Vancomycin IV Administrations (past 72 hours)        No vancomycin orders with administrations in past 72 hours.                    Nephrotoxins and other renal medications (From now, onward)      Start     Dose/Rate Route Frequency Ordered Stop    24 0600  vancomycin (VANCOCIN) 500 mg vial to attach to  mL bag        See Hyperspace for full Linked Orders Report.    500 mg  over 1 Hours Intravenous EVERY 12 HOURS 24 1731      24 1730  vancomycin (VANCOCIN) 1,250 mg in sodium chloride 0.9 % 250 mL intermittent infusion        See Hyperspace for full Linked Orders Report.    1,250 mg  over 90 Minutes Intravenous ONCE 24 1731      24 1600  vasopressin (VASOSTRICT) 20 Units in sodium chloride 0.9 % 100 mL standard conc infusion         2.4 Units/hr  12 mL/hr  Intravenous CONTINUOUS 24 1534      24 1430  norepinephrine (LEVOPHED) 4 mg in  mL infusion PREMIX         0.01-0.6 mcg/kg/min × 54.4 kg  2-122.4 mL/hr  Intravenous CONTINUOUS 24 1428              Contrast Orders - past 72 hours (72h ago, onward)      Start     Dose/Rate Route Frequency Stop    24 1630  iohexol (OMNIPAQUE) 350 MG/ML injectable solution 100 mL         100 mL Intravenous ONCE 24 1609    24 1630  iohexol (OMNIPAQUE) 350 MG/ML injectable solution 100 mL         100 mL Intravenous ONCE 24 1625    24 1630  iohexol (OMNIPAQUE) 350 MG/ML injectable solution 100 mL         100 mL Intravenous ONCE      24 1630  iohexol (OMNIPAQUE) 350 MG/ML  injectable solution 100 mL         100 mL Intravenous ONCE      04/04/24 1630  iohexol (OMNIPAQUE) 350 MG/ML injectable solution 100 mL         100 mL Intravenous ONCE 04/04/24 1654    04/04/24 1300  iopamidol (ISOVUE-370) solution 90 mL         90 mL Intravenous ONCE 04/04/24 1246            Insight[employer] Prediction of Planned Initial Vancomycin Regimen    Loading dose: 1250 mg at 18:00 04/04/2024.  Regimen: 500 mg IV every 12 hours.  Start time: 06:00 on 04/05/2024  Exposure target: AUC24 (range)400-600 mg/L.hr   AUC24,ss: 473 mg/L.hr  Probability of AUC24 > 400: 69 %  Ctrough,ss: 16.2 mg/L  Probability of Ctrough,ss > 20: 29 %  Probability of nephrotoxicity (Lodise JASON 2009): 12 %          Plan:  Start vancomycin  1250 mg IV once then 500 mg q12 hrs.   Vancomycin monitoring method: AUC  Vancomycin therapeutic monitoring goal: 400-600 mg*h/L  Pharmacy will check vancomycin levels as appropriate in 1-3 Days.    Serum creatinine levels will be ordered daily for the first week of therapy and at least twice weekly for subsequent weeks.      Maria Elena Mc, [facility]

## 2024-04-04 NOTE — PHARMACY-ADMISSION MEDICATION HISTORY
Pharmacist Admission Medication History    Admission medication history is complete. The information provided in this note is only as accurate as the sources available at the time of the update.    Information Source(s): Patient, Family member, and CareEverywhere/SureScripts via in-person    Pertinent Information: Last took medications yesterday AM    Changes made to PTA medication list:  Added: prochlorperazine, famotidine, lansoprazole  Deleted: montelukast at bedtime, lisinopril 5 mg (no longer taking), pantoprazole  Changed: cetirizine, Flonase, gabapentin to PRN (takes amna around chemo cycles)    Allergies reviewed with patient and updates made in EHR: yes    Medication History Completed By: Maria Elena Mc [facility] 4/4/2024 6:21 PM      Prior to Admission medications    Medication Sig Last Dose Taking? Auth Provider Long Term End Date   acetaminophen (TYLENOL) 325 MG tablet Take 2 tablets (650 mg) by mouth every 4 hours as needed for mild pain or fever 4/3/2024 at am Yes Natividad Harmon MD     ADVAIR DISKUS 250-50 MCG/ACT inhaler INHALE 1 PUFF BY MOUTH TWICE DAILY 4/3/2024 at am Yes Lacho Gunter MD Yes    albuterol (PROAIR HFA/PROVENTIL HFA/VENTOLIN HFA) 108 (90 Base) MCG/ACT inhaler Inhale 2 puffs into the lungs every 6 hours as needed for shortness of breath or wheezing Past Week at PRN Yes Nadira Jarrett, NP Yes    apixaban ANTICOAGULANT (ELIQUIS) 5 MG tablet Take 5 mg by mouth 2 times daily 4/3/2024 at am Yes Unknown, Entered By History     cetirizine (ZYRTEC) 10 MG tablet Take 10 mg by mouth daily as needed for allergies Past Month at prn Yes Provider, Historical     famotidine (PEPCID) 20 MG tablet Take 20 mg by mouth at bedtime 4/3/2024 at pm Yes Unknown, Entered By History     fluticasone (FLONASE) 50 MCG/ACT nasal spray Spray 1 spray into both nostrils daily as needed for rhinitis or allergies Unknown at prn Yes Unknown, Entered By History     gabapentin (NEURONTIN) 300 MG capsule Take 300 mg by  mouth at bedtime as needed, may repeat once for neuropathic pain Past Month Yes Unknown, Entered By History No    LANsoprazole (PREVACID) 30 MG DR capsule Take 30 mg by mouth daily 4/3/2024 at am Yes Unknown, Entered By History No    multivitamin therapeutic tablet [MULTIVITAMIN THERAPEUTIC TABLET] Take 1 tablet by mouth daily. Past Week at am Yes Provider, Historical     pembrolizumab Inject into the vein every 21 days 3/7/2024 at am Yes Reported, Patient     prochlorperazine (COMPAZINE) 10 MG tablet Take 10 mg by mouth every 6 hours as needed for nausea or vomiting Past Month at prn Yes Unknown, Entered By History     Vitamin D, Cholecalciferol, 25 MCG (1000 UT) TABS Take 1 tablet by mouth daily 4/3/2024 at am Yes Reported, Patient

## 2024-04-04 NOTE — PROCEDURES
Vascular and Interventional Radiology Procedure Note    Procedure: Pelvic angiogram/pelvic drain/right neph tube.    Attending: Ga Kinney MD    Medications: See Epic medication log    Complications:  No immediate complications.    Brief Findings: Pelvic angiogram demonstrates active bleeding from the right external iliac artery as seen on CTA. This vessel was embolized using one Amplatzer plug and two packing coils. No active bleeding on post embolization scan.    Successful placement of a 12F drain in the right lower pelvic collection, will check positioning of this drain with a CT post    Successful placement of a 10F right neph tube with removal of purulent fluid - sample sent for culture    Please refer to the dictated procedure note for specific details.    Plan:   Right CFA 8F sheath removed and closed with Perclose.   2.   Left CFA 5F sheath left in place and sutured - please run saline as per order set to prevent thrombosis of the sheath  3. Neph tube and pelvic drains in place  4. Monitor right leg for signs or symptoms of ischemia, also has recent diagnosis of RLE DVT  5. Palliative care consult      Ga Kinney MD, [facility]  Vascular and Interventional Physician  Vascular Medicine  Internal Medicine  Pager: [phone]  Clinic: [phone]

## 2024-04-04 NOTE — CONSULTS
GI CONSULT NOTE    Name: Stella Greene  Medical Record #: [id number]  YOB: 1950  Date of Admission: 4/4/2024  Date/Time: 4/4/2024/1:13 PM     CHIEF COMPLAINT: GI bleeding     HISTORY OF PRESENT ILLNESS: We were asked to see Stella Greene by Dr. Hayes for GI bleeding.     Stella Greene is a 73 year old female with history of COPD, asthma, endometrial cancer, pelvic mass, SBO, DVT on Eliquis who presented to the ED for dizziness and rectal bleeding.     Patient with multiple large volume maroon/bright red blood stools with clots since arrival at hospital. Also noted to have hematuria. Denies any nausea, vomiting, or epigastric pain currently. Had one emesis prior to admit, no hematemesis. Noted slight discomfort in the low abdomen quadrants.     Takes PPI daily for GERD. No recent dysphagia or increased GERD. Takes NSAIDs occasionally but nothing on a daily basis. No recent travel or illness/sick contacts. No previous GI bleeding.     Colonoscopy 3/1/2024: Multiple small and large-mouthed diverticula were found in the entire colon.        -An extrinsic moderate stenosis was found in the sigmoid colon and was        traversed. Traversed using gastroscope.        -Localized mild inflammation characterized by congestion (edema) and        erythema was found at the ileocecal valve. Biopsies were taken with a        cold forceps for histology.        -The exam was otherwise without abnormality.   Colonoscopy 8/8/2011: polyp removal, 5 year screening recommended.     REVIEW OF SYSTEMS (ROS): Complete review of systems negative other than listed in HPI.    PAST MEDICAL HISTORY:  Past Medical History:   Diagnosis Date    Acute renal insufficiency     Adrenal insufficiency (H24)     secondary, due to chronic prednisone use for asthma, tapering off with Pulmonology    Asthma     COPD (chronic obstructive pulmonary disease) (H)     Diabetes mellitus, type 2 (H) 12/06/2021    HbA1c 7% 9/2022 - Last visit with  Endo 9/2022    Essential hypertension     Created by [employer]version [employer] updated for latest IMO load     Hydronephrosis     Malignant neoplasm of uterus (H)     Mild persistent asthma without complication     Neuropathy       FAMILY HISTORY:  Family History   Problem Relation Age of Onset    No Known Problems Mother     Heart Disease Father      SOCIAL HISTORY:  Social History     Socioeconomic History    Marital status:      Spouse name: Not on file    Number of children: Not on file    Years of education: Not on file    Highest education level: Not on file   Occupational History    Not on file   Tobacco Use    Smoking status: Never    Smokeless tobacco: Never   Vaping Use    Vaping Use: Never used   Substance and Sexual Activity    Alcohol use: No    Drug use: No    Sexual activity: Not on file   Other Topics Concern    Parent/sibling w/ CABG, MI or angioplasty before 65F 55M? Not Asked   Social History Narrative    Not on file     Social Determinants of Health     Financial Resource Strain: Low Risk  (10/23/2023)    Financial Resource Strain     Within the past 12 months, have you or your family members you live with been unable to get utilities (heat, electricity) when it was really needed?: No   Food Insecurity: Low Risk  (10/23/2023)    Food Insecurity     Within the past 12 months, did you worry that your food would run out before you got money to buy more?: No     Within the past 12 months, did the food you bought just not last and you didn t have money to get more?: No   Transportation Needs: Low Risk  (10/23/2023)    Transportation Needs     Within the past 12 months, has lack of transportation kept you from medical appointments, getting your medicines, non-medical meetings or appointments, work, or from getting things that you need?: No   Physical Activity: Not on file   Stress: Not on file   Social Connections: Not on file   Interpersonal Safety: Low Risk  (10/30/2023)    Interpersonal  "Safety     Do you feel physically and emotionally safe where you currently live?: Yes     Within the past 12 months, have you been hit, slapped, kicked or otherwise physically hurt by someone?: No     Within the past 12 months, have you been humiliated or emotionally abused in other ways by your partner or ex-partner?: No   Housing Stability: Low Risk  (10/23/2023)    Housing Stability     Do you have housing? : Yes     Are you worried about losing your housing?: No     MEDICATIONS PRIOR TO ADMISSION: (Not in a hospital admission)       ALLERGIES: Patient has no known allergies.    PHYSICAL EXAM:    BP 92/53   Pulse 116   Temp 97.9  F (36.6  C)   Resp 24   Ht 1.549 m (5' 1\")   Wt 54.4 kg (120 lb)   LMP  (LMP Unknown)   SpO2 100%   BMI 22.67 kg/m      GENERAL: Pleasant, no obvious distress  NECK: Supple without adenopathy  EYES: No scleral icterus  LUNGS: Clear to auscultation bilaterally  ABDOMEN: Non-distended. Soft, slight tenderness in low abdomen, no guarding/rebound/mass, no obvious organomegaly  MUSKULOSKELETAL: moves all extremities  SKIN: No jaundice  NEUROLOGIC: Alert and oriented  PSYCHIATRIC: Normal affect    LAB DATA:  CMP Results:   Recent Labs   Lab Test 04/04/24  0957 12/12/23  0849 11/10/23  1304 11/10/23  0911 10/30/23  1221 05/08/23  0736 05/08/23  0547     --   --   --  138  --  137   POTASSIUM 3.8  --   --   --  3.5  --  4.1   CHLORIDE 106  --   --   --  101  --  104   CO2 23  --   --   --  26  --  20*   ANIONGAP 9  --   --   --  11  --  13   * 109* 94   < > 97   < > 243*   BUN 20.7  --   --   --  10.7  --  23.3*   CR 0.98*  --   --   --  0.71  --  0.78   BILITOTAL 0.5  --   --   --  0.5  --  0.3   ALKPHOS 121  --   --   --  87  --  100   ALT 11  --   --   --  6  --  16   AST 14  --   --   --  16  --  10    < > = values in this interval not displayed.      CBC  Recent Labs   Lab 04/04/24  1205 04/04/24  0957   WBC 7.3 9.6   RBC 2.51* 1.96*   HGB 7.0* 5.4*   HCT 22.6* 17.5* "   MCV 90 89   MCH 27.9 27.6   MCHC 31.0* 30.9*   RDW 16.0* 15.9*    310     INR  Recent Labs   Lab 04/04/24  0957   INR 1.76*      Lipase   Date Value Ref Range Status   04/04/2024 6 (L) 13 - 60 U/L Final   12/31/2022 15 13 - 60 U/L Final     IMAGING:  EXAM: CTA CHEST ABDOMEN PELVIS W CONTRAST  LOCATION: [facility]  DATE: 4/4/2024     INDICATION: Bright red blood per rectum, eval for brisk GI bleed. History of endometrial neoplasm. History of abscess on outside imaging.  COMPARISON: 11/27/2023 PET/CT. [facility] CT report 03/03/2024 (no images available).  TECHNIQUE: CT angiogram chest abdomen pelvis during arterial phase of injection of IV contrast. 2D and 3D MIP reconstructions were performed by the CT technologist. Dose reduction techniques were used.   CONTRAST: 90ml isovue 370     FINDINGS: Motion artifact.     CT ANGIOGRAM CHEST, ABDOMEN, AND PELVIS: Active extravasation from the right external iliac artery into a large right lower quadrant abscess. Nonaneurysmal aorta. Patent aortic branch vessels. No definite pulmonary embolism.     LUNGS AND PLEURA: New mild bilateral bandlike basilar predominant opacities. Few new 3 to 5 mm lower lung predominant nodules as well (for example right lower lobe series 9, images 1 ). No pleural effusion or pneumothorax.     MEDIASTINUM/AXILLAE: No thoracic adenopathy. Right-sided portacatheter tip near the cavoatrial junction. Moderate hiatus hernia.     CORONARY ARTERY CALCIFICATION: Motion artifact.     HEPATOBILIARY: Redemonstrated right hepatic dome hemangioma. No definite new or enlarging liver lesions.     PANCREAS: Mildly atrophic.     SPLEEN: Normal.     ADRENAL GLANDS: Stable mild left adrenal thickening.     KIDNEYS/BLADDER: Right ureteral stent remains in place. The right renal collecting system shows increased moderate to severe dilatation. Mild air within the right renal pelvis and ureter. The right ureteral stent courses  through the right lower quadrant   abscess along the mid to distal portion. Air and debris within the urinary bladder.     BOWEL: Please see the pelvic section for right lower quadrant abscess and bowel communication findings. Areas of colonic wall thickening, including the cecum and ascending colon, as well as the sigmoid colon, with areas of wall enhancement. Mild loosely   formed colonic stool. Mild fluid filled small bowel, with a few small bowel loops upper normal in caliber at 2.9 cm.     LYMPH NODES: Since 11/27/2023 PET/CT, right lower quadrant abscess is now present in the region of prior right lower quadrant mass / implant. Implant posterior to the abscess has increased in size measuring 3.4 cm versus 1.2 cm on the November PET/CT   (series 9, image 496). More pronounced adjacent implants along the midline to the left vaginal cuff, with example area measuring 1.2 cm (series 9, image 537). No retroperitoneal adenopathy.     VASCULATURE: Deep vein thrombosis identified extending at least from the right external iliac vein in the pelvis into the common femoral and superficial femoral vein from proximal to distal thigh. Small amount of DVT identified in the deep femoral vein.   Asymmetric right leg swelling. Flattened appearance of the IVC.     PELVIC ORGANS: New since the November 2023 PET/CT, right lower quadrant abscess measuring roughly 9.2 x 10.4 x 9.8 cm (series 9, image 465). This contains intrinsic air and debris. Abscess extends to the into the inferior right psoas muscle (image 423).   There is fistulous connection to the sigmoid colon and possibly cecum. Air and debris within the bladder suggests fistulization to the bladder as well.     MUSCULOSKELETAL: Subcutaneous body wall edema.                                                                    IMPRESSION:  1.  Acute active bleeding is present on this exam.      2.  The right external iliac artery demonstrates active contrast extravasation and  bleeding into a right lower quadrant abscess. The right external iliac artery courses through this abscess; abscess erosion into the artery presumed the etiology of bleed.     3.  Large right lower quadrant abscess. This was noted on outside [facility] CT report from March 2024, though imaging is not available from that exam. There is fistulous connection to the sigmoid colon and possibly cecum as well. Air and debris in the   bladder also raises concern for fistulization to the urinary bladder.      4.  Etiology of the abscess is presumed from a prior large right lower quadrant mass eroding into bowel.      5.  Areas of mild colonic wall thickening and hyperenhancement may be reactive or from colitis. Upper normal pelvic small bowel with air-fluid levels, likely from slow flow or ileus. Distal small bowel and terminal ileum are not well evaluated from the   abscess.     6.  Significant right-sided DVT present. Deep vein thrombus extends at least from the right external iliac vein in the pelvis to the femoral vein at the distal thigh. Asymmetric right leg subcutaneous edema and swelling. Recommend lower extremity   ultrasound.     7.  Right ureteral stent appears in appropriate position, however, increased moderate to severe right hydronephrosis. The mid to distal right ureteral stent courses through the right lower quadrant abscess.     8.  New heterogeneous bandlike lower lung predominant opacities bilaterally, compatible with infectious / inflammatory process, to include drug reaction or organizing pneumonia. Additional new clustered nodules, also presumed inflammatory, though can be   followed.     9.  Diffuse subcutaneous body wall edema. Minimal ascites    ASSESSMENT:    GI bleeding  Anemia  73 year old female with history of COPD, asthma, endometrial cancer, pelvic mass, SBO, DVT on Eliquis who presented to the ED for dizziness and rectal bleeding.   - CTA noting Acute active bleeding is present on this  exam. The right external iliac artery demonstrates active contrast extravasation and bleeding into a right lower quadrant abscess. The right external iliac artery courses through this abscess; abscess erosion into the artery presumed the etiology of bleed.  -Large volume GI bleeding per rectum, visualized during visit.   -Hgb 5.4 on admit, up to 7.0 with PRBC.     PLAN:    1. Continue to trend Hgb, transfuse per primary   2. No therapy at this time from a GI standpoint.   3. Agree with IR intervention if able at this time.   4. GI will sign off at this time, please call with changes or questions.      Discussed with Dr. Adrian who will also visit with the patient.     TIME SPENT: 45 min including chart review, patient interview and care coordination.                                                Lorelei Liriano CNP  Thank you for the opportunity to participate in the care of this patient.   Please feel free to call me with any questions or concerns.  Phone number [phone].          GI ATTENDING BRIEF ADDENDUM:  Chart reviewed. Discussed with Lorelei Liriano CNP.  Agree with findings and plan as above. Patient is 74 yo F with metastatic endometrial ca on eliquis admitted with lower gi bleed with  Cta demonstrating active bleed from right external iliac artery into right lower quadrant abscess. This is not amenable to endoscopic treatment. Agree with IR intervention if able. Will sign off.    20 minutes of total time was spent providing patient care, including patient evaluation, reviewing documentation/tests, and .    Vidya Adrian MD  [facility]

## 2024-04-04 NOTE — CONSULTS
"Care Management Initial Consult    General Information  Assessment completed with: Patient, Spouse or significant other, Stella and  Antoni  Type of CM/SW Visit: Initial Assessment    Primary Care Provider verified and updated as needed: Yes   Readmission within the last 30 days: no previous admission in last 30 days      Reason for Consult: discharge planning  Advance Care Planning: Advance Care Planning Reviewed: no concerns identified          Communication Assessment  Patient's communication style: spoken language (English or Bilingual)             Cognitive  Cognitive/Neuro/Behavioral:                        Living Environment:   People in home: spouse  Stella and  Antoni  Current living Arrangements: house (We were talking about what steps she has to use when the IR staff came to take her. So all I know is. \"She has some steps she has to use\".)      Able to return to prior arrangements: other (see comments) (unknown at this time)       Family/Social Support:  Care provided by: self  Provides care for:    Marital Status:   , Children  Antoni       Description of Support System: Supportive, Involved    Support Assessment: Adequate family and caregiver support, Adequate social supports, Patient communicates needs well met    Current Resources:   Patient receiving home care services: No     Community Resources: None  Equipment currently used at home: cane, straight, walker, rolling  Supplies currently used at home: Other (\"reading glasses\")    Employment/Financial:  Employment Status: retired     Employment/ Comments: \"no  history\"  Financial Concerns:     Referral to Financial Worker: No       Does the patient's insurance plan have a 3 day qualifying hospital stay waiver?  No      Functional Status:  Prior to admission patient needed assistance:   Dependent ADLs:: Independent, Ambulation-cane, Ambulation-walker (\"I most often use the cane. I use the walker outside the " "house\".)  Dependent IADLs:: Transportation (\"My  does all the driving\".)       Mental Health Status:          Chemical Dependency Status:                Values/Beliefs:  Spiritual, Cultural Beliefs, Advent Practices, Values that affect care:                 Additional Information:  Stella lives in a house with her  Antoni. She is independent with ADLs and most IADLs. \"My  does all the driving\". \"I most often use the cane. I use the walker outside the house\".    Unknown discharge needs at this time. Patient is going from ER to IR.    Family to transport at discharge.    CM to follow for medical progression of care, discharge recommendations, and final discharge plan.    Carmelita Russell RN    "

## 2024-04-04 NOTE — ED NOTES
Bed: JNED-08  Expected date: 4/4/24  Expected time: 9:16 AM  Means of arrival:   Comments:  Morrison/dizziness.rectal bleeding

## 2024-04-04 NOTE — CONSULTS
Interventional Radiology - Consultation Note:  Inpatient - [facility]  04/04/2024     Reason for Consult:  R internal iliac artery bleeding   Requesting Provider:  Adela Villagran PA-C    HPI:  Stella Greene is a 73 year old female with PMHx of metastatic high-grade endometrial cancer in 2022 s/p neoadjuvant therapy and GYN surgery, adjuvant chemo, radiation therapy. Right ureteral stent in place and right lower extremity DVT on Eliquis.     Of note patient recently admitted 3/17-3/21 at [facility] for partial SBO with CT on admission demonstrating large malignant appearing right pelvic mass involving cecum, terminal ileum, sigmoid colon, right pelvic vasculature. Underwent biopsy 3/20 with pathology that revealed malignant cells.     Presented to ED with dizziness and rectal bleeding. Hgb on arrival 5.4 with 2 units transfused and hgb up to 7. CTA reveals active bleeding from right external iliac artery, large RLQ abscess with communication to bowel and likely urinary bladder and right hydronephrosis despite right ureteral stent in place. Requiring 3L O2 via NC, previously not on home 02. Patient will be started on pressors given low BP.     IR consulted d/t active bleeding noted on CTA.     IMAGING:    Study Result    Narrative & Impression   EXAM: CTA CHEST ABDOMEN PELVIS W CONTRAST  LOCATION: [facility]  DATE: 4/4/2024     INDICATION: Bright red blood per rectum, eval for brisk GI bleed. History of endometrial neoplasm. History of abscess on outside imaging.  COMPARISON: 11/27/2023 PET/CT. [facility] CT report 03/03/2024 (no images available).  TECHNIQUE: CT angiogram chest abdomen pelvis during arterial phase of injection of IV contrast. 2D and 3D MIP reconstructions were performed by the CT technologist. Dose reduction techniques were used.   CONTRAST: 90ml isovue 370     FINDINGS: Motion artifact.     CT ANGIOGRAM CHEST, ABDOMEN, AND  PELVIS: Active extravasation from the right external iliac artery into a large right lower quadrant abscess. Nonaneurysmal aorta. Patent aortic branch vessels. No definite pulmonary embolism.     LUNGS AND PLEURA: New mild bilateral bandlike basilar predominant opacities. Few new 3 to 5 mm lower lung predominant nodules as well (for example right lower lobe series 9, images 1 ). No pleural effusion or pneumothorax.     MEDIASTINUM/AXILLAE: No thoracic adenopathy. Right-sided portacatheter tip near the cavoatrial junction. Moderate hiatus hernia.     CORONARY ARTERY CALCIFICATION: Motion artifact.     HEPATOBILIARY: Redemonstrated right hepatic dome hemangioma. No definite new or enlarging liver lesions.     PANCREAS: Mildly atrophic.     SPLEEN: Normal.     ADRENAL GLANDS: Stable mild left adrenal thickening.     KIDNEYS/BLADDER: Right ureteral stent remains in place. The right renal collecting system shows increased moderate to severe dilatation. Mild air within the right renal pelvis and ureter. The right ureteral stent courses through the right lower quadrant   abscess along the mid to distal portion. Air and debris within the urinary bladder.     BOWEL: Please see the pelvic section for right lower quadrant abscess and bowel communication findings. Areas of colonic wall thickening, including the cecum and ascending colon, as well as the sigmoid colon, with areas of wall enhancement. Mild loosely   formed colonic stool. Mild fluid filled small bowel, with a few small bowel loops upper normal in caliber at 2.9 cm.     LYMPH NODES: Since 11/27/2023 PET/CT, right lower quadrant abscess is now present in the region of prior right lower quadrant mass / implant. Implant posterior to the abscess has increased in size measuring 3.4 cm versus 1.2 cm on the November PET/CT   (series 9, image 496). More pronounced adjacent implants along the midline to the left vaginal cuff, with example area measuring 1.2 cm (series  9, image 537). No retroperitoneal adenopathy.     VASCULATURE: Deep vein thrombosis identified extending at least from the right external iliac vein in the pelvis into the common femoral and superficial femoral vein from proximal to distal thigh. Small amount of DVT identified in the deep femoral vein.   Asymmetric right leg swelling. Flattened appearance of the IVC.     PELVIC ORGANS: New since the November 2023 PET/CT, right lower quadrant abscess measuring roughly 9.2 x 10.4 x 9.8 cm (series 9, image 465). This contains intrinsic air and debris. Abscess extends to the into the inferior right psoas muscle (image 423).   There is fistulous connection to the sigmoid colon and possibly cecum. Air and debris within the bladder suggests fistulization to the bladder as well.     MUSCULOSKELETAL: Subcutaneous body wall edema.                                                                      IMPRESSION:     1.  Acute active bleeding is present on this exam.      2.  The right external iliac artery demonstrates active contrast extravasation and bleeding into a right lower quadrant abscess. The right external iliac artery courses through this abscess; abscess erosion into the artery presumed the etiology of bleed.     3.  Large right lower quadrant abscess. This was noted on outside [facility] CT report from March 2024, though imaging is not available from that exam. There is fistulous connection to the sigmoid colon and possibly cecum as well. Air and debris in the   bladder also raises concern for fistulization to the urinary bladder.      4.  Etiology of the abscess is presumed from a prior large right lower quadrant mass eroding into bowel.      5.  Areas of mild colonic wall thickening and hyperenhancement may be reactive or from colitis. Upper normal pelvic small bowel with air-fluid levels, likely from slow flow or ileus. Distal small bowel and terminal ileum are not well evaluated from the   abscess.     6.   Significant right-sided DVT present. Deep vein thrombus extends at least from the right external iliac vein in the pelvis to the femoral vein at the distal thigh. Asymmetric right leg subcutaneous edema and swelling. Recommend lower extremity   ultrasound.     7.  Right ureteral stent appears in appropriate position, however, increased moderate to severe right hydronephrosis. The mid to distal right ureteral stent courses through the right lower quadrant abscess.     8.  New heterogeneous bandlike lower lung predominant opacities bilaterally, compatible with infectious / inflammatory process, to include drug reaction or organizing pneumonia. Additional new clustered nodules, also presumed inflammatory, though can be   followed.     9.  Diffuse subcutaneous body wall edema. Minimal ascites.         Critical Result: Active bleeding from the right external iliac artery. Large right lower quadrant abscess with communication to bowel and likely the urinary bladder. Right-sided deep vein thrombus. Increased right hydronephrosis; right ureteral stent   courses through the abscess.     Finding was identified on 4/4/2024 at 1:05 PM and initially reported to SAMINA Lainez at 1:05 PM in the ER. After final completion of the report, additional results were verbally communicated by phone to Dr. Soto Hayes at 1:38 PM.     Dr. Soto Hayes in the Emergency Room was contacted by me on 4/4/2024 2:00 PM CDT and verbalized understanding of the critical result.           NPO Status:  last solid intake 4/3; liquid intake early this AM - appropriate NPO status for sedation  Anticoagulation/Antiplatelets/Bleeding tendencies:  Eliquis LD 4/3 in AM per chart review  Antibiotics: none indicated for procedure      PAST MEDICAL HISTORY:  Past Medical History:   Diagnosis Date    Acute renal insufficiency     Adrenal insufficiency (H24)     secondary, due to chronic prednisone use for asthma, tapering off with Pulmonology    Asthma     COPD  (chronic obstructive pulmonary disease) (H)     Diabetes mellitus, type 2 (H) 12/06/2021    HbA1c 7% 9/2022 - Last visit with Endo 9/2022    Essential hypertension     Created by [employer] updated for latest IMO load     Hydronephrosis     Malignant neoplasm of uterus (H)     Mild persistent asthma without complication     Neuropathy        PAST SURGICAL HISTORY:  Past Surgical History:   Procedure Laterality Date    COMBINED CYSTOSCOPY, RETROGRADES, EXCHANGE STENT URETER(S) Right 11/10/2023    Procedure: CYSTOSCOPY, RIGHT RETROGRADE PYELOGRAM WITH RIGHT URETERAL STENT EXCHANGE;  Surgeon: Hernan Santana MD;  Location: [facility] OR    IR CHEST PORT PLACEMENT > 5 YRS OF AGE  1/13/2023    IR NEPHROSTOMY TUBE PLACEMENT RIGHT  12/31/2022    IR URETERAL STENT PLACEMENT RIGHT  3/29/2023    LAPAROSCOPIC TUBAL LIGATION Bilateral     nasal polpys Bilateral     PICC TRIPLE LUMEN PLACEMENT  12/31/2022            FAMILY HISTORY:  Family History   Problem Relation Age of Onset    No Known Problems Mother     Heart Disease Father         SOCIAL HISTORY:  Social History     Socioeconomic History    Marital status:    Tobacco Use    Smoking status: Never    Smokeless tobacco: Never   Vaping Use    Vaping Use: Never used   Substance and Sexual Activity    Alcohol use: No    Drug use: No     Social Determinants of Health     Financial Resource Strain: Low Risk  (10/23/2023)    Financial Resource Strain     Within the past 12 months, have you or your family members you live with been unable to get utilities (heat, electricity) when it was really needed?: No   Food Insecurity: Low Risk  (10/23/2023)    Food Insecurity     Within the past 12 months, did you worry that your food would run out before you got money to buy more?: No     Within the past 12 months, did the food you bought just not last and you didn t have money to get more?: No   Transportation Needs: Low Risk  (10/23/2023)    Transportation  Needs     Within the past 12 months, has lack of transportation kept you from medical appointments, getting your medicines, non-medical meetings or appointments, work, or from getting things that you need?: No   Interpersonal Safety: Low Risk  (10/30/2023)    Interpersonal Safety     Do you feel physically and emotionally safe where you currently live?: Yes     Within the past 12 months, have you been hit, slapped, kicked or otherwise physically hurt by someone?: No     Within the past 12 months, have you been humiliated or emotionally abused in other ways by your partner or ex-partner?: No   Housing Stability: Low Risk  (10/23/2023)    Housing Stability     Do you have housing? : Yes     Are you worried about losing your housing?: No        ALLERGIES:  No Known Allergies     MEDICATIONS:  Current Facility-Administered Medications   Medication Dose Route Frequency Provider Last Rate Last Admin    lidocaine (XYLOCAINE) 2 % external gel   Topical Daily PRN Hannah Blandon NP   Given at 12/17/21 0754    norepinephrine (LEVOPHED) 4 mg in  mL infusion PREMIX  0.01-0.6 mcg/kg/min Intravenous Continuous Soto Hayes MD         Current Outpatient Medications   Medication Sig Dispense Refill    acetaminophen (TYLENOL) 325 MG tablet Take 2 tablets (650 mg) by mouth every 4 hours as needed for mild pain or fever      ADVAIR DISKUS 250-50 MCG/ACT inhaler INHALE 1 PUFF BY MOUTH TWICE DAILY 14 each 0    albuterol (PROAIR HFA/PROVENTIL HFA/VENTOLIN HFA) 108 (90 Base) MCG/ACT inhaler Inhale 2 puffs into the lungs every 6 hours as needed for shortness of breath or wheezing 18 g 1    Apixaban Starter Pack (ELIQUIS DVT/PE STARTER PACK) 5 MG TBPK       cetirizine (ZYRTEC) 10 MG tablet [CETIRIZINE (ZYRTEC) 10 MG TABLET] Take 10 mg by mouth daily.      fluticasone propionate (FLONASE) 50 mcg/actuation nasal spray [FLUTICASONE PROPIONATE (FLONASE) 50 MCG/ACTUATION NASAL SPRAY] 1 spray into each nostril daily. 16 g 3    gabapentin  "(NEURONTIN) 300 MG capsule Take 1 capsule (300 mg) by mouth at bedtime 60 capsule 0    pantoprazole (PROTONIX) 20 MG EC tablet Take 1 tablet (20 mg) by mouth daily Take 20 mg bid for 2 weeks and then once daily      blood glucose test (CONTOUR NEXT STRIPS) strips [BLOOD GLUCOSE TEST (CONTOUR NEXT STRIPS) STRIPS] Test four times daily.  Dx code DM2. 30 strip 6    Glucagon (GVOKE HYPOPEN 1-PACK) 1 MG/0.2ML SOAJ Inject 1 mg Subcutaneous as needed (low BG levels) 0.2 mL 1    insulin pen needle (31G X 6 MM) 31G X 6 MM miscellaneous Use 2 pen needles daily or as directed. 180 each 4    lisinopril (ZESTRIL) 5 MG tablet Take 1 tablet (5 mg) by mouth daily 90 tablet 0    montelukast (SINGULAIR) 10 MG tablet Take 1 tablet (10 mg) by mouth At Bedtime 30 tablet 11    multivitamin therapeutic tablet [MULTIVITAMIN THERAPEUTIC TABLET] Take 1 tablet by mouth daily.      pembrolizumab Inject into the vein every 21 days      Vitamin D, Cholecalciferol, 25 MCG (1000 UT) TABS Take 1 tablet by mouth daily          LABS:  INR   Date Value Ref Range Status   04/04/2024 1.76 (H) 0.85 - 1.15 Final      Hemoglobin   Date Value Ref Range Status   04/04/2024 7.0 (L) 11.7 - 15.7 g/dL Final     Platelet Count   Date Value Ref Range Status   04/04/2024 259 150 - 450 10e3/uL Final     Creatinine   Date Value Ref Range Status   04/04/2024 0.98 (H) 0.51 - 0.95 mg/dL Final     Potassium   Date Value Ref Range Status   04/04/2024 3.8 3.4 - 5.3 mmol/L Final   04/05/2022 4.1 3.5 - 5.0 mmol/L Final         EXAM:  BP (!) 67/40   Pulse 114   Temp 97.9  F (36.6  C)   Resp 19   Ht 1.549 m (5' 1\")   Wt 54.4 kg (120 lb)   LMP  (LMP Unknown)   SpO2 100%   BMI 22.67 kg/m    General: Stable. In no acute distress.    Neurologic: Alert and oriented x 3. No focal deficits.  Psychiatric: Appropriate mood and affect. Cooperative. Answering questions appropriately. Linear/coherent thought process.  Respiratory: Normal respirations on O2 at 3L via NC.. Lungs " clear to auscultation bilaterally.  Cardiac: S1S2, regular rate and rhythm, without murmur, clicks or rubs.  Skin: Warm and dry. Without excoriations, ecchymosis, erythema, lesions or open sores on R femoral groin.    Pre-Sedation Assessment:  Mallampati Airway Classification:  II - Faucial pillars and soft palate may be seen, but uvula is masked by the base of the tongue  Previous reaction to anesthesia/sedation:  No  Sedation plan based on assessment: Moderate (conscious) sedation  ASA Classification: Class 4 - SEVERE SYSTEMIC DISEASE, ACUTE UNSTABLE PROBLEMS.   Code Status/Advanced care directive: DNR / DNI intra procedure, per discussion with patient and family ( and daughter).       ASSESSMENT:  74yo female who presented to ED with rectal bleeding and dizziness. CTA with active bleeding, R hydronephrosis and large RLQ abscess.    -Case and imaging reviewed by Dr. Kinney (IR) who gave recommendations for procedure plan(s) below. Covered stent placement vs coiling discussed between providers given risks associated with both options.     - Vascular surgery consult recommended, appreciate input  - DNR/DNI status chosen after discussion with patient and family  - Soft BPs  - Received 2u blood  - Plan to initiate vasopressors    PLAN:    Recommend proceeding with procedures listed below in order and all with sedation administration as tolerated by patient:   -  Mesenteric angiogram with potential intervention including stent placement or coiling embolization  - RIGHT percutaneous nephrostomy tube placement  - Image guided drain placement for abdominal abscess    Recommendations and procedural education reviewed with patient/family ( and daughter) in detail including, but not limited to risks, benefits and alternatives with understanding verbalized by patient/family ( and daughter).    Thank you for kindly for this consultation.     Total time spent on the date of the encounter: 50  minutes.      Lori Marshall, APRN CNP  Interventional Radiology  [phone]

## 2024-04-04 NOTE — CONSULTS
\vascular Surgery Consult  2024    Stella Greene  : 1950    Date of Service: 2024 2:38 PM    Assessment and Plan:  Stella Greene is a 73 year old female with PMH of stage 4 endometrial cancer currently on chemotherapy, appt to start second cycle tomorrow with recent hospitalization at [facility] for RLQ mass who presented to ED with BRBPR, found to have active extravasation from R EIA into RLQ mass. Hemodynamically unstable in ED, tachycardic to 120, hypotensive 70s/40s. Discussed grave nature of situation at bedside with patient. She and her family expressed understanding, expressed that she has had a long 16 month road of difficulties. Appropriate questions asked about options moving forward and about options to be made comfortable. Ultimately patient decided to proceed with emergent coil embolization with IR of EIA. Discussed with patient that we will evaluate her RLE after procedure, and that her RLE will likely become ischemic after the procedure. We would be willing to offer revascularization after this however patient and family thinking about next steps, will plan for now for emergent procedure and revisit conversation after acute bleeding stabilized.     - Agree with emergent IR coil embolization of R EIA.   - Will revisit patient to assess vascular status after procedure, she will likely require revascularization procedure to save her limb, which would likely require at least a week of hospitalization afterwards and given immunocompromised state likely lead to significant weakness afterwards and certainly require a rehab stay afterward.   - Patient and family thinking about the above, patient may wish to pursue comfort care, we will revisit conversation this evening    Formal plan pending discussion with staff.     Jed Valentino MD  Surgery PGY3    History of Present Illness:    Stella Greene is a 73 year old female that presents with PMH of stage 4 endometrial cancer currently on  chemotherapy, appt to start second cycle tomorrow with recent hospitalization at [facility] for RLQ mass who presented to ED with BRBPR, found to have active extravasation from R EIA into RLQ mass. Hemodynamically unstable in ED, tachycardic to 120, hypotensive 70s/40s. Discussed grave nature of situation at bedside with patient. She and her family expressed understanding, expressed that she has had a long 16 month road of difficulties. Appropriate questions asked about options moving forward and about options to be made comfortable    Has not been mobile the past few weeks, barely able to walk 2/2 increased swelling in the RLE and dizziness.     Does not smoke, never has.   Does not drink.   H/o TAHBSO      Past Medical History:  Past Medical History:   Diagnosis Date    Acute renal insufficiency     Adrenal insufficiency (H24)     secondary, due to chronic prednisone use for asthma, tapering off with Pulmonology    Asthma     COPD (chronic obstructive pulmonary disease) (H)     Diabetes mellitus, type 2 (H) 12/06/2021    HbA1c 7% 9/2022 - Last visit with Endo 9/2022    Essential hypertension     Created by [employer] updated for latest IMO load     Hydronephrosis     Malignant neoplasm of uterus (H)     Mild persistent asthma without complication     Neuropathy        Past Surgical History  Past Surgical History:   Procedure Laterality Date    COMBINED CYSTOSCOPY, RETROGRADES, EXCHANGE STENT URETER(S) Right 11/10/2023    Procedure: CYSTOSCOPY, RIGHT RETROGRADE PYELOGRAM WITH RIGHT URETERAL STENT EXCHANGE;  Surgeon: Hernan Santana MD;  Location: [facility] OR    IR CHEST PORT PLACEMENT > 5 YRS OF AGE  1/13/2023    IR NEPHROSTOMY TUBE PLACEMENT RIGHT  12/31/2022    IR URETERAL STENT PLACEMENT RIGHT  3/29/2023    LAPAROSCOPIC TUBAL LIGATION Bilateral     nasal polpys Bilateral     PICC TRIPLE LUMEN PLACEMENT  12/31/2022            Family History:  Family History   Problem Relation Age of Onset     No Known Problems Mother     Heart Disease Father        Social History:  Social History     Socioeconomic History    Marital status:      Spouse name: Not on file    Number of children: Not on file    Years of education: Not on file    Highest education level: Not on file   Occupational History    Not on file   Tobacco Use    Smoking status: Never    Smokeless tobacco: Never   Vaping Use    Vaping Use: Never used   Substance and Sexual Activity    Alcohol use: No    Drug use: No    Sexual activity: Not on file   Other Topics Concern    Parent/sibling w/ CABG, MI or angioplasty before 65F 55M? Not Asked   Social History Narrative    Not on file     Social Determinants of Health     Financial Resource Strain: Low Risk  (10/23/2023)    Financial Resource Strain     Within the past 12 months, have you or your family members you live with been unable to get utilities (heat, electricity) when it was really needed?: No   Food Insecurity: Low Risk  (10/23/2023)    Food Insecurity     Within the past 12 months, did you worry that your food would run out before you got money to buy more?: No     Within the past 12 months, did the food you bought just not last and you didn t have money to get more?: No   Transportation Needs: Low Risk  (10/23/2023)    Transportation Needs     Within the past 12 months, has lack of transportation kept you from medical appointments, getting your medicines, non-medical meetings or appointments, work, or from getting things that you need?: No   Physical Activity: Not on file   Stress: Not on file   Social Connections: Not on file   Interpersonal Safety: Low Risk  (10/30/2023)    Interpersonal Safety     Do you feel physically and emotionally safe where you currently live?: Yes     Within the past 12 months, have you been hit, slapped, kicked or otherwise physically hurt by someone?: No     Within the past 12 months, have you been humiliated or emotionally abused in other ways by your  partner or ex-partner?: No   Housing Stability: Low Risk  (10/23/2023)    Housing Stability     Do you have housing? : Yes     Are you worried about losing your housing?: No       Medications:  Current Outpatient Medications   Medication Sig Dispense Refill    acetaminophen (TYLENOL) 325 MG tablet Take 2 tablets (650 mg) by mouth every 4 hours as needed for mild pain or fever      ADVAIR DISKUS 250-50 MCG/ACT inhaler INHALE 1 PUFF BY MOUTH TWICE DAILY 14 each 0    albuterol (PROAIR HFA/PROVENTIL HFA/VENTOLIN HFA) 108 (90 Base) MCG/ACT inhaler Inhale 2 puffs into the lungs every 6 hours as needed for shortness of breath or wheezing 18 g 1    cetirizine (ZYRTEC) 10 MG tablet [CETIRIZINE (ZYRTEC) 10 MG TABLET] Take 10 mg by mouth daily.      fluticasone propionate (FLONASE) 50 mcg/actuation nasal spray [FLUTICASONE PROPIONATE (FLONASE) 50 MCG/ACTUATION NASAL SPRAY] 1 spray into each nostril daily. 16 g 3    gabapentin (NEURONTIN) 300 MG capsule Take 1 capsule (300 mg) by mouth at bedtime 60 capsule 0    pantoprazole (PROTONIX) 20 MG EC tablet Take 1 tablet (20 mg) by mouth daily Take 20 mg bid for 2 weeks and then once daily      blood glucose test (CONTOUR NEXT STRIPS) strips [BLOOD GLUCOSE TEST (CONTOUR NEXT STRIPS) STRIPS] Test four times daily.  Dx code DM2. 30 strip 6    Glucagon (GVOKE HYPOPEN 1-PACK) 1 MG/0.2ML SOAJ Inject 1 mg Subcutaneous as needed (low BG levels) 0.2 mL 1    insulin pen needle (31G X 6 MM) 31G X 6 MM miscellaneous Use 2 pen needles daily or as directed. 180 each 4    lisinopril (ZESTRIL) 5 MG tablet Take 1 tablet (5 mg) by mouth daily 90 tablet 0    montelukast (SINGULAIR) 10 MG tablet Take 1 tablet (10 mg) by mouth At Bedtime 30 tablet 11    multivitamin therapeutic tablet [MULTIVITAMIN THERAPEUTIC TABLET] Take 1 tablet by mouth daily.      pembrolizumab Inject into the vein every 21 days      Vitamin D, Cholecalciferol, 25 MCG (1000 UT) TABS Take 1 tablet by mouth daily         Allergies:    "No Known Allergies    Review of Symptoms:  A 10 point review of symptoms has been conducted and is negative except for that mentioned in the above HPI.    Physical Exam:    Blood pressure 92/53, pulse 116, temperature 97.9  F (36.6  C), resp. rate 24, height 1.549 m (5' 1\"), weight 54.4 kg (120 lb), SpO2 100%, not currently breastfeeding.  Gen:    Lying in bed in NAD, A&OX3  HEENT: Normocephalic and atraumatic  CV:  Hypotensive, tachycardic  Pulm:  Non-labored breathing on NC  Abd:  Large Mass palpable RLQ, nontender, nondistended.   Ext:  Warm and well perfused  Vasc:   Monophasic doppler signal RLE femoral, popliteal, DP and PT signals.     Monophasic signal LLE DP and DP signals    Labs:  CBC RESULTS:   Recent Labs   Lab Test 04/04/24  1205   WBC 7.3   RBC 2.51*   HGB 7.0*   HCT 22.6*   MCV 90   MCH 27.9   MCHC 31.0*   RDW 16.0*        Last Basic Metabolic Panel:  Lab Results   Component Value Date     04/04/2024      Lab Results   Component Value Date    POTASSIUM 3.8 04/04/2024    POTASSIUM 4.1 04/05/2022     Lab Results   Component Value Date    CHLORIDE 106 04/04/2024    CHLORIDE 101 04/05/2022     Lab Results   Component Value Date    DAVID 7.1 04/04/2024     Lab Results   Component Value Date    CO2 23 04/04/2024    CO2 24 04/05/2022     Lab Results   Component Value Date    BUN 20.7 04/04/2024    BUN 13 04/05/2022     Lab Results   Component Value Date    CR 0.98 04/04/2024     Lab Results   Component Value Date     04/04/2024    GLC 94 11/10/2023     04/05/2022     04/05/2022       Imaging:  CTA Chest Abdomen Pelvis w Contrast    Result Date: 4/4/2024  EXAM: CTA CHEST ABDOMEN PELVIS W CONTRAST LOCATION: [facility] DATE: 4/4/2024 INDICATION: Bright red blood per rectum, eval for brisk GI bleed. History of endometrial neoplasm. History of abscess on outside imaging. COMPARISON: 11/27/2023 PET/CT. [facility] CT report 03/03/2024 (no images available). " TECHNIQUE: CT angiogram chest abdomen pelvis during arterial phase of injection of IV contrast. 2D and 3D MIP reconstructions were performed by the CT technologist. Dose reduction techniques were used. CONTRAST: 90ml isovue 370 FINDINGS: Motion artifact. CT ANGIOGRAM CHEST, ABDOMEN, AND PELVIS: Active extravasation from the right external iliac artery into a large right lower quadrant abscess. Nonaneurysmal aorta. Patent aortic branch vessels. No definite pulmonary embolism. LUNGS AND PLEURA: New mild bilateral bandlike basilar predominant opacities. Few new 3 to 5 mm lower lung predominant nodules as well (for example right lower lobe series 9, images 1 ). No pleural effusion or pneumothorax. MEDIASTINUM/AXILLAE: No thoracic adenopathy. Right-sided portacatheter tip near the cavoatrial junction. Moderate hiatus hernia. CORONARY ARTERY CALCIFICATION: Motion artifact. HEPATOBILIARY: Redemonstrated right hepatic dome hemangioma. No definite new or enlarging liver lesions. PANCREAS: Mildly atrophic. SPLEEN: Normal. ADRENAL GLANDS: Stable mild left adrenal thickening. KIDNEYS/BLADDER: Right ureteral stent remains in place. The right renal collecting system shows increased moderate to severe dilatation. Mild air within the right renal pelvis and ureter. The right ureteral stent courses through the right lower quadrant abscess along the mid to distal portion. Air and debris within the urinary bladder. BOWEL: Please see the pelvic section for right lower quadrant abscess and bowel communication findings. Areas of colonic wall thickening, including the cecum and ascending colon, as well as the sigmoid colon, with areas of wall enhancement. Mild loosely formed colonic stool. Mild fluid filled small bowel, with a few small bowel loops upper normal in caliber at 2.9 cm. LYMPH NODES: Since 11/27/2023 PET/CT, right lower quadrant abscess is now present in the region of prior right lower quadrant mass / implant. Implant  posterior to the abscess has increased in size measuring 3.4 cm versus 1.2 cm on the November PET/CT (series 9, image 496). More pronounced adjacent implants along the midline to the left vaginal cuff, with example area measuring 1.2 cm (series 9, image 537). No retroperitoneal adenopathy. VASCULATURE: Deep vein thrombosis identified extending at least from the right external iliac vein in the pelvis into the common femoral and superficial femoral vein from proximal to distal thigh. Small amount of DVT identified in the deep femoral vein. Asymmetric right leg swelling. Flattened appearance of the IVC. PELVIC ORGANS: New since the November 2023 PET/CT, right lower quadrant abscess measuring roughly 9.2 x 10.4 x 9.8 cm (series 9, image 465). This contains intrinsic air and debris. Abscess extends to the into the inferior right psoas muscle (image 423). There is fistulous connection to the sigmoid colon and possibly cecum. Air and debris within the bladder suggests fistulization to the bladder as well. MUSCULOSKELETAL: Subcutaneous body wall edema.     IMPRESSION: 1.  Acute active bleeding is present on this exam. 2.  The right external iliac artery demonstrates active contrast extravasation and bleeding into a right lower quadrant abscess. The right external iliac artery courses through this abscess; abscess erosion into the artery presumed the etiology of bleed. 3.  Large right lower quadrant abscess. This was noted on outside [facility] CT report from March 2024, though imaging is not available from that exam. There is fistulous connection to the sigmoid colon and possibly cecum as well. Air and debris in the bladder also raises concern for fistulization to the urinary bladder. 4.  Etiology of the abscess is presumed from a prior large right lower quadrant mass eroding into bowel. 5.  Areas of mild colonic wall thickening and hyperenhancement may be reactive or from colitis. Upper normal pelvic small bowel with  air-fluid levels, likely from slow flow or ileus. Distal small bowel and terminal ileum are not well evaluated from the abscess. 6.  Significant right-sided DVT present. Deep vein thrombus extends at least from the right external iliac vein in the pelvis to the femoral vein at the distal thigh. Asymmetric right leg subcutaneous edema and swelling. Recommend lower extremity ultrasound. 7.  Right ureteral stent appears in appropriate position, however, increased moderate to severe right hydronephrosis. The mid to distal right ureteral stent courses through the right lower quadrant abscess. 8.  New heterogeneous bandlike lower lung predominant opacities bilaterally, compatible with infectious / inflammatory process, to include drug reaction or organizing pneumonia. Additional new clustered nodules, also presumed inflammatory, though can be followed. 9.  Diffuse subcutaneous body wall edema. Minimal ascites. Critical Result: Active bleeding from the right external iliac artery. Large right lower quadrant abscess with communication to bowel and likely the urinary bladder. Right-sided deep vein thrombus. Increased right hydronephrosis; right ureteral stent courses through the abscess. Finding was identified on 4/4/2024 at 1:05 PM and initially reported to SAMINA Lainez at 1:05 PM in the ER. After final completion of the report, additional results were verbally communicated by phone to Dr. Soto Hayes at 1:38 PM. Dr. Soto Hayes in the Emergency Room was contacted by me on 4/4/2024 2:00 PM CDT and verbalized understanding of the critical result.     CT Abdomen and/or Pelvis Biopsy    Result Date: 3/20/2024  EXAM: CT ABDOMEN AND/OR PELVIS BIOPSY PRE-PROCEDURE: Patient seen, evaluated, history reviewed, and approved for sedation. Airway, heart, and lung exam satisfactory for sedation. Discussed risks, benefits, alternatives for procedure, and/or sedation. The roles and responsibilities of care team members, residents, and  fellows were discussed. Patient understands information and questions answered. Informed consent obtained from the patient. Immediately prior to starting the procedure, in the presence of the assisting personnel, a procedural  pause was conducted to verify correct patient identity and verification of procedure to be performed, and as applicable, correct side and site, correct patient position, availability of implants, special equipment, or special requirements, and all image  and specimen identification data. INTRAPROCEDURE: Moderate sedation was administered by sedation nurse under my supervision. The patient was continuously monitored with real time oxygen saturation, heart rate, ECG rhythm strip and blood pressure throughout administration of the sedation and performance of the procedure. The total intra-procedural sedation time was: 11 minutes. TECHNIQUE: Sterile;1% lidocaine for local anesthesia and CT Guidance. Under CT guidance, a 17-gauge introducer was carefully advanced into the lateral aspect of the right lower quadrant pelvic mass. Using an 18-gauge spring-loaded biopsy device, 7 core samples were obtained and sent for analysis. The biopsy device and introducer were removed. Patient tolerated the procedure well without immediate complications.    TARGET LOCATION: Right lower quadrant pelvic mass seen on series 3, image 96 of CT abdomen and pelvis 3/17/2024. TARGET LESION SIZE: 8.6 cm on prior CT. BIOPSY INSTRUMENT: 17-gauge introducer with 18-gauge spring-loaded biopsy device. NUMBER OF SAMPLES OBTAINED: 7. COMPLICATION: None                         BLOOD LOSS: None. PATIENT INSTRUCTIONS: Patient may be dismissed from the radiology department when dismissal criteria met. POST-PROCEDURE DIAGNOSIS: Right lower quadrant pelvic mass.    CT-guided right lower quadrant pelvic mass biopsy. NR    US Lower Extremity Veins Bilateral    Result Date: 3/18/2024  EXAM: US LOWER EXTREMITY VEINS BILATERAL Exam  performed with color and spectral Doppler analysis. COMPARISON: CT 3/17/2024. FINDINGS: RIGHT: Common Femoral Vein: Acute DVT. Profunda Femoral Vein: Acute DVT. Femoral Vein: Acute DVT. Popliteal Vein: Acute DVT. Gastrocnemius Veins: Acute DVT. Soleal Veins: Acute DVT. Posterior Tibial Veins: Acute DVT. Peroneal Veins: Acute DVT. Great Saphenous Vein: Acute SVT. Small Saphenous Vein: Negative where seen. Popliteal Fossa: Popliteal fossa cyst with loose bodies. Other: n/a LEFT: Common Femoral Vein: Negative. Profunda Femoral Vein: Negative. Femoral Vein: Negative. Popliteal Vein: Negative. Gastrocnemius Veins: Negative where seen. Soleal Veins: Negative where seen. Posterior Tibial Veins: Negative where seen. Peroneal Veins: Negative where seen. Great Saphenous Vein: Negative where seen. Small Saphenous Vein: Not Evaluated. Popliteal Fossa: Small popliteal fossa cyst. Other: n/a Information on venous thrombosis and management can be found on the [email or web address] site. Link [email or web address]    1. Extensive acute DVT throughout the right lower extremity, as described in findings. Of note, patient is currently on IV heparin. 2. Negative left lower extremity veins..    CT Abdomen Pelvis w Contrast    Result Date: 3/17/2024  EXAM:  CT ABDOMEN PELVIS WITH IV CONTRAST COMPARISON:  None available. FINDINGS:   About 8.0 x 8.6 x 7.5 cm irregular, peripherally enhancing, centrally cystic or necrotic mass in the right pelvis involves peritoneal and extraperitoneal structures (3/97, 4/53, 5/81). It is intimately associated with the cecum and/or appendix and encases the terminal ileum. Curvilinear hyperdensity at the expected location of the appendix may be from suture material or calcification (3/95). Satellite mass or extension of the primary mass tethers and potentially invades the sigmoid colon (3/102, 4/72). Moderate diffuse dilation of the small bowel proximal  to the mass consistent with mechanical obstruction. The distal ileum just proximal to the mass has a striated appearance, likely due to congestion. Surrounding mesenteric edema. Partially visualized moderate hiatal hernia. Large duodenal diverticulum. The mass invades the right pelvic sidewall, encasing common/internal/external iliac artery and vein. Filling defect in the right external iliac, common femoral, and femoral vein consistent with deep vein thrombosis. Relative enlargement of the right thigh, likely due to venous congestion. Additional mild diffuse subcutaneous edema. The mass encases the right ureteral stent. Persistent right hydronephrosis and delayed right nephrogram suggests stent dysfunction. Small right renal cyst. Hysterectomy. Cavernous hemangioma in hepatic segment 7. Hepatic hypodensities along the falciform ligament and accessory lateral left lobe ligament, likely due to focal steatosis. Diffuse pancreatic atrophy. Small scattered areas of consolidation in the lower lungs could be due to infection or metastatic disease. These include some small nodular opacities as well as scattered atelectasis. Additional peribronchovascular nodularity in the right lower lobe and,  to a lesser extent in the right middle lobe, is almost certainly infectious/inflammatory, potentially from aspiration pneumonitis. Mild anterolisthesis of L4 on L5.    1. Large, malignant-appearing right pelvic mass involving cecum, terminal ileum, sigmoid colon, right pelvic vasculature, and right ureteral stent. This could be a primary gastrointestinal malignancy versus metastatic disease. 2. Associated mechanical small bowel obstruction, DVT in the right pelvis and proximal thigh, and right ureteral stent dysfunction. 3. Opacities in the visualized lower lungs that may be due to a combination of aspiration, infection, and/or metastatic disease.

## 2024-04-04 NOTE — ED PROVIDER NOTES
"Emergency Department Midlevel Supervisory Note     I had a face to face encounter with this patient seen by the Advanced Practice Provider (ANNE MARIE). I personally made/approved the management plan and take responsibility for the patient management. I personally saw patient and performed a substantive portion of the visit including all aspects of the medical decision making.     ED Course:  0944 Adela Villagran PA-C staffed patient with me. I agree with their assessment and plan of management, and I will see the patient.  0945 I met with the patient to introduce myself, gather additional history, perform my initial exam, and discuss the plan.     Brief HPI:     Stella Greene is a 73 year old female who presents for evaluation of rectal bleeding and fatigue as well as lightheadedness.  Patient has a history of endometrial cancer who presents to the emergency department for evaluation of rectal bleeding.  First began having some bright red blood per rectum last night and then another large bloody bowel movement this morning.  Began feeling lightheaded overnight as well.  She has some subjective shortness of breath as well and dizziness is worse when she is up and standing and moving around.  She not had any fevers.  Denies any hematuria or vaginal bleeding believes the blood is coming from her rectum.  She does take Eliquis for DVT but her last dose was yesterday morning just over 24 hours ago.          Brief Physical Exam: /59   Pulse 120   Temp 99.5  F (37.5  C)   Resp 25   Ht 1.549 m (5' 1\")   Wt 54.4 kg (120 lb)   LMP  (LMP Unknown)   SpO2 100%   BMI 22.67 kg/m    Constitutional:  Alert, in no acute distress  EYES pale conjunctiva  HENT:  Atraumatic  Respiratory:  Respirations even, unlabored, in no acute respiratory distress  Cardiovascular: Tachycardic, thready radial pulses bilaterally, warm extremities  GI: Soft, non-distended, non-tender,  :  pelvic exam no vinayak bleeding from vaginal mucosa " but does seem to be a small amount of bleeding from the urethra, gross blood at the anus and in rectum  Musculoskeletal:  Moves all 4 extremities equally, grossly symmetrical strength  Integument: Warm & dry. No appreciable rash, erythema.  Neurologic:  Alert & oriented, speech clear and fluent, no focal deficits noted  Psych: Normal mood and affect       MDM:      ED Course as of 04/04/24 1621   Thu Apr 04, 2024   0952 Consent for blood transfusion   1011 Patient is a 73-year-old female with stage IV endometrial cancer currently on chemotherapy presents emerged part with bright red blood per rectum.  For symptoms of rectal bleeding yesterday and then had another large bloody bowel movement this morning described as bright red blood and clots.  She has not any significant associate abdominal pain.  Does note lightheadedness and dizziness particularly with standing.  Has not had any vomiting or hematemesis or coffee-ground emesis.  She does take Eliquis but her last dose was yesterday morning greater than 24 hours ago.  Had a colonoscopy done about a month ago which showed diverticulosis and possible sigmoid stricture.    Exam here patient does appear slightly pale and fatigued.  She is hypotensive with a BP of 73/51 and tachycardic at 106.  She is mentating well at this point in time and answering questions appropriately.  No focal abdominal tenderness.   rectal exam seems to show large amount of bright red blood coming from the rectum but does not seem to be coming from the vagina.    Patient is hypotensive with concern for brisk GI bleed.  IV access obtained and will start blood transfusion with PRBCs.  Coagulation studies sent and pending at this point in time.  Will also give pantoprazole   1040 Speculum exam to see if there is any brisk bleeding coming from the vaginal mucosa.  She status post hysterectomy notes any brisk bleeding from the vaginal mucosa.  Small amount of red-tinged fluid was removed from  vaginal vault and seems like there could be some bleeding from the urethra I presume most of the blood is rectal bleeding   1043 Discussed CODE STATUS with the patient, she would like all interventions done up until CPR. She would be okay with intubation if needed for procedures.   1309 Spoke with the radiologist with emergent report of active right iliac arterial bleed on patient's CTA.     1316 Dr. Hayes spoke with IR   1351 Patient is on 3 L oxygen by nasal cannula, is not on supplementary O2 at baseline.   1413 I spoke with Lori, nurse practitioner with interventional radiology they plan on mesenteric angio with stent, coil, versus embolize, placement of right nephrostomy tube, and placement of drain into right upper quadrant for abscess.   1419 Dr. Hayes spoke with the intensivist who accepts the patient.   1424 I spoke with Dr. Martin vascular surgeon who spoke with Dr. Kinney from IR       Patient is a 73-year-old female with stage IV uterine cancer who presents to the emergency department for evaluation of lightheadedness in the context of bright red blood per rectum that started last night.  On initial evaluation here she was noted to be hypotensive and borderline tachycardic.  Gross blood on rectal examination and patient presentation is concerning for hemorrhagic shock.  IV access obtained and she also has her PowerPort accessed.  She was consented for blood transfusion  and given the hypotension on presentation with bright red blood per rectum initiated an empiric transfusion with 1 unit of packed red cells.  Her blood pressure did improved following initiation of first unit of PRBCs and she is mentating well at this point in time.  Will send off extensive blood work including coagulation studies and metabolic panel as well as obtain CT  angiogram of the abdomen pelvis to evaluate for potential source of brisk GI bleed.    Labs reviewed interpret myself.  Initial hemoglobin is 5.4 which shows a  significant drop from the patient's reported hemoglobin of 7.4 several days ago when she received a blood transfusion.  Platelets are within normal limits.  INR is elevated at 1.76.   Will continue to monitor blood pressure and if she requires further PRBCs beyond 2 units will initiate 1:1:1 product resuscitation.  Discussed with pharmacy about possible Kcentra for Eliquis reversal however however her last dose of Eliquis was over 24 hours ago and this is thought to be less beneficial at this point in time.    After first unit of PRBCs patient is still borderline hypotensive and has ongoing lightheadedness although clear mentation.  Will give a second unit of PRBCs for ongoing hemorrhage    At initiation of second unit of PRBCs patient did begin to have a flushing reaction endorses some chills but no signs of diffuse hives or respiratory compromise.  No wheezing.  Could be mild transfusion reaction.  Transfusion pause has blood pressures currently within acceptable range and patient remains mentating well.    CTA of the abdomen pelvis shows numerous abnormalities including active bleeding from the right external iliac and into the right lower quadrant abscess with likely fistulous connection to both the colon and the bladder.  Additionally there is also noted to be severe right-sided hydronephrosis although the right ureteral stent appears to be appropriately positioned.    Discussed patient care with GI, vascular surgery and interventional radiology.  Interventional radiology and vascular surgery as well as GI evaluated in the emergency department.  Patient is critically ill at this point in time with ongoing hemorrhage from erosive abscess/tumor into her GI tract.  Interventional radiology will take to IR suite to perform stenting of the external iliac versus possible cauterization to control source of bleeding but this will may be mostly palliative in nature as patient still has extensive underlying metastatic  burden as well as concern for intra-abdominal abscess.  Will also attempt to drain intra-abdominal abscess.  Levophed ordered for additional  vasopressor support if needed during interventional radiology procedure.    Did have extensive goals of care discussions with both the patient and her family during her ED course.  At this point in time patient states that she would not want CPR if she were to go into cardiac arrest but would like full supportive measures up until that point in weeks until she is able to have more time with her family and give other family time to arrive in the hospital.  At that point in time patient would consider possibly transitioning to comfort measures only but is not ready at this point in time.    Patient is critically ill and requires ongoing blood product resuscitation as well as potential further surgical intervention.  Discussed with the intensivist who agrees admit the patient at this point in time after interventional radiology intervention to the ICU.        Critical Care time was 90 minutes for this patient excluding procedures.     Initiation  1. Iliac artery bleed, right        Consults:  I discussed patient with interventional radiology, vascular surgery, GI medicine as well as intensivist.      Labs and Imaging:  Results for orders placed or performed during the hospital encounter of 04/04/24   CTA Chest Abdomen Pelvis w Contrast    Impression    IMPRESSION:    1.  Acute active bleeding is present on this exam.     2.  The right external iliac artery demonstrates active contrast extravasation and bleeding into a right lower quadrant abscess. The right external iliac artery courses through this abscess; abscess erosion into the artery presumed the etiology of bleed.    3.  Large right lower quadrant abscess. This was noted on outside [facility] CT report from March 2024, though imaging is not available from that exam. There is fistulous connection to the sigmoid colon and  possibly cecum as well. Air and debris in the   bladder also raises concern for fistulization to the urinary bladder.     4.  Etiology of the abscess is presumed from a prior large right lower quadrant mass eroding into bowel.     5.  Areas of mild colonic wall thickening and hyperenhancement may be reactive or from colitis. Upper normal pelvic small bowel with air-fluid levels, likely from slow flow or ileus. Distal small bowel and terminal ileum are not well evaluated from the   abscess.    6.  Significant right-sided DVT present. Deep vein thrombus extends at least from the right external iliac vein in the pelvis to the femoral vein at the distal thigh. Asymmetric right leg subcutaneous edema and swelling. Recommend lower extremity   ultrasound.    7.  Right ureteral stent appears in appropriate position, however, increased moderate to severe right hydronephrosis. The mid to distal right ureteral stent courses through the right lower quadrant abscess.    8.  New heterogeneous bandlike lower lung predominant opacities bilaterally, compatible with infectious / inflammatory process, to include drug reaction or organizing pneumonia. Additional new clustered nodules, also presumed inflammatory, though can be   followed.    9.  Diffuse subcutaneous body wall edema. Minimal ascites.       Critical Result: Active bleeding from the right external iliac artery. Large right lower quadrant abscess with communication to bowel and likely the urinary bladder. Right-sided deep vein thrombus. Increased right hydronephrosis; right ureteral stent   courses through the abscess.    Finding was identified on 4/4/2024 at 1:05 PM and initially reported to SAMINA Lainez at 1:05 PM in the ER. After final completion of the report, additional results were verbally communicated by phone to Dr. Soto Hayes at 1:38 PM.    Dr. Soto Hayes in the Emergency Room was contacted by me on 4/4/2024 2:00 PM CDT and verbalized understanding of the  critical result.      Result Value Ref Range    INR 1.76 (H) 0.85 - 1.15   Partial thromboplastin time   Result Value Ref Range    aPTT 58 (H) 22 - 38 Seconds   Basic metabolic panel   Result Value Ref Range    Sodium 138 135 - 145 mmol/L    Potassium 3.8 3.4 - 5.3 mmol/L    Chloride 106 98 - 107 mmol/L    Carbon Dioxide (CO2) 23 22 - 29 mmol/L    Anion Gap 9 7 - 15 mmol/L    Urea Nitrogen 20.7 8.0 - 23.0 mg/dL    Creatinine 0.98 (H) 0.51 - 0.95 mg/dL    GFR Estimate 61 >60 mL/min/1.73m2    Calcium 7.1 (L) 8.8 - 10.2 mg/dL    Glucose 145 (H) 70 - 99 mg/dL   Hepatic function panel   Result Value Ref Range    Protein Total 4.8 (L) 6.4 - 8.3 g/dL    Albumin 2.2 (L) 3.5 - 5.2 g/dL    Bilirubin Total 0.5 <=1.2 mg/dL    Alkaline Phosphatase 121 40 - 150 U/L    AST 14 0 - 45 U/L    ALT 11 0 - 50 U/L    Bilirubin Direct 0.21 0.00 - 0.30 mg/dL   Result Value Ref Range    Lipase 6 (L) 13 - 60 U/L   Lactic acid whole blood   Result Value Ref Range    Lactic Acid 1.1 0.7 - 2.0 mmol/L   Result Value Ref Range    Magnesium 1.2 (L) 1.7 - 2.3 mg/dL   CBC with platelets and differential   Result Value Ref Range    WBC Count 9.6 4.0 - 11.0 10e3/uL    RBC Count 1.96 (L) 3.80 - 5.20 10e6/uL    Hemoglobin 5.4 (LL) 11.7 - 15.7 g/dL    Hematocrit 17.5 (L) 35.0 - 47.0 %    MCV 89 78 - 100 fL    MCH 27.6 26.5 - 33.0 pg    MCHC 30.9 (L) 31.5 - 36.5 g/dL    RDW 15.9 (H) 10.0 - 15.0 %    Platelet Count 310 150 - 450 10e3/uL    % Neutrophils 89 %    % Lymphocytes 5 %    % Monocytes 5 %    % Eosinophils 0 %    % Basophils 0 %    % Immature Granulocytes 1 %    NRBCs per 100 WBC 0 <1 /100    Absolute Neutrophils 8.5 (H) 1.6 - 8.3 10e3/uL    Absolute Lymphocytes 0.5 (L) 0.8 - 5.3 10e3/uL    Absolute Monocytes 0.5 0.0 - 1.3 10e3/uL    Absolute Eosinophils 0.0 0.0 - 0.7 10e3/uL    Absolute Basophils 0.0 0.0 - 0.2 10e3/uL    Absolute Immature Granulocytes 0.1 <=0.4 10e3/uL    Absolute NRBCs 0.0 10e3/uL   CBC with platelets and differential   Result  Value Ref Range    WBC Count 7.3 4.0 - 11.0 10e3/uL    RBC Count 2.51 (L) 3.80 - 5.20 10e6/uL    Hemoglobin 7.0 (L) 11.7 - 15.7 g/dL    Hematocrit 22.6 (L) 35.0 - 47.0 %    MCV 90 78 - 100 fL    MCH 27.9 26.5 - 33.0 pg    MCHC 31.0 (L) 31.5 - 36.5 g/dL    RDW 16.0 (H) 10.0 - 15.0 %    Platelet Count 259 150 - 450 10e3/uL    % Neutrophils 95 %    % Lymphocytes 3 %    % Monocytes 2 %    % Eosinophils 0 %    % Basophils 0 %    % Immature Granulocytes 0 %    NRBCs per 100 WBC 0 <1 /100    Absolute Neutrophils 7.0 1.6 - 8.3 10e3/uL    Absolute Lymphocytes 0.2 (L) 0.8 - 5.3 10e3/uL    Absolute Monocytes 0.1 0.0 - 1.3 10e3/uL    Absolute Eosinophils 0.0 0.0 - 0.7 10e3/uL    Absolute Basophils 0.0 0.0 - 0.2 10e3/uL    Absolute Immature Granulocytes 0.0 <=0.4 10e3/uL    Absolute NRBCs 0.0 10e3/uL   Adult Type and Screen   Result Value Ref Range    ABO/RH(D) O NEG     Antibody Screen Negative Negative    SPECIMEN EXPIRATION DATE 20240407235900    Prepare red blood cells (unit)   Result Value Ref Range    Blood Component Type Red Blood Cells     Product Code O0997H41     Unit Status Transfused     Unit Number [id number]     CROSSMATCH Compatible     CODING SYSTEM [id number]     ISSUE DATE AND TIME 90806905332204     UNIT ABO/RH O-     UNIT TYPE ISBT 9500    Prepare red blood cells (unit)   Result Value Ref Range    Blood Component Type Red Blood Cells     Product Code Y7495X16     Unit Status Transfused     Unit Number [id number]     CROSSMATCH Compatible     CODING SYSTEM [id number]     ISSUE DATE AND TIME [id number]     UNIT ABO/RH O-     UNIT TYPE ISBT 9500    Transfusion reaction evaluation   Result Value Ref Range    SPECIMEN EXPIRATION DATE 20240407235900     POST-RXN ABO/RH O NEG     POST RXN CLERICAL CHECK Correct     CO COMPONENTS [id number] transfused     GRAM/CULTURE INDICATED? No     POST SPECIMEN APPEARANCE No hemolysis     OTHER UNITS TRANSFUSED No other units     Product Code S1687Y89     Unit  Blood Type O Negative     Unit Number [id number]     POST-RXN POLY SANTOSH Negative    Adult Type and Screen   Result Value Ref Range    ABO/RH(D) O NEG     Antibody Screen Negative Negative    SPECIMEN EXPIRATION DATE 20240407235900    Prepare red blood cells (unit)   Result Value Ref Range    ISSUE DATE AND TIME 20240404153200     Blood Component Type Red Blood Cells     Product Code A6375V25     Unit Status Transfused     Unit Number [id number]     UNIT ABO/RH O-     CROSSMATCH Compatible     CODING SYSTEM [id number]     UNIT TYPE ISBT 9500    Prepare red blood cells (unit)   Result Value Ref Range    ISSUE DATE AND TIME [id number]     Blood Component Type Red Blood Cells     Product Code L9553M77     Unit Status Transfused     Unit Number [id number]     UNIT ABO/RH O-     CROSSMATCH Compatible     CODING SYSTEM [id number]     UNIT TYPE ISBT 9500        I have reviewed the relevant laboratory studies above.    I independently interpreted the following imaging study(s):       EKG: I reviewed and independently interpreted the patient's EKG, with comments made as listed below. Please see scanned EKG for full report.   EKG shows a sinus tachycardia 106 beats a minute, left axis deviation, normal NE, QRS and QTc durations, no ST elevations, nonspecific ST depressions    Procedures:  I was present for the key portions of procedures documented in ANNE MARIE/midlevel note, see midlevel note for further details.    Soto Hayes MD  [facility] EMERGENCY DEPARTMENT  [address] [place] [id number]  [phone]     Soto Hayes MD  04/04/24 5006

## 2024-04-04 NOTE — SEDATION DOCUMENTATION
Nurse is running two units down from blood bank. Dr. Kinney is working. Baldomero RAMOS RN updating ICU charge and Dr. Hughes is on the way down.     Pt is diaphoretic, apneic breathing, pt had a pulse. Blood has not been started.

## 2024-04-04 NOTE — PROGRESS NOTES
Pulmonary/Critical Care Consult Team Note    Stella Greene,  1950, MRN [id number]  Admitting Dx: Iliac artery bleed, right [R58]  Date / Time of Admission:  2024  9:40 AM       Intake/Output last 3 shifts:  I/O last 3 completed shifts:  In: 350   Out: -   She feels some chest heaviness, she denies shortness fo breath  She is laying on the IR table having a procedure done    Assessment/Plan: Stella Greene is a 73 year old female with PMHx of stage 4 endometrial cancer currently on chemotherapy who presents with hypotension and bleeding found to have active extravasation from R External Illiac Artery into RLQ mass causing shock.    Hypotension from acute blood loss anemia due to Active bleeding from the right external iliac artery.   - going to IR for stent and angio  - vascular surgery also contacted from ER and are following  - Hgb now 7 <--- 2 units 5.4  - Hgb q6hrs    ID: Large right lower quadrant abscess with communication to bowel and likely the urinary bladder.   - will cover with vanc, meropenem and flagyl, unclear what is infection vs malignancy or necrotic malignancy     Heme: Right-sided deep vein thrombus.   - unable to anticoagulate given above bleeding    Neph/Urology: Increased right hydronephrosis;   - right ureteral stent courses through the abscess.  - Neph tube laced by IR    CV: tachycardia due to blood loss and on levophed  - Hgb q6hrs  - Monitor on tele    GI: NPO  - GI proph    RENAL: Concern for mass eroding into bladder and worsening hydronephrosis  - Follow BUN/Creatinine  - strict I/O's    Hx of COPD   - continue home Advair, Singulair and albuterol PRN    ENDO: Hx of DM  - Critical Care hyperglycemic protocol  - Accuchecks and sliding scale q6      Pt has critical illness and impairs circulation on vasopressors such as there is high probability of imminent of life threatening deterioration in the patient's condition.    Code Status: FULL CODE    Infusions:  Current  "Facility-Administered Medications   Medication Dose Route Frequency Provider Last Rate Last Admin    norepinephrine (LEVOPHED) 4 mg in  mL infusion PREMIX  0.01-0.6 mcg/kg/min Intravenous Continuous Soto Hayes MD 51 mL/hr at 04/04/24 1512 0.25 mcg/kg/min at 04/04/24 1512       ICU DAILY CHECKLIST           PPI  No DVT ppx, or mechanical due to iliac stent and DVT  Subcutaneous insulin    PHYSICAL THERAPY AND MOBILITY: Can patient have PT and mobility trial: no Activity: ICU mobility protocol    Critical Care Time excluding procedures and family discussions greater than: 1 Hour    Risk Factors Present on Admission:  Clinically Significant Risk Factors Present on Admission            # Hypomagnesemia: Lowest Mg = 1.2 mg/dL in last 2 days, will replace as needed   # Hypoalbuminemia: Lowest albumin = 2.2 g/dL at 4/4/2024  9:57 AM, will monitor as appropriate  # Drug Induced Coagulation Defect: home medication list includes an anticoagulant medication    # Hypertension: Noted on problem list   # Circulatory Shock: required vasopressors within past 24 hours    # Acute Respiratory Failure: Documented O2 saturation < 91%.  Continue supplemental oxygen as needed             # Anemia: based on hgb <11           Ewa Tomlin DO  Pulmonary and Critical Care Attending  pgr [phone]    No Known Allergies    Meds: See MAR    Physical Exam:  BP (!) 75/35   Pulse (!) 127   Temp 97.9  F (36.6  C)   Resp (!) 31   Ht 1.549 m (5' 1\")   Wt 54.4 kg (120 lb)   LMP  (LMP Unknown)   SpO2 (!) 75%   BMI 22.67 kg/m    Intake/Output this shift:  No intake/output data recorded.  GEN: laying flat, NAD  HEENT: NC and oxymask in place  CVS: regular rhythm, no murmurs  RESP: CTA BL   ABD: Soft, No abdominal pain with palpation, no guarding, no rigidity  EXT: right leg dusky, left warm  NEURO: moving all extermities, nonfocal  PSYCH: pleasant    Pertinent Labs: Latest lab results in EHR personally reviewed.   CMP  Recent Labs "   Lab 04/04/24  0957      POTASSIUM 3.8   CHLORIDE 106   CO2 23   ANIONGAP 9   *   BUN 20.7   CR 0.98*   GFRESTIMATED 61   DAVID 7.1*   MAG 1.2*   PROTTOTAL 4.8*   ALBUMIN 2.2*   BILITOTAL 0.5   ALKPHOS 121   AST 14   ALT 11     CBC  Recent Labs   Lab 04/04/24  1205 04/04/24  0957   WBC 7.3 9.6   RBC 2.51* 1.96*   HGB 7.0* 5.4*   HCT 22.6* 17.5*   MCV 90 89   MCH 27.9 27.6   MCHC 31.0* 30.9*   RDW 16.0* 15.9*    310     INR  Recent Labs   Lab 04/04/24  0957   INR 1.76*     Arterial Blood GasNo lab results found in last 7 days.    Cultures: not yet available.    Imaging: personally reviewed.   EXAM: CTA CHEST ABDOMEN PELVIS W CONTRAST  LOCATION: [facility]  DATE: 4/4/2024     INDICATION: Bright red blood per rectum, eval for brisk GI bleed. History of endometrial neoplasm. History of abscess on outside imaging.  COMPARISON: 11/27/2023 PET/CT. [facility] CT report 03/03/2024 (no images available).  TECHNIQUE: CT angiogram chest abdomen pelvis during arterial phase of injection of IV contrast. 2D and 3D MIP reconstructions were performed by the CT technologist. Dose reduction techniques were used.   CONTRAST: 90ml isovue 370     FINDINGS: Motion artifact.     CT ANGIOGRAM CHEST, ABDOMEN, AND PELVIS: Active extravasation from the right external iliac artery into a large right lower quadrant abscess. Nonaneurysmal aorta. Patent aortic branch vessels. No definite pulmonary embolism.     LUNGS AND PLEURA: New mild bilateral bandlike basilar predominant opacities. Few new 3 to 5 mm lower lung predominant nodules as well (for example right lower lobe series 9, images 1 ). No pleural effusion or pneumothorax.     MEDIASTINUM/AXILLAE: No thoracic adenopathy. Right-sided portacatheter tip near the cavoatrial junction. Moderate hiatus hernia.     CORONARY ARTERY CALCIFICATION: Motion artifact.     HEPATOBILIARY: Redemonstrated right hepatic dome hemangioma. No definite new or  enlarging liver lesions.     PANCREAS: Mildly atrophic.     SPLEEN: Normal.     ADRENAL GLANDS: Stable mild left adrenal thickening.     KIDNEYS/BLADDER: Right ureteral stent remains in place. The right renal collecting system shows increased moderate to severe dilatation. Mild air within the right renal pelvis and ureter. The right ureteral stent courses through the right lower quadrant   abscess along the mid to distal portion. Air and debris within the urinary bladder.     BOWEL: Please see the pelvic section for right lower quadrant abscess and bowel communication findings. Areas of colonic wall thickening, including the cecum and ascending colon, as well as the sigmoid colon, with areas of wall enhancement. Mild loosely   formed colonic stool. Mild fluid filled small bowel, with a few small bowel loops upper normal in caliber at 2.9 cm.     LYMPH NODES: Since 11/27/2023 PET/CT, right lower quadrant abscess is now present in the region of prior right lower quadrant mass / implant. Implant posterior to the abscess has increased in size measuring 3.4 cm versus 1.2 cm on the November PET/CT   (series 9, image 496). More pronounced adjacent implants along the midline to the left vaginal cuff, with example area measuring 1.2 cm (series 9, image 537). No retroperitoneal adenopathy.     VASCULATURE: Deep vein thrombosis identified extending at least from the right external iliac vein in the pelvis into the common femoral and superficial femoral vein from proximal to distal thigh. Small amount of DVT identified in the deep femoral vein.   Asymmetric right leg swelling. Flattened appearance of the IVC.     PELVIC ORGANS: New since the November 2023 PET/CT, right lower quadrant abscess measuring roughly 9.2 x 10.4 x 9.8 cm (series 9, image 465). This contains intrinsic air and debris. Abscess extends to the into the inferior right psoas muscle (image 423).   There is fistulous connection to the sigmoid colon and possibly  cecum. Air and debris within the bladder suggests fistulization to the bladder as well.     MUSCULOSKELETAL: Subcutaneous body wall edema.                                                                      IMPRESSION:     1.  Acute active bleeding is present on this exam.      2.  The right external iliac artery demonstrates active contrast extravasation and bleeding into a right lower quadrant abscess. The right external iliac artery courses through this abscess; abscess erosion into the artery presumed the etiology of bleed.     3.  Large right lower quadrant abscess. This was noted on outside [facility] CT report from March 2024, though imaging is not available from that exam. There is fistulous connection to the sigmoid colon and possibly cecum as well. Air and debris in the   bladder also raises concern for fistulization to the urinary bladder.      4.  Etiology of the abscess is presumed from a prior large right lower quadrant mass eroding into bowel.      5.  Areas of mild colonic wall thickening and hyperenhancement may be reactive or from colitis. Upper normal pelvic small bowel with air-fluid levels, likely from slow flow or ileus. Distal small bowel and terminal ileum are not well evaluated from the   abscess.     6.  Significant right-sided DVT present. Deep vein thrombus extends at least from the right external iliac vein in the pelvis to the femoral vein at the distal thigh. Asymmetric right leg subcutaneous edema and swelling. Recommend lower extremity   ultrasound.     7.  Right ureteral stent appears in appropriate position, however, increased moderate to severe right hydronephrosis. The mid to distal right ureteral stent courses through the right lower quadrant abscess.     8.  New heterogeneous bandlike lower lung predominant opacities bilaterally, compatible with infectious / inflammatory process, to include drug reaction or organizing pneumonia. Additional new clustered nodules, also presumed  inflammatory, though can be   followed.     9.  Diffuse subcutaneous body wall edema. Minimal ascites.         Critical Result: Active bleeding from the right external iliac artery. Large right lower quadrant abscess with communication to bowel and likely the urinary bladder. Right-sided deep vein thrombus. Increased right hydronephrosis; right ureteral stent   courses through the abscess.        Patient Active Problem List   Diagnosis    Essential hypertension    Other specified chronic obstructive pulmonary disease (H)    Diabetes mellitus, type 2 (H)    Acute renal insufficiency    Elevated liver enzymes    Elevated troponin    Acute kidney failure with tubular necrosis (H24)    Adrenal insufficiency (H24)    Malignant neoplasm of uterus, unspecified site (H)    Anemia in neoplastic disease    Iliac artery bleed, right       Ewa Tomlin DO  Pulmonary and Critical Care Attending  pgr [phone]    Securely message with the Vocera Web Console (learn more here)

## 2024-04-04 NOTE — SEDATION DOCUMENTATION
Patient Name: Stella Greene  Medical Record Number: [id number]  Today's Date: 4/4/2024    Procedure: Angio, right side neph tube placement, abscess drain placement.    Proceduralist: Dr. Kinney     Procedure Start: 1500  Procedure end: 1700  Sedation medications administered: 0 mg midazolam and 25 mcg fentanyl   Sedation time: 0 minutes, critical care time 120 minutes    See MAR and sedation narrator, pt did receive 2 units of PRBC during procedure.     Report given to: Gini Maguire RN Bedside report included checking left groin site, where art line is in place, neph tube site and abscess drain. Pelvis is hard.     At 1800 called and spoke with  Antoni, he was present at patients bedside.

## 2024-04-04 NOTE — SEDATION DOCUMENTATION
As we are rolling the patient onto her left side the rectal area is being cleaned with excess blood. Pt is feeling nauseaed. Alert and interacting with care team.

## 2024-04-04 NOTE — ED PROVIDER NOTES
EMERGENCY DEPARTMENT ENCOUNTER      NAME: Stella Greene  AGE: 73 year old female  YOB: 1950  MRN: [id number]  EVALUATION DATE & TIME: 04/04/24 10:15 AM    PCP: Lacho Guntre    ED PROVIDER: Adela Villagran PA-C      CHIEF COMPLAINT:  Dizziness and Rectal Bleeding      FINAL IMPRESSION:  1. Iliac artery bleed, right          ED COURSE & MEDICAL DECISION MAKING:  Pertinent Labs & Imaging studies reviewed. (See chart for details)    The patient is a 73 year old-year-old female with a history of metastatic high-grade endometrial cancer in 2022 s/p neoadjuvant therapy, hysterectomy and bilateral salpingo-oophorectomy, adjuvant chemotherapy, radiation therapy who was on maintenance Keytruda (pembrolizumab; last dose on 03/07/2024), and lenvatinib (last dose in November 2023), right ureteral stent in place with moderate right hydronephrosis initiated on Zosyn and eventually discharged on ertapenem at an outside hospital finished antibiotics on 3/16/2024, right lower extremity DVT discovered in February on Eliquis presenting to the emergency department for evaluation of bright red blood per rectum onset overnight last night.  This morning, patient felt dizzy, got out of bed and noticed that she was bleeding.  She also endorses some intermittent right lower quadrant tenderness.    Initial vital signs reviewed and significant for hypotension with initial blood pressure 73/51 as well as tachycardia with . Patient is not on supplementary oxygen at baseline, on 3 L by nasal cannula.  On exam, the patient appears ill.  She is slightly pale.  Abdomen is soft, nondistended with no significant tenderness.  She has bright red blood with clots in her briefs.  Pelvic examination with no significant bleeding from vaginal vault, possible scant amount of bleeding from urethra. Appears most of the blood is rectal bleeding.    Differential diagnosis includes metastasis from malignancy, diverticulosis,  arteriovenous malformation, colonic polyps, colorectal cancer, inflammatory bowel disease, ischemic colitis, infectious colitis, others.    Patient continued to be hypotensive therefore consent was obtained for empiric blood transfusion appointment..  Her blood pressure did improve following previous tinnitus.  Please see notes.  Patient continues to mentate well on numerous reassessments.  Initial hemoglobin level 5.4.  INR 1.76.  Patient's last dose of Eliquis was 8 over 24 hours ago.  ED pharmacist consulted and felt that reversal of Eliquis not significantly helpful at this time given last dose greater than 24 hours ago.  Repeat hemoglobin level 7 after 1 unit PRBCs.  Patient also was found to be hypomagnesemic with magnesium 1.2, 2 g IV magnesium initiated.      Lactic acid 1.1.  BMP with creatinine 0.98, GFR 61.  Patient continues to be hypotensive, did start second unit of PRBCs with improved blood pressures.  Patient developed mild transfusion reaction, therefore transfusion was stopped a portion of the way through the second unit of blood.    I consulted gastroenterology who recommends CT angiogram to localize and confirm the site of the bleeding normal creatinine level.  The radiologist called with report that the CTA shows acute active bleeding from the right external iliac artery into a right lower quadrant abscess with suspected etiology of bleed abscess erosion into the artery.  Also noted to have a large right lower quadrant abscess with communication the bowel and likely the urinary bladder.  Right ureteral stent also courses through right lower quadrant abscess.      Interventional radiology and vascular surgery consulted. After reviewing the patient's case, plan for emergent clinical embolization with IR of external iliac artery on the right, placement of right nephrostomy tube, and placement of drain into abscess.  As the patient remains hemodynamically unstable in the emergency department with  tachycardia to 120 bpm and persistent hypotension 70s/40s, the patient was discussed with the intensivist who accepts the patient for admission to the ICU.  Levophed ordered for additional vasopressor support if needed during interventional radiology procedure.    Patient's goals of care were discussed with her and family, the patient reported that she would not want CPR if she were to go into cardiac arrest but would like full supportive measures up until that point.  Patient considering possible transition to comfort measures at some point in the future but does not feel ready for transition to comfort cares at this point in time.        ED COURSE:  9:44 AM I met and introduced myself to the patient. I gathered initial history and performed an initial physical exam. We discussed options and plan for diagnostics and treatment here in the ED.  9:45 AM I have staffed the patient with Dr. Soto Hayes, ED physician, who has evaluated the patient and agrees with all aspects of today's care.   9:52 AM I consented the patient for blood transfusion.  10:26 AM I discussed the patient with Dr. Estefany Chandra, gastroenterologist with [facility] who recommends CT angiogram to localize and confirm site of bleeding if her creatinine is normal.  Creatinine is abnormal, recommend nuclear med PRBC scan to localize.  GI will come see the patient.    10:26 AM Hemoglobin 5.4.   10:43 AM Discussed CODE STATUS with the patient.  She has DNR/DNI paperwork on file.  After discussing CODE STATUS with the patient, she would like to be full CODE STATUS.    1:59 PM Discussed the patient with Dr. Valentino, vascular surgery fellow who will evaluate the patient.  ED Course as of 04/04/24 1829   Thu Apr 04, 2024   0952 Consent for blood transfusion   1011 Patient is a 73-year-old female with stage IV endometrial cancer currently on chemotherapy presents emerged part with bright red blood per rectum.  For symptoms of rectal bleeding yesterday and then  had another large bloody bowel movement this morning described as bright red blood and clots.  She has not any significant associate abdominal pain.  Does note lightheadedness and dizziness particularly with standing.  Has not had any vomiting or hematemesis or coffee-ground emesis.  She does take Eliquis but her last dose was yesterday morning greater than 24 hours ago.  Had a colonoscopy done about a month ago which showed diverticulosis and possible sigmoid stricture.    Exam here patient does appear slightly pale and fatigued.  She is hypotensive with a BP of 73/51 and tachycardic at 106.  She is mentating well at this point in time and answering questions appropriately.  No focal abdominal tenderness.   rectal exam seems to show large amount of bright red blood coming from the rectum but does not seem to be coming from the vagina.    Patient is hypotensive with concern for brisk GI bleed.  IV access obtained and will start blood transfusion with PRBCs.  Coagulation studies sent and pending at this point in time.  Will also give pantoprazole   1040 Speculum exam to see if there is any brisk bleeding coming from the vaginal mucosa.  She status post hysterectomy notes any brisk bleeding from the vaginal mucosa.  Small amount of red-tinged fluid was removed from vaginal vault and seems like there could be some bleeding from the urethra I presume most of the blood is rectal bleeding   1043 Discussed CODE STATUS with the patient, she would like all interventions done up until CPR. She would be okay with intubation if needed for procedures.   1309 Spoke with the radiologist with emergent report of active right iliac arterial bleed on patient's CTA.     1316 Dr. Hayes spoke with IR   1351 Patient is on 3 L oxygen by nasal cannula, is not on supplementary O2 at baseline.   1413 I spoke with Lori, nurse practitioner with interventional radiology they plan on mesenteric angio with stent, coil, versus embolize,  placement of right nephrostomy tube, and placement of drain into right upper quadrant for abscess.   1419 Dr. Hayes spoke with the intensivist who accepts the patient.   1424 I spoke with Dr. Martin vascular surgeon who spoke with Dr. Kinney from            Medical Decision Making  Obtained supplemental history:Supplemental history obtained?: No  Reviewed external records: External records reviewed?: Documented in chart, Inpatient Record: Admission at [facility] 3/17 - 3/21/2024, and Outpatient Record: Cancer [facility] center visit 4/2/2024  Care impacted by chronic illness:Anticoagulated State and Cancer/Chemotherapy  Care significantly affected by social determinants of health:N/A  Did you consider but not order tests?: Work up considered but not performed and documented in chart, if applicable  Did you interpret images independently?: Independent interpretation of ECG and images noted in documentation, when applicable.  Consultation discussion with other provider:Did you involve another provider (consultant, , pharmacy, etc.)?: I discussed the care with another health care provider, see documentation for details.  Admit.      CRITICAL CARE:  Critical Care  Performed by: Adela Villagran PA-C  Authorized by: Adela Villagran PA-C    Total critical care time: 90 minutes  Critical care time was exclusive of separately billable procedures and treating other patients.  Critical care was necessary to treat or prevent imminent or life-threatening deterioration of the following conditions: Active hemorrhage from right external iliac artery requiring ongoing blood product resuscitation, persistent hypotension.  Critical care was time spent personally by me on the following activities: development of treatment plan with patient or surrogate, discussions with consultants, examination of patient, evaluation of patient's response to treatment, obtaining history from patient or surrogate, ordering and  performing treatments and interventions, ordering and review of laboratory studies, ordering and review of radiographic studies and re-evaluation of patient's condition, this excludes any separately billable procedures.       MEDICATIONS GIVEN IN THE EMERGENCY:  Medications   norepinephrine (LEVOPHED) 4 mg in  mL infusion PREMIX (0.3 mcg/kg/min × 54.4 kg Intravenous $New Bag 4/4/24 1700)   flumazenil (ROMAZICON) injection 0.2 mg (has no administration in time range)   naloxone (NARCAN) injection 0.2 mg (has no administration in time range)     Or   naloxone (NARCAN) injection 0.4 mg (has no administration in time range)     Or   naloxone (NARCAN) injection 0.2 mg (has no administration in time range)     Or   naloxone (NARCAN) injection 0.4 mg (has no administration in time range)   vasopressin (VASOSTRICT) 20 Units in sodium chloride 0.9 % 100 mL standard conc infusion (0 Units/hr Intravenous Stopped 4/4/24 4405)   methylPREDNISolone sodium succinate (solu-MEDROL) injection 62.5 mg (62.5 mg Intravenous $Given 4/4/24 1546)   iohexol (OMNIPAQUE) 350 MG/ML injectable solution 100 mL (has no administration in time range)   iohexol (OMNIPAQUE) 350 MG/ML injectable solution 100 mL (has no administration in time range)   Hold: Metformin and metformin containing medications on day of the procedure and for 48 hours after IV contrast given- Patients with acute kidney injury or severe chronic kidney disease (stage IV or stage V; i.e., eGFR less than 30) (has no administration in time range)   glucose gel 15-30 g (has no administration in time range)     Or   dextrose 50 % injection 25-50 mL (has no administration in time range)     Or   glucagon injection 1 mg (has no administration in time range)   Continuing statin from home medication list OR statin order already placed during this visit (has no administration in time range)   vancomycin (VANCOCIN) 1,250 mg in sodium chloride 0.9 % 250 mL intermittent infusion (has  no administration in time range)     Followed by   vancomycin (VANCOCIN) 500 mg vial to attach to  mL bag (has no administration in time range)   meropenem (MERREM) 1 g vial to attach to  mL bag (has no administration in time range)   oxyCODONE (ROXICODONE) tablet 10 mg (has no administration in time range)   fluticasone-vilanterol (BREO ELLIPTA) 100-25 MCG/ACT inhaler 1 puff (has no administration in time range)   albuterol (PROVENTIL HFA/VENTOLIN HFA) inhaler (has no administration in time range)   cetirizine (zyrTEC) tablet 10 mg (has no administration in time range)   gabapentin (NEURONTIN) capsule 300 mg (has no administration in time range)   montelukast (SINGULAIR) tablet 10 mg (has no administration in time range)   pantoprazole (PROTONIX) IV push injection 40 mg (40 mg Intravenous $Given 4/4/24 1003)   magnesium sulfate 2 g in 50 mL sterile water intermittent infusion (0 g Intravenous Stopped 4/4/24 1246)   acetaminophen (TYLENOL) tablet 650 mg (650 mg Oral $Given 4/4/24 1218)   ondansetron (ZOFRAN) injection 4 mg (4 mg Intravenous $Given 4/4/24 1218)   iopamidol (ISOVUE-370) solution 90 mL (90 mLs Intravenous $Given 4/4/24 1246)   ondansetron (ZOFRAN) injection 4 mg (4 mg Intravenous $Given 4/4/24 1632)   iohexol (OMNIPAQUE) 350 MG/ML injectable solution 100 mL (30 mLs Intravenous $Given 4/4/24 1609)   iohexol (OMNIPAQUE) 350 MG/ML injectable solution 100 mL (15 mLs Intravenous $Given 4/4/24 1625)   iohexol (OMNIPAQUE) 350 MG/ML injectable solution 100 mL (10 mLs Intravenous $Given 4/4/24 1654)       NEW PRESCRIPTIONS STARTED AT TODAY'S ER VISIT  Current Discharge Medication List             =================================================================    HPI    Patient information was obtained from: the patient     Use of Intrepreter: N/A         Stella Greene is a 73 year old female with pertinent medical history of metastatic high-grade endometrial cancer in 2022 s/p neoadjuvant  therapy, hysterectomy and bilateral salpingo-oophorectomy, adjuvant chemotherapy, radiation therapy who was on maintenance Keytruda (pembrolizumab; last dose on 03/07/2024), and lenvatinib (last dose in November 2023), right ureteral stent in place with moderate right hydronephrosis initiated on Zosyn and eventually discharged on ertapenem at an outside hospital finished antibiotics on 3/16/2024, right lower extremity DVT discovered in February on Eliquis who presents to the emergency department for evaluation of dizziness and rectal bleeding.    Patient developed bright red blood per rectum with clots overnight last night.  She has had some intermittent right lower quadrant abdominal pain and nausea, did have nonbloody emesis this morning.  She feels lightheaded dizziness this morning with 200-300 cc of blood noted on floor by EMS this morning. Last oral intake of Dr. Pepper at 715 this morning, states that she vomited shortly thereafter.  Last had solid food yesterday at lunchtime.  No chest pain, shortness of breath.  She denies vaginal bleeding.  Last dose of Eliquis 24 hours ago yesterday morning.     Per chart review,  Hemoglobin 7.4 on 3/29/2024.  She underwent transfusion 1 unit PRBCs 2 days ago 4/2/2024 at cancer center.  The patient was recently admitted 3/17/2024 - 3/21/2024 at [facility] for partial small bowel obstruction.  During admission, CT abdomen demonstrated large potentially malignant appearing right pelvic mass involving cecum, terminal ileum, sigmoid colon, right pelvic vasculature, right ureteral stent, she underwent CT-guided biopsy on 3/20/2024 with which was notable for malignant cells on preliminary cytology.  Partial small bowel obstruction thought to be due to malignant compression.  On day of discharge, she was noted to have necrotic left foot ulcer which was debrided and wound care was initiated.        PAST MEDICAL HISTORY:  Past Medical History:   Diagnosis Date    Acute  renal insufficiency     Adrenal insufficiency (H24)     secondary, due to chronic prednisone use for asthma, tapering off with Pulmonology    Asthma     COPD (chronic obstructive pulmonary disease) (H)     Diabetes mellitus, type 2 (H) 12/06/2021    HbA1c 7% 9/2022 - Last visit with Endo 9/2022    Essential hypertension     Created by Conversion [employer] Utility updated for latest IMO load     Hydronephrosis     Malignant neoplasm of uterus (H)     Mild persistent asthma without complication     Neuropathy        PAST SURGICAL HISTORY:  Past Surgical History:   Procedure Laterality Date    COMBINED CYSTOSCOPY, RETROGRADES, EXCHANGE STENT URETER(S) Right 11/10/2023    Procedure: CYSTOSCOPY, RIGHT RETROGRADE PYELOGRAM WITH RIGHT URETERAL STENT EXCHANGE;  Surgeon: Hernan Santana MD;  Location: [facility] OR     CHEST PORT PLACEMENT > 5 YRS OF AGE  1/13/2023    IR NEPHROSTOMY TUBE PLACEMENT RIGHT  12/31/2022    IR URETERAL STENT PLACEMENT RIGHT  3/29/2023    LAPAROSCOPIC TUBAL LIGATION Bilateral     nasal polpys Bilateral     PICC TRIPLE LUMEN PLACEMENT  12/31/2022            CURRENT MEDICATIONS:    Prior to Admission Medications   Prescriptions Last Dose Informant Patient Reported? Taking?   ADVAIR DISKUS 250-50 MCG/ACT inhaler Past Week  No Yes   Sig: INHALE 1 PUFF BY MOUTH TWICE DAILY   Apixaban Starter Pack (ELIQUIS DVT/PE STARTER PACK) 5 MG TBPK   Yes Yes   Glucagon (GVOKE HYPOPEN 1-PACK) 1 MG/0.2ML SOAJ   No No   Sig: Inject 1 mg Subcutaneous as needed (low BG levels)   Vitamin D, Cholecalciferol, 25 MCG (1000 UT) TABS   Yes No   Sig: Take 1 tablet by mouth daily   acetaminophen (TYLENOL) 325 MG tablet Past Week at PRN  No Yes   Sig: Take 2 tablets (650 mg) by mouth every 4 hours as needed for mild pain or fever   albuterol (PROAIR HFA/PROVENTIL HFA/VENTOLIN HFA) 108 (90 Base) MCG/ACT inhaler Past Week at PRN  No Yes   Sig: Inhale 2 puffs into the lungs every 6 hours as needed for shortness of breath or  wheezing   blood glucose test (CONTOUR NEXT STRIPS) strips   No No   Sig: [BLOOD GLUCOSE TEST (CONTOUR NEXT STRIPS) STRIPS] Test four times daily.  Dx code DM2.   cetirizine (ZYRTEC) 10 MG tablet Past Week  Yes Yes   Sig: [CETIRIZINE (ZYRTEC) 10 MG TABLET] Take 10 mg by mouth daily.   fluticasone propionate (FLONASE) 50 mcg/actuation nasal spray Past Week  No Yes   Sig: [FLUTICASONE PROPIONATE (FLONASE) 50 MCG/ACTUATION NASAL SPRAY] 1 spray into each nostril daily.   gabapentin (NEURONTIN) 300 MG capsule Past Week  No Yes   Sig: Take 1 capsule (300 mg) by mouth at bedtime   insulin pen needle (31G X 6 MM) 31G X 6 MM miscellaneous   No No   Sig: Use 2 pen needles daily or as directed.   lisinopril (ZESTRIL) 5 MG tablet   No No   Sig: Take 1 tablet (5 mg) by mouth daily   montelukast (SINGULAIR) 10 MG tablet   No No   Sig: Take 1 tablet (10 mg) by mouth At Bedtime   multivitamin therapeutic tablet   Yes No   Sig: [MULTIVITAMIN THERAPEUTIC TABLET] Take 1 tablet by mouth daily.   pantoprazole (PROTONIX) 20 MG EC tablet   Yes Yes   Sig: Take 1 tablet (20 mg) by mouth daily Take 20 mg bid for 2 weeks and then once daily   pembrolizumab 3/7/2024  Yes No   Sig: Inject into the vein every 21 days      Facility-Administered Medications Last Administration Doses Remaining   lidocaine (XYLOCAINE) 2 % external gel 12/17/2021  7:54 AM           ALLERGIES:  No Known Allergies    FAMILY HISTORY:  Family History   Problem Relation Age of Onset    No Known Problems Mother     Heart Disease Father        SOCIAL HISTORY:  Social History     Tobacco Use    Smoking status: Never    Smokeless tobacco: Never   Vaping Use    Vaping Use: Never used   Substance Use Topics    Alcohol use: No    Drug use: No        VITALS:    First Vitals:  Patient Vitals for the past 24 hrs:   BP Temp Pulse Resp SpO2 Height Weight   04/04/24 1650 -- -- (!) 125 23 100 % -- --   04/04/24 1645 -- -- (!) 123 21 100 % -- --   04/04/24 1640 -- -- (!) 124 21 100 % --  --   04/04/24 1635 (!) 157/94 -- (!) 129 (!) 49 91 % -- --   04/04/24 1630 (!) 146/84 -- 120 (!) 33 (!) 82 % -- --   04/04/24 1625 112/55 -- (!) 121 21 100 % -- --   04/04/24 1622 107/56 -- (!) 121 23 100 % -- --   04/04/24 1620 102/58 -- (!) 121 22 100 % -- --   04/04/24 1616 113/59 -- 120 25 100 % -- --   04/04/24 1615 -- -- (!) 121 22 100 % -- --   04/04/24 1614 117/65 -- 120 21 100 % -- --   04/04/24 1608 126/60 -- 120 22 100 % -- --   04/04/24 1604 128/63 -- (!) 122 21 100 % -- --   04/04/24 1602 133/72 -- (!) 124 22 100 % -- --   04/04/24 1600 127/70 -- (!) 125 25 100 % -- --   04/04/24 1558 112/60 -- (!) 128 24 100 % -- --   04/04/24 1556 104/69 -- (!) 132 25 100 % -- --   04/04/24 1553 101/61 -- (!) 137 28 100 % -- --   04/04/24 1543 95/51 99.5  F (37.5  C) (!) 142 28 100 % -- --   04/04/24 1540 100/62 -- (!) 141 28 100 % -- --   04/04/24 1534 (!) 64/43 -- (!) 137 26 100 % -- --   04/04/24 1532 (!) 64/40 -- (!) 135 24 100 % -- --   04/04/24 1530 (!) 71/40 -- (!) 137 27 100 % -- --   04/04/24 1528 (!) 68/43 -- (!) 135 27 (!) 86 % -- --   04/04/24 1524 -- -- (!) 133 29 100 % -- --   04/04/24 1520 (!) 71/41 -- (!) 129 29 100 % -- --   04/04/24 1512 (!) 65/42 -- (!) 128 30 100 % -- --   04/04/24 1510 (!) 64/43 -- (!) 130 26 91 % -- --   04/04/24 1508 (!) 61/41 -- (!) 133 27 96 % -- --   04/04/24 1502 (!) 75/35 -- (!) 127 (!) 31 (!) 75 % -- --   04/04/24 1501 (!) 76/37 -- (!) 127 30 (!) 81 % -- --   04/04/24 1441 (!) 67/40 -- -- -- -- -- --   04/04/24 1430 (!) 65/38 -- 114 -- 100 % -- --   04/04/24 1426 (!) 71/45 -- 117 -- 100 % -- --   04/04/24 1400 (!) 81/53 -- 120 -- 100 % -- --   04/04/24 1356 (!) 79/47 -- 112 -- 100 % -- --   04/04/24 1345 (!) 64/49 -- 117 -- 100 % -- --   04/04/24 1341 (!) 57/35 -- 111 -- 100 % -- --   04/04/24 1326 -- -- 105 19 100 % -- --   04/04/24 1311 (!) 73/49 -- 102 16 100 % -- --   04/04/24 1257 92/53 -- 116 24 100 % -- --   04/04/24 1249 128/67 -- (!) 121 20 100 % -- --   04/04/24  "1244 138/60 -- 118 -- 100 % -- --   04/04/24 1205 103/54 -- 108 (!) 38 99 % -- --   04/04/24 1154 103/54 97.9  F (36.6  C) 96 16 -- -- --   04/04/24 1100 (!) 89/50 -- 99 25 91 % -- --   04/04/24 1050 95/54 -- 97 15 -- -- --   04/04/24 1035 (!) 74/53 -- 104 21 91 % -- --   04/04/24 1027 (!) 69/50 98.1  F (36.7  C) 100 16 -- -- --   04/04/24 1020 (!) 69/50 -- 100 19 100 % -- --   04/04/24 1015 -- 98.1  F (36.7  C) -- -- -- -- --   04/04/24 1005 (!) 76/60 -- 100 -- 98 % -- --   04/04/24 0951 -- -- -- -- -- 1.549 m (5' 1\") 54.4 kg (120 lb)   04/04/24 0950 (!) 78/51 -- 104 -- -- -- --   04/04/24 0944 (!) 73/51 98.4  F (36.9  C) 106 -- -- -- --       Patient Vitals for the past 24 hrs:   BP Temp Pulse Resp SpO2 Height Weight   04/04/24 1650 -- -- (!) 125 23 100 % -- --   04/04/24 1645 -- -- (!) 123 21 100 % -- --   04/04/24 1640 -- -- (!) 124 21 100 % -- --   04/04/24 1635 (!) 157/94 -- (!) 129 (!) 49 91 % -- --   04/04/24 1630 (!) 146/84 -- 120 (!) 33 (!) 82 % -- --   04/04/24 1625 112/55 -- (!) 121 21 100 % -- --   04/04/24 1622 107/56 -- (!) 121 23 100 % -- --   04/04/24 1620 102/58 -- (!) 121 22 100 % -- --   04/04/24 1616 113/59 -- 120 25 100 % -- --   04/04/24 1615 -- -- (!) 121 22 100 % -- --   04/04/24 1614 117/65 -- 120 21 100 % -- --   04/04/24 1608 126/60 -- 120 22 100 % -- --   04/04/24 1604 128/63 -- (!) 122 21 100 % -- --   04/04/24 1602 133/72 -- (!) 124 22 100 % -- --   04/04/24 1600 127/70 -- (!) 125 25 100 % -- --   04/04/24 1558 112/60 -- (!) 128 24 100 % -- --   04/04/24 1556 104/69 -- (!) 132 25 100 % -- --   04/04/24 1553 101/61 -- (!) 137 28 100 % -- --   04/04/24 1543 95/51 99.5  F (37.5  C) (!) 142 28 100 % -- --   04/04/24 1540 100/62 -- (!) 141 28 100 % -- --   04/04/24 1534 (!) 64/43 -- (!) 137 26 100 % -- --   04/04/24 1532 (!) 64/40 -- (!) 135 24 100 % -- --   04/04/24 1530 (!) 71/40 -- (!) 137 27 100 % -- --   04/04/24 1528 (!) 68/43 -- (!) 135 27 (!) 86 % -- --   04/04/24 1524 -- -- (!) 133 " "29 100 % -- --   04/04/24 1520 (!) 71/41 -- (!) 129 29 100 % -- --   04/04/24 1512 (!) 65/42 -- (!) 128 30 100 % -- --   04/04/24 1510 (!) 64/43 -- (!) 130 26 91 % -- --   04/04/24 1508 (!) 61/41 -- (!) 133 27 96 % -- --   04/04/24 1502 (!) 75/35 -- (!) 127 (!) 31 (!) 75 % -- --   04/04/24 1501 (!) 76/37 -- (!) 127 30 (!) 81 % -- --   04/04/24 1441 (!) 67/40 -- -- -- -- -- --   04/04/24 1430 (!) 65/38 -- 114 -- 100 % -- --   04/04/24 1426 (!) 71/45 -- 117 -- 100 % -- --   04/04/24 1400 (!) 81/53 -- 120 -- 100 % -- --   04/04/24 1356 (!) 79/47 -- 112 -- 100 % -- --   04/04/24 1345 (!) 64/49 -- 117 -- 100 % -- --   04/04/24 1341 (!) 57/35 -- 111 -- 100 % -- --   04/04/24 1326 -- -- 105 19 100 % -- --   04/04/24 1311 (!) 73/49 -- 102 16 100 % -- --   04/04/24 1257 92/53 -- 116 24 100 % -- --   04/04/24 1249 128/67 -- (!) 121 20 100 % -- --   04/04/24 1244 138/60 -- 118 -- 100 % -- --   04/04/24 1205 103/54 -- 108 (!) 38 99 % -- --   04/04/24 1154 103/54 97.9  F (36.6  C) 96 16 -- -- --   04/04/24 1100 (!) 89/50 -- 99 25 91 % -- --   04/04/24 1050 95/54 -- 97 15 -- -- --   04/04/24 1035 (!) 74/53 -- 104 21 91 % -- --   04/04/24 1027 (!) 69/50 98.1  F (36.7  C) 100 16 -- -- --   04/04/24 1020 (!) 69/50 -- 100 19 100 % -- --   04/04/24 1015 -- 98.1  F (36.7  C) -- -- -- -- --   04/04/24 1005 (!) 76/60 -- 100 -- 98 % -- --   04/04/24 0951 -- -- -- -- -- 1.549 m (5' 1\") 54.4 kg (120 lb)   04/04/24 0950 (!) 78/51 -- 104 -- -- -- --   04/04/24 0944 (!) 73/51 98.4  F (36.9  C) 106 -- -- -- --         PHYSICAL EXAM  VITAL SIGNS: BP (!) 157/94 (BP Location: Left arm, Patient Position: Supine, Cuff Size: Adult Small)   Pulse (!) 125   Temp 99.5  F (37.5  C)   Resp 23   Ht 1.549 m (5' 1\")   Wt 54.4 kg (120 lb)   LMP  (LMP Unknown)   SpO2 100%   BMI 22.67 kg/m     GENERAL: Awake, alert, answering questions appropriately, appears ill and pale.  HEENT: Conjunctiva clear, no drainage.  SPEECH:  Easy to understand speech, " Normal volume and marilee. Normal phonation.  PULMONARY: No respiratory distress, Breathing comfortably on room air. Lungs clear to auscultation bilaterally.  CARDIOVASCULAR: Tachycardic rate with regular rhythm, radial pulses present, symmetric, and normal.  ABDOMINAL: Soft, Nondistended, Nontender, No rebound or guarding, No palpable masses.  Bright red blood per rectum with clots present.  : Speculum exam small amount of red-tinged fluid in vaginal vault, suggestive of bleeding from urethra, no brisk vaginal bleeding.  EXTREMITIES: Extremities are warm and well perfused.  NEUROLOGIC: Patient is alert, mentating well, does not appear to be confused. Speaking in full coherent sentences. Conversive. Moving all extremities spontaneously.   SKIN: Exposed areas of skin warm, dry, no rashes.  PSYCH: Normal mood and affect.           RADIOLOGY/LAB:  Reviewed all pertinent imaging. Please see official radiology report.  All pertinent labs reviewed and interpreted.  Results for orders placed or performed during the hospital encounter of 04/04/24   CTA Chest Abdomen Pelvis w Contrast    Impression    IMPRESSION:    1.  Acute active bleeding is present on this exam.     2.  The right external iliac artery demonstrates active contrast extravasation and bleeding into a right lower quadrant abscess. The right external iliac artery courses through this abscess; abscess erosion into the artery presumed the etiology of bleed.    3.  Large right lower quadrant abscess. This was noted on outside [facility] CT report from March 2024, though imaging is not available from that exam. There is fistulous connection to the sigmoid colon and possibly cecum as well. Air and debris in the   bladder also raises concern for fistulization to the urinary bladder.     4.  Etiology of the abscess is presumed from a prior large right lower quadrant mass eroding into bowel.     5.  Areas of mild colonic wall thickening and hyperenhancement may be  reactive or from colitis. Upper normal pelvic small bowel with air-fluid levels, likely from slow flow or ileus. Distal small bowel and terminal ileum are not well evaluated from the   abscess.    6.  Significant right-sided DVT present. Deep vein thrombus extends at least from the right external iliac vein in the pelvis to the femoral vein at the distal thigh. Asymmetric right leg subcutaneous edema and swelling. Recommend lower extremity   ultrasound.    7.  Right ureteral stent appears in appropriate position, however, increased moderate to severe right hydronephrosis. The mid to distal right ureteral stent courses through the right lower quadrant abscess.    8.  New heterogeneous bandlike lower lung predominant opacities bilaterally, compatible with infectious / inflammatory process, to include drug reaction or organizing pneumonia. Additional new clustered nodules, also presumed inflammatory, though can be   followed.    9.  Diffuse subcutaneous body wall edema. Minimal ascites.       Critical Result: Active bleeding from the right external iliac artery. Large right lower quadrant abscess with communication to bowel and likely the urinary bladder. Right-sided deep vein thrombus. Increased right hydronephrosis; right ureteral stent   courses through the abscess.    Finding was identified on 4/4/2024 at 1:05 PM and initially reported to SAMINA Lainez at 1:05 PM in the ER. After final completion of the report, additional results were verbally communicated by phone to Dr. Soto Hayes at 1:38 PM.    Dr. Soto Hayes in the Emergency Room was contacted by me on 4/4/2024 2:00 PM CDT and verbalized understanding of the critical result.      Result Value Ref Range    INR 1.76 (H) 0.85 - 1.15   Partial thromboplastin time   Result Value Ref Range    aPTT 58 (H) 22 - 38 Seconds   Basic metabolic panel   Result Value Ref Range    Sodium 138 135 - 145 mmol/L    Potassium 3.8 3.4 - 5.3 mmol/L    Chloride 106 98 - 107 mmol/L     Carbon Dioxide (CO2) 23 22 - 29 mmol/L    Anion Gap 9 7 - 15 mmol/L    Urea Nitrogen 20.7 8.0 - 23.0 mg/dL    Creatinine 0.98 (H) 0.51 - 0.95 mg/dL    GFR Estimate 61 >60 mL/min/1.73m2    Calcium 7.1 (L) 8.8 - 10.2 mg/dL    Glucose 145 (H) 70 - 99 mg/dL   Hepatic function panel   Result Value Ref Range    Protein Total 4.8 (L) 6.4 - 8.3 g/dL    Albumin 2.2 (L) 3.5 - 5.2 g/dL    Bilirubin Total 0.5 <=1.2 mg/dL    Alkaline Phosphatase 121 40 - 150 U/L    AST 14 0 - 45 U/L    ALT 11 0 - 50 U/L    Bilirubin Direct 0.21 0.00 - 0.30 mg/dL   Result Value Ref Range    Lipase 6 (L) 13 - 60 U/L   Lactic acid whole blood   Result Value Ref Range    Lactic Acid 1.1 0.7 - 2.0 mmol/L   Result Value Ref Range    Magnesium 1.2 (L) 1.7 - 2.3 mg/dL   CBC with platelets and differential   Result Value Ref Range    WBC Count 9.6 4.0 - 11.0 10e3/uL    RBC Count 1.96 (L) 3.80 - 5.20 10e6/uL    Hemoglobin 5.4 (LL) 11.7 - 15.7 g/dL    Hematocrit 17.5 (L) 35.0 - 47.0 %    MCV 89 78 - 100 fL    MCH 27.6 26.5 - 33.0 pg    MCHC 30.9 (L) 31.5 - 36.5 g/dL    RDW 15.9 (H) 10.0 - 15.0 %    Platelet Count 310 150 - 450 10e3/uL    % Neutrophils 89 %    % Lymphocytes 5 %    % Monocytes 5 %    % Eosinophils 0 %    % Basophils 0 %    % Immature Granulocytes 1 %    NRBCs per 100 WBC 0 <1 /100    Absolute Neutrophils 8.5 (H) 1.6 - 8.3 10e3/uL    Absolute Lymphocytes 0.5 (L) 0.8 - 5.3 10e3/uL    Absolute Monocytes 0.5 0.0 - 1.3 10e3/uL    Absolute Eosinophils 0.0 0.0 - 0.7 10e3/uL    Absolute Basophils 0.0 0.0 - 0.2 10e3/uL    Absolute Immature Granulocytes 0.1 <=0.4 10e3/uL    Absolute NRBCs 0.0 10e3/uL   CBC with platelets and differential   Result Value Ref Range    WBC Count 7.3 4.0 - 11.0 10e3/uL    RBC Count 2.51 (L) 3.80 - 5.20 10e6/uL    Hemoglobin 7.0 (L) 11.7 - 15.7 g/dL    Hematocrit 22.6 (L) 35.0 - 47.0 %    MCV 90 78 - 100 fL    MCH 27.9 26.5 - 33.0 pg    MCHC 31.0 (L) 31.5 - 36.5 g/dL    RDW 16.0 (H) 10.0 - 15.0 %    Platelet Count 259  150 - 450 10e3/uL    % Neutrophils 95 %    % Lymphocytes 3 %    % Monocytes 2 %    % Eosinophils 0 %    % Basophils 0 %    % Immature Granulocytes 0 %    NRBCs per 100 WBC 0 <1 /100    Absolute Neutrophils 7.0 1.6 - 8.3 10e3/uL    Absolute Lymphocytes 0.2 (L) 0.8 - 5.3 10e3/uL    Absolute Monocytes 0.1 0.0 - 1.3 10e3/uL    Absolute Eosinophils 0.0 0.0 - 0.7 10e3/uL    Absolute Basophils 0.0 0.0 - 0.2 10e3/uL    Absolute Immature Granulocytes 0.0 <=0.4 10e3/uL    Absolute NRBCs 0.0 10e3/uL   Adult Type and Screen   Result Value Ref Range    ABO/RH(D) O NEG     Antibody Screen Negative Negative    SPECIMEN EXPIRATION DATE 20240407235900    Prepare red blood cells (unit)   Result Value Ref Range    Blood Component Type Red Blood Cells     Product Code F2488K86     Unit Status Transfused     Unit Number [id number]     CROSSMATCH Compatible     CODING SYSTEM [id number]     ISSUE DATE AND TIME 94303780556311     UNIT ABO/RH O-     UNIT TYPE ISBT 9500    Prepare red blood cells (unit)   Result Value Ref Range    Blood Component Type Red Blood Cells     Product Code E3601E65     Unit Status Transfused     Unit Number [id number]     CROSSMATCH Compatible     CODING SYSTEM [id number]     ISSUE DATE AND TIME [id number]     UNIT ABO/RH O-     UNIT TYPE ISBT 9500    Transfusion reaction evaluation   Result Value Ref Range    SPECIMEN EXPIRATION DATE 20240407235900     POST-RXN ABO/RH O NEG     POST RXN CLERICAL CHECK Correct     CO COMPONENTS [id number] transfused     GRAM/CULTURE INDICATED? No     POST SPECIMEN APPEARANCE No hemolysis     OTHER UNITS TRANSFUSED No other units     Product Code O2293O78     Unit Blood Type O Negative     Unit Number [id number]     POST-RXN POLY SANTOSH Negative    Adult Type and Screen   Result Value Ref Range    ABO/RH(D) O NEG     Antibody Screen Negative Negative    SPECIMEN EXPIRATION DATE 20240407235900    Prepare red blood cells (unit)   Result Value Ref Range    ISSUE  DATE AND TIME 20240404153200     Blood Component Type Red Blood Cells     Product Code T5625H02     Unit Status Transfused     Unit Number [id number]     UNIT ABO/RH O-     CROSSMATCH Compatible     CODING SYSTEM [id number]     UNIT TYPE ISBT 9500    Prepare red blood cells (unit)   Result Value Ref Range    ISSUE DATE AND TIME [id number]     Blood Component Type Red Blood Cells     Product Code D4280G72     Unit Status Transfused     Unit Number [id number]     UNIT ABO/RH O-     CROSSMATCH Compatible     CODING SYSTEM [id number]     UNIT TYPE ISBT 9500                Adela Villagran PA-C  Emergency Medicine  [facility] EMERGENCY DEPARTMENT  [facility] [address] [phone]  Dept: [phone]     Adela Villagran PA-C  04/04/24 3504

## 2024-04-05 NOTE — PROGRESS NOTES
"Care Management Follow Up    Length of Stay (days): 1    Expected Discharge Date: 04/06/2024     Concerns to be Addressed: discharge planning     Patient plan of care discussed at interdisciplinary rounds: Yes    Anticipated Discharge Disposition:       Anticipated Discharge Services:    Education Provided on the Discharge Plan:  Per Care Team   Patient/Family in Agreement with the Plan:  Yes    Referrals Placed by CM/SW:    Private pay costs discussed: Not applicable    Additional Information:  Chart reviewed.    Cm updates:  Palliative plans to see pt today.      RNCM spoke with pt and pt's  Antoni, they would like op hospice to come out and give education about hospice and what they have to offer. Per pt/Antoni they are stear ing in that direction but not have made up mind fully yet.        Pt and Antoni had not preference as to which hospice agency.       RNCM sent referral to [facility], pending.       Emmanuel from [facility] called, she plans to meet with pt and pt's  today and plans to call  to coordinate a time, awaiting call back on what time.       1:15pm-Lien from [facility] met with pt's family and pt, they are unclear at this time if they want at home hospice or possibly [facility]. Lien will keep OP hospice referral open and will plan to check in with pt' family on Monday.     The family expressed to palliative this as well.         Referral sent via [facility] to [facility], paper application faxed as well (pending).       Social hx:  \"Stella lives in a house with her  Antoni. She is independent with ADLs and most IADLs. \"My  does all the driving\". \"I most often use the cane. I use the walker outside the house\". Family to transport at discharge.\"        Cm will continue to follow plan of care,review recommendations, and assist with any discharge needs anticipated.       Abbie Tapia RN      "

## 2024-04-05 NOTE — PROGRESS NOTES
Quick Palliative Care Note:    Met with pt (who is alert, conversant,fairly comfortable) and family for a palliative care visit. Reviewed with pt and family the important conversations she has had over the last 24 hours.  Comfort is a priority and allowing a natural death when it occurs.She however also is open to having monitoring and see how she is doing.Pt and family are aware she is at risk of decompensation off of antibiotics yet the precise timeframe is unclear.     At this time, she is off of vasopressors and precedex. She would not want further vasopressors or return to the ICU/ICU level of care. She understands and is ok with not having antibiotics.    She however is not quite ready to stop labs or vital sign monitoring. Reviewed she may now have Hgb stabilization and may not need a blood transfusion. She has not definitively decided against this(discussed often transfuse if Hgb below 7, if consistent with goals).    [facility] will be coming to meet with pt and family in an hour. She is not sure she would want to be at home on hospice. She is open to hearing about [facility] availability.    PLAN:  -Pt would be ok with a lower dose of Oxycodone 5 mg PO for moderate pain q 3 hours PRN and to continue Oxycodone 10 mg PO yet decrease the interval to q 3 hours (rather than q 4 hours).  -Wants to continue with labs and VS monitoring for now, but is clear she would not want further vasopressors or return to the ICU/ICU level of care.   -Recommend revisit this issue (VS and labs) tomorrow (she has had many serious and hard conversations the last 24 hours).   -Plan for meeting with [facility] soon as well as would appreciate CM connecting with them re:[facility] availability as she would prefer to be at a facility.  -Pt and family are aware that if she declines further and is medically unstable, she will stay at [facility] for EOL care.  -Pt and family are aware palliative care is not involved over the week-end and  that we will follow up on Monday.   - shares he hopes that they can gather information over the week-end,see sites,and consider discharge Monday, if medically stable.    Lorrie Scanlon NP,CNP, Palliative Care

## 2024-04-05 NOTE — PROGRESS NOTES
Vascular Surgery Progress Note    Patient seen at the bedside with her . Off of pressors now, reports pain is 8-9/10 in her leg. She has minimal sensation in her right leg, is able to roll the leg and somewhat plantar/dorsiflex foot today,as well as slightly bed knee.   Has some evidence of forward flow on doppler signals at the PT. RLE is cool to touch and pale.     Discussed acute limb ischemia with patient again as well as option for fem-fem bypass vs. Amputation, vs. Watching. Patient is very clear that she does not wish to pursue intervention and would like to be comfort cares. Recommend palliative care team see for ongoing changes toward comfort cares.     Seen and discussed with Dr. Gottlieb, vascular surgery staff.     Jed Valentino MD  Vascular Surgery PGY3

## 2024-04-05 NOTE — CONSULTS
Palliative Care Consultation Note  [facility]      Patient: Stella Greene  Date of Admission:  4/4/2024    Requesting Clinician / Team: Dr Hughes  Reason for consult: Metastatic cancer, bleed       Recommendations & Counseling     GOALS OF CARE:   Met with pt (who is alert, conversant,fairly comfortable) and family for a palliative care visit. Reviewed with pt and family the important conversations she has had over the last 24 hours.    Comfort is a priority and allowing a natural death when it occurs.She however also is open to having monitoring and see how she is doing.Pt and family are aware she is at risk of decompensation off of antibiotics yet the precise timeframe is unclear.     At this time, she is off of vasopressors and precedex. She would not want further vasopressors or return to the ICU/ICU level of care. She understands and is ok with not having antibiotics.    She however is not quite ready to stop labs or vital sign monitoring. Reviewed she may now have Hgb stabilization and may not need a blood transfusion. She has not definitively decided against this(discussed often transfuse if Hgb below 7, if consistent with goals).    [facility] will be coming to meet with pt and family in an hour. She is not sure she would want to be at home on hospice. She is open to hearing about OLOP availability.    ADDENDUM:  Received a call from hospitalist regarding the patient's plan of care as it relates to lab monitoring, blood transfusions, antibiotics, and vital signs.  Did review with him what we had discussed.  It did sound as though a different provider had talked with patient's family regarding stopping the antibiotics however no order related to this was placed.  Hospitalist wishes for palliative care to go back and see patient who has met with patient today, to try to clarify some of these things further.  Spoke with RN prior to seeing patient.  There also was a question  about whether to continue with blood sugar monitoring.  Stopped in to see patient and she is alert with her oldest son Jose C roca.  Her  has left for the day.  Reviewed the reason for this follow-up visit today wishing for some clarification.   was called and presented with these questions by his son however ultimately defers to his wife for her wishes.  At this time patient wants to continue on with antibiotics and is open to a blood transfusion if indicated.  She does value good pain control.  Both patient and her son endorse the fact that it has been a very roller coaster experience over the last 24 hours. We had recognized this during the first visit today.     PLAN:  -Pt would be ok with a lower dose of Oxycodone 5 mg PO for moderate pain q 3 hours PRN and to continue Oxycodone 10 mg PO yet decrease the interval to q 3 hours (rather than q 4 hours).  -Wants to continue with labs and VS monitoring for now, but is clear she would not want further vasopressors or return to the ICU/ICU level of care.   -Recommend revisit this issue (VS and labs) tomorrow (she has had many serious and hard conversations the last 24 hours).   -Plan for meeting with [facility] soon as well as would appreciate CM connecting with them re:OLOP availability as she would prefer to be at a facility.  -Pt and family are aware that if she declines further and is medically unstable, she will stay at [facility] for EOL care.  -Pt and family are aware palliative care is not involved over the week-end and that we will follow up on Monday.   - shares he hopes that they can gather information over the week-end,see sites,and consider discharge Monday, if medically stable.    ADVANCE CARE PLANNING:  No health care directive on file. Per  informed consent policy, next of kin should be involved if patient becomes unable.  There is no POLST form on file, recommend to complete prior to DC.  Code status: No CPR- Do NOT  Intubate    MEDICAL MANAGEMENT:   Pain, right limb ischemia, rates pain at 6/10. Goal would be 3-4. Did not feel Oxycodone 10 mg was too sedating. Cr 1.54  -Comfort focused care and planning to meet with hospice today.  -Pt would be ok with a lower dose of Oxycodone 5 mg PO for moderate pain q 3 hours PRN and to continue Oxycodone 10 mg PO yet decrease the interval to q 3 hours (rather than q 4 hours).  -Tomorrow, recommend review her opioid needs/use over the last 24 hours and consider scheduling Oxycodone, eg 10 mg q 6 hours and continue PRN.    Pt and family are aware that palliative care is not involved over the week-end and that we will follow up on Monday.     PSYCHOSOCIAL/SPIRITUAL SUPPORT:  Pt is [employer] (but not doing to [employer] often). She would welcome a visit from Palliative Care  later today.     Palliative Care will continue to follow Stella (see next on Monday). Thank you for the consult and allowing us to aid in the care of Stella Greene.    These recommendations have been discussed with ICU MD, ICU RN, message sent to Indigo Scanlon NP  Securely message with [address] (more info)  Text page via [employer] Paging/Directory     TTS: I have personally spent a total of 80 minutes  today on the unit in review of medical record, consultation with the medical providers and assessment of patient, with more than 50% of this time spent in counseling, coordination of care, and discussion  in a family meeting re: diagnostic results, prognosis, symptom management, risks and benefits of management options, emotional support and development of plan of care. An additional 30 minutes was spent in an extended visit, discussing care details/choices, chart review, documentation. Total time 110 minutes. An additional 45 minutes was spent in a 2nd face to face meeting trying to clarify some care questions and an addiitional 15 minutes communicating post 2nd visit with MD and ICU RN, and documentation. 2nd  visit total time: 60 minutes.     Assessment      Stella Greene is a 73 year old female with a past medical history of history of metastatic high-grade endometrial cancer in 2022 s/p neoadjuvant therapy, hysterectomy and bilateral salpingo-oophorectomy, adjuvant chemotherapy, radiation therapy who was on maintenance Keytruda (pembrolizumab; last dose on 03/07/2024), and lenvatinib (last dose in November 2023), right ureteral stent in place with moderate right hydronephrosis initiated on Zosyn and eventually discharged on ertapenem at an outside hospital finished antibiotics on 3/16/2024, right lower extremity DVT discovered in February on Eliquis presenting to the emergency department on April 4th for evaluation of bright red blood per rectum onset.      Today, the patient was seen for: metastatic CA and recent bleed     ICU MD note from last night, related to palliative care consult:  Vascular surgery staff evaluated Stella this evening -- she has acute RLE ischemia, which is expected following management of life threatening bleeding from the R external iliac artery (emergent coil embolization). Vascular fellow outlined current options for management: an aortoiliac bypass vs allowing ischemia to progress & evolve into dry gangrene. Patient has a stage IV endometrial cancer on chemotherapy and on her admission she & her family have broached the subject of comfort care based management approach. Stella is currently in septic & hemorrhagic shock on 2 vasopressors, broad antibiotic coverage of the RLQ abscess w/ tunneling to bowel & (likely) bladder. She had a R percutaneous nephrostomy tube placement and peritoneal percutaneous pigtail placement into the RLQ abscess.      Stella expressed a firm wish to have nature take its course (further qualified as allowing a natural death with medical focus on maintaining comfort). Her  is in agreement. One of her 3 sons is present at the bedside & explained that family  would want to visit w/ Stella before transitioning to comfort care -- remainder of her children & her grandchildren. The plan at this time is to aggressively support Stella through the night to allow her family to visit.      In deference to her wishes for a natural death, her code status was set as DNR/DNI.     Palliative Care Summary:   Met with pt, her son and DIL.     Prognosis, Goals, & Planning:    Functional Status just prior to this current hospitalization:  Decreased fxn recently    Prognosis, Goals, and/or Advance Care Planning:  Palliative care joining care late in the conversations. Affirmed comfort focused priorities and hospice.     Code Status was addressed today:   Pt already DNR/DNI    Patient's decision making preferences: with input from medical clinicians and loved ones        Patient has decision-making capacity today for complex decisions:Intact          Coping, Meaning, & Spirituality:   Mood, coping, and/or meaning in the context of serious illness were addressed today: Yes. Coping remarkably well, per family.     Social:    to Antoni x 53 years. Has 3 sons, 4 grandchildren. Was a very active volunteer at the school.     Medications:  I have reviewed this patient's medication profile and medications from this hospitalization.     ROS:  Comprehensive ROS is reviewed and is negative except as here & per HPI:     Physical Exam   Vital Signs with Ranges  Temp:  [97.4  F (36.3  C)-99.5  F (37.5  C)] 97.4  F (36.3  C)  Pulse:  [] 93  Resp:  [13-49] 18  BP: ()/(35-94) 116/76  Arterial Line BP: (127)/(49) 127/49  SpO2:  [73 %-100 %] 100 %  123 lbs 10.85 oz    PHYSICAL EXAM:  Alert, conversant, in NAD.   Right leg appears pale.     Data reviewed:  Results for orders placed or performed during the hospital encounter of 04/04/24 (from the past 24 hour(s))   Transfusion Reaction Investigation *Canceled*    Narrative    The following orders were created for panel order Transfusion Reaction  Investigation.  Procedure                               Abnormality         Status                     ---------                               -----------         ------                       Please view results for these tests on the individual orders.   CTA Chest Abdomen Pelvis w Contrast    Narrative    EXAM: CTA CHEST ABDOMEN PELVIS W CONTRAST  LOCATION: [facility]  DATE: 4/4/2024    INDICATION: Bright red blood per rectum, eval for brisk GI bleed. History of endometrial neoplasm. History of abscess on outside imaging.  COMPARISON: 11/27/2023 PET/CT. [facility] CT report 03/03/2024 (no images available).  TECHNIQUE: CT angiogram chest abdomen pelvis during arterial phase of injection of IV contrast. 2D and 3D MIP reconstructions were performed by the CT technologist. Dose reduction techniques were used.   CONTRAST: 90ml isovue 370    FINDINGS: Motion artifact.    CT ANGIOGRAM CHEST, ABDOMEN, AND PELVIS: Active extravasation from the right external iliac artery into a large right lower quadrant abscess. Nonaneurysmal aorta. Patent aortic branch vessels. No definite pulmonary embolism.    LUNGS AND PLEURA: New mild bilateral bandlike basilar predominant opacities. Few new 3 to 5 mm lower lung predominant nodules as well (for example right lower lobe series 9, images 1 ). No pleural effusion or pneumothorax.    MEDIASTINUM/AXILLAE: No thoracic adenopathy. Right-sided portacatheter tip near the cavoatrial junction. Moderate hiatus hernia.    CORONARY ARTERY CALCIFICATION: Motion artifact.    HEPATOBILIARY: Redemonstrated right hepatic dome hemangioma. No definite new or enlarging liver lesions.    PANCREAS: Mildly atrophic.    SPLEEN: Normal.    ADRENAL GLANDS: Stable mild left adrenal thickening.    KIDNEYS/BLADDER: Right ureteral stent remains in place. The right renal collecting system shows increased moderate to severe dilatation. Mild air within the right renal pelvis and ureter.  The right ureteral stent courses through the right lower quadrant   abscess along the mid to distal portion. Air and debris within the urinary bladder.    BOWEL: Please see the pelvic section for right lower quadrant abscess and bowel communication findings. Areas of colonic wall thickening, including the cecum and ascending colon, as well as the sigmoid colon, with areas of wall enhancement. Mild loosely   formed colonic stool. Mild fluid filled small bowel, with a few small bowel loops upper normal in caliber at 2.9 cm.    LYMPH NODES: Since 11/27/2023 PET/CT, right lower quadrant abscess is now present in the region of prior right lower quadrant mass / implant. Implant posterior to the abscess has increased in size measuring 3.4 cm versus 1.2 cm on the November PET/CT   (series 9, image 496). More pronounced adjacent implants along the midline to the left vaginal cuff, with example area measuring 1.2 cm (series 9, image 537). No retroperitoneal adenopathy.    VASCULATURE: Deep vein thrombosis identified extending at least from the right external iliac vein in the pelvis into the common femoral and superficial femoral vein from proximal to distal thigh. Small amount of DVT identified in the deep femoral vein.   Asymmetric right leg swelling. Flattened appearance of the IVC.    PELVIC ORGANS: New since the November 2023 PET/CT, right lower quadrant abscess measuring roughly 9.2 x 10.4 x 9.8 cm (series 9, image 465). This contains intrinsic air and debris. Abscess extends to the into the inferior right psoas muscle (image 423).   There is fistulous connection to the sigmoid colon and possibly cecum. Air and debris within the bladder suggests fistulization to the bladder as well.    MUSCULOSKELETAL: Subcutaneous body wall edema.      Impression    IMPRESSION:    1.  Acute active bleeding is present on this exam.     2.  The right external iliac artery demonstrates active contrast extravasation and bleeding into a  right lower quadrant abscess. The right external iliac artery courses through this abscess; abscess erosion into the artery presumed the etiology of bleed.    3.  Large right lower quadrant abscess. This was noted on outside [facility] CT report from March 2024, though imaging is not available from that exam. There is fistulous connection to the sigmoid colon and possibly cecum as well. Air and debris in the   bladder also raises concern for fistulization to the urinary bladder.     4.  Etiology of the abscess is presumed from a prior large right lower quadrant mass eroding into bowel.     5.  Areas of mild colonic wall thickening and hyperenhancement may be reactive or from colitis. Upper normal pelvic small bowel with air-fluid levels, likely from slow flow or ileus. Distal small bowel and terminal ileum are not well evaluated from the   abscess.    6.  Significant right-sided DVT present. Deep vein thrombus extends at least from the right external iliac vein in the pelvis to the femoral vein at the distal thigh. Asymmetric right leg subcutaneous edema and swelling. Recommend lower extremity   ultrasound.    7.  Right ureteral stent appears in appropriate position, however, increased moderate to severe right hydronephrosis. The mid to distal right ureteral stent courses through the right lower quadrant abscess.    8.  New heterogeneous bandlike lower lung predominant opacities bilaterally, compatible with infectious / inflammatory process, to include drug reaction or organizing pneumonia. Additional new clustered nodules, also presumed inflammatory, though can be   followed.    9.  Diffuse subcutaneous body wall edema. Minimal ascites.       Critical Result: Active bleeding from the right external iliac artery. Large right lower quadrant abscess with communication to bowel and likely the urinary bladder. Right-sided deep vein thrombus. Increased right hydronephrosis; right ureteral stent   courses through the  abscess.    Finding was identified on 4/4/2024 at 1:05 PM and initially reported to SAMINA Lainez at 1:05 PM in the ER. After final completion of the report, additional results were verbally communicated by phone to Dr. Soto Hayes at 1:38 PM.    Dr. Soto Hayes in the Emergency Room was contacted by me on 4/4/2024 2:00 PM CDT and verbalized understanding of the critical result.      Transfusion Reaction Investigation *Canceled*    Narrative    The following orders were created for panel order Transfusion Reaction Investigation.  Procedure                               Abnormality         Status                     ---------                               -----------         ------                     Transfusion reaction hiram...[[id number]]                                                   Please view results for these tests on the individual orders.   Transfusion Reaction Investigation    Narrative    The following orders were created for panel order Transfusion Reaction Investigation.  Procedure                               Abnormality         Status                     ---------                               -----------         ------                     Transfusion reaction hiram...[[id number]]                      Final result                 Please view results for these tests on the individual orders.   Transfusion reaction evaluation   Result Value Ref Range    SPECIMEN EXPIRATION DATE [id number]     POST-RXN ABO/RH O NEG     POST RXN CLERICAL CHECK Correct     CO COMPONENTS [id number] transfused     GRAM/CULTURE INDICATED? No     POST SPECIMEN APPEARANCE No hemolysis     OTHER UNITS TRANSFUSED No other units     Product Code B5658M17     Unit Blood Type O Negative     Unit Number [id number]     POST-RXN POLY SANTOSH Negative    ABO/Rh type and screen    Narrative    The following orders were created for panel order ABO/Rh type and screen.  Procedure                               Abnormality          Status                     ---------                               -----------         ------                     Adult Type and Screen[[id number]]                            Final result                 Please view results for these tests on the individual orders.   Adult Type and Screen   Result Value Ref Range    ABO/RH(D) O NEG     Antibody Screen Negative Negative    SPECIMEN EXPIRATION DATE [id number]    ABO/Rh type and screen *Canceled*    Narrative    The following orders were created for panel order ABO/Rh type and screen.  Procedure                               Abnormality         Status                     ---------                               -----------         ------                       Please view results for these tests on the individual orders.   Prepare red blood cells (unit)   Result Value Ref Range    ISSUE DATE AND TIME [id number]     Blood Component Type Red Blood Cells     Product Code F7727J77     Unit Status Transfused     Unit Number [id number]     UNIT ABO/RH O-     CROSSMATCH Compatible     CODING SYSTEM [id number]     UNIT TYPE ISBT 9500    Prepare red blood cells (unit)   Result Value Ref Range    ISSUE DATE AND TIME [id number]     Blood Component Type Red Blood Cells     Product Code O2099J38     Unit Status Transfused     Unit Number [id number]     UNIT ABO/RH O-     CROSSMATCH Compatible     CODING SYSTEM [id number]     UNIT TYPE ISBT 9500    IR Visceral Angiogram    Narrative    LOCATION: [facility]  DATE: 4/4/2024    PROCEDURE:   1. PELVIC AORTOGRAM, BILATERAL COMMON FEMORAL ARTERIAL ACCESS UTILIZING ULTRASOUND GUIDANCE, SELECTIVE CATHETERIZATION OF THE RIGHT EXTERNAL ILIAC ARTERY VIA CONTRALATERAL COMMON FEMORAL ARTERIAL ACCESS, POSSIBLE EMBOLIZATION OF THE RIGHT EXTERNAL ILIAC   ARTERY IN THE SETTING OF ACUTE HEMORRHAGE/ACTIVE BLEEDING    2. ULTRASOUND AND FLUOROSCOPIC GUIDED ABSCESS DRAINAGE, LIMITED ULTRASOUND PERFORMED FOR  LOCALIZATION PURPOSES. A PERMANENT IMAGE WAS STORED.    3. ANTEGRADE PYELOGRAM AND PERCUTANEOUS NEPHROSTOMY, ULTRASOUND-GUIDED ACCESS OF THE RIGHT INTRARENAL COLLECTING SYSTEM. A PERMANENT IMAGE WAS STORED, ANTEGRADE PYELOGRAPHY, PERCUTANEOUS NEPHROSTOMY TUBE PLACEMENT.    INTERVENTIONAL RADIOLOGIST: Ga Kinney MD    INDICATION: 73-year-old female with history of metastatic endometrial cancer status post surgical treatment, radiation therapy and chemotherapy. The patient presents to the emergency room with lower GI bleeding. A CT scan demonstrates active bleeding   from the right external iliac artery which is surrounded by a necrosing tumor containing gas. The patient is also noted to have right renal obstruction with indwelling ureteral stent. The CT scan demonstrates right hydronephrosis. After discussion with   vascular surgery, the plan will be to occlude the bleeding right external iliac artery given the life-threatening hemorrhage versus placement of a covered stent which would certainly become infected and may not control bleeding. Plan for drainage of the   right pelvic collection after control of hemorrhage followed by right nephrostomy tube placement given failure of the indwelling right ureteral stent.    CONSENT: The risks, benefits and alternatives of the procedure were discussed with the patient and patient's guardian in detail. All questions were answered. Informed consent was given to proceed with the procedure.    MODERATE SEDATION: Versed 0 mg IV; Fentanyl 25 mcg IV. During the time out, immediately prior to the administration of medications, the patient was reassessed for adequacy to receive conscious sedation.  Under physician supervision, Versed and fentanyl   were administered for moderate sedation. Pulse oximetry, heart rate and blood pressure were continuously monitored by an independent trained observer. The physician spent 105 minutes of face-to-face sedation time with the  patient.    CONTRAST: 90 cc Omnipaque    FLUOROSCOPIC TIME: 17.8 minutes.  RADIATION DOSE: Air Kerma: 2506 mGy.    COMPLICATIONS: No immediate complications.    UNIVERSAL PROTOCOL: The operative site was marked and any prior imaging was reviewed. Required items including blood products, implants, devices and special equipment was made available. Patient identity was confirmed either verbally, with demographic   information, hospital assigned identification or other identification markers. A timeout was performed immediately prior to the procedure.    STERILE BARRIER TECHNIQUE: Maximum sterile barrier technique was used. Cutaneous antisepsis was performed at the operative site with application of 2% chlorhexidine and large sterile drape. Prior to the procedure, the  and assistant performed   hand hygiene and wore hat, mask, sterile gown, and sterile gloves during the entire procedure.    PROCEDURE:    PELVIC ANGIOGRAM AND EMBOLIZATION:  Access was achieved into the right common femoral artery utilizing real-time ultrasound guidance and a micropuncture access kit. Imaging demonstrates a patent and compressible artery. Permanent images were stored to the patient's medical record.   Conversion was made for a 5 [place] vascular sheath, which was attached to a continuous heparinized saline infusion. A small volume of contrast was injected through the femoral sheath in a retrograde fashion. Imaging demonstrates patent right common   iliac, internal iliac and external iliac arteries. There is a large focus of extravasation overlying the mid right external iliac artery. Exchange is made for a Perclose suture mediated closure device which was deployed in the 12:00 position.   Over-the-wire exchange is made for an 8 [place] x 23 cm vascular sheath which was positioned in the right common iliac artery. A 16 mm Amplatzer 2 embolization plug was advanced through the sheath and deployed in the right external iliac artery.  There is   complete detachment of the plug.    Access was achieved into the left common femoral artery utilizing real-time ultrasound guidance and a micropuncture access kit. Imaging demonstrates a patent and compressible artery. Permanent images were stored to the patient's medical record.   Conversion was made for a 5 [place] vascular sheath, which was attached to a continuous heparinized saline infusion. Exchange is made for a Omni Flush catheter which was used to selectively catheterize the right common and external iliac arteries. Imaging   demonstrates a small area of persistent extravasation from the right external iliac artery.    A 2.8 Progreat Microcatheter was advanced through the right common femoral arterial sheath. The microcatheter and microwire were used to selectively catheterize the occluded right external iliac artery to the point of active extravasation. Coil   embolization of the right external iliac artery was performed utilizing two Pneumbra POD 30 cm packing coils. Control angiography from the catheter positioned in the proximal right external iliac artery demonstrates no evidence of active bleeding after   plug and coil embolization of the right external iliac artery. A retrograde angiogram from the right common femoral arterial sheath also demonstrates no evidence of retrograde bleeding.    At this point, this portion of the procedure was considered complete. The right common femoral arterial sheath was removed and hemostasis was achieved with the assistance of the Perclose closure device. The left common femoral arterial 5 Romanian sheath   was left in place and sutured to the skin.     RIGHT LOWER QUADRANT PELVIC DRAIN PLACEMENT:  A limited ultrasound was performed for localization purposes. Using real-time sonographic guidance, a needle was inserted into the right lower quadrant pelvic fluid collection. A wire was advanced and coiled in the collection. Following tract dilation, a   12  Rwandan drainage catheter was advanced and secured using sutures. The catheter was attached to a YUNG bulb. Final spot radiograph demonstrates appropriate positioning of the pelvic drain.    RIGHT NEPHROSTOMY TUBE PLACEMENT:  Using real-time sonographic guidance, a 22 gauge needle was inserted into the collecting system. Imaging demonstrates moderate to severe hydronephrosis. A permanent image was stored to the patient's medical record. An antegrade pyelogram was performed.   An AccuStick set was then placed, and a 10 Rwandan nephrostomy tube was placed in the renal pelvis. A post placement nephrostogram was performed. The catheter was sutured to the skin and placed to gravity bag drainage. Final spot radiograph demonstrates   appropriate positioning of the nephrostomy tube within the right renal pelvis. A sample of purulent fluid was sent for culture.      Impression    IMPRESSION:    1. Pelvic angiography demonstrates large area of active bleeding arising from the right external artery, as seen on CT angiography. This vessel was successfully embolized utilizing one Amplatzer plug and two microcoils.  2. Successful placement of a 12 Rwandan drain into the complex right lower pelvic collection.  3. Successful placement of a 10 Rwandan right nephrostomy tube with drainage of purulent material.   IR Peritoneal Abscess Drainage    Narrative    LOCATION: [facility]  DATE: 4/4/2024    PROCEDURE:   1. PELVIC AORTOGRAM, BILATERAL COMMON FEMORAL ARTERIAL ACCESS UTILIZING ULTRASOUND GUIDANCE, SELECTIVE CATHETERIZATION OF THE RIGHT EXTERNAL ILIAC ARTERY VIA CONTRALATERAL COMMON FEMORAL ARTERIAL ACCESS, POSSIBLE EMBOLIZATION OF THE RIGHT EXTERNAL ILIAC   ARTERY IN THE SETTING OF ACUTE HEMORRHAGE/ACTIVE BLEEDING    2. ULTRASOUND AND FLUOROSCOPIC GUIDED ABSCESS DRAINAGE, LIMITED ULTRASOUND PERFORMED FOR LOCALIZATION PURPOSES. A PERMANENT IMAGE WAS STORED.    3. ANTEGRADE PYELOGRAM AND PERCUTANEOUS NEPHROSTOMY,  ULTRASOUND-GUIDED ACCESS OF THE RIGHT INTRARENAL COLLECTING SYSTEM. A PERMANENT IMAGE WAS STORED, ANTEGRADE PYELOGRAPHY, PERCUTANEOUS NEPHROSTOMY TUBE PLACEMENT.    INTERVENTIONAL RADIOLOGIST: Ga Kinney MD    INDICATION: 73-year-old female with history of metastatic endometrial cancer status post surgical treatment, radiation therapy and chemotherapy. The patient presents to the emergency room with lower GI bleeding. A CT scan demonstrates active bleeding   from the right external iliac artery which is surrounded by a necrosing tumor containing gas. The patient is also noted to have right renal obstruction with indwelling ureteral stent. The CT scan demonstrates right hydronephrosis. After discussion with   vascular surgery, the plan will be to occlude the bleeding right external iliac artery given the life-threatening hemorrhage versus placement of a covered stent which would certainly become infected and may not control bleeding. Plan for drainage of the   right pelvic collection after control of hemorrhage followed by right nephrostomy tube placement given failure of the indwelling right ureteral stent.    CONSENT: The risks, benefits and alternatives of the procedure were discussed with the patient and patient's guardian in detail. All questions were answered. Informed consent was given to proceed with the procedure.    MODERATE SEDATION: Versed 0 mg IV; Fentanyl 25 mcg IV. During the time out, immediately prior to the administration of medications, the patient was reassessed for adequacy to receive conscious sedation.  Under physician supervision, Versed and fentanyl   were administered for moderate sedation. Pulse oximetry, heart rate and blood pressure were continuously monitored by an independent trained observer. The physician spent 105 minutes of face-to-face sedation time with the patient.    CONTRAST: 90 cc Omnipaque    FLUOROSCOPIC TIME: 17.8 minutes.  RADIATION DOSE: Air Kerma: 2506  mGy.    COMPLICATIONS: No immediate complications.    UNIVERSAL PROTOCOL: The operative site was marked and any prior imaging was reviewed. Required items including blood products, implants, devices and special equipment was made available. Patient identity was confirmed either verbally, with demographic   information, hospital assigned identification or other identification markers. A timeout was performed immediately prior to the procedure.    STERILE BARRIER TECHNIQUE: Maximum sterile barrier technique was used. Cutaneous antisepsis was performed at the operative site with application of 2% chlorhexidine and large sterile drape. Prior to the procedure, the  and assistant performed   hand hygiene and wore hat, mask, sterile gown, and sterile gloves during the entire procedure.    PROCEDURE:    PELVIC ANGIOGRAM AND EMBOLIZATION:  Access was achieved into the right common femoral artery utilizing real-time ultrasound guidance and a micropuncture access kit. Imaging demonstrates a patent and compressible artery. Permanent images were stored to the patient's medical record.   Conversion was made for a 5 Emirati vascular sheath, which was attached to a continuous heparinized saline infusion. A small volume of contrast was injected through the femoral sheath in a retrograde fashion. Imaging demonstrates patent right common   iliac, internal iliac and external iliac arteries. There is a large focus of extravasation overlying the mid right external iliac artery. Exchange is made for a Perclose suture mediated closure device which was deployed in the 12:00 position.   Over-the-wire exchange is made for an 8 Emirati x 23 cm vascular sheath which was positioned in the right common iliac artery. A 16 mm Amplatzer 2 embolization plug was advanced through the sheath and deployed in the right external iliac artery. There is   complete detachment of the plug.    Access was achieved into the left common femoral artery  utilizing real-time ultrasound guidance and a micropuncture access kit. Imaging demonstrates a patent and compressible artery. Permanent images were stored to the patient's medical record.   Conversion was made for a 5 Citizen of [place] vascular sheath, which was attached to a continuous heparinized saline infusion. Exchange is made for a Omni Flush catheter which was used to selectively catheterize the right common and external iliac arteries. Imaging   demonstrates a small area of persistent extravasation from the right external iliac artery.    A 2.8 Progreat Microcatheter was advanced through the right common femoral arterial sheath. The microcatheter and microwire were used to selectively catheterize the occluded right external iliac artery to the point of active extravasation. Coil   embolization of the right external iliac artery was performed utilizing two Pneumbra POD 30 cm packing coils. Control angiography from the catheter positioned in the proximal right external iliac artery demonstrates no evidence of active bleeding after   plug and coil embolization of the right external iliac artery. A retrograde angiogram from the right common femoral arterial sheath also demonstrates no evidence of retrograde bleeding.    At this point, this portion of the procedure was considered complete. The right common femoral arterial sheath was removed and hemostasis was achieved with the assistance of the Perclose closure device. The left common femoral arterial 5 Citizen of [place] sheath   was left in place and sutured to the skin.     RIGHT LOWER QUADRANT PELVIC DRAIN PLACEMENT:  A limited ultrasound was performed for localization purposes. Using real-time sonographic guidance, a needle was inserted into the right lower quadrant pelvic fluid collection. A wire was advanced and coiled in the collection. Following tract dilation, a   12 Citizen of [place] drainage catheter was advanced and secured using sutures. The catheter was attached to a YUNG bulb.  Final spot radiograph demonstrates appropriate positioning of the pelvic drain.    RIGHT NEPHROSTOMY TUBE PLACEMENT:  Using real-time sonographic guidance, a 22 gauge needle was inserted into the collecting system. Imaging demonstrates moderate to severe hydronephrosis. A permanent image was stored to the patient's medical record. An antegrade pyelogram was performed.   An AccuStick set was then placed, and a 10 Guamanian nephrostomy tube was placed in the renal pelvis. A post placement nephrostogram was performed. The catheter was sutured to the skin and placed to gravity bag drainage. Final spot radiograph demonstrates   appropriate positioning of the nephrostomy tube within the right renal pelvis. A sample of purulent fluid was sent for culture.      Impression    IMPRESSION:    1. Pelvic angiography demonstrates large area of active bleeding arising from the right external artery, as seen on CT angiography. This vessel was successfully embolized utilizing one Amplatzer plug and two microcoils.  2. Successful placement of a 12 Guamanian drain into the complex right lower pelvic collection.  3. Successful placement of a 10 Guamanian right nephrostomy tube with drainage of purulent material.   IR Nephrostomy Tube Placement Right    Narrative    LOCATION: [facility]  DATE: 4/4/2024    PROCEDURE:   1. PELVIC AORTOGRAM, BILATERAL COMMON FEMORAL ARTERIAL ACCESS UTILIZING ULTRASOUND GUIDANCE, SELECTIVE CATHETERIZATION OF THE RIGHT EXTERNAL ILIAC ARTERY VIA CONTRALATERAL COMMON FEMORAL ARTERIAL ACCESS, POSSIBLE EMBOLIZATION OF THE RIGHT EXTERNAL ILIAC   ARTERY IN THE SETTING OF ACUTE HEMORRHAGE/ACTIVE BLEEDING    2. ULTRASOUND AND FLUOROSCOPIC GUIDED ABSCESS DRAINAGE, LIMITED ULTRASOUND PERFORMED FOR LOCALIZATION PURPOSES. A PERMANENT IMAGE WAS STORED.    3. ANTEGRADE PYELOGRAM AND PERCUTANEOUS NEPHROSTOMY, ULTRASOUND-GUIDED ACCESS OF THE RIGHT INTRARENAL COLLECTING SYSTEM. A PERMANENT IMAGE WAS STORED,  ANTEGRADE PYELOGRAPHY, PERCUTANEOUS NEPHROSTOMY TUBE PLACEMENT.    INTERVENTIONAL RADIOLOGIST: Ga Kinney MD    INDICATION: 73-year-old female with history of metastatic endometrial cancer status post surgical treatment, radiation therapy and chemotherapy. The patient presents to the emergency room with lower GI bleeding. A CT scan demonstrates active bleeding   from the right external iliac artery which is surrounded by a necrosing tumor containing gas. The patient is also noted to have right renal obstruction with indwelling ureteral stent. The CT scan demonstrates right hydronephrosis. After discussion with   vascular surgery, the plan will be to occlude the bleeding right external iliac artery given the life-threatening hemorrhage versus placement of a covered stent which would certainly become infected and may not control bleeding. Plan for drainage of the   right pelvic collection after control of hemorrhage followed by right nephrostomy tube placement given failure of the indwelling right ureteral stent.    CONSENT: The risks, benefits and alternatives of the procedure were discussed with the patient and patient's guardian in detail. All questions were answered. Informed consent was given to proceed with the procedure.    MODERATE SEDATION: Versed 0 mg IV; Fentanyl 25 mcg IV. During the time out, immediately prior to the administration of medications, the patient was reassessed for adequacy to receive conscious sedation.  Under physician supervision, Versed and fentanyl   were administered for moderate sedation. Pulse oximetry, heart rate and blood pressure were continuously monitored by an independent trained observer. The physician spent 105 minutes of face-to-face sedation time with the patient.    CONTRAST: 90 cc Omnipaque    FLUOROSCOPIC TIME: 17.8 minutes.  RADIATION DOSE: Air Kerma: 2506 mGy.    COMPLICATIONS: No immediate complications.    UNIVERSAL PROTOCOL: The operative site was marked and any  prior imaging was reviewed. Required items including blood products, implants, devices and special equipment was made available. Patient identity was confirmed either verbally, with demographic   information, hospital assigned identification or other identification markers. A timeout was performed immediately prior to the procedure.    STERILE BARRIER TECHNIQUE: Maximum sterile barrier technique was used. Cutaneous antisepsis was performed at the operative site with application of 2% chlorhexidine and large sterile drape. Prior to the procedure, the  and assistant performed   hand hygiene and wore hat, mask, sterile gown, and sterile gloves during the entire procedure.    PROCEDURE:    PELVIC ANGIOGRAM AND EMBOLIZATION:  Access was achieved into the right common femoral artery utilizing real-time ultrasound guidance and a micropuncture access kit. Imaging demonstrates a patent and compressible artery. Permanent images were stored to the patient's medical record.   Conversion was made for a 5 Thai vascular sheath, which was attached to a continuous heparinized saline infusion. A small volume of contrast was injected through the femoral sheath in a retrograde fashion. Imaging demonstrates patent right common   iliac, internal iliac and external iliac arteries. There is a large focus of extravasation overlying the mid right external iliac artery. Exchange is made for a Perclose suture mediated closure device which was deployed in the 12:00 position.   Over-the-wire exchange is made for an 8 Thai x 23 cm vascular sheath which was positioned in the right common iliac artery. A 16 mm Amplatzer 2 embolization plug was advanced through the sheath and deployed in the right external iliac artery. There is   complete detachment of the plug.    Access was achieved into the left common femoral artery utilizing real-time ultrasound guidance and a micropuncture access kit. Imaging demonstrates a patent and compressible  artery. Permanent images were stored to the patient's medical record.   Conversion was made for a 5 Georgian vascular sheath, which was attached to a continuous heparinized saline infusion. Exchange is made for a Omni Flush catheter which was used to selectively catheterize the right common and external iliac arteries. Imaging   demonstrates a small area of persistent extravasation from the right external iliac artery.    A 2.8 Progreat Microcatheter was advanced through the right common femoral arterial sheath. The microcatheter and microwire were used to selectively catheterize the occluded right external iliac artery to the point of active extravasation. Coil   embolization of the right external iliac artery was performed utilizing two Pneumbra POD 30 cm packing coils. Control angiography from the catheter positioned in the proximal right external iliac artery demonstrates no evidence of active bleeding after   plug and coil embolization of the right external iliac artery. A retrograde angiogram from the right common femoral arterial sheath also demonstrates no evidence of retrograde bleeding.    At this point, this portion of the procedure was considered complete. The right common femoral arterial sheath was removed and hemostasis was achieved with the assistance of the Perclose closure device. The left common femoral arterial 5 Georgian sheath   was left in place and sutured to the skin.     RIGHT LOWER QUADRANT PELVIC DRAIN PLACEMENT:  A limited ultrasound was performed for localization purposes. Using real-time sonographic guidance, a needle was inserted into the right lower quadrant pelvic fluid collection. A wire was advanced and coiled in the collection. Following tract dilation, a   12 Georgian drainage catheter was advanced and secured using sutures. The catheter was attached to a YUNG bulb. Final spot radiograph demonstrates appropriate positioning of the pelvic drain.    RIGHT NEPHROSTOMY TUBE  PLACEMENT:  Using real-time sonographic guidance, a 22 gauge needle was inserted into the collecting system. Imaging demonstrates moderate to severe hydronephrosis. A permanent image was stored to the patient's medical record. An antegrade pyelogram was performed.   An AccuStick set was then placed, and a 10 Greek nephrostomy tube was placed in the renal pelvis. A post placement nephrostogram was performed. The catheter was sutured to the skin and placed to gravity bag drainage. Final spot radiograph demonstrates   appropriate positioning of the nephrostomy tube within the right renal pelvis. A sample of purulent fluid was sent for culture.      Impression    IMPRESSION:    1. Pelvic angiography demonstrates large area of active bleeding arising from the right external artery, as seen on CT angiography. This vessel was successfully embolized utilizing one Amplatzer plug and two microcoils.  2. Successful placement of a 12 Greek drain into the complex right lower pelvic collection.  3. Successful placement of a 10 Greek right nephrostomy tube with drainage of purulent material.   CT Abdomen Pelvis w/o Contrast    Narrative    EXAM: CT ABDOMEN PELVIS W/O CONTRAST  LOCATION: [facility]  DATE: 4/4/2024    INDICATION: eval right flank drain position. Follow-up abscess. Follow-up known focus of active extravasation and GI bleed.  COMPARISON: Earlier today at 1241 hours  TECHNIQUE: CT scan of the abdomen and pelvis was performed without IV contrast. Multiplanar reformats were obtained. Dose reduction techniques were used.  CONTRAST: None.    FINDINGS:   LOWER CHEST: Bands of abnormal soft tissue seen at both lung bases are indeterminate but unchanged. Large hiatal hernia.    HEPATOBILIARY: There is now slight air within peripheral intrahepatic portal branches. Air identified within superior mesenteric vein.    PANCREAS: Normal.    SPLEEN: Normal.    ADRENAL GLANDS: Normal.    KIDNEYS/BLADDER:  Interval placement of percutaneous right nephrostomy tube and resolution of right hydronephrosis. Right ureteral stent remains in good position. Abnormal striated enhancement of right renal parenchyma likely relates to the previous   hydronephrosis.  Left kidney appears normal. Filling defect within bladder lumen consistent with intraluminal clot.    BOWEL: Colon now demonstrates intraluminal contrast which appears to relate to the previous active extravasation of contrast into the large right lower quadrant mass and cecal region.  The large mass within right lower quadrant persists measuring approximately 12 x 8 cm filled with frothy debris. There has been interval placement of a percutaneous pigtail drainage catheter the catheter appears to be at the anterior most aspect of this   collection and possibly resides within the lumen of cecum or ascending colon. No new bowel abnormality evident.    LYMPH NODES: No definite lymphadenopathy.    VASCULATURE: There is been interval embolization of the right external iliac artery    PELVIC ORGANS: No new abnormality.    MUSCULOSKELETAL: Unremarkable.      Impression    IMPRESSION:   1.  Interval embolization of right external iliac artery.  2.  There is contrast now seen throughout the colon which should relate to extravasation of intravascular contrast during documented episode of active extravasation.  3.  New mesenteric venous air present, can't exclude focus of bowel wall ischemia but no obvious new focus of bowel abnormality evident. There remains the pre-existing large right pelvic mass that appears to involve the cecum/ascending colon.  4.  New right lower quadrant percutaneous drain extends into the large complex right lower quadrant mass though the drain is very anteriorly positioned within this large collection and may not result in adequate drainage in current position.  5.  Interval placement of right percutaneous nephrostomy tube with resolution of right  hydronephrosis. Mottled enhancement of right kidney may relate to the previous hydronephrosis though right pyelonephritis can't be excluded.     Hemoglobin   Result Value Ref Range    Hemoglobin 7.2 (L) 11.7 - 15.7 g/dL   Prepare red blood cells (unit)   Result Value Ref Range    Blood Component Type Red Blood Cells     Product Code A9650Z96     Unit Status Transfused     Unit Number [id number]     CROSSMATCH Compatible     CODING SYSTEM [id number]     ISSUE DATE AND TIME [id number]     UNIT ABO/RH O-     UNIT TYPE ISBT 9500    Glucose by meter   Result Value Ref Range    GLUCOSE BY METER POCT 169 (H) 70 - 99 mg/dL   Hemoglobin   Result Value Ref Range    Hemoglobin 6.2 (LL) 11.7 - 15.7 g/dL   Prepare red blood cells (unit)   Result Value Ref Range    Blood Component Type Red Blood Cells     Product Code B7254C94     Unit Status Ready for issue     Unit Number [id number]     CROSSMATCH Compatible     CODING SYSTEM [id number]    CBC with platelets   Result Value Ref Range    WBC Count 8.8 4.0 - 11.0 10e3/uL    RBC Count 2.55 (L) 3.80 - 5.20 10e6/uL    Hemoglobin 7.6 (L) 11.7 - 15.7 g/dL    Hematocrit 22.4 (L) 35.0 - 47.0 %    MCV 88 78 - 100 fL    MCH 29.8 26.5 - 33.0 pg    MCHC 33.9 31.5 - 36.5 g/dL    RDW 14.4 10.0 - 15.0 %    Platelet Count 154 150 - 450 10e3/uL   Basic metabolic panel   Result Value Ref Range    Sodium 140 135 - 145 mmol/L    Potassium 4.3 3.4 - 5.3 mmol/L    Chloride 108 (H) 98 - 107 mmol/L    Carbon Dioxide (CO2) 17 (L) 22 - 29 mmol/L    Anion Gap 15 7 - 15 mmol/L    Urea Nitrogen 34.4 (H) 8.0 - 23.0 mg/dL    Creatinine 1.54 (H) 0.51 - 0.95 mg/dL    GFR Estimate 35 (L) >60 mL/min/1.73m2    Calcium 6.5 (L) 8.8 - 10.2 mg/dL    Glucose 224 (H) 70 - 99 mg/dL   Glucose by meter   Result Value Ref Range    GLUCOSE BY METER POCT 192 (H) 70 - 99 mg/dL

## 2024-04-05 NOTE — PROGRESS NOTES
Critical hemoglobin called by lab at 1830, 6.8 g/dL. Patient and family updated, state she does not want further blood transfusions. Dr. Jeong, Dr. Myles also notified.

## 2024-04-05 NOTE — PROGRESS NOTES
Transfer out from ICU.    Detailed signout received from intensivist.  Also informed that patient was seen by multiple specialties today and patient is overwhelmed with information and advised to see patient from tomorrow as a primary.  Discussed with palliative care team.    In brief, Stella Greene is a 73 year old female with PMHx of metastatic high-grade endometrial cancer in 2022 s/p neoadjuvant therapy and GYN surgery, adjuvant chemo, radiation therapy, right ureteral stent in place and right lower extremity DVT on Eliquis who presented to ED for evaluation of dizziness, rectal bleeding.  In ED hemoglobin was 5.4, CTA imaging reported active bleeding from right external iliac artery, large RLQ abscess with communication to bowel and likely urinary bladder, right hydronephrosis despite right ureteral stent in place.      On 4/4, patient emergently taken from ED to IR, pelvic angiography demonstrated large area of active bleeding from right external artery, successfully embolized.  Patient also underwent right nephrostomy tube placement and also drain placement on right lower pelvic collection.    Patient then admitted to ICU, required total of 5 unit packed RBC transfusion per intensivist.  Patient also required pressors.    Vascular surgery evaluated patient for right lower extremity limb ischemia.  They discussed option for femorofemoral bypass versus amputation versus watching.  Reported patient does not wish to pursue intervention and would like to be comfort care.    Palliative care followed patient and reported patient would not want further pressors or return to ICU level of care.  However, patient would like to continue labs and vitals monitoring at this time.  [facility] hospice is coming to see patient and family.      Please refer to intensivist note from today for detailed plan of care.    Discussed with palliative care team, patient would like to continue lab monitoring, vitals, antibiotics and  blood transfusion.  Transfuse if hemoglobin less than 7 or hemodynamically unstable.      Note created using dragon voice recognition software.  Errors in spelling or words which seems out of context are unintentional.  Sounds alike errors may have escaped editing.    4/5/2024  JAYDON ROE MD  HOSPITALIST, [facility]

## 2024-04-05 NOTE — PLAN OF CARE
[facility] - ICU    RN Progress Note:            Pertinent Assessments:      Please refer to flowsheet rows for full assessment     Patient alert and oriented x 4, calm while precedex gtt running. Vasopressin and levophed gtts turned off around 0100ish. MAP has been >65. Afebrile, sinus tachycardia 100's then SR on the 90's. Right lower quadrant abdominal pain tolerable most of the night then pain with 7/10 rate around 0600, oxycodone po given x 1, pain down to 6/10 rate. Precedex gtt off at 0700, patient appears calm and comfortable while resting.    Hgb at 0000 6.3, 1 unit PRBC given and well tolerated. Repeat hgb 7.6 this am.     Right percutaneous nephrostomy had 25 ml bloody output, RLQ abscess drain had 20 ml   bloody foul smelling drain.         Key Events - This Shift:     See above notes.             Barriers to Discharge / Downgrade:    Can downgrade?  Palliative consult today. pressor off since 0100.

## 2024-04-05 NOTE — PHARMACY-VANCOMYCIN DOSING SERVICE
Scr increased from 0.98 to 1.54 mg/dL.   Dose has been given this morning. Will change to vancomycin by intermittent dosing and order a level for tomorrow am to assess.  Thelma Adam PharmD April 5, 2024 11:12 AM

## 2024-04-05 NOTE — PLAN OF CARE
"[facility] - ICU    RN Progress Note:            Pertinent Assessments:      Please refer to flowsheet rows for full assessment     Pt arrived on unit at 1930, had 2 large dark blood & bright bloody stools with large multiple clots. No Pulses felt with right leg, pt reports no sensation. Pt on Heparin 1700 units/hr, next recheck 4/06.    Telemetry reads Sinus Tachycardia.           Key Events - This Shift:       2 large bloody BM's with clots  PRN pain mediations added  Precedex started.  Levophed and vasopressin on.               Barriers to Discharge / Downgrade:     Pt on vasopressors, hgb trending down, discussion tomorrow with family.         Point of Contact Update YES-OR-NO: Yes  If No, reason:   Name:  Phone Number:  Summary of Conversation: Family and doctors discussed currently they (family) and pt want to be \"comfortable with no pain.\"          Problem: Comorbidity Management  Goal: Blood Glucose Levels Within Targeted Range  Outcome: Progressing  Intervention: Monitor and Manage Glycemia  Recent Flowsheet Documentation  Taken 4/4/2024 1730 by Gini Maguire RN  Medication Review/Management:   medications reviewed   dosing adjusted   high-risk medications identified   infusion initiated   infusion titrated   provider consulted     Problem: Pain Acute  Goal: Optimal Pain Control and Function  Outcome: Progressing  Intervention: Develop Pain Management Plan  Recent Flowsheet Documentation  Taken 4/4/2024 2135 by Gini Maguire RN  Pain Management Interventions:   medication (see MAR)   repositioned  Taken 4/4/2024 2100 by Gini Maguire RN  Pain Management Interventions: (reassessed 7/10 pain) MD notified (comment)  Taken 4/4/2024 2017 by Gini Maguire RN  Pain Management Interventions:   medication (see MAR)   repositioned  Taken 4/4/2024 2000 by Gini Maguire RN  Pain Management Interventions:   medication (see MAR)   MD notified (comment)   " repositioned  Intervention: Prevent or Manage Pain  Recent Flowsheet Documentation  Taken 4/4/2024 1730 by Gini Maguire RN  Sensory Stimulation Regulation:   lighting decreased   quiet environment promoted  Medication Review/Management:   medications reviewed   dosing adjusted   high-risk medications identified   infusion initiated   infusion titrated   provider consulted  Intervention: Optimize Psychosocial Wellbeing  Recent Flowsheet Documentation  Taken 4/4/2024 1730 by Gini Maguire RN  Supportive Measures: relaxation techniques promoted

## 2024-04-05 NOTE — PROGRESS NOTES
"Vascular surgery progress note    Patient seen at bedside with  after IR coil embolization of right external iliac artery.     Patient now with [place] 2B/3 ischemia of right lower extremity, absent Doppler signals, sensation significantly diminished to light touch in foot and lower leg, and unable to move her foot, however still able to rotate right leg.  Also now hypotensive on norepinephrine and vasopressin.  Discussed with patient and family nature of acute limb ischemia and the option for bypass to restore perfusion to the limb.  Also discussed how patient's medical status,  ongoing chemotherapy,  and overall frailty, and large infected fluid collection with cancer in the pelvis would make a procedure very high risk for wound infection and breakdown.     When presented with this information , Stella stated that she wanted to \"let nature take its course\"  at bedside in agreement.  Stella tells us that she does not feel good right now and she just wants to feel good again.    Dr. Garcia at bedside as well for conversation.  She very clearly provided all options to the patient in terms of how aggressively to pursue life-prolonging measures.  Stella at this time would like to continue some life-prolonging measures with a plan to allow her grandchildren and 2 sons to visit, and then ultimately pursue comfort care measures.     Given above,  vascular surgery will sign off at this time, please do not hesitate to reach out if there are any questions or concerns    Jed Valentino MD  Vascular surgery PGY 3  "

## 2024-04-05 NOTE — PROGRESS NOTES
"  Interventional Radiology - Progress Note  Inpatient - [facility]  04/05/2024     S:  Patient resting comfortably without complaints. Drowsy during conversation    O:  /63   Pulse 99   Temp 97.3  F (36.3  C) (Oral)   Resp 16   Ht 1.549 m (5' 1\")   Wt 56.1 kg (123 lb 10.9 oz)   LMP  (LMP Unknown)   SpO2 100%   BMI 23.37 kg/m    General: Stable. In no acute distress.    Neurologic: Alert and oriented x 3. No focal deficits. Drowsy  Psychiatric: Appropriate mood and affect. Cooperative. Answering questions appropriately. Linear/coherent thought process.   Respiratory: Unlabored respirations on room air.  Vascular: IR Vascular access procedure site without hematoma, swelling, or bleeding. Dressing positive for drainage. Site soft. Site nontender to palpation.  Drains(s)/Tube(s):   - Abscess Drain: Midline drain to YUNG bulb with serosanguinous output. Dressing clean, dry, and intact. No leaking appreciated from drain exit site. Drain site nontender with palpation. Flushing port 3 way stop cock in place and appropriately positioned.    - Nephrostomy Tube: Right Flank drain to gravity drainage with pink tinged hematuria output. Dressing clean, dry, and intact. Stitch present. No leaking appreciated from drain exit site. Drain site nontender with palpation.       IMAGING:    Study Result  Narrative & Impression   LOCATION: [facility]  DATE: 4/4/2024     PROCEDURE:   1. PELVIC AORTOGRAM, BILATERAL COMMON FEMORAL ARTERIAL ACCESS UTILIZING ULTRASOUND GUIDANCE, SELECTIVE CATHETERIZATION OF THE RIGHT EXTERNAL ILIAC ARTERY VIA CONTRALATERAL COMMON FEMORAL ARTERIAL ACCESS, POSSIBLE EMBOLIZATION OF THE RIGHT EXTERNAL ILIAC   ARTERY IN THE SETTING OF ACUTE HEMORRHAGE/ACTIVE BLEEDING     2. ULTRASOUND AND FLUOROSCOPIC GUIDED ABSCESS DRAINAGE, LIMITED ULTRASOUND PERFORMED FOR LOCALIZATION PURPOSES. A PERMANENT IMAGE WAS STORED.     3. ANTEGRADE PYELOGRAM AND " PERCUTANEOUS NEPHROSTOMY, ULTRASOUND-GUIDED ACCESS OF THE RIGHT INTRARENAL COLLECTING SYSTEM. A PERMANENT IMAGE WAS STORED, ANTEGRADE PYELOGRAPHY, PERCUTANEOUS NEPHROSTOMY TUBE PLACEMENT.     INTERVENTIONAL RADIOLOGIST: Ga Kinney MD     INDICATION: 73-year-old female with history of metastatic endometrial cancer status post surgical treatment, radiation therapy and chemotherapy. The patient presents to the emergency room with lower GI bleeding. A CT scan demonstrates active bleeding   from the right external iliac artery which is surrounded by a necrosing tumor containing gas. The patient is also noted to have right renal obstruction with indwelling ureteral stent. The CT scan demonstrates right hydronephrosis. After discussion with   vascular surgery, the plan will be to occlude the bleeding right external iliac artery given the life-threatening hemorrhage versus placement of a covered stent which would certainly become infected and may not control bleeding. Plan for drainage of the   right pelvic collection after control of hemorrhage followed by right nephrostomy tube placement given failure of the indwelling right ureteral stent.     CONSENT: The risks, benefits and alternatives of the procedure were discussed with the patient and patient's guardian in detail. All questions were answered. Informed consent was given to proceed with the procedure.     MODERATE SEDATION: Versed 0 mg IV; Fentanyl 25 mcg IV. During the time out, immediately prior to the administration of medications, the patient was reassessed for adequacy to receive conscious sedation.  Under physician supervision, Versed and fentanyl   were administered for moderate sedation. Pulse oximetry, heart rate and blood pressure were continuously monitored by an independent trained observer. The physician spent 105 minutes of face-to-face sedation time with the patient.     CONTRAST: 90 cc Omnipaque     FLUOROSCOPIC TIME: 17.8 minutes.  RADIATION  DOSE: Air Kerma: 2506 mGy.     COMPLICATIONS: No immediate complications.     UNIVERSAL PROTOCOL: The operative site was marked and any prior imaging was reviewed. Required items including blood products, implants, devices and special equipment was made available. Patient identity was confirmed either verbally, with demographic   information, hospital assigned identification or other identification markers. A timeout was performed immediately prior to the procedure.     STERILE BARRIER TECHNIQUE: Maximum sterile barrier technique was used. Cutaneous antisepsis was performed at the operative site with application of 2% chlorhexidine and large sterile drape. Prior to the procedure, the  and assistant performed   hand hygiene and wore hat, mask, sterile gown, and sterile gloves during the entire procedure.     PROCEDURE:    PELVIC ANGIOGRAM AND EMBOLIZATION:  Access was achieved into the right common femoral artery utilizing real-time ultrasound guidance and a micropuncture access kit. Imaging demonstrates a patent and compressible artery. Permanent images were stored to the patient's medical record.   Conversion was made for a 5 Persian vascular sheath, which was attached to a continuous heparinized saline infusion. A small volume of contrast was injected through the femoral sheath in a retrograde fashion. Imaging demonstrates patent right common   iliac, internal iliac and external iliac arteries. There is a large focus of extravasation overlying the mid right external iliac artery. Exchange is made for a Perclose suture mediated closure device which was deployed in the 12:00 position.   Over-the-wire exchange is made for an 8 Persian x 23 cm vascular sheath which was positioned in the right common iliac artery. A 16 mm Amplatzer 2 embolization plug was advanced through the sheath and deployed in the right external iliac artery. There is   complete detachment of the plug.     Access was achieved into the left  common femoral artery utilizing real-time ultrasound guidance and a micropuncture access kit. Imaging demonstrates a patent and compressible artery. Permanent images were stored to the patient's medical record.   Conversion was made for a 5 Fijian vascular sheath, which was attached to a continuous heparinized saline infusion. Exchange is made for a Omni Flush catheter which was used to selectively catheterize the right common and external iliac arteries. Imaging   demonstrates a small area of persistent extravasation from the right external iliac artery.     A 2.8 Progreat Microcatheter was advanced through the right common femoral arterial sheath. The microcatheter and microwire were used to selectively catheterize the occluded right external iliac artery to the point of active extravasation. Coil   embolization of the right external iliac artery was performed utilizing two Pneumbra POD 30 cm packing coils. Control angiography from the catheter positioned in the proximal right external iliac artery demonstrates no evidence of active bleeding after   plug and coil embolization of the right external iliac artery. A retrograde angiogram from the right common femoral arterial sheath also demonstrates no evidence of retrograde bleeding.     At this point, this portion of the procedure was considered complete. The right common femoral arterial sheath was removed and hemostasis was achieved with the assistance of the Perclose closure device. The left common femoral arterial 5 Fijian sheath   was left in place and sutured to the skin.      RIGHT LOWER QUADRANT PELVIC DRAIN PLACEMENT:  A limited ultrasound was performed for localization purposes. Using real-time sonographic guidance, a needle was inserted into the right lower quadrant pelvic fluid collection. A wire was advanced and coiled in the collection. Following tract dilation, a   12 Fijian drainage catheter was advanced and secured using sutures. The catheter was  attached to a YUNG bulb. Final spot radiograph demonstrates appropriate positioning of the pelvic drain.     RIGHT NEPHROSTOMY TUBE PLACEMENT:  Using real-time sonographic guidance, a 22 gauge needle was inserted into the collecting system. Imaging demonstrates moderate to severe hydronephrosis. A permanent image was stored to the patient's medical record. An antegrade pyelogram was performed.   An AccuStick set was then placed, and a 10 [place]er nephrostomy tube was placed in the renal pelvis. A post placement nephrostogram was performed. The catheter was sutured to the skin and placed to gravity bag drainage. Final spot radiograph demonstrates   appropriate positioning of the nephrostomy tube within the right renal pelvis. A sample of purulent fluid was sent for culture.                                                                      IMPRESSION:    1. Pelvic angiography demonstrates large area of active bleeding arising from the right external artery, as seen on CT angiography. This vessel was successfully embolized utilizing one Amplatzer plug and two microcoils.  2. Successful placement of a 12 [place] drain into the complex right lower pelvic collection.  3. Successful placement of a 10 [place]er right nephrostomy tube with drainage of purulent material.       LABS:  Recent Labs   Lab 04/05/24  1331 04/05/24  0542 04/04/24  2358 04/04/24  1754 04/04/24  1205 04/04/24  0957   WBC  --  8.8  --   --  7.3 9.6   HGB 7.0* 7.6* 6.2* 7.2* 7.0* 5.4*   PLT  --  154  --   --  259 310   INR  --   --   --   --   --  1.76*   CR  --  1.54*  --   --   --  0.98*   BILITOTAL  --   --   --   --   --  0.5   ALKPHOS  --   --   --   --   --  121   AST  --   --   --   --   --  14   ALT  --   --   --   --   --  11       Drain Outputs (in mL):  DATE OP   4/4 130   4/5 30             PNT Outputs (in mL)  DATE OP   4/4 45   4/5 55               A:  73 year old female with PMHx of metastatic high-grade endometrial cancer in 2022 s/p  neoadjuvant therapy and GYN surgery, adjuvant chemo, radiation therapy. Right ureteral stent in place and right lower extremity DVT on Eliquis.      Of note patient recently admitted 3/17-3/21 at [facility] for partial SBO with CT on admission demonstrating large malignant appearing right pelvic mass involving cecum, terminal ileum, sigmoid colon, right pelvic vasculature. Underwent biopsy 3/20 with pathology that revealed malignant cells.      Presented to ED with dizziness and rectal bleeding. Hgb on arrival 5.4 with 2 units transfused and hgb up to 7. CTA reveals active bleeding from right external iliac artery, large RLQ abscess with communication to bowel and likely urinary bladder and right hydronephrosis despite right ureteral stent in place. Requiring 3L O2 via NC, previously not on home 02. Patient will be started on pressors given low BP.      IR consulted d/t active bleeding noted on CTA. S/p pelvic angiogram with embolization utilizing Amplatzer plug and two microcoils, placement of 12F drain into right lower pelvic collection and placement of 10F R PNT 4/4/24.     P:    Patient will likely not need follow up with IR given condition; however, typical recommendations per below.    Nephrostomy tube:  - Continue present nephrostomy tube cares. Continue to gravity drainage.  - If PNT is maintained long term, would recommend routine PNT exchanges q3 months.    Abscess Drain:  - Continue present drain cares. Continue drain to YUNG Bulb drainage. Flush drain as ordered.  - Antibiotics per primary team.   - Anticipate abscessogram approx 10 days after drain placement IF needed  - Drain follow up can occur as inpatient or outpatient, most often the first drain check will occur as outpatient. Outpatient drain follow up orders have been entered.   - IR will continue to follow loosely. Please contact IR with drain related questions or concerns.      Embolization:  - Continue to follow serial Hgb. Supportive  cares. Transfuse PRN.  - Continue to hold anticoagulation (at minium would require 24 hr hold following angiogram/embolization; possibly longer pending clinical status).  - Please contact IR with questions or concerns.      Total time spent on the date of the encounter: 25 minutes.    JAZZMINE Arteaga CNP  Interventional Radiology  [phone]

## 2024-04-05 NOTE — CONSULTS
MD submitted consult for emotional support. Pt was busy with providers. I will return later in day.    MELO Ogden.  Palliative Care Team

## 2024-04-05 NOTE — PROGRESS NOTES
Intensivist update  4/4/2024     Vascular surgery staff evaluated Stella this evening -- she has acute RLE ischemia, which is expected following management of life threatening bleeding from the R external iliac artery (emergent coil embolization). Vascular fellow outlined current options for management: an aortoiliac bypass vs allowing ischemia to progress & evolve into dry gangrene. Patient has a stage IV endometrial cancer on chemotherapy and on her admission she & her family have broached the subject of comfort care based management approach.   Stella is currently in septic & hemorrhagic shock on 2 vasopressors, broad antibiotic coverage of the RLQ abscess w/ tunneling to bowel & (likely) bladder. She had a R percutaneous nephrostomy tube placement and peritoneal percutaneous pigtail placement into the RLQ abscess.     Stella expressed a firm wish to have nature take its course (further qualified as allowing a natural death with medical focus on maintaining comfort). Her  is in agreement. One of her 3 sons is present at the bedside & explained that family would want to visit w/ Stella before transitioning to comfort care -- remainder of her children & her grandchildren. The plan at this time is to aggressively support Stella through the night to allow her family to visit.     In deference to her wishes for a natural death, her code status was set as DNR/DNI.     Naye Garcia MD  Pulmonary and Critical Care

## 2024-04-05 NOTE — DISCHARGE INSTRUCTIONS
Angiogram Discharge Instructions:  You had an angiogram procedure. An angiogram is a procedure that uses x-rays to take pictures of your blood vessels. A long, flexible tube or catheter is inserted through the blood stream (through the procedure site) to help deliver contrast (dye) into the arteries so they can be visible on the x-ray. Angiograms are used to evaluate possible blockages in the arterial system. Please follow the below instructions after your angiogram, including monitoring of your procedure site.    Care instructions after angiogram procedure:  -  If you received sedation for your procedure, do not drive or operate heavy machinery for the rest of the day.  -  Do not lift objects greater than 10 pounds for 3 days following angiogram procedure.  -  Avoid excessive exercise and straining for 3 days.   -  Avoid tub baths, pools, hot tubs and Jacuzzis for 3 days or until procedure site is well healed.   -  You may shower beginning tomorrow. Do not scrub procedure site until well healed; pat dry.  -  Return to your normal activities as you tolerate after the 3 day restriction.  -  You can expect to return to work 1-3 days after your procedure - depending on the nature of your profession.  -  It is normal to have some tenderness and minimal swelling at procedure puncture site. A small area of discoloration may be present. Tenderness typically subsides in 1-2 days. A small knot may also be present at puncture site for 6-8 weeks. This can be a normal part of the healing process.     Follow up:  - Follow up with your vascular surgeon or the ordering provider. [facility] may contact you to help arrange for additional follow up.    Please seek medical evaluation for:  - If you develop fevers (greater than 101 F (38.3C)).  - If you develop increasing pain, redness, purulent drainage, tenderness, or swelling at procedure site.   - If you experience any bleeding from procedure/puncture site: lie down, firmly  apply pressure to puncture site and call 911.  - Seek emergent evaluation if you experience any new leg/arm pain, discoloration or numbness.      Percutaneous Nephrostomy Tube (PNT) Discharge Instructions:  You had a nephrostomy tube (PNT) placed. This is a catheter (plastic tube) that is inserted through your skin into your kidney. The nephrostomy tube is placed to drain urine from your body into a collecting bag outside your body. Please follow the below instructions regarding care of your nephrostomy tube:    Care Instructions:  - If you received sedation for your procedure, do not drive or operate heavy machinery for the rest of the day.  - Rest after your drain was placed. Avoid strenuous activity and heavy lifting for the next 2 days. Return to your normal activities as you tolerate after the 2 day restriction.  - Keep the nephrostomy tube site clean and dry.   - You may shower, but do not submerge the nephrostomy tube site in water (avoid tub baths, Jacuzzis, hot tubs and pools)  - If you have a gauze dressing, change the gauze and tape dressing as needed to keep site clean and dry. If you have a securement device, leave in place until your next exchange.  - Clean around nephrostomy tubes exit sites with soap and water, pat and apply new split gauze around the tube at the exit site.  - Urine going into the bag may be red with blood for the first few days.  - Keep the drainage bag lower than your kidney to keep urine from backing up.  - Inspect the tube often for kinks.  - Empty your drainage bag when it is approximately half way fluid. Follow below instructions for emptying bag:  - Clean hands well with soap and water.  - Place a container near the outlet valve of the drainage bag.  - To open the valve:  - If you have a [facility] leg bag: Twist the blue valve at the bottom of the bag while holding the valve over your container to empty bag. Re-twist the valve closed on the drainage bag once complete.  -  If you have a [facility] leg bag: Turn the lever downward while holding the valve over your container to empty the bag. Turn the valve upward to close once complete.  - Discard drainage into toilet once urine drained.    Follow-Up:  - Routine (every 2-3 months) nephrostomy tube exchange is recommended. Your Urologist may order and schedule exchanges by calling [facility] Outpatient Scheduling at [phone]    Contact [facility] RN Line at [phone] if you experience the following:  - Nephrostomy tube(s) stops draining urine.  - Nephrostomy tube(s) site is leaking urine.  - Extreme pain at the nephrostomy tube site.  - Nephrostomy tube appears to be falling out or has fallen out, becomes clogged or breaks.    Seek Medical Evaluation for the following:  - Fever (greater than 101 F (38.3C)).  - Purulent (yellow/green/foul smelling) drainage from nephrostomy tube exit sites.  - Significant bleeding from nephrostomy tube site(s).  - Significant or worsening pain at nephrostomy tube exit site(s) or back.      Drain Placement Discharge Instructions:  You had a small tube or drain(s) placed. This was placed so the abnormal fluid collection within your body can be drained out (externally). Sometimes drains must stay in place for weeks to months. Please follow the below instructions as you recover:    Care Instructions after drain placement:  - Rest after your drain was placed. Avoid strenuous activity and heavy lifting for the next 2 days. Return to your normal activities as you tolerate after the 2 day restriction.  - You may shower; however, you should not submerge site under water like in a tub bath, Jacuzzi or pool. Keep drain exit site covered with plastic wrap and tape when showering.  - Keep dressing clean and dry as long as drain is in place. If you have a gauze dressing, please change the dressing daily and as needed to keep site clean and dry.  - You may eat and drink as normal.  - You may have discomfort  "after the procedure near the drain exit site. You may take acetaminophen (Tylenol ) or ibuprofen (Motrin ) as needed for any discomfort.  - Protect the drain from being pulled out or dislodged.  - Inspect the tube often for kinks.  - If you have a gravity bag, keep the bag below the drain exit site to allow for free flow of drainage by gravity.  - Flush your drainage tube with 10mL sterile normal saline daily once daily.  - Record your daily drain outputs and amount flushed on your drainage record. Bring your records to your next radiology appointment. Often drains will need to stay in place until the drainage output is less than about 15mL a day for 2-3 days in a row.  - Empty your drainage bag/bulb daily or when it is approximately half way fluid. Follow below instructions for emptying your bag/bulb:       - Clean hands well with soap and water.       - Place a measuring container near the outlet valve of the drainage bag/bulb.       - If you have a drainage BAG: Twist the blue valve at the bottom of the bag while holding the valve over your measuring cup and open container to empty. Re-twist the valve closed on the drainage bag once complete.       - If you have a drainage BULB: Open the bulb cap (at the top of the bulb). Empty the fluid into the measuring cup. Squeeze bulb and hold flat. While bulb is squeezed, close the cap.       - After draining fluid record your drainage output.        - Discard drainage into toilet once fluid drained.    Flushing Your Drainage Tube:   If you have a 3 way stop cock:     1. Collect flushing supplies: 10mL of sterile normal saline in syringe, alcohol pad.  2. Clean the flushing (center) port with alcohol and attach the flushing syringe by twisting into place (clockwise).  3. Turn the 3 way stopcock valve \"off\" to the drainage bag/bulb. (Valve should be pointed toward the bag/bulb)  4. Gently inject/flush the drain so fluid is moving towards your body through the drainage " catheter.   5. Turn the stopcock valve back to its center position to allow for drainage to resume.   6. Remove flushing syringe from flushing port by untwisting the syringe (counter clockwise).  7. Squeeze bulb or accordion to reapply suction if you have one.  8. Record the output from your drain and flush amount on your drainage record.     If you have a Y flushing port / UreSil Flush Adapter:    1. Collect flushing supplies: 10mL of sterile normal saline in syringe, alcohol pad.  2. Clamp off the tubing to the drain by pinching the white clamp closed. Clean the drain port with an alcohol wipe.   3. Attach the saline syringe to the drain port.   4. Twist the syringe (clockwise) to tighten it to the port   5. Flush sterile normal saline from the syringe into the drain. The saline should flow toward your body not toward the drainage bag.   6. Untwist the syringe (counter clockwise) and remove it.   7. Unclamp the white clamp making sure the drainage is able to flow freely into the bag.   8. Squeeze bulb or accordion to reapply suction if you have one.  9. Record the output from your drain and flush amount on your drainage record.    Follow-Up:   - Please contact [facility] Outpatient Scheduling at [phone].    Please seek medical evaluation for:  - Nausea and/or vomiting   - Diffuse abdominal pain  - Fevers (greater than 101 F)  - Drainage tube falls out, is pulled back or felt to be out of position.     Call [facility] RN Line at [phone] with tube related questions or concerns:  - Unable to flush drainage tube.   - Leakage (or purulent drainage) around drainage tube site.   - Extreme pain at drainage tube site.   - Outputs suddenly stop or significantly reduces.   - Warmth, redness, swelling or tenderness around the drainage tube

## 2024-04-05 NOTE — PROGRESS NOTES
Pulmonary/Critical Care Consult Team Note    Stella Greene,  1950, MRN [id number]  Admitting Dx: Iliac artery bleed, right [R58]  Date / Time of Admission:  2024  9:40 AM       Intake/Output last 3 shifts:  I/O last 3 completed shifts:  In: 1858.07 [I.V.:617.07]  Out: 190 [Urine:55; Drains:135]    Able to titrate off levophed and vasopressin  Was given another unit PRBC overnight  Multiple conversations with physicians overnight about limb ischemia and progression and her wishes   Transitioning to hospice    Assessment/Plan: Stella Greene is a 73 year old female with PMHx of stage 4 endometrial cancer currently on chemotherapy who presents with hypotension and bleeding found to have active extravasation from R External Illiac Artery into RLQ mass causing shock.    Oncology: stage 4 endometrial cancer progression with abscess and connection to bowel/bladder  - Antibiotics - meropenem and vanc  - has two drains placed with purulent drainage by IR     Hypotension from acute blood loss anemia due to Active bleeding from the right external iliac artery.   - bleeding from the right external iliac s/p embolization and coiled   - vascular surgery also contacted from ER and are following  - Hgb now 7.6 <---1 unit <-- 6.2 <--- 7.2 <--- 2 units 7 <--- 2 units 5.4  - Hgb q6hrs    ID: Large right lower quadrant abscess with communication to bowel and likely the urinary bladder.   - will cover with vanc, meropenem and flagyl, unclear what is infection vs malignancy or necrotic malignancy     Heme: Right-sided deep vein thrombus.   - unable to anticoagulate given above bleeding    Neph/Urology: Increased right hydronephrosis;   - right ureteral stent courses through the abscess.  - Neph tube placed by IR     CV: tachycardia due to blood loss and on levophed  - Hgb q6hrs  - Monitor on tele    GI: advance diet  - GI proph    RENAL: Concern for mass eroding into bladder and worsening hydronephrosis  - Follow  BUN/Creatinine  - strict I/O's    Hx of COPD   - continue home Advair, Singulair and albuterol PRN    ENDO: Hx of DM  - Critical Care hyperglycemic protocol  - Accuchecks and sliding scale q6      Pt has critical illness and impairs circulation on vasopressors such as there is high probability of imminent of life threatening deterioration in the patient's condition.    Code Status: DNR/DNI    Infusions:  Current Facility-Administered Medications   Medication Dose Route Frequency Provider Last Rate Last Admin    Continuing statin from home medication list OR statin order already placed during this visit   Does not apply DOES NOT GO TO Ga Brink MD        dexmedeTOMIDine (PRECEDEX) 4 mcg/mL in sodium chloride 0.9 % 100 mL infusion  0.1-0.7 mcg/kg/hr Intravenous Continuous Naye Garcia MD   Stopped at 04/05/24 0658    norepinephrine (LEVOPHED) 4 mg in  mL infusion PREMIX  0.01-0.6 mcg/kg/min Intravenous Continuous Soto Hayes MD   Stopped at 04/05/24 0117    vasopressin (VASOSTRICT) 20 Units in sodium chloride 0.9 % 100 mL standard conc infusion  2.4 Units/hr Intravenous Continuous Naye Garcia MD   Paused at 04/05/24 0100       ICU DAILY CHECKLIST           PPI  No DVT ppx, or mechanical due to iliac stent and DVT  Subcutaneous insulin    PHYSICAL THERAPY AND MOBILITY: Can patient have PT and mobility trial: no Activity: ICU mobility protocol    Critical Care Time excluding procedures and family discussions greater than: 1 Hour    Risk Factors Present on Admission:  Clinically Significant Risk Factors Present on Admission            # Hypomagnesemia: Lowest Mg = 1.2 mg/dL in last 2 days, will replace as needed   # Hypoalbuminemia: Lowest albumin = 2.2 g/dL at 4/4/2024  9:57 AM, will monitor as appropriate    # Drug Induced Coagulation Defect: home medication list includes an anticoagulant medication    # Hypertension: Noted on problem list   # Circulatory Shock: required  "vasopressors within past 24 hours    # Acute Respiratory Failure: Documented O2 saturation < 91%.  Continue supplemental oxygen as needed             # Anemia: based on hgb <11   Code Status: No CPR- Do NOT Intubate         Ewa Tomlin, DO  Pulmonary and Critical Care Attending  pgr [phone]    No Known Allergies    Meds: See MAR    Physical Exam:  BP (!) 88/50   Pulse 91   Temp 97.4  F (36.3  C) (Oral)   Resp 24   Ht 1.549 m (5' 1\")   Wt 56.1 kg (123 lb 10.9 oz)   LMP  (LMP Unknown)   SpO2 100%   BMI 23.37 kg/m    Intake/Output this shift:  I/O this shift:  In: 130 [I.V.:130]  Out: 30 [Urine:15; Drains:15]  GEN:   NAD  HEENT: NC in place  CVS: regular rhythm, no murmurs  RESP: CTA BL   ABD: Soft, No abdominal pain with palpation, no guarding, no rigidity  EXT: right leg cold-dusky, left warm  NEURO: moving all extermities, nonfocal  PSYCH: pleasant    Pertinent Labs: Latest lab results in EHR personally reviewed.   CMP  Recent Labs   Lab 04/05/24  0542 04/04/24 2024 04/04/24  0957     --  138   POTASSIUM 4.3  --  3.8   CHLORIDE 108*  --  106   CO2 17*  --  23   ANIONGAP 15  --  9   * 169* 145*   BUN 34.4*  --  20.7   CR 1.54*  --  0.98*   GFRESTIMATED 35*  --  61   DAVID 6.5*  --  7.1*   MAG  --   --  1.2*   PROTTOTAL  --   --  4.8*   ALBUMIN  --   --  2.2*   BILITOTAL  --   --  0.5   ALKPHOS  --   --  121   AST  --   --  14   ALT  --   --  11     CBC  Recent Labs   Lab 04/05/24  0542 04/04/24  2358 04/04/24  1754 04/04/24  1205 04/04/24  0957   WBC 8.8  --   --  7.3 9.6   RBC 2.55*  --   --  2.51* 1.96*   HGB 7.6* 6.2* 7.2* 7.0* 5.4*   HCT 22.4*  --   --  22.6* 17.5*   MCV 88  --   --  90 89   MCH 29.8  --   --  27.9 27.6   MCHC 33.9  --   --  31.0* 30.9*   RDW 14.4  --   --  16.0* 15.9*     --   --  259 310     INR  Recent Labs   Lab 04/04/24  0957   INR 1.76*     Arterial Blood GasNo lab results found in last 7 days.    Cultures: not yet available.    Imaging: personally " reviewed.   EXAM: CTA CHEST ABDOMEN PELVIS W CONTRAST  LOCATION: [facility]  DATE: 4/4/2024     INDICATION: Bright red blood per rectum, eval for brisk GI bleed. History of endometrial neoplasm. History of abscess on outside imaging.  COMPARISON: 11/27/2023 PET/CT. [facility] CT report 03/03/2024 (no images available).  TECHNIQUE: CT angiogram chest abdomen pelvis during arterial phase of injection of IV contrast. 2D and 3D MIP reconstructions were performed by the CT technologist. Dose reduction techniques were used.   CONTRAST: 90ml isovue 370     FINDINGS: Motion artifact.     CT ANGIOGRAM CHEST, ABDOMEN, AND PELVIS: Active extravasation from the right external iliac artery into a large right lower quadrant abscess. Nonaneurysmal aorta. Patent aortic branch vessels. No definite pulmonary embolism.     LUNGS AND PLEURA: New mild bilateral bandlike basilar predominant opacities. Few new 3 to 5 mm lower lung predominant nodules as well (for example right lower lobe series 9, images 1 ). No pleural effusion or pneumothorax.     MEDIASTINUM/AXILLAE: No thoracic adenopathy. Right-sided portacatheter tip near the cavoatrial junction. Moderate hiatus hernia.     CORONARY ARTERY CALCIFICATION: Motion artifact.     HEPATOBILIARY: Redemonstrated right hepatic dome hemangioma. No definite new or enlarging liver lesions.     PANCREAS: Mildly atrophic.     SPLEEN: Normal.     ADRENAL GLANDS: Stable mild left adrenal thickening.     KIDNEYS/BLADDER: Right ureteral stent remains in place. The right renal collecting system shows increased moderate to severe dilatation. Mild air within the right renal pelvis and ureter. The right ureteral stent courses through the right lower quadrant   abscess along the mid to distal portion. Air and debris within the urinary bladder.     BOWEL: Please see the pelvic section for right lower quadrant abscess and bowel communication findings. Areas of colonic wall  thickening, including the cecum and ascending colon, as well as the sigmoid colon, with areas of wall enhancement. Mild loosely   formed colonic stool. Mild fluid filled small bowel, with a few small bowel loops upper normal in caliber at 2.9 cm.     LYMPH NODES: Since 11/27/2023 PET/CT, right lower quadrant abscess is now present in the region of prior right lower quadrant mass / implant. Implant posterior to the abscess has increased in size measuring 3.4 cm versus 1.2 cm on the November PET/CT   (series 9, image 496). More pronounced adjacent implants along the midline to the left vaginal cuff, with example area measuring 1.2 cm (series 9, image 537). No retroperitoneal adenopathy.     VASCULATURE: Deep vein thrombosis identified extending at least from the right external iliac vein in the pelvis into the common femoral and superficial femoral vein from proximal to distal thigh. Small amount of DVT identified in the deep femoral vein.   Asymmetric right leg swelling. Flattened appearance of the IVC.     PELVIC ORGANS: New since the November 2023 PET/CT, right lower quadrant abscess measuring roughly 9.2 x 10.4 x 9.8 cm (series 9, image 465). This contains intrinsic air and debris. Abscess extends to the into the inferior right psoas muscle (image 423).   There is fistulous connection to the sigmoid colon and possibly cecum. Air and debris within the bladder suggests fistulization to the bladder as well.     MUSCULOSKELETAL: Subcutaneous body wall edema.                                                                      IMPRESSION:     1.  Acute active bleeding is present on this exam.      2.  The right external iliac artery demonstrates active contrast extravasation and bleeding into a right lower quadrant abscess. The right external iliac artery courses through this abscess; abscess erosion into the artery presumed the etiology of bleed.     3.  Large right lower quadrant abscess. This was noted on outside  [facility] CT report from March 2024, though imaging is not available from that exam. There is fistulous connection to the sigmoid colon and possibly cecum as well. Air and debris in the   bladder also raises concern for fistulization to the urinary bladder.      4.  Etiology of the abscess is presumed from a prior large right lower quadrant mass eroding into bowel.      5.  Areas of mild colonic wall thickening and hyperenhancement may be reactive or from colitis. Upper normal pelvic small bowel with air-fluid levels, likely from slow flow or ileus. Distal small bowel and terminal ileum are not well evaluated from the   abscess.     6.  Significant right-sided DVT present. Deep vein thrombus extends at least from the right external iliac vein in the pelvis to the femoral vein at the distal thigh. Asymmetric right leg subcutaneous edema and swelling. Recommend lower extremity   ultrasound.     7.  Right ureteral stent appears in appropriate position, however, increased moderate to severe right hydronephrosis. The mid to distal right ureteral stent courses through the right lower quadrant abscess.     8.  New heterogeneous bandlike lower lung predominant opacities bilaterally, compatible with infectious / inflammatory process, to include drug reaction or organizing pneumonia. Additional new clustered nodules, also presumed inflammatory, though can be   followed.     9.  Diffuse subcutaneous body wall edema. Minimal ascites.         Critical Result: Active bleeding from the right external iliac artery. Large right lower quadrant abscess with communication to bowel and likely the urinary bladder. Right-sided deep vein thrombus. Increased right hydronephrosis; right ureteral stent   courses through the abscess.        Patient Active Problem List   Diagnosis    Essential hypertension    Other specified chronic obstructive pulmonary disease (H)    Diabetes mellitus, type 2 (H)    Acute renal insufficiency    Elevated  liver enzymes    Elevated troponin    Acute kidney failure with tubular necrosis (H24)    Adrenal insufficiency (H24)    Malignant neoplasm of uterus, unspecified site (H)    Anemia in neoplastic disease    Iliac artery bleed, right       Ewa Tomlin DO  Pulmonary and Critical Care Attending  pgr [phone]    Securely message with the Vocera Web Console (learn more here)

## 2024-04-06 NOTE — PROGRESS NOTES
"Care Management Follow Up    Length of Stay (days): 2    Expected Discharge Date: 04/08/2024 or 04/09/2024     Concerns to be Addressed:  transitioned to comfort care, monitoring status and potential discharge to hospice home or home with hospice    Patient plan of care discussed at interdisciplinary rounds: Yes    Anticipated Discharge Disposition:  TBD     Anticipated Discharge Services:  TBD    Anticipated Discharge DME:  TBD    Patient/family educated on Medicare website which has current facility and service quality ratings:  yes  Education Provided on the Discharge Plan:  per care team  Patient/Family in Agreement with the Plan:   reviewing options    Referrals Placed by CM/SW:  [facility] referral sent 4/5    Private pay costs discussed: patient/family aware most hospice homes are private pay    Additional Information:  Patient has decided to transition to comfort care. Monitoring status and discussion discharge options.    Social History per initial CM assessment:  Stella lives in a house with her  Antoni. She is independent with ADLs and most IADLs. \"My  does all the driving\". \"I most often use the cane. I use the walker outside the house\". Family willing to transport at discharge.       4/6/24:  Met with patient and family. Provided list of hospice homes. Brief discussion on each hospice home.  Family will review list. CM to follow up Monday 4/8. Final discharge plan pending patient's progression.       Mela Rangel RN      "

## 2024-04-06 NOTE — PROGRESS NOTES
CLINICAL NUTRITION SERVICES      Patient transitioned to comfort cares yesterday 4/5. RD will sign off at this time.

## 2024-04-06 NOTE — PROGRESS NOTES
"[facility]    Medicine Progress Note - Hospitalist Service    Date of Admission:  4/4/2024    Assessment & Plan   Stella Greene is a 73 year old female with PMHx of metastatic high-grade endometrial cancer in 2022 s/p neoadjuvant therapy and GYN surgery, adjuvant chemo, radiation therapy, right ureteral stent in place and right lower extremity DVT on Eliquis who presented to ED for evaluation of dizziness, rectal bleeding.  In ED hemoglobin was 5.4, CTA imaging reported active bleeding from right external iliac artery, large RLQ abscess with communication to bowel and likely urinary bladder, right hydronephrosis despite right ureteral stent in place.       On 4/4, patient emergently taken from ED to IR, pelvic angiography demonstrated large area of active bleeding from right external artery, successfully embolized.  Patient also underwent right nephrostomy tube placement and also drain placement on right lower pelvic collection.     Patient then admitted to ICU, required total of 5 unit packed RBC transfusion per intensivist.  Patient also required pressors and subsequently weaned off.     Vascular surgery evaluated patient for RLE limb ischemia.  They discussed option for femorofemoral bypass versus amputation versus watching.  Reported patient does not wish to pursue intervention and would like to be comfort care.     On 4/6/2024, I had a long conversation with patient, patient's , son and daughter at bedside.  Patient and family reported overwhelmed with the informations they received yesterday from multiple providers and also reported some confusion about plan of care.  I reviewed IR note, vascular surgery note, intensivist note and I did explain ongoing medical issues and management that that was done so far.  They have multiple questions and I answered to best of my knowledge.  Patient's  reported that they were told by GYN oncologist  that \"cancer prognosis is not " "good\".  After having long conversation patient and family elected for comfort care.    #Hemorrhagic shock due to active bleeding from right external iliac artery;  --In ED, CT imaging reported active bleeding from right external leg artery, abscess eroding into the artery presumed due to lower GI bleeding.  --s/p right external iliac artery embolization and coil  -- Per intensivist, patient received total of 5 units packed RBC transfusion  -- Patient does not want any more blood transfusion.    #Stage IV endometrial cancer progression;  #Large RLQ abscess with communication to bowel and likely urinary bladder;  --In ED, CT imaging reported large RLQ abscess with fistulous connections to sigmoid colon, possibly she, and also concern for fistulization to urinary bladder  -- Per ICU team, unclear what is infection versus malignancy or necrotic malignancy  --IR placed YUNG drains  --Patient does not want to continue antibiotics.    #Increased right hydronephrosis despite right ureteral stent;  #Acute kidney injury;  -- IR placed right nephrostomy tube    #History of RLE DVT  #History of COPD            Diet: Regular Diet Adult    DVT Prophylaxis: Patient on comfort care  Waddell Catheter: Not present  Lines: PRESENT      Port A Cath Single 01/13/23 Right Chest wall-Site Assessment: WDL      Cardiac Monitoring: None  Code Status: No CPR- Do NOT Intubate      Clinically Significant Risk Factors              # Hypoalbuminemia: Lowest albumin = 2.2 g/dL at 4/4/2024  9:57 AM, will monitor as appropriate  # Coagulation Defect: INR = 1.76 (Ref range: 0.85 - 1.15) and/or PTT = 58 Seconds (Ref range: 22 - 38 Seconds), will monitor for bleeding    # Hypertension: Noted on problem list                   Disposition Plan      Expected Discharge Date: 04/08/2024                    Cade Quiñones MD  Hospitalist Service  [facility]  Securely message with [employer] (more info)  Text page via [facility] Paging/Directory "   ______________________________________________________________________    Interval History   Patient seen and examined.  Notes, labs, imaging report personally reviewed.  Patient reported right sided abdominal and flank pain.  Denied feeling short of breath or chest pain.  Discussed with nursing staff.  Discussed with patient, multiple family members at bedside at length.      Physical Exam   Vital Signs: Temp: 97.8  F (36.6  C) Temp src: Oral BP: 111/59 Pulse: 92   Resp: 14 SpO2: 98 % O2 Device: None (Room air)    Weight: 123 lbs 10.85 oz      General: Not in obvious distress.  Cachectic  Chest: Decreased but clear to auscultation bilateral anteriorly, no wheezing  Heart: S1S2 normal, regular  Abdomen: Soft.  Mild distention, tenderness on right side of the abdomen  Extremities: N bilateral lower extremity swelling, right lower extremity cold to touch and pale  Neuro: alert and awake, moves all extremities        Medical Decision Making       55 MINUTES SPENT BY ME on the date of service doing chart review, history, exam, documentation & further activities per the note.      Data     I have personally reviewed the following data over the past 24 hrs:    N/A  \   6.8 (LL)   / N/A     N/A N/A N/A /  152 (H)   N/A N/A 1.57 (H) \       Imaging results reviewed over the past 24 hrs:   No results found for this or any previous visit (from the past 24 hour(s)).

## 2024-04-07 NOTE — PROGRESS NOTES
[facility] Progress Note - Hospitalist Service    Date of Admission:  4/4/2024    Assessment & Plan   Stella Greene is a 73 year old female with PMHx of metastatic high-grade endometrial cancer in 2022 s/p neoadjuvant therapy and GYN surgery, adjuvant chemo, radiation therapy, right ureteral stent in place and right lower extremity DVT on Eliquis who presented to ED for evaluation of dizziness, rectal bleeding.      In ED, hemoglobin was 5.4, CTA imaging reported active bleeding from right external iliac artery, large RLQ abscess with communication to bowel and likely urinary bladder, right hydronephrosis despite right ureteral stent in place.       On 4/4, from ED patient emergently taken to IR, pelvic angiography demonstrated large area of active bleeding from right external artery, successfully embolized.  Patient also underwent right nephrostomy tube placement and also drain placement on right lower pelvic collection.     Patient then admitted to ICU, required total of 5 unit pRBC transfusion per intensivist.  Patient also required pressors and subsequently weaned off.     Vascular surgery evaluated patient for RLE limb ischemia.  They discussed option for femorofemoral bypass versus amputation versus watching.  Reported patient does not wish to pursue intervention and would like to be comfort care.     Patient transition to comfort care.    #Hemorrhagic shock due to active bleeding from right external iliac artery;  --In ED, CT imaging reported active bleeding from right external leg artery, abscess eroding into the artery presumed due to lower GI bleeding.  --s/p right external iliac artery embolization and coil  -- Per intensivist, patient received total of 5 units packed RBC transfusion  -- Patient does not want any more blood transfusion.    #Stage IV endometrial cancer progression;  #Large RLQ abscess with communication to bowel and likely urinary bladder;  --In ED, CT  imaging reported large RLQ abscess with fistulous connections to sigmoid colon, possibly she, and also concern for fistulization to urinary bladder  -- Per ICU team, unclear what is infection versus malignancy or necrotic malignancy  --IR placed YUNG drains  --Patient does not want to continue antibiotics.    #Increased right hydronephrosis despite right ureteral stent;  #Acute kidney injury;  -- IR placed right nephrostomy tube    #History of RLE DVT  #History of COPD, not on exacerbation            Diet: Regular Diet Adult    DVT Prophylaxis: Patient on comfort care.  Waddell Catheter: Not present  Lines: PRESENT      Port A Cath Single 01/13/23 Right Chest wall-Site Assessment: WDL      Cardiac Monitoring: None  Code Status: No CPR- Do NOT Intubate      Clinically Significant Risk Factors              # Hypoalbuminemia: Lowest albumin = 2.2 g/dL at 4/4/2024  9:57 AM, will monitor as appropriate  # Coagulation Defect: INR = 1.76 (Ref range: 0.85 - 1.15) and/or PTT = 58 Seconds (Ref range: 22 - 38 Seconds), will monitor for bleeding    # Hypertension: Noted on problem list                   Disposition Plan     Expected Discharge Date: 04/08/2024                    Cade Quiñones MD  Hospitalist Service  [facility]  Securely message with [employer] (more info)  Text page via [employer] Paging/Directory   ______________________________________________________________________    Interval History   Patient seen and examined.  Patient reported she slept good last night.  No new concerns or complaints.  Pain fairly controlled.  Discussed with patient's  at bedside.  Discussed with nursing staffs.    Physical Exam   Vital Signs: Temp: 97.8  F (36.6  C) Temp src: Oral BP: 111/59 Pulse: 91   Resp: 12        Weight: 123 lbs 10.85 oz      General: Not in obvious distress.  Ill looking  HEENT: Normocephalic, supple neck  Neuro: Sleepy, easily arousable        Medical Decision Making             Data          Imaging results reviewed over the past 24 hrs:   No results found for this or any previous visit (from the past 24 hour(s)).

## 2024-04-07 NOTE — PLAN OF CARE
Patient slept through the night after taking prn ativan once, Denies pain and discomfort. Refused turning and cares. Patient on comfort care  Problem: Pain Acute  Goal: Optimal Pain Control and Function  Outcome: Progressing   Goal Outcome Evaluation:

## 2024-04-08 NOTE — PLAN OF CARE
Goal Outcome Evaluation:                    Problem: Adult Inpatient Plan of Care  Goal: Absence of Hospital-Acquired Illness or Injury  Intervention: Prevent and Manage VTE (Venous Thromboembolism) Risk  Recent Flowsheet Documentation  Taken 4/8/2024 1200 by Kory Coreas RN  VTE Prevention/Management: SCDs (sequential compression devices) off  Intervention: Prevent Infection  Recent Flowsheet Documentation  Taken 4/8/2024 1200 by Kory Coreas RN  Infection Prevention:   rest/sleep promoted   single patient room provided     Problem: Risk for Delirium  Goal: Improved Behavioral Control  Intervention: Minimize Safety Risk  Recent Flowsheet Documentation  Taken 4/8/2024 1200 by Kory Coreas RN  Communication Enhancement Strategies:   family involved in communication plan   family/caregiver assisted with communication  Goal: Improved Attention and Thought Clarity  Intervention: Maximize Cognitive Function  Recent Flowsheet Documentation  Taken 4/8/2024 1200 by Kory Coreas RN  Sensory Stimulation Regulation:   lighting decreased   quiet environment promoted  Reorientation Measures:   clock in view   familiar social contact encouraged     Problem: Pain Acute  Goal: Optimal Pain Control and Function  Intervention: Prevent or Manage Pain  Recent Flowsheet Documentation  Taken 4/8/2024 1200 by Kory Coreas RN  Sensory Stimulation Regulation:   lighting decreased   quiet environment promoted     Problem: Skin Injury Risk Increased  Goal: Skin Health and Integrity  Intervention: Plan: Nurse Driven Intervention: Moisture Management  Recent Flowsheet Documentation  Taken 4/8/2024 1200 by Kory Coreas RN  Moisture Interventions: Urinary collection device  Intervention: Plan: Nurse Driven Intervention: Friction and Shear  Recent Flowsheet Documentation  Taken 4/8/2024 1200 by Kory Coreas RN  Friction/Shear Interventions: HOB 30 degrees or less     Problem: Palliative Care  Goal: Enhanced Quality of  Life  Intervention: Optimize Function  Recent Flowsheet Documentation  Taken 4/8/2024 1200 by Kory Coreas, RN  Sensory Stimulation Regulation:   lighting decreased   quiet environment promoted     Patient is sleeping heavily throughout morning. Family at bedside. Per family, patient prefers ativan and dilaudid at each PRN availability. Patient is calm and does not show any non-verbal signs of pain or agitation. No oral intake this shift.

## 2024-04-08 NOTE — PROGRESS NOTES
Patient sleeping and appears comfortable.  RR at 24 and some brow furrowing so PRN dilaudid given and effective.  Lorazepam IV given Q6.  Writer attempted to do mouth cares several times but patient will purse lips immediately and not allow a swab.  Turned Q2 for comfort and skin integrity.

## 2024-04-08 NOTE — PROGRESS NOTES
Quick Palliative Care Note:  Reviewed week-end notes, plan of care, medications, and connected with MD. MD believes pt may stay at [facility] for EOL care and felt goals are very clear now, full comfort care, pts symptoms were managed, and inpatient palliative care could sign off.   Lorrie Scanlon, NP,CNP, Palliative Care

## 2024-04-08 NOTE — PROGRESS NOTES
Care Management Follow Up    Length of Stay (days): 4    Expected Discharge Date: 04/09/2024     Concerns to be Addressed: discharge planning     Patient plan of care discussed at interdisciplinary rounds: Yes    Anticipated Discharge Disposition:  EOL at Hospital      Anticipated Discharge Services:  none  Anticipated Discharge DME:  none    Patient/family educated on Medicare website which has current facility and service quality ratings:  NA  Education Provided on the Discharge Plan:  yes  Patient/Family in Agreement with the Plan:  yes    Referrals Placed by CM/SW:    Private pay costs discussed: Not applicable    Additional Information:  Per hospitalist, patient is dying and will remain hospitalized for EOL. CM can cancel hospice facility discharge.     BILLY Ramsay

## 2024-04-08 NOTE — PROGRESS NOTES
[facility] Progress Note - Hospitalist Service    Date of Admission:  4/4/2024    Assessment & Plan   Stella Greene is a 73 year old female with PMHx of metastatic high-grade endometrial cancer in 2022 s/p neoadjuvant therapy and GYN surgery, adjuvant chemo, radiation therapy, right ureteral stent in place and right lower extremity DVT on Eliquis who presented to ED for evaluation of dizziness, rectal bleeding.      In ED, hemoglobin was 5.4, CTA imaging reported active bleeding from right external iliac artery, large RLQ abscess with communication to bowel and likely urinary bladder, right hydronephrosis despite right ureteral stent in place.       On 4/4, from ED patient emergently taken to IR, pelvic angiography demonstrated large area of active bleeding from right external artery, successfully embolized.  Patient also underwent right nephrostomy tube placement and also drain placement on right lower pelvic collection.     Patient then admitted to ICU, required total of 5 unit pRBC transfusion per intensivist.  Patient also required pressors and subsequently weaned off.     Vascular surgery evaluated patient for RLE limb ischemia.  They discussed option for femorofemoral bypass versus amputation versus watching.  Reported patient does not wish to pursue intervention and would like to be comfort care.     Patient transition to comfort care.    #Hemorrhagic shock due to active bleeding from right external iliac artery;  --In ED, CT imaging reported active bleeding from right external leg artery, abscess eroding into the artery presumed due to lower GI bleeding.  --s/p right external iliac artery embolization and coil  -- Per intensivist, patient received total of 5 units packed RBC transfusion  -- Patient does not want any more blood transfusion.    #Stage IV endometrial cancer with progression;  #Large RLQ abscess with communication to bowel and likely urinary bladder;  --In  ED, CT imaging reported large RLQ abscess with fistulous connections to sigmoid colon, possibly she, and also concern for fistulization to urinary bladder  -- Per ICU team, unclear what is infection versus malignancy or necrotic malignancy  --IR placed YUNG drains  --Patient does not want to continue antibiotics.    #Increased right hydronephrosis despite right ureteral stent;  #Acute kidney injury;  -- IR placed right nephrostomy tube    #History of RLE DVT  #History of COPD            Diet: Regular Diet Adult    DVT Prophylaxis: Patient on comfort care.  Waddell Catheter: Not present  Lines: PRESENT      Port A Cath Single 01/13/23 Right Chest wall-Site Assessment: WDL      Cardiac Monitoring: None  Code Status: No CPR- Do NOT Intubate      Clinically Significant Risk Factors              # Hypoalbuminemia: Lowest albumin = 2.2 g/dL at 4/4/2024  9:57 AM, will monitor as appropriate     # Hypertension: Noted on problem list                   Disposition Plan     Expected Discharge Date: 04/08/2024                    Cade Quiñones MD  Hospitalist Service  [facility]  Securely message with [employer] (more info)  Text page via [employer] Paging/Directory   ______________________________________________________________________    Interval History   Patient seen and examined.  Patient sleeping and did not wake her up.  Discussed with patient's  at bedside, reported when patient is awake she is having severe right lower abdominal pain.  Discussed with nursing staffs, reported patient anxious when awake and requesting ativan frequently.  Discussed with patient's  about comfort care medication adjustment.  Discussed with nursing staffs about comfort care medications  Discussed with palliative care team.  Discussed with care manager/.    Physical Exam   Vital Signs:                    Weight: 123 lbs 10.85 oz      General: Not in obvious distress.  Chest: Clear to auscultation bilateral  anteriorly, no wheezing  Heart: S1S2 normal, regular  Neuro: Sleeping, did not wake her up        Medical Decision Making             Data         Imaging results reviewed over the past 24 hrs:   No results found for this or any previous visit (from the past 24 hour(s)).

## 2024-04-08 NOTE — PROGRESS NOTES
Patient transferred to , Sons at bedside updated about the transfer,no belonging were at bedside. Report given to accepting nurse.

## 2024-04-09 PROBLEM — D62 ANEMIA DUE TO BLOOD LOSS, ACUTE: Status: ACTIVE | Noted: 2024-01-01

## 2024-04-09 PROBLEM — Z51.5 COMFORT MEASURES ONLY STATUS: Status: ACTIVE | Noted: 2024-01-01

## 2024-04-09 PROBLEM — E11.9 DIABETES MELLITUS, TYPE 2 (H): Status: ACTIVE | Noted: 2021-12-06

## 2024-04-09 PROBLEM — C55 MALIGNANT NEOPLASM OF UTERUS, UNSPECIFIED SITE (H): Status: ACTIVE | Noted: 2023-01-01

## 2024-04-09 NOTE — PLAN OF CARE
Problem: Palliative Care  Goal: Enhanced Quality of Life  Outcome: Progressing   Goal Outcome Evaluation:    Patient is on comfort cares. Sleeping most of the shift. Responds to voice and pain. Keeps her eyes closed. Does not appear to be in pain at rest. Moans when moved in bed. Prn dilaudid given x2. Turned and repositioned in bed with asssist of 2. Pure wick in place with dark brown urine. R nephrostomy with cloudy yellow urine. YUNG drain with dark red output. Continue with the plan of care.

## 2024-04-09 NOTE — PLAN OF CARE
Comfort cares. Family at bedside starting this am. Son is concerned that Stella is having pain even though no nonverbal signs are present. PRN and scheduled medications for anxiety and pain given. Does not open eyes, does reach with left hand. Support family- allow to express feelings, include family in plan of care as much as possible. Family state they have other support also. Continue with supportive measures. Manage pain/anxiety or other signs of discomfort. Reposition as family allows. Maritza Savage RN    Problem: Palliative Care  Goal: Enhanced Quality of Life  Outcome: Progressing

## 2024-04-09 NOTE — PLAN OF CARE
Problem: Adult Inpatient Plan of Care  Goal: Absence of Hospital-Acquired Illness or Injury  Intervention: Prevent Skin Injury  Recent Flowsheet Documentation  Taken 4/8/2024 1732 by Kory Coreas RN  Body Position:   left   turned  Intervention: Prevent and Manage VTE (Venous Thromboembolism) Risk  Recent Flowsheet Documentation  Taken 4/8/2024 1732 by Kory Coreas RN  VTE Prevention/Management: SCDs (sequential compression devices) off  Taken 4/8/2024 1200 by Kory Coreas RN  VTE Prevention/Management: SCDs (sequential compression devices) off  Intervention: Prevent Infection  Recent Flowsheet Documentation  Taken 4/8/2024 1732 by Kory Coreas RN  Infection Prevention:   rest/sleep promoted   single patient room provided  Taken 4/8/2024 1200 by Kory Coreas RN  Infection Prevention:   rest/sleep promoted   single patient room provided     Problem: Risk for Delirium  Goal: Improved Behavioral Control  Intervention: Minimize Safety Risk  Recent Flowsheet Documentation  Taken 4/8/2024 1732 by Kory Coreas RN  Communication Enhancement Strategies:   family involved in communication plan   family/caregiver assisted with communication  Taken 4/8/2024 1200 by Kory Coreas RN  Communication Enhancement Strategies:   family involved in communication plan   family/caregiver assisted with communication  Goal: Improved Attention and Thought Clarity  Intervention: Maximize Cognitive Function  Recent Flowsheet Documentation  Taken 4/8/2024 1732 by Kory Coreas RN  Sensory Stimulation Regulation:   lighting decreased   quiet environment promoted  Reorientation Measures:   clock in view   familiar social contact encouraged  Taken 4/8/2024 1200 by Kory Coreas RN  Sensory Stimulation Regulation:   lighting decreased   quiet environment promoted  Reorientation Measures:   clock in view   familiar social contact encouraged     Problem: Pain Acute  Goal: Optimal Pain Control and  Function  Intervention: Prevent or Manage Pain  Recent Flowsheet Documentation  Taken 4/8/2024 1732 by Kory Coreas RN  Sensory Stimulation Regulation:   lighting decreased   quiet environment promoted  Taken 4/8/2024 1200 by Kory Coreas RN  Sensory Stimulation Regulation:   lighting decreased   quiet environment promoted     Problem: Skin Injury Risk Increased  Goal: Skin Health and Integrity  Intervention: Plan: Nurse Driven Intervention: Moisture Management  Recent Flowsheet Documentation  Taken 4/8/2024 1732 by Kory Coreas RN  Moisture Interventions: Urinary collection device  Bathing/Skin Care: wipes, CHG  Taken 4/8/2024 1200 by Kory Coreas RN  Moisture Interventions: Urinary collection device  Intervention: Plan: Nurse Driven Intervention: Friction and Shear  Recent Flowsheet Documentation  Taken 4/8/2024 1732 by Kory Coreas RN  Friction/Shear Interventions: HOB 30 degrees or less  Taken 4/8/2024 1200 by Kory Coreas RN  Friction/Shear Interventions: HOB 30 degrees or less     Problem: Palliative Care  Goal: Enhanced Quality of Life  Intervention: Optimize Function  Recent Flowsheet Documentation  Taken 4/8/2024 1732 by Kory Coreas RN  Sensory Stimulation Regulation:   lighting decreased   quiet environment promoted  Taken 4/8/2024 1200 by Kory Coreas RN  Sensory Stimulation Regulation:   lighting decreased   quiet environment promoted   Goal Outcome Evaluation:       Patient has been sleeping for most of evening shift. No vitals due to comfort cares. Patient's family have made her wishes clear to treat anxiety and pain as often as possible with PRN ativan and hydromorphone. LOC low due to medication sedation.     Patient port dressing changed this evening. CHG bath given. Skin breakdown on posterior ankle of L foot dressed with mepilex.

## 2024-04-09 NOTE — PROGRESS NOTES
[facility]    PROGRESS NOTE - Hospitalist Service    Assessment and Plan    Principal Problem:    Comfort measures only status  Active Problems:    Essential hypertension    Other specified chronic obstructive pulmonary disease (H)    Diabetes mellitus, type 2 (H)    Malignant neoplasm of uterus, unspecified site (H)    Iliac artery bleed, right    Anemia due to blood loss, acute    Stella MELO Greene is a 73 year old female with h/o  metastatic high-grade endometrial cancer in 2022 s/p neoadjuvant therapy and GYN surgery, adjuvant chemo, radiation therapy, right ureteral stent in place and right lower extremity DVT on Eliquis who presented to ED for evaluation of dizziness, rectal bleeding.       In ED, hemoglobin was 5.4, CTA imaging reported active bleeding from right external iliac artery, large RLQ abscess with communication to bowel and likely urinary bladder, right hydronephrosis despite right ureteral stent in place.       On 4/4, from ED patient emergently taken to IR, pelvic angiography demonstrated large area of active bleeding from right external artery, successfully embolized.  Patient also underwent right nephrostomy tube placement and also drain placement on right lower pelvic collection.     Patient then admitted to ICU, required total of 5 unit pRBC transfusion per intensivist.  Patient also required pressors and subsequently weaned off.     Vascular surgery evaluated patient for RLE limb ischemia.  They discussed option for femorofemoral bypass versus amputation versus watching.  Reported patient does not wish to pursue intervention and would like to be comfort care.     Comfort cares  - patient previously transitioned to comfort cares 4/6/2024   - comfort care orders previously placed  - family requesting dilaudid and Ativan be scheduled IV, they were unhappy with SL delivered overnight due to mother was coughing/ choking on this and are worried of future events.   - discussed  "with     - dilaudid IV 0.5mg Q3hr scheduled and then Q2hrs prn IV and SL (however family do not like this route)  - Ativan 0.5mg IV scheduled Q4hrs and then IV and sl Q3rs prn   - palliative previously signed off.   - will keep her at this time since unstable for transport.     Hemorrhagic shock due to active bleeding from right external iliac artery;ABL anemia   - no further lab checks or transfusions since now comfort cares   --In ED, CT imaging reported active bleeding from right external leg artery, abscess eroding into the artery presumed due to lower GI bleeding.  --s/p right external iliac artery embolization and coil  -- Per intensivist, patient received total of 5 units packed RBC transfusion  - discussed with  and recommend that they notify nursing staff if she is uncomfortable or agonal or air hunger     #Stage IV endometrial cancer with progression;  #Large RLQ abscess with communication to bowel and likely urinary bladder;  --In ED, CT imaging reported large RLQ abscess with fistulous connections to sigmoid colon, possibly she, and also concern for fistulization to urinary bladder  -- Per ICU team, unclear what is infection versus malignancy or necrotic malignancy  --IR placed YUNG drains  --Patient does not want to continue antibiotics.     #Increased right hydronephrosis despite right ureteral stent;  #Acute kidney injury;  -- IR placed right nephrostomy tube  - no longer following labs     #History of RLE DVT  #History of COPD       Clinically Significant Risk Factors      # Overweight: Estimated body mass index is 28.31 kg/m  as calculated from the following:  Height as of this encounter: 1.626 m (5' 4\").  Weight as of this encounter: 74.8 kg (164 lb 14.4 oz)., PRESENT ON ADMISSION    COVID-19 PCR Results          12/31/2022    13:16   COVID-19 PCR Results   SARS CoV2 PCR Negative      COVID-19 Antibody Results, Testing for Immunity           No data to display               Code Status: " No CPR- Do NOT Intubate  VTE prophylaxis:  comfort cares   DIET: Orders Placed This Encounter      Regular Diet Adult    Drains/Lines: Patient has Port-a-cath. B/l PNT   Waddell Catheter: Not present      Weight bearing status: bedrest     Expected Discharge Date: 04/10/2024    Discharge Delays: Comfort Care/Hospice          Subjective:   in room and discussed IV medications vs SL, family requests IV only     PHYSICAL EXAM  Temp:  [97.8  F (36.6  C)] 97.8  F (36.6  C)  Pulse:  [107] 107  Resp:  [18] 18  BP: (97)/(55) 97/55  SpO2:  [95 %] 95 %  Wt Readings from Last 1 Encounters:   04/05/24 56.1 kg (123 lb 10.9 oz)       Intake/Output Summary (Last 24 hours) at 4/9/2024 0801  Last data filed at 4/9/2024 0359  Gross per 24 hour   Intake --   Output 925 ml   Net -925 ml      Body mass index is 23.37 kg/m .    Physical Exam  Constitutional:       Appearance: She is ill-appearing.   Pulmonary:      Comments: Apneic breathing but not agonal  Abdominal:      Comments: B/l PNT       PERTINENT LABS/IMAGING:  Results for orders placed or performed during the hospital encounter of 04/04/24   CTA Chest Abdomen Pelvis w Contrast    Impression    IMPRESSION:    1.  Acute active bleeding is present on this exam.     2.  The right external iliac artery demonstrates active contrast extravasation and bleeding into a right lower quadrant abscess. The right external iliac artery courses through this abscess; abscess erosion into the artery presumed the etiology of bleed.    3.  Large right lower quadrant abscess. This was noted on outside [facility] CT report from March 2024, though imaging is not available from that exam. There is fistulous connection to the sigmoid colon and possibly cecum as well. Air and debris in the   bladder also raises concern for fistulization to the urinary bladder.     4.  Etiology of the abscess is presumed from a prior large right lower quadrant mass eroding into bowel.     5.  Areas of mild colonic  wall thickening and hyperenhancement may be reactive or from colitis. Upper normal pelvic small bowel with air-fluid levels, likely from slow flow or ileus. Distal small bowel and terminal ileum are not well evaluated from the   abscess.    6.  Significant right-sided DVT present. Deep vein thrombus extends at least from the right external iliac vein in the pelvis to the femoral vein at the distal thigh. Asymmetric right leg subcutaneous edema and swelling. Recommend lower extremity   ultrasound.    7.  Right ureteral stent appears in appropriate position, however, increased moderate to severe right hydronephrosis. The mid to distal right ureteral stent courses through the right lower quadrant abscess.    8.  New heterogeneous bandlike lower lung predominant opacities bilaterally, compatible with infectious / inflammatory process, to include drug reaction or organizing pneumonia. Additional new clustered nodules, also presumed inflammatory, though can be   followed.    9.  Diffuse subcutaneous body wall edema. Minimal ascites.       Critical Result: Active bleeding from the right external iliac artery. Large right lower quadrant abscess with communication to bowel and likely the urinary bladder. Right-sided deep vein thrombus. Increased right hydronephrosis; right ureteral stent   courses through the abscess.    Finding was identified on 4/4/2024 at 1:05 PM and initially reported to SAMINA Lainez at 1:05 PM in the ER. After final completion of the report, additional results were verbally communicated by phone to Dr. Soto Hayes at 1:38 PM.    Dr. Soto Hayes in the Emergency Room was contacted by me on 4/4/2024 2:00 PM CDT and verbalized understanding of the critical result.      IR Visceral Angiogram    Impression    IMPRESSION:    1. Pelvic angiography demonstrates large area of active bleeding arising from the right external artery, as seen on CT angiography. This vessel was successfully embolized utilizing one  Amplatzer plug and two microcoils.  2. Successful placement of a 12 Cymro drain into the complex right lower pelvic collection.  3. Successful placement of a 10 Cymro right nephrostomy tube with drainage of purulent material.   IR Nephrostomy Tube Placement Right    Impression    IMPRESSION:    1. Pelvic angiography demonstrates large area of active bleeding arising from the right external artery, as seen on CT angiography. This vessel was successfully embolized utilizing one Amplatzer plug and two microcoils.  2. Successful placement of a 12 Cymro drain into the complex right lower pelvic collection.  3. Successful placement of a 10 Cymro right nephrostomy tube with drainage of purulent material.   IR Peritoneal Abscess Drainage    Impression    IMPRESSION:    1. Pelvic angiography demonstrates large area of active bleeding arising from the right external artery, as seen on CT angiography. This vessel was successfully embolized utilizing one Amplatzer plug and two microcoils.  2. Successful placement of a 12 Cymro drain into the complex right lower pelvic collection.  3. Successful placement of a 10 Cymro right nephrostomy tube with drainage of purulent material.   CT Abdomen Pelvis w/o Contrast    Impression    IMPRESSION:   1.  Interval embolization of right external iliac artery.  2.  There is contrast now seen throughout the colon which should relate to extravasation of intravascular contrast during documented episode of active extravasation.  3.  New mesenteric venous air present, can't exclude focus of bowel wall ischemia but no obvious new focus of bowel abnormality evident. There remains the pre-existing large right pelvic mass that appears to involve the cecum/ascending colon.  4.  New right lower quadrant percutaneous drain extends into the large complex right lower quadrant mass though the drain is very anteriorly positioned within this large collection and may not result in adequate drainage in  current position.  5.  Interval placement of right percutaneous nephrostomy tube with resolution of right hydronephrosis. Mottled enhancement of right kidney may relate to the previous hydronephrosis though right pyelonephritis can't be excluded.         Imaging results reviewed over the past 24 hrs:   No results found for this or any previous visit (from the past 24 hour(s)).  Recent Labs   Lab 04/06/24  0753 04/06/24  0550 04/05/24  1815 04/05/24  1331 04/05/24  0745 04/05/24  0542 04/04/24  1754 04/04/24  1205 04/04/24  0957   WBC  --   --   --   --   --  8.8  --  7.3 9.6   HGB  --   --  6.8* 7.0*  --  7.6*   < > 7.0* 5.4*   MCV  --   --   --   --   --  88  --  90 89   PLT  --   --   --   --   --  154  --  259 310   INR  --   --   --   --   --   --   --   --  1.76*   NA  --   --   --   --   --  140  --   --  138   POTASSIUM  --   --   --   --   --  4.3  --   --  3.8   CHLORIDE  --   --   --   --   --  108*  --   --  106   CO2  --   --   --   --   --  17*  --   --  23   BUN  --   --   --   --   --  34.4*  --   --  20.7   CR  --  1.57*  --   --   --  1.54*  --   --  0.98*   ANIONGAP  --   --   --   --   --  15  --   --  9   DAVID  --   --   --   --   --  6.5*  --   --  7.1*   *  --   --   --  192* 224*   < >  --  145*   ALBUMIN  --   --   --   --   --   --   --   --  2.2*   PROTTOTAL  --   --   --   --   --   --   --   --  4.8*   BILITOTAL  --   --   --   --   --   --   --   --  0.5   ALKPHOS  --   --   --   --   --   --   --   --  121   ALT  --   --   --   --   --   --   --   --  11   AST  --   --   --   --   --   --   --   --  14   LIPASE  --   --   --   --   --   --   --   --  6*    < > = values in this interval not displayed.     Recent Labs   Lab Test 10/30/23  1221   CHOL 158   HDL 50   LDL 81   TRIG 136     Recent Labs   Lab Test 04/06/24  0753 04/06/24  0550 04/05/24  0745 04/05/24  0542   NA  --   --   --  140   POTASSIUM  --   --   --  4.3   CHLORIDE  --   --   --  108*   CO2  --   --   --  17*   GLC  "152*  --    < > 224*   BUN  --   --   --  34.4*   CR  --  1.57*  --  1.54*   GFRESTIMATED  --  35*  --  35*   DAVID  --   --   --  6.5*    < > = values in this interval not displayed.     Recent Labs   Lab Test 10/30/23  1221 03/23/23  1001 12/31/22  1314   A1C 5.0 6.0* 5.8*     Recent Labs   Lab Test 04/05/24  1815 04/05/24  1331 04/05/24  0542   HGB 6.8* 7.0* 7.6*     No results for input(s): \"TROPONINI\" in the last 80884 hours.  Recent Labs   Lab Test 12/31/22  1314   NTBNPI >35,000*     No results for input(s): \"TSH\" in the last 16700 hours.  Recent Labs   Lab Test 04/04/24  0957 03/29/23  1319 12/31/22  1314   INR 1.76* 0.98 1.30*       40 MINUTES SPENT BY ME on the date of service doing chart review, history, exam, documentation, discussion with nursing staff and specialist, & further activities per the note.  Darcy Higginbotham MD  [facility]  [phone]   "

## 2024-04-10 PROBLEM — J96.01 ACUTE RESPIRATORY FAILURE WITH HYPOXIA (H): Status: ACTIVE | Noted: 2024-01-01

## 2024-04-10 NOTE — PLAN OF CARE
Problem: Adult Inpatient Plan of Care  Goal: Readiness for Transition of Care  Outcome: Progressing     Problem: Adult Inpatient Plan of Care  Goal: Absence of Hospital-Acquired Illness or Injury  Intervention: Identify and Manage Fall Risk  Recent Flowsheet Documentation  Taken 4/10/2024 1346 by Brooke Flro RN  Safety Promotion/Fall Prevention: safety round/check completed   Goal Outcome Evaluation:         Problem: Pain Acute  Goal: Optimal Pain Control and Function  Intervention: Prevent or Manage Pain  Recent Flowsheet Documentation  Taken 4/10/2024 1346 by Brooke Flor RN  Sleep/Rest Enhancement: comfort measures  Intervention: Optimize Psychosocial Wellbeing  Recent Flowsheet Documentation  Taken 4/10/2024 1346 by Brooke Flor RN  Supportive Measures: relaxation techniques promoted  Taken 4/10/2024 1300 by Brooke Flor RN  Supportive Measures: relaxation techniques promoted         Pt is A/O, has been calm with no visible signs of pain, giving Ativan and dilaudid as scheduled and emptied YUNG and nephrostomy.  YUNG output is dark and purulent, flushed YUNG line,  repositioning frequently, family at bedside, pt not very responsive , eyes has been closed the whole shift, but makes few facial movements when name is pronounced or when talked to.

## 2024-04-10 NOTE — PLAN OF CARE
Problem: Adult Inpatient Plan of Care  Goal: Plan of Care Review  Description: The Plan of Care Review/Shift note should be completed every shift.  The Outcome Evaluation is a brief statement about your assessment that the patient is improving, declining, or no change.  This information will be displayed automatically on your shift  note.  Outcome: Progressing     Problem: Palliative Care  Goal: Enhanced Quality of Life  Outcome: Progressing  Intervention: Optimize Function  Recent Flowsheet Documentation  Taken 4/10/2024 0120 by Randall Perez RN  Sleep/Rest Enhancement: comfort measures     Problem: Pain Acute  Goal: Optimal Pain Control and Function  Outcome: Progressing  Intervention: Prevent or Manage Pain  Recent Flowsheet Documentation  Taken 4/10/2024 0120 by Randall Perez RN  Sleep/Rest Enhancement: comfort measures   Goal Outcome Evaluation:  Patient is on Comfort cares. No verbal response and she does not open her eyes. However, slight reaction when writer calls her name. Appeared comfortable through out this shift. Winces a little with repositioning. Scheduled Dilaudid and Ativan administered. No nonverbals indicating need for PRN meds. Will continue to provide emotional support for patient and family.

## 2024-04-10 NOTE — PROGRESS NOTES
[facility]    PROGRESS NOTE - Hospitalist Service    Assessment and Plan    Principal Problem:    Comfort measures only status  Active Problems:    Essential hypertension    Other specified chronic obstructive pulmonary disease (H)    Diabetes mellitus, type 2 (H)    Malignant neoplasm of uterus, unspecified site (H)    Iliac artery bleed, right    Anemia due to blood loss, acute    Acute respiratory failure with hypoxia (H)    Stella Greene is a 73 year old female with h/o  metastatic high-grade endometrial cancer in 2022 s/p neoadjuvant therapy and GYN surgery, adjuvant chemo, radiation therapy, right ureteral stent in place and right lower extremity DVT on Eliquis who presented to ED for evaluation of dizziness, rectal bleeding.       In ED, hemoglobin was 5.4, CTA imaging reported active bleeding from right external iliac artery, large RLQ abscess with communication to bowel and likely urinary bladder, right hydronephrosis despite right ureteral stent in place.       On 4/4, from ED patient emergently taken to IR, pelvic angiography demonstrated large area of active bleeding from right external artery, successfully embolized.  Patient also underwent right nephrostomy tube placement and also drain placement on right lower pelvic collection.     Patient then admitted to ICU, required total of 5 unit pRBC transfusion per intensivist.  Patient also required pressors and subsequently weaned off.     Vascular surgery evaluated patient for RLE limb ischemia.  They discussed option for femorofemoral bypass versus amputation versus watching.  Reported patient does not wish to pursue intervention and would like to be comfort care.     Comfort cares  - patient previously transitioned to comfort cares 4/6/2024   - comfort care orders previously placed  - family requesting dilaudid and Ativan be scheduled IV, they were unhappy with SL delivered overnight due to mother was coughing/ choking on this  "and are worried of future events.   - discussed with   and all IV meds to be given in port  - dilaudid IV 0.5mg Q3hr scheduled and then Q2hrs prn IV and SL (however family do not like this route)  - Ativan 0.5mg IV scheduled Q4hrs and then IV and sl Q3rs prn   - palliative previously signed off.   - will keep her at this time since unstable for transport.     Hemorrhagic shock due to active bleeding from right external iliac artery;ABL anemia   - no further lab checks or transfusions since now comfort cares   --In ED, CT imaging reported active bleeding from right external leg artery, abscess eroding into the artery presumed due to lower GI bleeding.  --s/p right external iliac artery embolization and coil  -- Per intensivist, patient received total of 5 units packed RBC transfusion  - discussed with  and recommend that they notify nursing staff if she is uncomfortable or agonal or air hunger     #Stage IV endometrial cancer with progression;  #Large RLQ abscess with communication to bowel and likely urinary bladder;  --In ED, CT imaging reported large RLQ abscess with fistulous connections to sigmoid colon, possibly she, and also concern for fistulization to urinary bladder  -- Per ICU team, unclear what is infection versus malignancy or necrotic malignancy  --IR placed YUNG drains  --Patient does not want to continue antibiotics.     #Increased right hydronephrosis despite right ureteral stent;  #Acute kidney injury;  -- IR placed right nephrostomy tube  - no longer following labs     #History of RLE DVT  #History of COPD       Clinically Significant Risk Factors      # Overweight: Estimated body mass index is 28.31 kg/m  as calculated from the following:  Height as of this encounter: 1.626 m (5' 4\").  Weight as of this encounter: 74.8 kg (164 lb 14.4 oz)., PRESENT ON ADMISSION    COVID-19 PCR Results          12/31/2022    13:16   COVID-19 PCR Results   SARS CoV2 PCR Negative      COVID-19 Antibody " Results, Testing for Immunity           No data to display               Code Status: No CPR- Do NOT Intubate  VTE prophylaxis:  comfort cares   DIET: Orders Placed This Encounter      Regular Diet Adult    Drains/Lines: Patient has Port-a-cath. B/l PNT   Waddell Catheter: Not present      Weight bearing status: bedrest     Expected Discharge Date: 04/10/2024    Discharge Delays: Comfort Care/Hospice          Subjective:   in room and feels current regiment controlling her pain.  Patient was asking about a medication given IM to patient uncertain which was this.      PHYSICAL EXAM  Temp:  [98.9  F (37.2  C)] 98.9  F (37.2  C)  Pulse:  [115] 115  Resp:  [20] 20  BP: (100)/(55) 100/55  SpO2:  [92 %] 92 %  Wt Readings from Last 1 Encounters:   04/05/24 56.1 kg (123 lb 10.9 oz)       Intake/Output Summary (Last 24 hours) at 4/9/2024 0801  Last data filed at 4/9/2024 0359  Gross per 24 hour   Intake --   Output 925 ml   Net -925 ml      Body mass index is 23.37 kg/m .    Physical Exam  Constitutional:       Appearance: She is ill-appearing.      Comments: comfortable   Pulmonary:      Comments: Apneic breathing but not agonal  Abdominal:      Comments: B/l PNT       PERTINENT LABS/IMAGING:  Results for orders placed or performed during the hospital encounter of 04/04/24   CTA Chest Abdomen Pelvis w Contrast    Impression    IMPRESSION:    1.  Acute active bleeding is present on this exam.     2.  The right external iliac artery demonstrates active contrast extravasation and bleeding into a right lower quadrant abscess. The right external iliac artery courses through this abscess; abscess erosion into the artery presumed the etiology of bleed.    3.  Large right lower quadrant abscess. This was noted on outside [facility] CT report from March 2024, though imaging is not available from that exam. There is fistulous connection to the sigmoid colon and possibly cecum as well. Air and debris in the   bladder also raises  concern for fistulization to the urinary bladder.     4.  Etiology of the abscess is presumed from a prior large right lower quadrant mass eroding into bowel.     5.  Areas of mild colonic wall thickening and hyperenhancement may be reactive or from colitis. Upper normal pelvic small bowel with air-fluid levels, likely from slow flow or ileus. Distal small bowel and terminal ileum are not well evaluated from the   abscess.    6.  Significant right-sided DVT present. Deep vein thrombus extends at least from the right external iliac vein in the pelvis to the femoral vein at the distal thigh. Asymmetric right leg subcutaneous edema and swelling. Recommend lower extremity   ultrasound.    7.  Right ureteral stent appears in appropriate position, however, increased moderate to severe right hydronephrosis. The mid to distal right ureteral stent courses through the right lower quadrant abscess.    8.  New heterogeneous bandlike lower lung predominant opacities bilaterally, compatible with infectious / inflammatory process, to include drug reaction or organizing pneumonia. Additional new clustered nodules, also presumed inflammatory, though can be   followed.    9.  Diffuse subcutaneous body wall edema. Minimal ascites.       Critical Result: Active bleeding from the right external iliac artery. Large right lower quadrant abscess with communication to bowel and likely the urinary bladder. Right-sided deep vein thrombus. Increased right hydronephrosis; right ureteral stent   courses through the abscess.    Finding was identified on 4/4/2024 at 1:05 PM and initially reported to SAMINA Lainez at 1:05 PM in the ER. After final completion of the report, additional results were verbally communicated by phone to Dr. Soto Hayes at 1:38 PM.    Dr. Soto Hayes in the Emergency Room was contacted by me on 4/4/2024 2:00 PM CDT and verbalized understanding of the critical result.      IR Visceral Angiogram    Impression    IMPRESSION:     1. Pelvic angiography demonstrates large area of active bleeding arising from the right external artery, as seen on CT angiography. This vessel was successfully embolized utilizing one Amplatzer plug and two microcoils.  2. Successful placement of a 12 Finnish drain into the complex right lower pelvic collection.  3. Successful placement of a 10 Finnish right nephrostomy tube with drainage of purulent material.   IR Nephrostomy Tube Placement Right    Impression    IMPRESSION:    1. Pelvic angiography demonstrates large area of active bleeding arising from the right external artery, as seen on CT angiography. This vessel was successfully embolized utilizing one Amplatzer plug and two microcoils.  2. Successful placement of a 12 Finnish drain into the complex right lower pelvic collection.  3. Successful placement of a 10 Finnish right nephrostomy tube with drainage of purulent material.   IR Peritoneal Abscess Drainage    Impression    IMPRESSION:    1. Pelvic angiography demonstrates large area of active bleeding arising from the right external artery, as seen on CT angiography. This vessel was successfully embolized utilizing one Amplatzer plug and two microcoils.  2. Successful placement of a 12 Finnish drain into the complex right lower pelvic collection.  3. Successful placement of a 10 Finnish right nephrostomy tube with drainage of purulent material.   CT Abdomen Pelvis w/o Contrast    Impression    IMPRESSION:   1.  Interval embolization of right external iliac artery.  2.  There is contrast now seen throughout the colon which should relate to extravasation of intravascular contrast during documented episode of active extravasation.  3.  New mesenteric venous air present, can't exclude focus of bowel wall ischemia but no obvious new focus of bowel abnormality evident. There remains the pre-existing large right pelvic mass that appears to involve the cecum/ascending colon.  4.  New right lower quadrant  percutaneous drain extends into the large complex right lower quadrant mass though the drain is very anteriorly positioned within this large collection and may not result in adequate drainage in current position.  5.  Interval placement of right percutaneous nephrostomy tube with resolution of right hydronephrosis. Mottled enhancement of right kidney may relate to the previous hydronephrosis though right pyelonephritis can't be excluded.         Imaging results reviewed over the past 24 hrs:   No results found for this or any previous visit (from the past 24 hour(s)).  Recent Labs   Lab 04/06/24  0753 04/06/24  0550 04/05/24  1815 04/05/24  1331 04/05/24  0745 04/05/24  0542 04/04/24  1754 04/04/24  1205 04/04/24  0957   WBC  --   --   --   --   --  8.8  --  7.3 9.6   HGB  --   --  6.8* 7.0*  --  7.6*   < > 7.0* 5.4*   MCV  --   --   --   --   --  88  --  90 89   PLT  --   --   --   --   --  154  --  259 310   INR  --   --   --   --   --   --   --   --  1.76*   NA  --   --   --   --   --  140  --   --  138   POTASSIUM  --   --   --   --   --  4.3  --   --  3.8   CHLORIDE  --   --   --   --   --  108*  --   --  106   CO2  --   --   --   --   --  17*  --   --  23   BUN  --   --   --   --   --  34.4*  --   --  20.7   CR  --  1.57*  --   --   --  1.54*  --   --  0.98*   ANIONGAP  --   --   --   --   --  15  --   --  9   DAVID  --   --   --   --   --  6.5*  --   --  7.1*   *  --   --   --  192* 224*   < >  --  145*   ALBUMIN  --   --   --   --   --   --   --   --  2.2*   PROTTOTAL  --   --   --   --   --   --   --   --  4.8*   BILITOTAL  --   --   --   --   --   --   --   --  0.5   ALKPHOS  --   --   --   --   --   --   --   --  121   ALT  --   --   --   --   --   --   --   --  11   AST  --   --   --   --   --   --   --   --  14   LIPASE  --   --   --   --   --   --   --   --  6*    < > = values in this interval not displayed.     Recent Labs   Lab Test 10/30/23  1221   CHOL 158   HDL 50   LDL 81   TRIG 136  "    Recent Labs   Lab Test 04/06/24  0753 04/06/24  0550 04/05/24  0745 04/05/24  0542   NA  --   --   --  140   POTASSIUM  --   --   --  4.3   CHLORIDE  --   --   --  108*   CO2  --   --   --  17*   *  --    < > 224*   BUN  --   --   --  34.4*   CR  --  1.57*  --  1.54*   GFRESTIMATED  --  35*  --  35*   DAVID  --   --   --  6.5*    < > = values in this interval not displayed.     Recent Labs   Lab Test 10/30/23  1221 03/23/23  1001 12/31/22  1314   A1C 5.0 6.0* 5.8*     Recent Labs   Lab Test 04/05/24  1815 04/05/24  1331 04/05/24  0542   HGB 6.8* 7.0* 7.6*     No results for input(s): \"TROPONINI\" in the last 49960 hours.  Recent Labs   Lab Test 12/31/22  1314   NTBNPI >35,000*     No results for input(s): \"TSH\" in the last 11097 hours.  Recent Labs   Lab Test 04/04/24  0957 03/29/23  1319 12/31/22  1314   INR 1.76* 0.98 1.30*       40 MINUTES SPENT BY ME on the date of service doing chart review, history, exam, documentation, discussion with nursing staff and specialist, & further activities per the note.  Darcy Higginbotham MD  [facility]  [phone]   "

## 2024-04-10 NOTE — PLAN OF CARE
Problem: Palliative Care  Goal: Enhanced Quality of Life  4/9/2024 2208 by Lulu Erazo RN  Outcome: Progressing    Problem: Pain Acute  Goal: Optimal Pain Control and Function  Outcome: Progressing  Intervention: Prevent or Manage Pain  Recent Flowsheet Documentation  Taken 4/9/2024 1654 by Lulu Erazo RN  Sleep/Rest Enhancement: comfort measures     Intervention: Optimize Function  Recent Flowsheet Documentation  Taken 4/9/2024 1654 by Lulu Erazo RN  Sleep/Rest Enhancement: comfort measures  Intervention: Optimize Psychosocial Wellbeing  Recent Flowsheet Documentation  Taken 4/9/2024 1654 by Lulu Erazo RN  Family/Support System Care: involvement promoted     Goal Outcome Evaluation:       Pt with hx of metastatic endometrial cancer presented to the hospital on 4/04 with dizziness and rectal bleeding. Imaging revealed active bleeding from right iliac artery with a large RUQ abscess with fistula connection to the bowel and likely bladder. Pt developed limb ischemia following an emergent coil embolization and decided to forego further procedures. Transferred out of ICU on 4/07 on comfort cares. Receiving scheduled IV Dilaudid q 3 hours and scheduled Ativan q 4 hours. Right chest port access. She has been resting comfortably in bed and has not required any prn medications. Pt is comatose. Minimal movement. No eye opening. Heart rate is tachy and irregular. O2 sats 92% on RA. Breath sounds are clear. She has a purpuric rash to her bilateral upper extremities. 2-3+ edema of the RLE. Bowel sounds are hypoactive. No oral intake. Pure Wick in place with brown colored urine. YUNG drain to RLQ has 30 ml of brown foul smelling liquid output that resembles feces. Right nephrostomy tube has 90 ml cloudy yellow output this shift.

## 2024-04-11 NOTE — PLAN OF CARE
Problem: Palliative Care  Goal: Enhanced Quality of Life  Outcome: Progressing  Intervention: Optimize Psychosocial Wellbeing  Recent Flowsheet Documentation  Taken 4/10/2024 1654 by Lulu Erazo RN  Family/Support System Care: presence promoted     Problem: Pain Acute  Goal: Optimal Pain Control and Function  Outcome: Progressing     Problem: Skin Injury Risk Increased  Goal: Skin Health and Integrity  Outcome: Not Progressing  Intervention: Plan: Nurse Driven Intervention: Moisture Management  Recent Flowsheet Documentation  Taken 4/10/2024 1654 by Lulu Erazo RN  Moisture Interventions: Incontinence pad  Intervention: Plan: Nurse Driven Intervention: Friction and Shear  Recent Flowsheet Documentation  Taken 4/10/2024 1654 by Lulu Erazo RN  Friction/Shear Interventions: (lift sheet) Other (comment)  Intervention: Optimize Skin Protection  Recent Flowsheet Documentation  Taken 4/10/2024 1654 by Lulu Erazo RN  Activity Management: bedrest  Head of Bed (HOB) Positioning: HOB at 30-45 degrees     Goal Outcome Evaluation:        Pt with hx of metastatic endometrial cancer presented to the hospital on 4/04 with dizziness and rectal bleeding. Imaging revealed active bleeding from right iliac artery with a large RUQ abscess with fistula connection to the bowel and likely bladder. Pt developed limb ischemia following an emergent coil embolization and decided to forego further procedures. Transferred out of ICU on 4/07 on comfort cares. Receiving scheduled IV Dilaudid q 3 hours and scheduled Ativan q 4 hours. Right chest port access. She has been resting comfortably in bed and has not required any prn medications. Pt is unresponsive. Grimacing with turn and repo. No eye opening. Heart rate is tachy and irregular. O2 sats 87% on RA. Breath sounds are diminished. She has a purpuric rash to her bilateral upper extremities. 3+ edema of the RLE ischemic limb. Color is red/cyanotic. Foot is cool to  touch. Extensive bruising noted throughout limb.  Bowel sounds are hypoactive. No oral intake. No urine output this shift. Approximately 55 ml from right nephrostomy tube. YUNG drain to RLQ has no output this shift.

## 2024-04-11 NOTE — PROGRESS NOTES
[facility] No [facility] with [facility]. [facility] to reach out to [facility] to coordinate possible eye donation.

## 2024-04-11 NOTE — PLAN OF CARE
Goal Outcome Evaluation:       Patient was pronounced at 0542. Writer updated son Esteban and CN completed Post Mortem flow sheet.

## 2024-04-11 NOTE — PROGRESS NOTES
[facility] unable to reach family. Instructed RN to release body and they will follow-up with Shellie Nielsen RN

## 2024-04-11 NOTE — DISCHARGE SUMMARY
Primary Care Physician: Lacho Gunter  Admission Date: 4/4/2024   Discharge Provider: Darcy Higginbotham MD Discharge Date: 4/11/2024      Code Status: No CPR- Do NOT Intubate     Principal Problem:    Comfort measures only status  Active Problems:    Essential hypertension    Other specified chronic obstructive pulmonary disease (H)    Diabetes mellitus, type 2 (H)    Malignant neoplasm of uterus, unspecified site (H)    Iliac artery bleed, right    Anemia due to blood loss, acute    Acute respiratory failure with hypoxia (H)      Date and Time of Death: 04/11/24 0542 am      Stella Greene is a 73 year old old female with h/o  metastatic high-grade endometrial cancer in 2022 s/p neoadjuvant therapy and GYN surgery, adjuvant chemo, radiation therapy, right ureteral stent in place and right lower extremity DVT on Eliquis who presented to ED for evaluation of dizziness, rectal bleeding.       In ED, hemoglobin was 5.4, CTA imaging reported active bleeding from right external iliac artery, large RLQ abscess with communication to bowel and likely urinary bladder, right hydronephrosis despite right ureteral stent in place.       On 4/4, from ED patient emergently taken to IR, pelvic angiography demonstrated large area of active bleeding from right external artery, successfully embolized.  Patient also underwent right nephrostomy tube placement and also drain placement on right lower pelvic collection.     Patient then admitted to ICU, required total of 5 unit pRBC transfusion per intensivist.  Patient also required pressors and subsequently weaned off.     Vascular surgery evaluated patient for RLE limb ischemia.  They discussed option for femorofemoral bypass versus amputation versus watching.  Reported patient does not wish to pursue intervention and would like to be comfort care.     Comfort cares  - patient previously transitioned to comfort cares 4/6/2024   - comfort care orders previously placed  -  palliative previously signed off.      Hemorrhagic shock due to active bleeding from right external iliac artery;ABL anemia   - no further lab checks or transfusions since now comfort cares   --In ED, CT imaging reported active bleeding from right external leg artery, abscess eroding into the artery presumed due to lower GI bleeding.  --s/p right external iliac artery embolization and coil  -- Per intensivist, patient received total of 5 units packed RBC transfusion     #Stage IV endometrial cancer with progression;  #Large RLQ abscess with communication to bowel and likely urinary bladder;  --In ED, CT imaging reported large RLQ abscess with fistulous connections to sigmoid colon, possibly she, and also concern for fistulization to urinary bladder  -- Per ICU team, unclear what is infection versus malignancy or necrotic malignancy  --IR placed YUNG drains     #Increased right hydronephrosis despite right ureteral stent;  #Acute kidney injury;  -- IR placed right nephrostomy tube  - no longer following labs     #History of RLE DVT  #History of COPD          Darcy Higginbotham MD  [facility] Medicine Service  [phone]

## 2024-04-11 NOTE — DEATH PRONOUNCEMENT
MD DEATH PRONOUNCEMENT    House officer called to pronounce Stella MELO Greene dead. Patient unresponsive to verbal and tactile stimuli.  No heart sounds heard, no pulse felt. No spontaneous respirations.  Pupils fixed and dilated. Patient pronounced dead at 5:42 AM on 24. Nursing staff to notify  home.     Rest in Stella barnes.    Elena Renae MD  [facility], PGY-3  Contact via [employer] [facility].
